# Patient Record
Sex: FEMALE | Race: WHITE | NOT HISPANIC OR LATINO | Employment: OTHER | URBAN - METROPOLITAN AREA
[De-identification: names, ages, dates, MRNs, and addresses within clinical notes are randomized per-mention and may not be internally consistent; named-entity substitution may affect disease eponyms.]

---

## 2018-01-30 PROBLEM — I49.9 CARDIAC ARRHYTHMIA: Status: ACTIVE | Noted: 2018-01-30

## 2018-11-15 ENCOUNTER — TELEPHONE (OUTPATIENT)
Dept: RHEUMATOLOGY | Facility: CLINIC | Age: 78
End: 2018-11-15

## 2018-11-15 NOTE — TELEPHONE ENCOUNTER
----- Message from Bautista Aguiar sent at 11/15/2018 12:28 PM CST -----  Contact: pt  Type:  Sooner Apoointment Request    Caller is requesting a sooner appointment.     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

## 2018-11-20 ENCOUNTER — TELEPHONE (OUTPATIENT)
Dept: RHEUMATOLOGY | Facility: CLINIC | Age: 78
End: 2018-11-20

## 2018-11-20 NOTE — TELEPHONE ENCOUNTER
----- Message from RT Lea sent at 11/20/2018  9:19 AM CST -----  Contact: pt    pt , requesting an appt to be worked in soon as possible due to inflammation / pain issues, thanks.

## 2018-11-27 ENCOUNTER — TELEPHONE (OUTPATIENT)
Dept: RHEUMATOLOGY | Facility: CLINIC | Age: 78
End: 2018-11-27

## 2018-11-28 NOTE — TELEPHONE ENCOUNTER
----- Message from Scarlet Cruz sent at 11/27/2018  3:09 PM CST -----  Type: Needs appointment     Who Called:  Patient  Best Call Back Number: [phone]  Additional Information: Patient left previous message/patient is being referred to see Dr. Davison/patient is willing to see Dr. Eden Snyder if can be seen sooner for joint stiffness and swelling in hands/please call patient back to advise.

## 2018-12-04 ENCOUNTER — LAB VISIT (OUTPATIENT)
Dept: LAB | Facility: HOSPITAL | Age: 78
End: 2018-12-04
Attending: INTERNAL MEDICINE
Payer: MEDICARE

## 2018-12-04 ENCOUNTER — INITIAL CONSULT (OUTPATIENT)
Dept: RHEUMATOLOGY | Facility: CLINIC | Age: 78
End: 2018-12-04
Payer: MEDICARE

## 2018-12-04 VITALS
WEIGHT: 166.44 LBS | SYSTOLIC BLOOD PRESSURE: 140 MMHG | HEIGHT: 62 IN | HEART RATE: 68 BPM | BODY MASS INDEX: 30.63 KG/M2 | DIASTOLIC BLOOD PRESSURE: 80 MMHG

## 2018-12-04 DIAGNOSIS — R76.8 ANA POSITIVE: Primary | ICD-10-CM

## 2018-12-04 DIAGNOSIS — M15.9 PRIMARY OSTEOARTHRITIS INVOLVING MULTIPLE JOINTS: ICD-10-CM

## 2018-12-04 DIAGNOSIS — R76.8 ANA POSITIVE: ICD-10-CM

## 2018-12-04 LAB
C3 SERPL-MCNC: 111 MG/DL
C4 SERPL-MCNC: 20 MG/DL
THYROGLOB AB SERPL IA-ACNC: 4.5 IU/ML
THYROPEROXIDASE IGG SERPL-ACNC: <6 IU/ML

## 2018-12-04 PROCEDURE — 86160 COMPLEMENT ANTIGEN: CPT | Mod: 59

## 2018-12-04 PROCEDURE — 86160 COMPLEMENT ANTIGEN: CPT

## 2018-12-04 PROCEDURE — 86235 NUCLEAR ANTIGEN ANTIBODY: CPT | Mod: 59

## 2018-12-04 PROCEDURE — 86235 NUCLEAR ANTIGEN ANTIBODY: CPT

## 2018-12-04 PROCEDURE — 86225 DNA ANTIBODY NATIVE: CPT

## 2018-12-04 PROCEDURE — 86038 ANTINUCLEAR ANTIBODIES: CPT

## 2018-12-04 PROCEDURE — 99999 PR PBB SHADOW E&M-EST. PATIENT-LVL III: CPT | Mod: PBBFAC,,, | Performed by: INTERNAL MEDICINE

## 2018-12-04 PROCEDURE — 86800 THYROGLOBULIN ANTIBODY: CPT

## 2018-12-04 PROCEDURE — 36415 COLL VENOUS BLD VENIPUNCTURE: CPT | Mod: PO

## 2018-12-04 PROCEDURE — 86376 MICROSOMAL ANTIBODY EACH: CPT

## 2018-12-04 PROCEDURE — 99205 OFFICE O/P NEW HI 60 MIN: CPT | Mod: S$GLB,,, | Performed by: INTERNAL MEDICINE

## 2018-12-04 PROCEDURE — 1101F PT FALLS ASSESS-DOCD LE1/YR: CPT | Mod: CPTII,S$GLB,, | Performed by: INTERNAL MEDICINE

## 2018-12-04 RX ORDER — DICLOFENAC SODIUM 10 MG/G
2 GEL TOPICAL 3 TIMES DAILY PRN
Qty: 100 G | Refills: 6 | Status: SHIPPED | OUTPATIENT
Start: 2018-12-04 | End: 2020-02-10

## 2018-12-04 NOTE — PROGRESS NOTES
Subjective:          Chief Complaint: Aracelis Weber is a 78 y.o. female who had concerns including Disease Management.    HPI:    Patient is a 78-year-old female referred by primary care physician for arthritis.  She had a workup that involved a negative rheumatoid factor, age appropriate sedimentation rate,C reactive protein JANET was positive 1:160 in speckled pattern  She is having pain in her hand with stiffness and right 2nd PIP joint now unable to flex. 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   Hx of bilateral TKA 3 and 5 years ago and those are doing well.   She notes intermittent edema. Some hammer toe deformity bilaterally making shoes problematic but not painful otherwise.   Long hx of GERD-severe.   Cannot tolerate NSAIDs at all w/o gastritis.   Can use Hydrocodone PRN   Patient denies weight loss, rashes, dry eye, dry mouth, nasal or palatal ulcerations,  lymphadenopathy, Raynaud's, hx of DVT/miscarriages, psoriasis or family hx of psoriasis, rashes, serositis, anemia or other constitutional symptoms.  Asking about naltrexone    Component      Latest Ref Rng & Units 11/10/2018   JANET IgG by IFA      <1:80 Detected (H)   JANET Interpretive Comment       See Note   JANET TITER       1:160 (A)   JANET PATTERN       Speckled (A)   Sed Rate      0 - 29 mm/Hr 39 (H)   CRP      0.00 - 0.90 mg/dL <0.50   JANET Screen      None Detected Detected (A)   Rheumatoid Factor      0.0 - 15.0 IU/mL <8.6       REVIEW OF SYSTEMS:    Review of Systems   Constitutional: Negative for fever, malaise/fatigue and weight loss.   HENT: Negative for sore throat.    Eyes: Negative for double vision, photophobia and redness.   Respiratory: Negative for cough, shortness of breath and wheezing.    Cardiovascular: Negative for chest pain, palpitations and orthopnea.   Gastrointestinal: Negative for abdominal pain, constipation and diarrhea.   Genitourinary: Negative for dysuria, hematuria and urgency.    Musculoskeletal: Positive for joint pain. Negative for back pain and myalgias.   Skin: Negative for rash.   Neurological: Negative for dizziness, tingling, focal weakness and headaches.   Endo/Heme/Allergies: Does not bruise/bleed easily.   Psychiatric/Behavioral: Negative for depression, hallucinations and suicidal ideas.               Objective:            History reviewed. No pertinent past medical history.  Family History   Problem Relation Age of Onset    Heart disease Mother     Arthritis Mother     Cancer Sister     Arthritis Sister     Diabetes Sister     Hypertension Sister      Social History     Tobacco Use    Smoking status: Former Smoker     Start date: 2/3/1955     Last attempt to quit: 1/3/1960     Years since quittin.9    Smokeless tobacco: Never Used   Substance Use Topics    Alcohol use: Not on file    Drug use: Not on file         Current Outpatient Medications on File Prior to Visit   Medication Sig Dispense Refill    aspirin (ECOTRIN) 81 MG EC tablet Take 81 mg by mouth once daily.      C/SOURCHERRY/CELERY/GRAPE SEED (TART CHERRY ORAL) Take by mouth.      CALCIUM CARBONATE (CALCIUM 600 ORAL) Take 1,200 mg by mouth once daily.       cholecalciferol, vitamin D3, (VITAMIN D3) 5,000 unit Tab Take 5,000 Units by mouth once daily.      cyclobenzaprine (FLEXERIL) 5 MG tablet Take 5 mg by mouth 3 (three) times daily as needed for Muscle spasms.      doxycycline (MONODOX) 100 MG capsule Take 1 capsule (100 mg total) by mouth 2 (two) times daily. 20 capsule 0    hydrocodone-acetaminophen 7.5-325mg (NORCO) 7.5-325 mg per tablet Take 1 tablet by mouth every 6 (six) hours as needed for Pain. 30 tablet 0    LACTOBACILLUS ACIDOPHILUS (PROBIOTIC ORAL) Take 1 capsule by mouth 2 (two) times daily.      metoprolol succinate (TOPROL-XL) 100 MG 24 hr tablet TAKE 1 TABLET EVERY DAY 90 tablet 3    multivitamin (ONE DAILY MULTIVITAMIN) per tablet Take 1 tablet by mouth once daily.       omeprazole (PRILOSEC) 40 MG capsule Take 1 capsule (40 mg total) by mouth once daily. 30 capsule 11    UNABLE TO FIND Sulfur powder QD      VIT C/VIT E ACET/LUTEIN/MIN (OCUVITE LUTEIN ORAL) Take by mouth.      VIT C/ZN/K.GINSG/MELODY/HRB62 (IMMUNE SUPPORT COMPLEX ORAL) Take 2 capsules by mouth once daily.       No current facility-administered medications on file prior to visit.        There were no vitals filed for this visit.    Physical Exam:    Physical Exam   Constitutional: She is oriented to person, place, and time. She appears well-developed and well-nourished.   HENT:   Head: Normocephalic and atraumatic.   Mouth/Throat: Oropharynx is clear and moist.   Eyes: EOM are normal. Pupils are equal, round, and reactive to light.   Neck: Normal range of motion.   Cardiovascular: Normal rate, regular rhythm and normal heart sounds.   Pulmonary/Chest: Effort normal and breath sounds normal.   Musculoskeletal:        Right shoulder: She exhibits normal range of motion, no tenderness and no swelling.        Left shoulder: She exhibits normal range of motion, no tenderness and no swelling.        Right elbow: She exhibits normal range of motion and no swelling. No tenderness found.        Left elbow: She exhibits normal range of motion and no swelling. No tenderness found.        Right wrist: She exhibits normal range of motion, no tenderness and no swelling.        Left wrist: She exhibits normal range of motion, no tenderness and no swelling.        Right knee: She exhibits normal range of motion and no swelling. No tenderness found.        Left knee: She exhibits normal range of motion and no swelling. No tenderness found.        Right hand: She exhibits decreased range of motion and tenderness. She exhibits no swelling.        Left hand: She exhibits decreased range of motion and tenderness. She exhibits no swelling.        Right foot: There is normal range of motion, no tenderness and no swelling.        Left  foot: There is normal range of motion, no tenderness and no swelling.   Bony hypertrophy at the PIP and DIP joints. +squaring at the CMC joint. No active synovitis on 28 joint exam.    Neurological: She is alert and oriented to person, place, and time.   Skin: Skin is warm and dry.   Psychiatric: She has a normal mood and affect. Her behavior is normal.             Assessment:       Encounter Diagnoses   Name Primary?    JANET positive Yes    Primary osteoarthritis involving multiple joints           Plan:        JANET positive  -     JANET; Future; Expected date: 12/04/2018  -     Anti-DNA antibody, double-stranded; Future; Expected date: 12/04/2018  -     Anti Sm/RNP Antibody; Future; Expected date: 12/04/2018  -     C3 complement; Future; Expected date: 12/04/2018  -     C4 complement; Future; Expected date: 12/04/2018  -     Anti-Smith antibody; Future; Expected date: 12/04/2018  -     Anti-thyroglobulin antibody; Future; Expected date: 12/04/2018  -     Thyroid peroxidase antibody; Future; Expected date: 12/04/2018  -     Sjogrens syndrome-B extractable nuclear antibody; Future; Expected date: 12/04/2018  -     Sjogrens syndrome-A extractable nuclear antibody; Future; Expected date: 12/04/2018    Primary osteoarthritis involving multiple joints    Other orders  -     diclofenac sodium (VOLTAREN) 1 % Gel; Apply 2 g topically 3 (three) times daily as needed.  Dispense: 100 g; Refill: 6    Trial with LDN 1.5mg daily to start and if tolerated we can increase to 3mg daily  Voltaren Gel  R/o +JANET but low suspicion for SLE.     No Follow-up on file.      f/u 4 months  Thank you for allowing me to participate in the care of this very pleasant patient.    60min consultation with greater than 50% spent in counseling, chart review and coordination of care. All questions answered.

## 2018-12-04 NOTE — LETTER
December 19, 2018      [address]  [facility] [id number]           [facility]  [address] [facility] [address] [place] [address]  Phone: [phone]  Fax: [phone]          Patient: Aracelis Weber   MR Number: [id number]   YOB: 1940   Date of Visit: 12/4/2018       Dear Dr. Ryan Reddy:    Thank you for referring Aracelis Weber to me for evaluation. Attached you will find relevant portions of my assessment and plan of care.    If you have questions, please do not hesitate to call me. I look forward to following Aracelis Weber along with you.    Sincerely,    Eden Snyder, DO    Enclosure  CC:  No Recipients    If you would like to receive this communication electronically, please contact [email or web address] or [phone] to request more information on [employer] access.    For providers and/or their staff who would like to refer a patient to [facility], please contact us through our one-stop-shop provider referral line, [facility], at [phone].    If you feel you have received this communication in error or would no longer like to receive these types of communications, please e-mail [email or web address]

## 2018-12-05 LAB
ANA SER QL IF: NORMAL
ANTI SM ANTIBODY: 4.91 EU
ANTI SM/RNP ANTIBODY: 5 EU
ANTI-SM INTERPRETATION: NEGATIVE
ANTI-SM/RNP INTERPRETATION: NEGATIVE
ANTI-SSA ANTIBODY: 4.05 EU
ANTI-SSA INTERPRETATION: NEGATIVE
ANTI-SSB ANTIBODY: 1.03 EU
ANTI-SSB INTERPRETATION: NEGATIVE
DSDNA AB SER-ACNC: NORMAL [IU]/ML

## 2018-12-14 ENCOUNTER — TELEPHONE (OUTPATIENT)
Dept: RHEUMATOLOGY | Facility: CLINIC | Age: 78
End: 2018-12-14

## 2018-12-14 NOTE — TELEPHONE ENCOUNTER
----- Message from Keily Lindsey sent at 12/14/2018  9:46 AM CST -----  Contact: Patient  Type:  Test Results    Who Called:  Patient  Name of Test (Lab/Mammo/Etc): labs  Date of Test:  12/4  Ordering Provider:    Where the test was performed:  [facility]  Best Call Back Number:  [phone] (home)    Additional Information:  Patient requesting a call back to discuss, has questions regarding labs. Thank you!     Spoke to the patient. Explained once the doctor reviews lab results, she will get a call back. CG

## 2018-12-14 NOTE — TELEPHONE ENCOUNTER
Repeat JANET negative nothing from a rheumatologic perspective all the other testing is negative for lupus or other connective tissue diseases.  She does have a positive anti thyroglobulin antibody which can be seen in autoimmune thyroid disease- is it is of no concern other than to follow up with her primary care for routine thyroid levels.    Kaylin CORBIN

## 2018-12-14 NOTE — TELEPHONE ENCOUNTER
Patient given lab results per Dr. Snyder. Patient will follow up with PCP concerning thyroid labs. BERKLEY

## 2018-12-27 PROBLEM — M19.90 ARTHRITIS: Status: ACTIVE | Noted: 2018-12-27

## 2019-04-24 ENCOUNTER — OFFICE VISIT (OUTPATIENT)
Dept: URGENT CARE | Facility: CLINIC | Age: 79
End: 2019-04-24
Payer: MEDICARE

## 2019-04-24 VITALS
HEIGHT: 62 IN | RESPIRATION RATE: 15 BRPM | OXYGEN SATURATION: 99 % | WEIGHT: 163 LBS | BODY MASS INDEX: 30 KG/M2 | TEMPERATURE: 98 F | SYSTOLIC BLOOD PRESSURE: 131 MMHG | HEART RATE: 53 BPM | DIASTOLIC BLOOD PRESSURE: 62 MMHG
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
## 2019-04-24 DIAGNOSIS — S80.01XA CONTUSION OF RIGHT KNEE, INITIAL ENCOUNTER: ICD-10-CM

## 2019-04-24 DIAGNOSIS — S20.212A CONTUSION, CHEST WALL, LEFT, INITIAL ENCOUNTER: ICD-10-CM

## 2019-04-24 PROCEDURE — 73562 X-RAY EXAM OF KNEE 3: CPT | Mod: RT,S$GLB,, | Performed by: RADIOLOGY

## 2019-04-24 PROCEDURE — 1101F PT FALLS ASSESS-DOCD LE1/YR: CPT | Mod: CPTII,S$GLB,, | Performed by: FAMILY MEDICINE

## 2019-04-24 PROCEDURE — 73562 XR KNEE 3 VIEW RIGHT: ICD-10-PCS | Mod: RT,S$GLB,, | Performed by: RADIOLOGY

## 2019-04-24 PROCEDURE — 99214 OFFICE O/P EST MOD 30 MIN: CPT | Mod: S$GLB,,, | Performed by: FAMILY MEDICINE

## 2019-04-24 PROCEDURE — 71100 XR RIBS 2 VIEW LEFT: ICD-10-PCS | Mod: LT,S$GLB,, | Performed by: RADIOLOGY

## 2019-04-24 PROCEDURE — 99214 PR OFFICE/OUTPT VISIT, EST, LEVL IV, 30-39 MIN: ICD-10-PCS | Mod: S$GLB,,, | Performed by: FAMILY MEDICINE

## 2019-04-24 PROCEDURE — 1101F PR PT FALLS ASSESS DOC 0-1 FALLS W/OUT INJ PAST YR: ICD-10-PCS | Mod: CPTII,S$GLB,, | Performed by: FAMILY MEDICINE

## 2019-04-24 PROCEDURE — 71100 X-RAY EXAM RIBS UNI 2 VIEWS: CPT | Mod: LT,S$GLB,, | Performed by: RADIOLOGY

## 2019-04-24 NOTE — PROGRESS NOTES
"Subjective:       Patient ID: Aracelis Weber is a 78 y.o. female.    Vitals:  height is 5' 2" (1.575 m) and weight is 73.9 kg (163 lb). Her temperature is 97.8 °F (36.6 °C). Her blood pressure is 131/62 and her pulse is 53 (abnormal). Her respiration is 15 and oxygen saturation is 99%.     Chief Complaint: Fall (monday 3pm); Knee Pain (right); and Rib Injury (left rib )    Pt fell Monday afternoon in her home while carrying stuff. Fell forward and hurt right knee and left side ribs. Hurts to take take deep breath. But denies SOB. Has not taken otc meds for pain. Has rested last day or so and has improved but still sore.     Fall   The accident occurred 2 days ago. The fall occurred while walking. She fell from a height of 3 to 5 ft. She landed on hard floor. There was no blood loss. The point of impact was the right knee. The pain is present in the left shoulder. Pertinent negatives include no abdominal pain, hematuria, loss of consciousness, nausea or vomiting.   Knee Pain       Patient landed directly on her right kneecap and then rolled and fell onto her left side jamming her elbow into her left anterior lateral rib cage.  She believe she bumped her head slightly because or glasses flu off but she did not lose consciousness and has no loss of memory or headaches at this time.  She has been able to ambulate more effectively every day since the injury and her knee pain is decreasing she continues to have pain in the left anterior infraclavicular region and the left anterior lateral mid chest around the 9th through 12th ribs.  Denies hemoptysis denies difficulty swallowing or eating.  No vomiting.  Some discomfort with deep inspiration.    Constitution: Negative for fatigue.   HENT: Negative for facial swelling and facial trauma.    Neck: Negative for neck stiffness.   Cardiovascular: Negative for chest trauma.   Eyes: Negative for eye trauma, double vision and blurred vision.   Gastrointestinal: Negative for " abdominal trauma, abdominal pain, nausea, vomiting, constipation, diarrhea and rectal bleeding.   Genitourinary: Negative for hematuria, missed menses, genital trauma and pelvic pain.   Musculoskeletal: Positive for pain. Negative for trauma, joint swelling and abnormal ROM of joint.   Skin: Positive for bruising. Negative for color change, wound, abrasion and laceration.   Neurological: Negative for dizziness, history of vertigo, light-headedness, coordination disturbances, altered mental status and loss of consciousness.   Hematologic/Lymphatic: Negative for history of bleeding disorder.   Psychiatric/Behavioral: Negative for altered mental status.       Objective:      Physical Exam   Constitutional: She is oriented to person, place, and time. She appears well-developed and well-nourished. She is cooperative.  Non-toxic appearance. She does not appear ill. No distress.   HENT:   Head: Normocephalic and atraumatic.   Right Ear: Hearing, tympanic membrane, external ear and ear canal normal.   Left Ear: Hearing, tympanic membrane, external ear and ear canal normal.   Nose: Nose normal. No mucosal edema, rhinorrhea or nasal deformity. No epistaxis. Right sinus exhibits no maxillary sinus tenderness and no frontal sinus tenderness. Left sinus exhibits no maxillary sinus tenderness and no frontal sinus tenderness.   Mouth/Throat: Uvula is midline, oropharynx is clear and moist and mucous membranes are normal. No trismus in the jaw. Normal dentition. No uvula swelling. No posterior oropharyngeal erythema.   Eyes: Conjunctivae and lids are normal. Right eye exhibits no discharge. Left eye exhibits no discharge. No scleral icterus.   Sclera clear bilat   Neck: Trachea normal, normal range of motion, full passive range of motion without pain and phonation normal. Neck supple.   Cardiovascular: Normal rate, regular rhythm, normal heart sounds, intact distal pulses and normal pulses.   Pulmonary/Chest: Effort normal and  breath sounds normal. No respiratory distress. She exhibits tenderness.   Tenderness along the left lower costochondral joints.  No subcutaneous emphysema or crepitus.  Tenderness also in the left infraclavicular area along the ribs but again no crepitus or subcu emphysema.   Abdominal: Soft. Normal appearance and bowel sounds are normal. She exhibits no distension, no pulsatile midline mass and no mass. There is no tenderness.   Musculoskeletal: Normal range of motion. She exhibits no edema or deformity.   Semi circular ecchymosis involving the inferior and medial aspects of the right patella well-healed TKR incision sites.  No gross ligament instability distal neurovascular intact.   Neurological: She is alert and oriented to person, place, and time. She exhibits normal muscle tone. Coordination normal.   Skin: Skin is warm, dry and intact. She is not diaphoretic. No pallor.   Psychiatric: She has a normal mood and affect. Her speech is normal and behavior is normal. Judgment and thought content normal. Cognition and memory are normal.   Nursing note and vitals reviewed.      Assessment:       1. Fall, initial encounter    2. Contusion, chest wall, left, initial encounter    3. Contusion of right knee, initial encounter        Plan:         Fall, initial encounter  -     X-Ray Ribs 2 View Left; Future; Expected date: 04/24/2019  -     XR KNEE 3 VIEW RIGHT; Future; Expected date: 04/24/2019    Contusion, chest wall, left, initial encounter    Contusion of right knee, initial encounter     X-rays of the chest on left rib cage show no rib fractures no evidence of hemopneumothorax and no evidence of left shoulder fracture.  Right knee x-ray shows hardware present from TKR but no evidence of acute fracture or hardware shift.  Patient continue OTC medicines for pain continue to increase her activities as symptoms allow and follow if she is not able to return to full activity within the next 1-2 weeks.

## 2019-04-24 NOTE — PATIENT INSTRUCTIONS
Chest Contusion    A contusion is a bruise to the skin, muscle, or ribs. It may cause pain, tenderness, and swelling. It may turn the skin purple until it heals. Contusions take a few days to a few weeks to heal.  Home care  Follow these guidelines when caring for yourself at home:  · Rest. Dont do any heavy lifting or strenuous activity. Dont do any activity that causes pain.  · Put an ice pack on the injured area. Do this for 20 minutes every 1 to 2 hours the first day. You can make an ice pack by wrapping a plastic bag of ice cubes in a thin towel. Continue to use the ice pack 3 to 4 times a day for the next 2 days. Then use the ice pack as needed to ease pain and swelling.  · After 1 to 2 days you may put a warm compress on the area. Do this for 10 minutes several times a day. A warm compress is a clean cloth thats damp with warm water.  · Hold a pillow to the affected area when you cough. This will help ease pain.  · You may use acetaminophen or ibuprofen to control pain, unless another pain medicine was prescribed. If you have chronic liver or kidney disease, talk with your health care provider before using these medicines. Also talk with your provider if youve had a stomach ulcer or GI bleeding.  Follow-up care  Follow up with your health care provider during the next week, or as advised.  When to seek medical advice  Call your health care provider right away if any of these occur:  · Shortness of breath, difficulty breathing, or breathing fast  · Chest pain gets worse when you breathe  · Severe pain that comes on suddenly or lasts more than an hour  · Dizziness, weakness, or fainting  · New abdominal pain or abdominal pain that gets worse  ·  Fever of 101ºF (38.3ºC) or higher, or as directed by your health care provider  Date Last Reviewed: 2/15/2015  © 5596-6331 [employer]. [address] [place] [address], [place] [address]. All rights reserved. This information is not intended as a substitute  for professional medical care. Always follow your healthcare professional's instructions.

## 2019-04-27 ENCOUNTER — TELEPHONE (OUTPATIENT)
Dept: URGENT CARE | Facility: CLINIC | Age: 79
End: 2019-04-27

## 2019-05-07 ENCOUNTER — OFFICE VISIT (OUTPATIENT)
Dept: RHEUMATOLOGY | Facility: CLINIC | Age: 79
End: 2019-05-07
Payer: MEDICARE

## 2019-05-07 VITALS
SYSTOLIC BLOOD PRESSURE: 122 MMHG | HEIGHT: 62 IN | BODY MASS INDEX: 28.44 KG/M2 | WEIGHT: 154.56 LBS | DIASTOLIC BLOOD PRESSURE: 58 MMHG | HEART RATE: 55 BPM

## 2019-05-07 DIAGNOSIS — M19.90 OSTEOARTHRITIS, UNSPECIFIED OSTEOARTHRITIS TYPE, UNSPECIFIED SITE: Primary | ICD-10-CM

## 2019-05-07 DIAGNOSIS — R76.8 ANA POSITIVE: ICD-10-CM

## 2019-05-07 DIAGNOSIS — R07.81 RIB PAIN ON LEFT SIDE: ICD-10-CM

## 2019-05-07 PROCEDURE — 1101F PT FALLS ASSESS-DOCD LE1/YR: CPT | Mod: CPTII,S$GLB,, | Performed by: INTERNAL MEDICINE

## 2019-05-07 PROCEDURE — 99999 PR PBB SHADOW E&M-EST. PATIENT-LVL III: ICD-10-PCS | Mod: PBBFAC,,, | Performed by: INTERNAL MEDICINE

## 2019-05-07 PROCEDURE — 99999 PR PBB SHADOW E&M-EST. PATIENT-LVL III: CPT | Mod: PBBFAC,,, | Performed by: INTERNAL MEDICINE

## 2019-05-07 PROCEDURE — 99214 PR OFFICE/OUTPT VISIT, EST, LEVL IV, 30-39 MIN: ICD-10-PCS | Mod: S$GLB,,, | Performed by: INTERNAL MEDICINE

## 2019-05-07 PROCEDURE — 99214 OFFICE O/P EST MOD 30 MIN: CPT | Mod: S$GLB,,, | Performed by: INTERNAL MEDICINE

## 2019-05-07 PROCEDURE — 1101F PR PT FALLS ASSESS DOC 0-1 FALLS W/OUT INJ PAST YR: ICD-10-PCS | Mod: CPTII,S$GLB,, | Performed by: INTERNAL MEDICINE

## 2019-05-07 NOTE — PROGRESS NOTES
Subjective:          Chief Complaint: Aracelis Weber is a 78 y.o. female who had concerns including Positive JANET (4 months) and Osteoarthritis.    HPI:    Patient is a 78-year-old female referred by primary care physician for arthritis.  She had a workup that involved a negative rheumatoid factor, age appropriate sedimentation rate,C reactive protein JANET was positive 1:160 in speckled pattern  Note      Repeat JANET negative nothing from a rheumatologic perspective all the other testing is negative for lupus or other connective tissue diseases.  She does have a positive anti thyroglobulin antibody which can be seen in autoimmune thyroid disease- is it is of no concern other than to follow up with her primary care for routine thyroid levels.                 She is having pain in her hand with stiffness and right 2nd PIP joint now unable to flex. 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   Hx of bilateral TKA 3 and 5 years ago and those are doing well.   She notes intermittent edema. Some hammer toe deformity bilaterally making shoes problematic but not painful otherwise.   Long hx of GERD-severe.   Cannot tolerate NSAIDs at all w/o gastritis.   Can use Hydrocodone PRN   Patient denies weight loss, rashes, dry eye, dry mouth, nasal or palatal ulcerations,  lymphadenopathy, Raynaud's, hx of DVT/miscarriages, psoriasis or family hx of psoriasis, rashes, serositis, anemia or other constitutional symptoms.  Asking about naltrexone    Component      Latest Ref Rng & Units 11/10/2018   JANET IgG by IFA      <1:80 Detected (H)   JANET Interpretive Comment       See Note   JANET TITER       1:160 (A)   JANET PATTERN       Speckled (A)   Sed Rate      0 - 29 mm/Hr 39 (H)   CRP      0.00 - 0.90 mg/dL <0.50   JANET Screen      None Detected Detected (A)   Rheumatoid Factor      0.0 - 15.0 IU/mL <8.6       REVIEW OF SYSTEMS:    Review of Systems   Constitutional: Negative for fever, malaise/fatigue and weight  loss.   HENT: Negative for sore throat.    Eyes: Negative for double vision, photophobia and redness.   Respiratory: Negative for cough, shortness of breath and wheezing.    Cardiovascular: Negative for chest pain, palpitations and orthopnea.   Gastrointestinal: Negative for abdominal pain, constipation and diarrhea.   Genitourinary: Negative for dysuria, hematuria and urgency.   Musculoskeletal: Positive for joint pain. Negative for back pain and myalgias.   Skin: Negative for rash.   Neurological: Negative for dizziness, tingling, focal weakness and headaches.   Endo/Heme/Allergies: Does not bruise/bleed easily.   Psychiatric/Behavioral: Negative for depression, hallucinations and suicidal ideas.               Objective:            Past Medical History:   Diagnosis Date    Depression      Family History   Problem Relation Age of Onset    Heart disease Mother     Arthritis Mother     Cancer Sister     Arthritis Sister     Diabetes Sister     Hypertension Sister      Social History     Tobacco Use    Smoking status: Former Smoker     Start date: 2/3/1955     Last attempt to quit: 1/3/1960     Years since quittin.3    Smokeless tobacco: Never Used   Substance Use Topics    Alcohol use: Never     Alcohol/week: 0.0 oz     Frequency: Never    Drug use: Never         Current Outpatient Medications on File Prior to Visit   Medication Sig Dispense Refill    aspirin (ECOTRIN) 81 MG EC tablet Take 81 mg by mouth once daily.      C/SOURCHERRY/CELERY/GRAPE SEED (TART CHERRY ORAL) Take by mouth.      CALCIUM CARBONATE (CALCIUM 600 ORAL) Take 1,200 mg by mouth once daily.       cholecalciferol, vitamin D3, (VITAMIN D3) 5,000 unit Tab Take 5,000 Units by mouth once daily.      cyclobenzaprine (FLEXERIL) 5 MG tablet Take 5 mg by mouth 3 (three) times daily as needed for Muscle spasms.      diclofenac sodium (VOLTAREN) 1 % Gel Apply 2 g topically 3 (three) times daily as needed. 100 g 6     hydrocodone-acetaminophen 7.5-325mg (NORCO) 7.5-325 mg per tablet Take 1 tablet by mouth every 6 (six) hours as needed for Pain. 30 tablet 0    LACTOBACILLUS ACIDOPHILUS (PROBIOTIC ORAL) Take 1 capsule by mouth 2 (two) times daily.      metoprolol succinate (TOPROL-XL) 100 MG 24 hr tablet TAKE 1 TABLET EVERY DAY 90 tablet 3    multivitamin (ONE DAILY MULTIVITAMIN) per tablet Take 1 tablet by mouth once daily.      naltrexone HCl (NALTREXONE ORAL) Take 2.5 mg by mouth once daily.      omeprazole (PRILOSEC) 40 MG capsule Take 1 capsule (40 mg total) by mouth once daily. 30 capsule 11    UNABLE TO FIND Sulfur powder QD      VIT C/VIT E ACET/LUTEIN/MIN (OCUVITE LUTEIN ORAL) Take by mouth.      VIT C/ZN/K.GINSG/MELODY/HRB62 (IMMUNE SUPPORT COMPLEX ORAL) Take 2 capsules by mouth once daily.       No current facility-administered medications on file prior to visit.        Vitals:    05/07/19 1016   BP: (!) 122/58   Pulse: (!) 55       Physical Exam:    Physical Exam   Constitutional: She is oriented to person, place, and time. She appears well-developed and well-nourished.   HENT:   Head: Normocephalic and atraumatic.   Mouth/Throat: Oropharynx is clear and moist.   Eyes: Pupils are equal, round, and reactive to light. EOM are normal.   Neck: Normal range of motion.   Cardiovascular: Normal rate, regular rhythm and normal heart sounds.   Pulmonary/Chest: Effort normal and breath sounds normal.   Musculoskeletal:        Right shoulder: She exhibits normal range of motion, no tenderness and no swelling.        Left shoulder: She exhibits normal range of motion, no tenderness and no swelling.        Right elbow: She exhibits normal range of motion and no swelling. No tenderness found.        Left elbow: She exhibits normal range of motion and no swelling. No tenderness found.        Right wrist: She exhibits normal range of motion, no tenderness and no swelling.        Left wrist: She exhibits normal range of motion, no  tenderness and no swelling.        Right knee: She exhibits normal range of motion and no swelling. No tenderness found.        Left knee: She exhibits normal range of motion and no swelling. No tenderness found.        Right hand: She exhibits decreased range of motion and tenderness. She exhibits no swelling.        Left hand: She exhibits decreased range of motion and tenderness. She exhibits no swelling.        Right foot: There is normal range of motion, no tenderness and no swelling.        Left foot: There is normal range of motion, no tenderness and no swelling.   Bony hypertrophy at the PIP and DIP joints. +squaring at the CMC joint. No active synovitis on 28 joint exam.    Neurological: She is alert and oriented to person, place, and time.   Skin: Skin is warm and dry.   Psychiatric: She has a normal mood and affect. Her behavior is normal.             Assessment:       No diagnosis found.       Plan:        There are no diagnoses linked to this encounter.  Voltaren Gel  R/o +JANET -no evidnece of   Naltrexone helping will increase to 3mg daily. Noted significant relief with pain.     Serratus injury recent fall -lidocaine OTC patch give 4-6 weeks if not better call office.     Follow up in about 6 months (around 11/7/2019).      f/u 4-6 months  Thank you for allowing me to participate in the care of this very pleasant patient.    30min consultation with greater than 50% spent in counseling, chart review and coordination of care. All questions answered.

## 2019-06-13 NOTE — TELEPHONE ENCOUNTER
----- Message from Liz Ordaz sent at 6/13/2019 12:39 PM CDT -----  Contact: [phone]  Patient contact# [phone]  Patient requesting this prescription Naltrexone 3mg to be re-sent to [facility].  Fax#[phone] [facility] fax#  Patient stated [facility] will cover the cost of the medication.

## 2019-11-07 ENCOUNTER — TELEPHONE (OUTPATIENT)
Dept: RHEUMATOLOGY | Facility: CLINIC | Age: 79
End: 2019-11-07

## 2019-11-07 NOTE — TELEPHONE ENCOUNTER
----- Message from Polly Blount sent at 11/7/2019  8:26 AM CST -----  Type:  Patient Returning Call    Who Called: patient  Who Left Message for Patient:  Anali  Does the patient know what this is regarding?:  eugenie  Best Call Back Number:  [phone]  Additional Information:  Thank you!    Patient confirms 12 pm 11-22-19 as a makeup for 11-7-19. CG

## 2019-11-22 ENCOUNTER — OFFICE VISIT (OUTPATIENT)
Dept: RHEUMATOLOGY | Facility: CLINIC | Age: 79
End: 2019-11-22
Payer: MEDICARE

## 2019-11-22 VITALS
BODY MASS INDEX: 24.66 KG/M2 | DIASTOLIC BLOOD PRESSURE: 61 MMHG | HEIGHT: 62 IN | SYSTOLIC BLOOD PRESSURE: 114 MMHG | WEIGHT: 134 LBS | HEART RATE: 54 BPM

## 2019-11-22 DIAGNOSIS — R76.8 ANA POSITIVE: ICD-10-CM

## 2019-11-22 DIAGNOSIS — M15.9 PRIMARY OSTEOARTHRITIS INVOLVING MULTIPLE JOINTS: Primary | ICD-10-CM

## 2019-11-22 PROCEDURE — 1159F MED LIST DOCD IN RCRD: CPT | Mod: S$GLB,,, | Performed by: INTERNAL MEDICINE

## 2019-11-22 PROCEDURE — 99999 PR PBB SHADOW E&M-EST. PATIENT-LVL III: ICD-10-PCS | Mod: PBBFAC,,, | Performed by: INTERNAL MEDICINE

## 2019-11-22 PROCEDURE — 1159F PR MEDICATION LIST DOCUMENTED IN MEDICAL RECORD: ICD-10-PCS | Mod: S$GLB,,, | Performed by: INTERNAL MEDICINE

## 2019-11-22 PROCEDURE — 1101F PT FALLS ASSESS-DOCD LE1/YR: CPT | Mod: CPTII,S$GLB,, | Performed by: INTERNAL MEDICINE

## 2019-11-22 PROCEDURE — 99999 PR PBB SHADOW E&M-EST. PATIENT-LVL III: CPT | Mod: PBBFAC,,, | Performed by: INTERNAL MEDICINE

## 2019-11-22 PROCEDURE — 1101F PR PT FALLS ASSESS DOC 0-1 FALLS W/OUT INJ PAST YR: ICD-10-PCS | Mod: CPTII,S$GLB,, | Performed by: INTERNAL MEDICINE

## 2019-11-22 PROCEDURE — 99214 PR OFFICE/OUTPT VISIT, EST, LEVL IV, 30-39 MIN: ICD-10-PCS | Mod: S$GLB,,, | Performed by: INTERNAL MEDICINE

## 2019-11-22 PROCEDURE — 1126F PR PAIN SEVERITY QUANTIFIED, NO PAIN PRESENT: ICD-10-PCS | Mod: S$GLB,,, | Performed by: INTERNAL MEDICINE

## 2019-11-22 PROCEDURE — 99214 OFFICE O/P EST MOD 30 MIN: CPT | Mod: S$GLB,,, | Performed by: INTERNAL MEDICINE

## 2019-11-22 PROCEDURE — 1126F AMNT PAIN NOTED NONE PRSNT: CPT | Mod: S$GLB,,, | Performed by: INTERNAL MEDICINE

## 2019-11-22 NOTE — PROGRESS NOTES
Subjective:          Chief Complaint: Aracelis Weber is a 79 y.o. female who had concerns including Disease Management and Osteoarthritis.    HPI:    Patient is a 78-year-old female referred by primary care physician for arthritis.  She had a workup that involved a negative rheumatoid factor, age appropriate sedimentation rate,C reactive protein JANET was positive 1:160 in speckled pattern  Note      Repeat JANET negative nothing from a rheumatologic perspective all the other testing is negative for lupus or other connective tissue diseases.  She does have a positive anti thyroglobulin antibody which can be seen in autoimmune thyroid disease- is it is of no concern other than to follow up with her primary care for routine thyroid levels.                 She is having pain in her hand with stiffness and right 2nd PIP joint now unable to flex. 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   Hx of bilateral TKA 3 and 5 years ago and those are doing well.   She notes intermittent edema. Some hammer toe deformity bilaterally making shoes problematic but not painful otherwise.   Long hx of GERD-severe.   Cannot tolerate NSAIDs at all w/o gastritis.   Can use Hydrocodone PRN   Added Naltrexone at 3mg and doing very well  Lost 30# with [facility].   Patient denies weight loss, rashes, dry eye, dry mouth, nasal or palatal ulcerations,  lymphadenopathy, Raynaud's, hx of DVT/miscarriages, psoriasis or family hx of psoriasis, rashes, serositis, anemia or other constitutional symptoms.  Asking about naltrexone    Component      Latest Ref Rng & Units 11/10/2018   JANET IgG by IFA      <1:80 Detected (H)   JANET Interpretive Comment       See Note   JANET TITER       1:160 (A)   JANET PATTERN       Speckled (A)   Sed Rate      0 - 29 mm/Hr 39 (H)   CRP      0.00 - 0.90 mg/dL <0.50   JANET Screen      None Detected Detected (A)   Rheumatoid Factor      0.0 - 15.0 IU/mL <8.6       REVIEW OF SYSTEMS:    Review of Systems    Constitutional: Negative for fever, malaise/fatigue and weight loss.   HENT: Negative for sore throat.    Eyes: Negative for double vision, photophobia and redness.   Respiratory: Negative for cough, shortness of breath and wheezing.    Cardiovascular: Negative for chest pain, palpitations and orthopnea.   Gastrointestinal: Negative for abdominal pain, constipation and diarrhea.   Genitourinary: Negative for dysuria, hematuria and urgency.   Musculoskeletal: Positive for joint pain. Negative for back pain and myalgias.   Skin: Negative for rash.   Neurological: Negative for dizziness, tingling, focal weakness and headaches.   Endo/Heme/Allergies: Does not bruise/bleed easily.   Psychiatric/Behavioral: Negative for depression, hallucinations and suicidal ideas.               Objective:            Past Medical History:   Diagnosis Date    Depression      Family History   Problem Relation Age of Onset    Heart disease Mother     Arthritis Mother     Cancer Sister     Arthritis Sister     Diabetes Sister     Hypertension Sister      Social History     Tobacco Use    Smoking status: Former Smoker     Start date: 2/3/1955     Last attempt to quit: 1/3/1960     Years since quittin.9    Smokeless tobacco: Never Used   Substance Use Topics    Alcohol use: Never     Alcohol/week: 0.0 standard drinks     Frequency: Never    Drug use: Never         Current Outpatient Medications on File Prior to Visit   Medication Sig Dispense Refill    aspirin (ECOTRIN) 81 MG EC tablet Take 81 mg by mouth once daily.      C/SOURCHERRY/CELERY/GRAPE SEED (TART CHERRY ORAL) Take by mouth.      CALCIUM CARBONATE (CALCIUM 600 ORAL) Take 1,200 mg by mouth once daily.       cholecalciferol, vitamin D3, (VITAMIN D3) 5,000 unit Tab Take 5,000 Units by mouth once daily.      cyclobenzaprine (FLEXERIL) 5 MG tablet Take 5 mg by mouth 3 (three) times daily as needed for Muscle spasms.      diclofenac sodium (VOLTAREN) 1 % Gel Apply  2 g topically 3 (three) times daily as needed. 100 g 6    hydrocodone-acetaminophen 7.5-325mg (NORCO) 7.5-325 mg per tablet Take 1 tablet by mouth every 6 (six) hours as needed for Pain. 30 tablet 0    LACTOBACILLUS ACIDOPHILUS (PROBIOTIC ORAL) Take 1 capsule by mouth 2 (two) times daily.      metoprolol succinate (TOPROL-XL) 100 MG 24 hr tablet Take 1 tablet (100 mg total) by mouth once daily. 90 tablet 3    multivitamin (ONE DAILY MULTIVITAMIN) per tablet Take 1 tablet by mouth once daily.      naltrexone capsule Compounded to 3mg  Sig once daily (Patient taking differently: Take by mouth every evening. Compounded to 3mg  Sig once daily) 30 capsule 11    UNABLE TO FIND Sulfur powder QD      VIT C/VIT E ACET/LUTEIN/MIN (OCUVITE LUTEIN ORAL) Take by mouth.      VIT C/ZN/K.GINSG/MELODY/HRB62 (IMMUNE SUPPORT COMPLEX ORAL) Take 2 capsules by mouth once daily.      omeprazole (PRILOSEC) 40 MG capsule Take 1 capsule (40 mg total) by mouth once daily. 30 capsule 11     No current facility-administered medications on file prior to visit.        Vitals:    11/22/19 1211   BP: 114/61   Pulse: (!) 54       Physical Exam:    Physical Exam   Constitutional: She is oriented to person, place, and time. She appears well-developed and well-nourished.   HENT:   Head: Normocephalic and atraumatic.   Mouth/Throat: Oropharynx is clear and moist.   Eyes: Pupils are equal, round, and reactive to light. EOM are normal.   Neck: Normal range of motion.   Cardiovascular: Normal rate, regular rhythm and normal heart sounds.   Pulmonary/Chest: Effort normal and breath sounds normal.   Musculoskeletal:        Right shoulder: She exhibits normal range of motion, no tenderness and no swelling.        Left shoulder: She exhibits normal range of motion, no tenderness and no swelling.        Right elbow: She exhibits normal range of motion and no swelling. No tenderness found.        Left elbow: She exhibits normal range of motion and no  swelling. No tenderness found.        Right wrist: She exhibits normal range of motion, no tenderness and no swelling.        Left wrist: She exhibits normal range of motion, no tenderness and no swelling.        Right knee: She exhibits normal range of motion and no swelling. No tenderness found.        Left knee: She exhibits normal range of motion and no swelling. No tenderness found.        Right hand: She exhibits decreased range of motion and tenderness. She exhibits no swelling.        Left hand: She exhibits decreased range of motion and tenderness. She exhibits no swelling.        Right foot: There is normal range of motion, no tenderness and no swelling.        Left foot: There is normal range of motion, no tenderness and no swelling.   Bony hypertrophy at the PIP and DIP joints. +squaring at the CMC joint. No active synovitis on 28 joint exam.    Neurological: She is alert and oriented to person, place, and time.   Skin: Skin is warm and dry.   Psychiatric: She has a normal mood and affect. Her behavior is normal.             Assessment:       Encounter Diagnoses   Name Primary?    Primary osteoarthritis involving multiple joints Yes    JANET positive           Plan:        Primary osteoarthritis involving multiple joints    JANET positive    Other orders  -     naltrexone capsule; Compounded to 3mg taken once nightly  Dispense: 30 capsule; Refill: 11      Voltaren Gel  R/o +JANET -no evidnece of   Naltrexone helping will increase to 3mg daily. Noted significant relief with pain.     Serratus injury recent fall -lidocaine OTC patch give 4-6 weeks if not better call office.     Follow up in about 6 months (around 5/22/2020).      f/u 4-6 months  Thank you for allowing me to participate in the care of this very pleasant patient.    30min consultation with greater than 50% spent in counseling, chart review and coordination of care. All questions answered.

## 2020-02-10 RX ORDER — DICLOFENAC SODIUM 10 MG/G
GEL TOPICAL 3 TIMES DAILY
Qty: 100 G | Refills: 6 | Status: SHIPPED | OUTPATIENT
Start: 2020-02-10 | End: 2021-02-05

## 2020-02-10 NOTE — TELEPHONE ENCOUNTER
----- Message from Lit Metz sent at 2/10/2020  4:19 PM CST -----  Contact: ONIEL ROSARIO [[id number]]  Type:  Patient Returning Call    Who Called: ONIEL ROSARIO [[id number]]    Who Left Message for Patient:     Does the patient know what this is regarding?: no    Would the patient rather a call back or a response via [facility]? call    Best Call Back Number: [phone]    Additional Information:     Patient got a call from our clinic and was checking if it was rheumatology. It wasn't and patient says she is doing well. Will keep May appointment.

## 2020-03-13 ENCOUNTER — TELEPHONE (OUTPATIENT)
Dept: RHEUMATOLOGY | Facility: CLINIC | Age: 80
End: 2020-03-13

## 2020-03-13 NOTE — TELEPHONE ENCOUNTER
She is not on any pain medication from me.   Ok to stop the naltrexone.   But hydrocodone is from PCP so just clarify with her     Dr. CORBIN

## 2020-03-13 NOTE — TELEPHONE ENCOUNTER
"Informed pt it is okay to stop naltrexone, pt states "PCP prescribed hydrocodone for pain from surgery"  "

## 2020-03-19 PROBLEM — E78.5 DYSLIPIDEMIA: Status: ACTIVE | Noted: 2020-03-19

## 2020-03-24 ENCOUNTER — TELEPHONE (OUTPATIENT)
Dept: RHEUMATOLOGY | Facility: CLINIC | Age: 80
End: 2020-03-24

## 2020-03-24 NOTE — TELEPHONE ENCOUNTER
Pt curious if she was considered immunosuppressed and if we were testing for COIVD-19 here at the clinic. 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

## 2020-03-24 NOTE — TELEPHONE ENCOUNTER
----- Message from Princess OBIE sEparza sent at 3/24/2020  3:20 PM CDT -----  Contact: pt  Type: Needs Medical Advice    Who Called:  Patient  Best Call Back Number:   Additional Information: requesting a call from the Nurse, patient has some questions she needs to ask.

## 2020-04-23 NOTE — TELEPHONE ENCOUNTER
----- Message from Priti Song sent at 4/23/2020  4:20 PM CDT -----  Contact: Patient  Hello,    Patient is in need of a refill for Naltrexone medication. She would like to have it sent to [facility] in [place]. Please send and notify patient once complete.        Thanks,    Priti ext. [phone]

## 2020-04-24 ENCOUNTER — DOCUMENTATION ONLY (OUTPATIENT)
Dept: RHEUMATOLOGY | Facility: CLINIC | Age: 80
End: 2020-04-24

## 2020-04-24 NOTE — PROGRESS NOTES
[facility]. Pt notified.

## 2020-05-26 ENCOUNTER — OFFICE VISIT (OUTPATIENT)
Dept: RHEUMATOLOGY | Facility: CLINIC | Age: 80
End: 2020-05-26
Payer: MEDICARE

## 2020-05-26 DIAGNOSIS — M19.90 OSTEOARTHRITIS, UNSPECIFIED OSTEOARTHRITIS TYPE, UNSPECIFIED SITE: Primary | ICD-10-CM

## 2020-05-26 PROCEDURE — 99443 PR PHYSICIAN TELEPHONE EVALUATION 21-30 MIN: ICD-10-PCS | Mod: 95,,, | Performed by: INTERNAL MEDICINE

## 2020-05-26 PROCEDURE — 99443 PR PHYSICIAN TELEPHONE EVALUATION 21-30 MIN: CPT | Mod: 95,,, | Performed by: INTERNAL MEDICINE

## 2020-05-26 NOTE — PROGRESS NOTES
Established Patient - Audio Only Telehealth Visit     Patient is a 78-year-old female referred by primary care physician for arthritis.  She had a workup that involved a negative rheumatoid factor, age appropriate sedimentation rate,C reactive protein JANET was positive 1:160 in speckled pattern  Note      Repeat JANET negative nothing from a rheumatologic perspective all the other testing is negative for lupus or other connective tissue diseases.  She does have a positive anti thyroglobulin antibody which can be seen in autoimmune thyroid disease- is it is of no concern other than to follow up with her primary care for routine thyroid levels.                     She is having pain in her hand with stiffness and right 2nd PIP joint now unable to flex. 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   Hx of bilateral TKA 3 and 5 years ago and those are doing well.   She notes intermittent edema. Some hammer toe deformity bilaterally making shoes problematic but not painful otherwise.   Long hx of GERD-severe.   Cannot tolerate NSAIDs at all w/o gastritis.   Can use Hydrocodone PRN   Added Naltrexone at 3mg and doing very well  Lost 30# with [facility].   Patient denies weight loss, rashes, dry eye, dry mouth, nasal or palatal ulcerations,  lymphadenopathy, Raynaud's, hx of DVT/miscarriages, psoriasis or family hx of psoriasis, rashes, serositis, anemia or other constitutional symptoms.  Asking about naltrexone  Primary osteoarthritis involving multiple joints     JANET positive     Other orders  -     naltrexone capsule;         Voltaren Gel  R/o +JANET -no evidnece of   Naltrexone helping will increase to 4.5 mg daily. Noted significant relief with pain.           The patient location is: Home [place]  The chief complaint leading to consultation is: medication management and f/u   Visit type: Virtual visit with synchronous audio   Total time spent with patient: 25min  Each patient to whom he or she provides medical  services by telemedicine is:  (1) informed of the relationship between the physician and patient and the respective role of any other health care provider with respect to management of the patient; and (2) notified that he or she may decline to receive medical services by telemedicine and may withdraw from such care at any time.    Notes:   As above.   This service was not originating from a related E/M service provided within the previous 7 days nor will  to an E/M service or procedure within the next 24 hours or my soonest available appointment.  Prevailing standard of care was able to be met in this audio-only visit.

## 2020-06-01 ENCOUNTER — TELEPHONE (OUTPATIENT)
Dept: RHEUMATOLOGY | Facility: CLINIC | Age: 80
End: 2020-06-01

## 2020-06-01 NOTE — TELEPHONE ENCOUNTER
----- Message from Aimee Johnson sent at 6/1/2020 12:22 PM CDT -----  Contact: [facility] -Joanne  Type: Needs Medical Advice    Who Called:  joanne  Best Call Back Number: [phone]  Additional Information: Requesting a call back regarding needing the strength of medication naltrexone capsule  Please Advise ---Thank you    Spoke to Joanne. Last chart notes states increase to 4.5 mg . Pharmacy will fill. CG

## 2020-06-30 ENCOUNTER — TELEPHONE (OUTPATIENT)
Dept: RHEUMATOLOGY | Facility: CLINIC | Age: 80
End: 2020-06-30

## 2020-06-30 NOTE — TELEPHONE ENCOUNTER
----- Message from Roselyn Montgomery sent at 6/30/2020  9:14 AM CDT -----  Type: Needs Medical Advice  Who Called:  patient  Symptoms (please be specific):  patient states that she is having a lot of pain  Best Call Back Number: [phone]  Additional Information: requesting a call back to speak to the nurse.

## 2020-06-30 NOTE — TELEPHONE ENCOUNTER
Patient is recovering from sinus surgery with Dr. Bautista Quinn ans has held Naltrexone prior and still off. Second sinus surgery is tomorrow. Patient has [place] 7.5/325 for post op pain. Patient states she has not been taking Norco but 4,000 mg Tylenol daily. Patient advised limit  tylenol intake to 2,000 mg a day if possible.     Patient states  all her joints have been hurting like a toothache and Voltaren gel and Tylenol not helping. Patient is asking when she can safely restart Naltrexone. ENT has done lots of labs recently and would like you to review in Epic please. CG

## 2020-06-30 NOTE — TELEPHONE ENCOUNTER
Patient's sed rate is elevated. No date on first sinus surgery but I assume prior to 6/25/20 which is lab date. This is likely elevated from surgery but will have to check with [place].     JANET is + in same titer as it was with Dr. Franco in 2018, then negative with me 1 month later- it fluctuates and does not signify flare of disease.     I think restart naltrexone after sutures are removed or wounds are starting to heal. There is some minor bleeding risk but no immune system suppression.     Dr. Snyder

## 2020-07-02 ENCOUNTER — TELEPHONE (OUTPATIENT)
Dept: RHEUMATOLOGY | Facility: CLINIC | Age: 80
End: 2020-07-02

## 2020-07-02 NOTE — TELEPHONE ENCOUNTER
Patient with no formally diagnosed autoimmune disorder to date so will need to see more notes. JANET is weakly positive.   I will need to review prior to appt.    Office notes  Surgical notes  Any pathology from surgery  Any imaging done by ENT>     I will need a copy of neg COVID as well (should have had this done prior to surgery)  Dr. Snyder

## 2020-07-02 NOTE — TELEPHONE ENCOUNTER
Patient called. She is one day post op from second sinus surgery. Dr. Bautista Quinn is recommending she see you soon for an in person physical. Right now booked for 6 month follow up in November. She states that  Dr. Quinn is willing to discuss her case over the phone. She states that her hearing has been affected and she has lost her sense of taste.Please advise. CG

## 2020-07-06 ENCOUNTER — TELEPHONE (OUTPATIENT)
Dept: RHEUMATOLOGY | Facility: CLINIC | Age: 80
End: 2020-07-06

## 2020-07-06 NOTE — TELEPHONE ENCOUNTER
Returned patient call regarding rescheduling appointment. Patient stated had surgery but still having trouble hearing. Patient stated surgeon wants appointment pushed up. Informed will send a message to Mrs Briones regarding a sooner appointment.

## 2020-07-06 NOTE — TELEPHONE ENCOUNTER
----- Message from Nish Gutierrez sent at 7/6/2020 10:08 AM CDT -----  Regarding: pt  Type:  Patient Returning Call    Who Called:  pt  Who Left Message for Patient:  nafisa  Does the patient know what this is regarding?:  eugenie appt with dr jamilah Rowland Call Back Number:  [phone]   Additional Information:

## 2020-07-07 NOTE — TELEPHONE ENCOUNTER
Ok to stop naltrexone but I suspect it is the hydrocodone that is causing constipation not naltrexone.     She is new to taking hydrocodone with ENT for sinus surgery, not from me.     She has taken naltrexone for over 1 year with no ASE to date.   Please see my notes from 6/30 and f/u 7/2/20 there is a request to see patient sooner per ENT, need ENT notes.     I am sure  was working on fitting patient in sooner, but not in office. So covering staff ok to find a sooner slot but need all the records as requested from my note 6/30/20.     DR. Snyder

## 2020-07-07 NOTE — TELEPHONE ENCOUNTER
Patient contacted office and informed had a allergic reaction to naltrexone. Has not had a bowel movement in 9 days. Stated she only took one dose of naltrexone. Did not go into detail of reaction. Stated called pharmacy regarding reaction and was told to stop naltrexone that it does not mix well with pain medication. Nurse informed a message will be sent to Dr Snyder for advisement. Patient verbalized understanding.

## 2020-07-08 ENCOUNTER — TELEPHONE (OUTPATIENT)
Dept: RHEUMATOLOGY | Facility: CLINIC | Age: 80
End: 2020-07-08

## 2020-07-08 NOTE — TELEPHONE ENCOUNTER
Spoke to pt's , advises she is having issues with her arthritis. Pt is unable to hear nurse to discuss issues,  states that since sinus surgery 6-8 wks ago pt has unable to hear.  reports having a lot of pain, but when takes pain medication has constipation and had naltrexone after stopping pain medication and started with reaction. States Dr. Turpin ENT states her immune system is down and the joint pain is intense, difficulty walking and sleeping. They would like pt for to be seen for further evaluation. Advised we would discuss further with Dr. CORBIN and reach back out to them with further advisement or an appointment. No further questions at this time.

## 2020-07-08 NOTE — TELEPHONE ENCOUNTER
Spoke to pt and she states that she was put on the norco 7.5-325mg 10 days ago. Pt states that the medication has made her constipated so she stopped it. Pt states she spoke to Maureen and Maureen gave her Dr. Snyder's message so she started the naltrexone. Pt states it caused a reaction but did not go into detail. Pt sates she called the pharmacy and they told her it was caused by the interaction from the pain medication. Pt states she has stopped the naltrexone but has to start back taking the pain medication because she is in extreme pain. Pt advised to take a stool softener while on the pain meds. Pt verbalized understanding. I spoke to the ENT office and they are going to fax over notes. Will place on your desk once received. Pt is requesting refill from you but advised she would need to contact ordering provider. Pt was adamant that I sent it to you..

## 2020-07-08 NOTE — TELEPHONE ENCOUNTER
----- Message from Liz Ordaz sent at 7/7/2020  3:32 PM CDT -----  Contact: ONIEL ROSARIO [[id number]]  Patient is requesting a call back from the nurse stated she's having adverse of medication and also inquiring about sooner appt.    Please call the patient upon request at phone number [phone].

## 2020-07-08 NOTE — TELEPHONE ENCOUNTER
----- Message from Bree Loera, Patient Care Assistant sent at 7/8/2020  4:29 PM CDT -----  Name of Who is Calling: ONIEL ROSARIO [[id number]]    What is the request in detail: Requesting a call back. Please contact to further discuss and advise      Can the clinic reply by [facility]: No    What Number to Call Back if not in [facility]:   [phone]

## 2020-07-09 ENCOUNTER — TELEPHONE (OUTPATIENT)
Dept: RHEUMATOLOGY | Facility: CLINIC | Age: 80
End: 2020-07-09

## 2020-07-09 RX ORDER — PREDNISONE 5 MG/1
10 TABLET ORAL DAILY
Qty: 60 TABLET | Refills: 1 | Status: SHIPPED | OUTPATIENT
Start: 2020-07-09 | End: 2020-09-07

## 2020-07-09 NOTE — TELEPHONE ENCOUNTER
Dr. Snyder,  Dr. Quinn would like you to give him a call regarding this pt.  Please call him back on his personal cell phone at [phone].  Thank you.    Court Perera MA  7/9/2020

## 2020-07-09 NOTE — TELEPHONE ENCOUNTER
Thanks spoke with Dr. Quinn- I want patient to start Prednisone 10mg daily today and through the weekend. We should see her on Monday at 12 (thanks) and can assess what is going on with regard to sinuses.     Pred

## 2020-07-09 NOTE — TELEPHONE ENCOUNTER
Spoke with pt's  and relayed Dr. Snyder's message regarding prescription.  He understands and will go  prescription today.    Court Perera MA  7/9/2020

## 2020-07-09 NOTE — TELEPHONE ENCOUNTER
Spoke with pt's  and gave him Dr. Snyder's message regarding appt on 7/13/20.   understands and will bring pt in at that time.    Court Perera MA  7/9/2020

## 2020-07-09 NOTE — TELEPHONE ENCOUNTER
Make sure we have records by end of clinic Friday 7/10/20. I have received a and returned a call with ENT.   Book her for 12pm Monday 7/13/20

## 2020-07-13 ENCOUNTER — OFFICE VISIT (OUTPATIENT)
Dept: RHEUMATOLOGY | Facility: CLINIC | Age: 80
End: 2020-07-13
Payer: MEDICARE

## 2020-07-13 VITALS
DIASTOLIC BLOOD PRESSURE: 65 MMHG | WEIGHT: 130 LBS | HEIGHT: 62 IN | BODY MASS INDEX: 23.92 KG/M2 | SYSTOLIC BLOOD PRESSURE: 131 MMHG | HEART RATE: 84 BPM

## 2020-07-13 DIAGNOSIS — M31.6 GIANT CELL ARTERITIS: ICD-10-CM

## 2020-07-13 DIAGNOSIS — M35.3 POLYMYALGIA RHEUMATICA: Primary | ICD-10-CM

## 2020-07-13 DIAGNOSIS — M79.661 PAIN IN RIGHT LOWER LEG: ICD-10-CM

## 2020-07-13 DIAGNOSIS — M54.9 UPPER BACK PAIN: ICD-10-CM

## 2020-07-13 DIAGNOSIS — M54.50 MIDLINE LOW BACK PAIN WITHOUT SCIATICA, UNSPECIFIED CHRONICITY: ICD-10-CM

## 2020-07-13 DIAGNOSIS — H91.90 HEARING LOSS, UNSPECIFIED HEARING LOSS TYPE, UNSPECIFIED LATERALITY: ICD-10-CM

## 2020-07-13 PROCEDURE — 99215 OFFICE O/P EST HI 40 MIN: CPT | Mod: S$GLB,,, | Performed by: INTERNAL MEDICINE

## 2020-07-13 PROCEDURE — 99999 PR PBB SHADOW E&M-EST. PATIENT-LVL III: CPT | Mod: PBBFAC,,, | Performed by: INTERNAL MEDICINE

## 2020-07-13 PROCEDURE — 1101F PT FALLS ASSESS-DOCD LE1/YR: CPT | Mod: CPTII,S$GLB,, | Performed by: INTERNAL MEDICINE

## 2020-07-13 PROCEDURE — 1159F MED LIST DOCD IN RCRD: CPT | Mod: S$GLB,,, | Performed by: INTERNAL MEDICINE

## 2020-07-13 PROCEDURE — 1125F AMNT PAIN NOTED PAIN PRSNT: CPT | Mod: S$GLB,,, | Performed by: INTERNAL MEDICINE

## 2020-07-13 PROCEDURE — 1125F PR PAIN SEVERITY QUANTIFIED, PAIN PRESENT: ICD-10-PCS | Mod: S$GLB,,, | Performed by: INTERNAL MEDICINE

## 2020-07-13 PROCEDURE — 99999 PR PBB SHADOW E&M-EST. PATIENT-LVL III: ICD-10-PCS | Mod: PBBFAC,,, | Performed by: INTERNAL MEDICINE

## 2020-07-13 PROCEDURE — 1101F PR PT FALLS ASSESS DOC 0-1 FALLS W/OUT INJ PAST YR: ICD-10-PCS | Mod: CPTII,S$GLB,, | Performed by: INTERNAL MEDICINE

## 2020-07-13 PROCEDURE — 99215 PR OFFICE/OUTPT VISIT, EST, LEVL V, 40-54 MIN: ICD-10-PCS | Mod: S$GLB,,, | Performed by: INTERNAL MEDICINE

## 2020-07-13 PROCEDURE — 1159F PR MEDICATION LIST DOCUMENTED IN MEDICAL RECORD: ICD-10-PCS | Mod: S$GLB,,, | Performed by: INTERNAL MEDICINE

## 2020-07-13 RX ORDER — PREDNISONE 10 MG/1
20 TABLET ORAL DAILY
Qty: 60 TABLET | Refills: 3 | Status: SHIPPED | OUTPATIENT
Start: 2020-07-13 | End: 2020-08-27 | Stop reason: DRUGHIGH

## 2020-07-13 RX ORDER — OMEPRAZOLE 40 MG/1
CAPSULE, DELAYED RELEASE ORAL
Qty: 90 CAPSULE | Refills: 0 | Status: SHIPPED | OUTPATIENT
Start: 2020-07-13 | End: 2020-09-23 | Stop reason: SDUPTHER

## 2020-07-13 RX ORDER — HYDROCODONE BITARTRATE AND ACETAMINOPHEN 7.5; 325 MG/1; MG/1
1 TABLET ORAL EVERY 6 HOURS PRN
Qty: 120 TABLET | Refills: 0 | Status: SHIPPED | OUTPATIENT
Start: 2020-07-13 | End: 2020-09-23 | Stop reason: SDUPTHER

## 2020-07-13 RX ORDER — SUCRALFATE 1 G/1
1 TABLET ORAL 3 TIMES DAILY
Qty: 90 TABLET | Refills: 2 | Status: SHIPPED | OUTPATIENT
Start: 2020-07-13 | End: 2020-07-14

## 2020-07-13 ASSESSMENT — ROUTINE ASSESSMENT OF PATIENT INDEX DATA (RAPID3)
FATIGUE SCORE: 2.2
PAIN SCORE: 8
TOTAL RAPID3 SCORE: 7.17
MDHAQ FUNCTION SCORE: 1.5
PSYCHOLOGICAL DISTRESS SCORE: 0
PATIENT GLOBAL ASSESSMENT SCORE: 8.5

## 2020-07-13 NOTE — PATIENT INSTRUCTIONS
This could be reaction to dissolving mesh from sinus surgery, another thought is this is giant cell arteritis with polymyalgia rheumatica.       Increase Prednisone to 20mg daily    Call office with update in 24 hours of any joint pain/ear pain.     Start omeprazole (prilosec)  40mg by mouth daily.       Carafate 1 tab as needed for any heartburn not controlled with above prilosec.     Labs today and in 1 week. For inflammatory marker.

## 2020-07-13 NOTE — LETTER
July 13, 2020        Bautista Quinn MD  [address] [facility] [address]             [facility]  [address] [place] [address]  Phone: [phone]  Fax: [phone]   Patient: Aracelis Weber   MR Number: [id number]   YOB: 1940   Date of Visit: 7/13/2020       Dear Dr. Quinn:    Thank you for referring Aracelis Weber to me for evaluation. Attached you will find relevant portions of my assessment and plan of care.    If you have questions, please do not hesitate to call me. I look forward to following Aracelis Weber along with you.    Sincerely,      Eden Snyder, DO            CC  No Recipients    Enclosure

## 2020-07-13 NOTE — PROGRESS NOTES
Subjective:          Chief Complaint: Aracelis Weber is a 79 y.o. female who had concerns including Osteoarthritis.    HPI:  Patient with a recent chronic sinusitis that ultimately warranted a septoplasty, endoscopic sinus surgery and turbinate reduction 5/2020   Four weeks postop t increasingly worse she was noting pain in her throat and ears lasting several hours complaint of pdizziness she had symptoms of ear pain and decreased hearing in the left ear.  She underwent a debridement at this visit she continue with compound nasal irrigations.  Follow-up June 16 she had had tympanostomy tubes placed for pressure within the ear and decreased hearing was complaining of headaches and sinus pressure, mucoid drainage    Patient did have labs done she does have a low IgM was concern for slowed healing.  She did was noted to have nasal drainage an ear fluid with symptoms of congestion continued with nasal irrigation,  ear drops  Multiple cultures have returned negative he is.    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    Patient did have repeat procedure on with biopsies taken 07/03/2020 showing right maxillary sinus chronically inflamed fibrotic polypoid portions benign nasal mucosa left maxillary sinus similar polypoid fragments ulcerated markedly inflamed respiratory mucosa focal foreign body giant cell response noted suggested that this might have been from her Gelfoam packing as a possible cause of this reaction    I have spoken with Dr. Quinn prior to patient's visit today and was a concern that she could have triggered some inflammatory response  with the Gelfoam packing    .  Patient states she no longer has a headache is not noticing much ear drainage continues with profound loss of hearing on the left and rather significant on the right both sensorineural and some conductive changes.    Restarted on prednisone as of [id number] mg patient has not noticed any change with her hearing in this time she did notice slight improvement with her joint aches and pains but is still relying on hydrocodone for the bulk of this.    She denies a persistent headache she does have some tenderness about the preauricular region particularly on the right more than the left.  She has denied jaw claudication changes in her voice or any amaurosis fugax.  She has no pre-existing history of joint aches and pains hip or otherwise.          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    Prior hx;  Patient is a 78-year-old female referred by primary care physician for arthritis.  She had a workup that involved a negative rheumatoid factor, age appropriate sedimentation rate,C reactive protein JANET was positive 1:160 in speckled pattern  Note      Repeat JANET negative nothing from a rheumatologic perspective all the other testing is negative for lupus or other connective tissue diseases.  She does have a positive anti thyroglobulin antibody which can be seen in autoimmune thyroid disease- is it is of no concern other than to follow up with her primary care for routine thyroid levels.                 She is having pain in her hand with stiffness and right 2nd PIP joint now unable to flex. 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   Hx of bilateral TKA 3 and 5 years ago and those are doing well.   She notes intermittent edema. Some hammer toe deformity bilaterally making shoes problematic but not painful otherwise.   Long hx of GERD-severe.   Cannot tolerate NSAIDs at all w/o gastritis.   Can use  Hydrocodone PRN   Added Naltrexone at 3mg and doing very well  Lost 30# with WW.   Patient denies weight loss, rashes, dry eye, dry mouth, nasal or palatal ulcerations,  lymphadenopathy, Raynaud's, hx of DVT/miscarriages, psoriasis or family hx of psoriasis, rashes, serositis, anemia or other constitutional symptoms.  Asking about naltrexone    Component      Latest Ref Rng & Units 11/10/2018   JANET IgG by IFA      <1:80 Detected (H)   JANET Interpretive Comment       See Note   JANET TITER       1:160 (A)   JANET PATTERN       Speckled (A)   Sed Rate      0 - 29 mm/Hr 39 (H)   CRP      0.00 - 0.90 mg/dL <0.50   JANET Screen      None Detected Detected (A)   Rheumatoid Factor      0.0 - 15.0 IU/mL <8.6       REVIEW OF SYSTEMS:    Review of Systems   Constitutional: Negative for fever, malaise/fatigue and weight loss.   HENT: Negative for sore throat.    Eyes: Negative for double vision, photophobia and redness.   Respiratory: Negative for cough, shortness of breath and wheezing.    Cardiovascular: Negative for chest pain, palpitations and orthopnea.   Gastrointestinal: Negative for abdominal pain, constipation and diarrhea.   Genitourinary: Negative for dysuria, hematuria and urgency.   Musculoskeletal: Positive for joint pain. Negative for back pain and myalgias.   Skin: Negative for rash.   Neurological: Negative for dizziness, tingling, focal weakness and headaches.   Endo/Heme/Allergies: Does not bruise/bleed easily.   Psychiatric/Behavioral: Negative for depression, hallucinations and suicidal ideas.               Objective:            Past Medical History:   Diagnosis Date    Depression      Family History   Problem Relation Age of Onset    Heart disease Mother     Arthritis Mother     Cancer Sister     Arthritis Sister     Diabetes Sister     Hypertension Sister      Social History     Tobacco Use    Smoking status: Former Smoker     Start date: 2/3/1955     Quit date: 1/3/1960     Years since quittin.5     Smokeless tobacco: Never Used   Substance Use Topics    Alcohol use: Never     Alcohol/week: 0.0 standard drinks     Frequency: Never    Drug use: Never         Current Outpatient Medications on File Prior to Visit   Medication Sig Dispense Refill    aspirin (ECOTRIN) 81 MG EC tablet Take 81 mg by mouth once daily.      C/SOURCHERRY/CELERY/GRAPE SEED (TART CHERRY ORAL) Take by mouth.      CALCIUM CARBONATE (CALCIUM 600 ORAL) Take 1,200 mg by mouth once daily.       cholecalciferol, vitamin D3, (VITAMIN D3) 5,000 unit Tab Take 5,000 Units by mouth once daily.      diclofenac sodium (VOLTAREN) 1 % Gel Apply topically 3 (three) times daily. Apply to affect joints up to 3 times daily 100 g 6    FLUZONE HIGH-DOSE 2019-20, PF, 180 mcg/0.5 mL Syrg ADM 0.5ML IM UTD  0    LACTOBACILLUS ACIDOPHILUS (PROBIOTIC ORAL) Take 1 capsule by mouth 2 (two) times daily.      metoprolol succinate (TOPROL-XL) 100 MG 24 hr tablet Take 1 tablet (100 mg total) by mouth once daily. 90 tablet 3    multivitamin (ONE DAILY MULTIVITAMIN) per tablet Take 1 tablet by mouth once daily.      naltrexone capsule Compound. 30 capsule 11    predniSONE (DELTASONE) 5 MG tablet Take 2 tablets (10 mg total) by mouth once daily. 60 tablet 1    UNABLE TO FIND Sulfur powder QD      VIT C/VIT E ACET/LUTEIN/MIN (OCUVITE LUTEIN ORAL) Take by mouth.      VIT C/ZN/K.GINSG/MELODY/HRB62 (IMMUNE SUPPORT COMPLEX ORAL) Take 2 capsules by mouth once daily.      [DISCONTINUED] hydrocodone-acetaminophen 7.5-325mg (NORCO) 7.5-325 mg per tablet Take 1 tablet by mouth every 6 (six) hours as needed for Pain. 30 tablet 0    [DISCONTINUED] omeprazole (PRILOSEC) 40 MG capsule TAKE 1 CAPSULE(40 MG) BY MOUTH EVERY DAY 90 capsule 0    cyclobenzaprine (FLEXERIL) 5 MG tablet Take 5 mg by mouth 3 (three) times daily as needed for Muscle spasms.       No current facility-administered medications on file prior to visit.        Vitals:    07/13/20 1209   BP: 131/65    Pulse: 84       Physical Exam:    Physical Exam   Constitutional: She is oriented to person, place, and time. She appears well-developed and well-nourished.   HENT:   Head: Normocephalic and atraumatic.   Mouth/Throat: Oropharynx is clear and moist.   Eyes: Pupils are equal, round, and reactive to light. EOM are normal.   Neck: Normal range of motion.   Cardiovascular: Normal rate, regular rhythm and normal heart sounds.   Pulmonary/Chest: Effort normal and breath sounds normal.   Musculoskeletal:        Right shoulder: She exhibits normal range of motion, no tenderness and no swelling.        Left shoulder: She exhibits normal range of motion, no tenderness and no swelling.        Right elbow: She exhibits normal range of motion and no swelling. No tenderness found.        Left elbow: She exhibits normal range of motion and no swelling. No tenderness found.        Right wrist: She exhibits normal range of motion, no tenderness and no swelling.        Left wrist: She exhibits normal range of motion, no tenderness and no swelling.        Right knee: She exhibits normal range of motion and no swelling. No tenderness found.        Left knee: She exhibits normal range of motion and no swelling. No tenderness found.        Right hand: She exhibits decreased range of motion and tenderness. She exhibits no swelling.        Left hand: She exhibits decreased range of motion and tenderness. She exhibits no swelling.        Right foot: There is normal range of motion, no tenderness and no swelling.        Left foot: There is normal range of motion, no tenderness and no swelling.   Bony hypertrophy at the PIP and DIP joints. +squaring at the CMC joint. No active synovitis on 28 joint exam.    Neurological: She is alert and oriented to person, place, and time.   Skin: Skin is warm and dry.   Psychiatric: She has a normal mood and affect. Her behavior is normal.             Assessment:       Encounter Diagnoses   Name Primary?     Polymyalgia rheumatica Yes    Giant cell arteritis     Upper back pain     Midline low back pain without sciatica, unspecified chronicity     Pain in right lower leg     Hearing loss, unspecified hearing loss type, unspecified laterality           Plan:        Polymyalgia rheumatica  -     Sedimentation rate; Standing  -     C-Reactive Protein; Standing; Expected date: 07/13/2020  -     Anti-Histone Antibody; Future; Expected date: 07/13/2020  -     Anti-Smith antibody; Future; Expected date: 07/13/2020  -     Anti-DNA antibody, double-stranded; Future; Expected date: 07/13/2020  -     Anti Sm/RNP Antibody; Future; Expected date: 07/13/2020  -     C3 complement; Future; Expected date: 07/13/2020  -     C4 complement; Future; Expected date: 07/13/2020  -     Complement, total; Future; Expected date: 07/13/2020  -     predniSONE (DELTASONE) 10 MG tablet; Take 2 tablets (20 mg total) by mouth once daily.  Dispense: 60 tablet; Refill: 3    Giant cell arteritis  -     Sedimentation rate; Standing  -     C-Reactive Protein; Standing; Expected date: 07/13/2020  -     Anti-Histone Antibody; Future; Expected date: 07/13/2020  -     Anti-Smith antibody; Future; Expected date: 07/13/2020  -     Anti-DNA antibody, double-stranded; Future; Expected date: 07/13/2020  -     Anti Sm/RNP Antibody; Future; Expected date: 07/13/2020  -     C3 complement; Future; Expected date: 07/13/2020  -     C4 complement; Future; Expected date: 07/13/2020  -     Complement, total; Future; Expected date: 07/13/2020  -     predniSONE (DELTASONE) 10 MG tablet; Take 2 tablets (20 mg total) by mouth once daily.  Dispense: 60 tablet; Refill: 3    Upper back pain  -     HYDROcodone-acetaminophen (NORCO) 7.5-325 mg per tablet; Take 1 tablet by mouth every 6 (six) hours as needed for Pain.  Dispense: 120 tablet; Refill: 0    Midline low back pain without sciatica, unspecified chronicity  -     HYDROcodone-acetaminophen (NORCO) 7.5-325 mg per  tablet; Take 1 tablet by mouth every 6 (six) hours as needed for Pain.  Dispense: 120 tablet; Refill: 0    Pain in right lower leg  -     HYDROcodone-acetaminophen (NORCO) 7.5-325 mg per tablet; Take 1 tablet by mouth every 6 (six) hours as needed for Pain.  Dispense: 120 tablet; Refill: 0    Hearing loss, unspecified hearing loss type, unspecified laterality  -     predniSONE (DELTASONE) 10 MG tablet; Take 2 tablets (20 mg total) by mouth once daily.  Dispense: 60 tablet; Refill: 3    Other orders  -     sucralfate (CARAFATE) 1 gram tablet; Take 1 tablet (1 g total) by mouth 3 (three) times daily. Dissolve tablet in 30cc water then drink. May use as need for heartburn.  Dispense: 90 tablet; Refill: 2  -     omeprazole (PRILOSEC) 40 MG capsule; TAKE 1 CAPSULE(40 MG) BY MOUTH EVERY DAY  Dispense: 90 capsule; Refill: 0       Patient is a 79-year-old female she has had a recent robust inflammatory reaction within the sinus cavity as well as hearing deficit following a sinus surgery.  She has been repeated cultured postoperatively completed antibiotics and irrigations negative for any fungal infections or bacterial infections.  She continues to have widespread musculoskeletal pain elevated sedimentation rate and a biopsy from July 2020 showing a possible reaction or inflammatory response to the gel packing.     -differential would include inflammatory reaction following sinus surgery versus concomitant temporal arteritis and polymyalgia rheumatica following a sinus surgery.  Repeat some labs today  . -patient did have seem to have some musculoskeletal relief with prednisone at 10 mg but 0 improvement with any hearing we will increase this to 20 mg daily she will call the office in 24 hr see if there has been any change.   -    This could be reaction to dissolving mesh from sinus surgery, another thought is this is giant cell arteritis with polymyalgia rheumatica.       Increase Prednisone to 20mg daily    Call office  with update in 24 hours of any joint pain/ear pain.     Start omeprazole (prilosec)  40mg by mouth daily.   Carafate 1 tab as needed for any heartburn not controlled with above prilosec.     Labs today and in 1 week. For inflammatory marker.     No follow-ups on file.      f/u 4-6 months  Thank you for allowing me to participate in the care of this very pleasant patient.    45min consultation with greater than 50% spent in counseling, chart review and coordination of care. All questions answered.

## 2020-07-14 ENCOUNTER — TELEPHONE (OUTPATIENT)
Dept: RHEUMATOLOGY | Facility: CLINIC | Age: 80
End: 2020-07-14

## 2020-07-14 RX ORDER — SUCRALFATE 1 G/1
1 TABLET ORAL 3 TIMES DAILY PRN
Qty: 120 TABLET | Refills: 0 | Status: SHIPPED | OUTPATIENT
Start: 2020-07-14 | End: 2020-07-14 | Stop reason: SDUPTHER

## 2020-07-14 RX ORDER — SUCRALFATE 1 G/1
1 TABLET ORAL 3 TIMES DAILY PRN
Qty: 120 TABLET | Refills: 0 | Status: SHIPPED | OUTPATIENT
Start: 2020-07-14 | End: 2020-07-20

## 2020-07-15 ENCOUNTER — TELEPHONE (OUTPATIENT)
Dept: RHEUMATOLOGY | Facility: CLINIC | Age: 80
End: 2020-07-15

## 2020-07-15 NOTE — TELEPHONE ENCOUNTER
----- Message from Aimee Johnson sent at 7/15/2020  2:11 PM CDT -----  Contact: pt  Type: Needs Medical Advice    Who Called:  Pt  Best Call Back Number: [phone]  Additional Information: Requesting a call back regarding pt stated that her progress report  the predniSONE (DELTASONE) 20 MG tablet has been helping her . It seams to be making the pain medication more effective,pt is having a side effect from it . Pt stated her feet is swelling from it . Pt is trying to control swelling by putting her feet up on pillows everyday.omeprazole (PRILOSEC) 40 MG capsule  1x every morning and sucralfate (CARAFATE) 1 gram tablet 1 x since she seen doctor  she stated that she is taking both and her stomach is not being affected by predzodone . Pt stated constipation is a little better from pain medication .  Please Advise ---Thank you

## 2020-07-16 NOTE — TELEPHONE ENCOUNTER
Called patient left a message. To keep prednisone at 20mg daily. She should see Dr. Quinn (ENT) tomorrow and I do want to plan for temporal artery biopsy.     Dr. Snyder

## 2020-07-17 ENCOUNTER — TELEPHONE (OUTPATIENT)
Dept: RHEUMATOLOGY | Facility: CLINIC | Age: 80
End: 2020-07-17

## 2020-07-17 DIAGNOSIS — M35.3 POLYMYALGIA RHEUMATICA: ICD-10-CM

## 2020-07-17 DIAGNOSIS — M31.6 GIANT CELL ARTERITIS: ICD-10-CM

## 2020-07-17 DIAGNOSIS — R60.9 EDEMA, UNSPECIFIED TYPE: Primary | ICD-10-CM

## 2020-07-17 RX ORDER — FUROSEMIDE 20 MG/1
20 TABLET ORAL DAILY PRN
Qty: 30 TABLET | Refills: 11 | Status: SHIPPED | OUTPATIENT
Start: 2020-07-17 | End: 2020-08-26

## 2020-07-17 NOTE — TELEPHONE ENCOUNTER
----- Message from Anali Del Rio LPN sent at 7/16/2020  4:11 PM CDT -----  Contact: self  Detailed update from patient-CG  ----- Message -----  From: Scarlet Chambers  Sent: 7/16/2020   3:43 PM CDT  To: [facility] Staff    Type: Needs Medical Advice  Who Called:  patient     Best Call Back Number: [phone]  Additional Information: called to give report on how she is doing,   states   1. basically the same as yesterday,   2. Prednisone seams to be making pain medication more effective, took 1 less pill of hydrocodone       yesterday today of 3 for yesterday,  will be taking 3 today.   3. Prilosec, is protecting her stomach, taken early morning.   4.  Karofate she did not have to use yesterday or today.   5. Feet are swollen,to the point of stinging,  trying to keep them elevated as often as she can.  6. Constipation is much better.

## 2020-07-17 NOTE — TELEPHONE ENCOUNTER
We will try Lasix 20 mg for edema with KCL supplement.     Take only if very swollen so may only need every other day. DR> M    Add BMP to labs for next week.

## 2020-07-20 ENCOUNTER — LAB VISIT (OUTPATIENT)
Dept: LAB | Facility: HOSPITAL | Age: 80
End: 2020-07-20
Attending: INTERNAL MEDICINE
Payer: MEDICARE

## 2020-07-20 DIAGNOSIS — M31.6 GIANT CELL ARTERITIS: ICD-10-CM

## 2020-07-20 DIAGNOSIS — M35.3 POLYMYALGIA RHEUMATICA: ICD-10-CM

## 2020-07-20 DIAGNOSIS — R60.9 EDEMA, UNSPECIFIED TYPE: ICD-10-CM

## 2020-07-20 LAB
ANION GAP SERPL CALC-SCNC: 7 MMOL/L (ref 8–16)
BUN SERPL-MCNC: 11 MG/DL (ref 8–23)
CALCIUM SERPL-MCNC: 9.7 MG/DL (ref 8.7–10.5)
CHLORIDE SERPL-SCNC: 92 MMOL/L (ref 95–110)
CO2 SERPL-SCNC: 29 MMOL/L (ref 23–29)
CREAT SERPL-MCNC: 0.8 MG/DL (ref 0.5–1.4)
CRP SERPL-MCNC: 57.5 MG/L (ref 0–8.2)
ERYTHROCYTE [SEDIMENTATION RATE] IN BLOOD BY WESTERGREN METHOD: 54 MM/HR (ref 0–20)
EST. GFR  (AFRICAN AMERICAN): >60 ML/MIN/1.73 M^2
EST. GFR  (NON AFRICAN AMERICAN): >60 ML/MIN/1.73 M^2
GLUCOSE SERPL-MCNC: 109 MG/DL (ref 70–110)
POTASSIUM SERPL-SCNC: 5.2 MMOL/L (ref 3.5–5.1)
SODIUM SERPL-SCNC: 128 MMOL/L (ref 136–145)

## 2020-07-20 PROCEDURE — 36415 COLL VENOUS BLD VENIPUNCTURE: CPT | Mod: PO

## 2020-07-20 PROCEDURE — 80048 BASIC METABOLIC PNL TOTAL CA: CPT

## 2020-07-20 PROCEDURE — 86140 C-REACTIVE PROTEIN: CPT

## 2020-07-20 PROCEDURE — 85651 RBC SED RATE NONAUTOMATED: CPT | Mod: PO

## 2020-07-21 ENCOUNTER — TELEPHONE (OUTPATIENT)
Dept: RHEUMATOLOGY | Facility: CLINIC | Age: 80
End: 2020-07-21

## 2020-07-21 DIAGNOSIS — M31.6 GIANT CELL ARTERITIS: Primary | ICD-10-CM

## 2020-07-21 DIAGNOSIS — E87.1 HYPONATREMIA: ICD-10-CM

## 2020-07-21 RX ORDER — POTASSIUM CHLORIDE 750 MG/1
10 TABLET, EXTENDED RELEASE ORAL DAILY
Qty: 30 TABLET | Refills: 3 | Status: SHIPPED | OUTPATIENT
Start: 2020-07-21 | End: 2020-08-20

## 2020-07-21 NOTE — TELEPHONE ENCOUNTER
Phoned patient:   Doing a bit better.   Fri-Sunday- she is down to hydrocodone only BID.   Prednisone making things greatly better regarding pain.   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   Right ear is slightly better- but patient   No HA.   Slight pain with chewing prolonged period. Mild intermittent.   Stomach is ok using prilosec   Using lasix. And this is helping with edema.   She is noting some red spots on arms.       Starting Prednisone 30mg today (7/21/20)   Adding KCL 10MeQ  QDas using lasix daily   Labs reflect one Lasix 20mg  taken.     STAFF:  Set up BMP to be done prior to appt next week please and notify patient.         Dr. Snyder

## 2020-07-22 ENCOUNTER — TELEPHONE (OUTPATIENT)
Dept: RHEUMATOLOGY | Facility: CLINIC | Age: 80
End: 2020-07-22

## 2020-07-22 NOTE — TELEPHONE ENCOUNTER
----- Message from Gema Bernal sent at 7/22/2020  9:56 AM CDT -----  Contact: @Aracelis  Type: Needs Medical Advice  Who Called:  Patient   Pharmacy name and phone #:    [id number] - [place], [place] - [address] AT [facility] [address]   [address]  [facility] [address]  Phone: [phone] Fax: [phone]  Best Call Back Number:   Additional Information: Per patient was told she needed to call and follow up with this office regarding a medication she started taking, states it is urgent-please advise-thank you    Took extra 10 mg  prednisone as directed yesterday and within an hour  feet and ankles blew up like a water balloon. Painful, stinging sensation in feet  which made her cry when she walked.  Took lasix and propped legs. Went to urinate often yesterday. Had a headache also.    Patient  asking if she needs to take  that extra  10 mg prednisone again? Afraid to have side effects again. No real  help with hearing. Red spots on the arm have doubled.    Swelling down in legs this morning. She took her normal 20 mg prednisone this morning. Please advise. CG

## 2020-07-24 ENCOUNTER — TELEPHONE (OUTPATIENT)
Dept: RHEUMATOLOGY | Facility: CLINIC | Age: 80
End: 2020-07-24

## 2020-07-24 NOTE — TELEPHONE ENCOUNTER
----- Message from Roselyn Montgomery sent at 7/24/2020  3:08 PM CDT -----  Type: Needs Medical Advice  Who Called:  patient  Best Call Back Number: [phone]  Additional Information: pt states that she tried called yesterday but if was after 5pm. States that she was calling to give a report on medication. Please give call back to discuss. thanks

## 2020-07-27 ENCOUNTER — TELEPHONE (OUTPATIENT)
Dept: RHEUMATOLOGY | Facility: CLINIC | Age: 80
End: 2020-07-27

## 2020-07-27 NOTE — TELEPHONE ENCOUNTER
----- Message from Summer Clark sent at 7/27/2020  3:41 PM CDT -----  Pt is returning a missed called from the office please reach out to pt at [phone]    Patient agrees to discuss all updates with the doctor this Wednesday. BERKLEY

## 2020-07-27 NOTE — TELEPHONE ENCOUNTER
----- Message from Lester Goodson sent at 7/27/2020  2:55 PM CDT -----  Type: Needs Medical Advice  Who Called:  Patient    Best Call Back Number: [phone]  Additional Information: Patient states that she would like a callback from Joanne regarding her daily report on her medications.    Patient states that she tried to reach the office on 07/24 but clinic was already closed.    Reminding patient of visit this Wednesday if she wants to update the doctor then. Call back number provided if needed. Labs are final now and will be sent to [facility] today. CG

## 2020-07-29 ENCOUNTER — LAB VISIT (OUTPATIENT)
Dept: LAB | Facility: HOSPITAL | Age: 80
End: 2020-07-29
Attending: INTERNAL MEDICINE
Payer: MEDICARE

## 2020-07-29 ENCOUNTER — OFFICE VISIT (OUTPATIENT)
Dept: RHEUMATOLOGY | Facility: CLINIC | Age: 80
End: 2020-07-29
Payer: MEDICARE

## 2020-07-29 VITALS
SYSTOLIC BLOOD PRESSURE: 105 MMHG | DIASTOLIC BLOOD PRESSURE: 56 MMHG | HEIGHT: 62 IN | HEART RATE: 74 BPM | WEIGHT: 128.44 LBS | BODY MASS INDEX: 23.64 KG/M2

## 2020-07-29 DIAGNOSIS — M35.3 POLYMYALGIA RHEUMATICA: Primary | ICD-10-CM

## 2020-07-29 DIAGNOSIS — M31.6 GIANT CELL ARTERITIS: ICD-10-CM

## 2020-07-29 DIAGNOSIS — M35.3 POLYMYALGIA RHEUMATICA: ICD-10-CM

## 2020-07-29 DIAGNOSIS — E87.1 HYPONATREMIA: ICD-10-CM

## 2020-07-29 LAB
ANION GAP SERPL CALC-SCNC: 9 MMOL/L (ref 8–16)
BUN SERPL-MCNC: 13 MG/DL (ref 8–23)
CALCIUM SERPL-MCNC: 9.5 MG/DL (ref 8.7–10.5)
CHLORIDE SERPL-SCNC: 92 MMOL/L (ref 95–110)
CO2 SERPL-SCNC: 25 MMOL/L (ref 23–29)
CREAT SERPL-MCNC: 0.8 MG/DL (ref 0.5–1.4)
EST. GFR  (AFRICAN AMERICAN): >60 ML/MIN/1.73 M^2
EST. GFR  (NON AFRICAN AMERICAN): >60 ML/MIN/1.73 M^2
GLUCOSE SERPL-MCNC: 109 MG/DL (ref 70–110)
POTASSIUM SERPL-SCNC: 5.4 MMOL/L (ref 3.5–5.1)
SODIUM SERPL-SCNC: 126 MMOL/L (ref 136–145)

## 2020-07-29 PROCEDURE — 1125F PR PAIN SEVERITY QUANTIFIED, PAIN PRESENT: ICD-10-PCS | Mod: S$GLB,,, | Performed by: INTERNAL MEDICINE

## 2020-07-29 PROCEDURE — 1159F MED LIST DOCD IN RCRD: CPT | Mod: S$GLB,,, | Performed by: INTERNAL MEDICINE

## 2020-07-29 PROCEDURE — 36415 COLL VENOUS BLD VENIPUNCTURE: CPT | Mod: PO

## 2020-07-29 PROCEDURE — 1159F PR MEDICATION LIST DOCUMENTED IN MEDICAL RECORD: ICD-10-PCS | Mod: S$GLB,,, | Performed by: INTERNAL MEDICINE

## 2020-07-29 PROCEDURE — 1101F PT FALLS ASSESS-DOCD LE1/YR: CPT | Mod: CPTII,S$GLB,, | Performed by: INTERNAL MEDICINE

## 2020-07-29 PROCEDURE — 99215 OFFICE O/P EST HI 40 MIN: CPT | Mod: S$GLB,,, | Performed by: INTERNAL MEDICINE

## 2020-07-29 PROCEDURE — 99215 PR OFFICE/OUTPT VISIT, EST, LEVL V, 40-54 MIN: ICD-10-PCS | Mod: S$GLB,,, | Performed by: INTERNAL MEDICINE

## 2020-07-29 PROCEDURE — 86140 C-REACTIVE PROTEIN: CPT

## 2020-07-29 PROCEDURE — 99999 PR PBB SHADOW E&M-EST. PATIENT-LVL V: ICD-10-PCS | Mod: PBBFAC,,, | Performed by: INTERNAL MEDICINE

## 2020-07-29 PROCEDURE — 1101F PR PT FALLS ASSESS DOC 0-1 FALLS W/OUT INJ PAST YR: ICD-10-PCS | Mod: CPTII,S$GLB,, | Performed by: INTERNAL MEDICINE

## 2020-07-29 PROCEDURE — 80048 BASIC METABOLIC PNL TOTAL CA: CPT

## 2020-07-29 PROCEDURE — 1125F AMNT PAIN NOTED PAIN PRSNT: CPT | Mod: S$GLB,,, | Performed by: INTERNAL MEDICINE

## 2020-07-29 PROCEDURE — 99999 PR PBB SHADOW E&M-EST. PATIENT-LVL V: CPT | Mod: PBBFAC,,, | Performed by: INTERNAL MEDICINE

## 2020-07-29 RX ORDER — SENNOSIDES 8.6 MG/1
1 TABLET ORAL DAILY
COMMUNITY
End: 2020-08-27

## 2020-07-29 RX ORDER — DOCUSATE SODIUM 100 MG/1
300 CAPSULE, LIQUID FILLED ORAL DAILY
COMMUNITY
End: 2022-03-07

## 2020-07-29 RX ORDER — AMOXICILLIN 500 MG
CAPSULE ORAL DAILY
COMMUNITY
End: 2021-06-28

## 2020-07-29 ASSESSMENT — ROUTINE ASSESSMENT OF PATIENT INDEX DATA (RAPID3)
PSYCHOLOGICAL DISTRESS SCORE: 0
PAIN SCORE: 5
FATIGUE SCORE: 1.1
TOTAL RAPID3 SCORE: 4.72
MDHAQ FUNCTION SCORE: 0.8
PATIENT GLOBAL ASSESSMENT SCORE: 6.5

## 2020-07-29 NOTE — PROGRESS NOTES
Subjective:          Chief Complaint: Aracelis Weber is a 79 y.o. female who had concerns including Disease Management.    HPI:    Interval events:   Pred 20mg with mild edema, pred 30 mg with severe edema.   Lasix daily with pronounced edema.     Hands not painful, knees not painful. Shoulder/cervical and down the arms, markedly improved. She still has some pain him hips and groin with walking and some weakness to the legs with edema. Some pain with fixing own hair.   She takes in AM regarding hips ache, some shoulder/arms, and again at night because hips/legs.   The clue is that the LDN new dose she had some ASE, previously tolerated 3mg daily fine.     Patient with a recent chronic sinusitis that ultimately warranted a septoplasty, endoscopic sinus surgery and turbinate reduction 5/2020   Four weeks postop t increasingly worse she was noting pain in her throat and ears lasting several hours complaint of pdizziness she had symptoms of ear pain and decreased hearing in the left ear.  She underwent a debridement at this visit she continue with compound nasal irrigations.  Follow-up June 16 she had had tympanostomy tubes placed for pressure within the ear and decreased hearing was complaining of headaches and sinus pressure, mucoid drainage    Patient did have labs done she does have a low IgM was concern for slowed healing.  She did was noted to have nasal drainage an ear fluid with symptoms of congestion continued with nasal irrigation,  ear drops  Multiple cultures have returned negative he is.    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    Patient did have repeat procedure on with biopsies taken 07/03/2020 showing right maxillary sinus chronically inflamed fibrotic polypoid portions benign nasal mucosa left maxillary sinus similar polypoid fragments ulcerated markedly inflamed respiratory mucosa focal foreign body giant cell response noted suggested that this might have been from her Gelfoam packing as a possible cause of this reaction    I have spoken with Dr. Quinn prior to patient's visit today and was a concern that she could have triggered some inflammatory response with the Gelfoam packing    .  Patient states she no longer has a headache is not noticing much ear drainage continues with profound loss of hearing on the left and rather significant on the right both sensorineural and some conductive changes.    Restarted on prednisone as of [id number] mg patient has not noticed any change with her hearing in this time she did notice slight improvement with her joint aches and pains but is still relying on hydrocodone for the bulk of this.    She denies a persistent headache she does have some tenderness about the preauricular region particularly on the right more than the left.  She has denied jaw claudication changes in her voice or any amaurosis fugax.  She has no pre-existing history of joint aches and pains hip or otherwise.          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    Prior hx;  Patient is a 78-year-old female referred by primary care physician for arthritis.  She had a workup that involved a negative rheumatoid factor, age appropriate sedimentation rate,C reactive protein JANET was positive 1:160 in speckled pattern  Note      Repeat JANET negative nothing from a rheumatologic perspective all the other testing is negative for lupus or other connective tissue diseases.  She does have a positive anti thyroglobulin antibody which can be seen in autoimmune  thyroid disease- is it is of no concern other than to follow up with her primary care for routine thyroid levels.                 She is having pain in her hand with stiffness and right 2nd PIP joint now unable to flex. 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   Hx of bilateral TKA 3 and 5 years ago and those are doing well.   She notes intermittent edema. Some hammer toe deformity bilaterally making shoes problematic but not painful otherwise.   Long hx of GERD-severe.   Cannot tolerate NSAIDs at all w/o gastritis.   Can use Hydrocodone PRN   Added Naltrexone at 3mg and doing very well  Lost 30# with WW.   Patient denies weight loss, rashes, dry eye, dry mouth, nasal or palatal ulcerations,  lymphadenopathy, Raynaud's, hx of DVT/miscarriages, psoriasis or family hx of psoriasis, rashes, serositis, anemia or other constitutional symptoms.  Asking about naltrexone    Component      Latest Ref Rng & Units 11/10/2018   JANET IgG by IFA      <1:80 Detected (H)   JANET Interpretive Comment       See Note   JANET TITER       1:160 (A)   JANET PATTERN       Speckled (A)   Sed Rate      0 - 29 mm/Hr 39 (H)   CRP      0.00 - 0.90 mg/dL <0.50   JANET Screen      None Detected Detected (A)   Rheumatoid Factor      0.0 - 15.0 IU/mL <8.6       REVIEW OF SYSTEMS:    Review of Systems   Constitutional: Negative for fever, malaise/fatigue and weight loss.   HENT: Negative for sore throat.    Eyes: Negative for double vision, photophobia and redness.   Respiratory: Negative for cough, shortness of breath and wheezing.    Cardiovascular: Negative for chest pain, palpitations and orthopnea.   Gastrointestinal: Negative for abdominal pain, constipation and diarrhea.   Genitourinary: Negative for dysuria, hematuria and urgency.   Musculoskeletal: Positive for joint pain. Negative for back pain and myalgias.   Skin: Negative for rash.   Neurological: Negative for dizziness, tingling, focal weakness and headaches.    Endo/Heme/Allergies: Does not bruise/bleed easily.   Psychiatric/Behavioral: Negative for depression, hallucinations and suicidal ideas.               Objective:            Past Medical History:   Diagnosis Date    Depression      Family History   Problem Relation Age of Onset    Heart disease Mother     Arthritis Mother     Cancer Sister     Arthritis Sister     Diabetes Sister     Hypertension Sister      Social History     Tobacco Use    Smoking status: Former Smoker     Start date: 2/3/1955     Quit date: 1/3/1960     Years since quittin.6    Smokeless tobacco: Never Used   Substance Use Topics    Alcohol use: Never     Alcohol/week: 0.0 standard drinks     Frequency: Never    Drug use: Never         Current Outpatient Medications on File Prior to Visit   Medication Sig Dispense Refill    aspirin (ECOTRIN) 81 MG EC tablet Take 81 mg by mouth once daily.      docusate sodium (COLACE) 250 MG capsule Take 250 mg by mouth once daily.      furosemide (LASIX) 20 MG tablet Take 1 tablet (20 mg total) by mouth daily as needed (EDEMA.). 30 tablet 11    HYDROcodone-acetaminophen (NORCO) 7.5-325 mg per tablet Take 1 tablet by mouth every 6 (six) hours as needed for Pain. 120 tablet 0    LACTOBACILLUS ACIDOPHILUS (PROBIOTIC ORAL) Take 1 capsule by mouth 2 (two) times daily.      metoprolol succinate (TOPROL-XL) 100 MG 24 hr tablet Take 1 tablet (100 mg total) by mouth once daily. 90 tablet 3    multivitamin (ONE DAILY MULTIVITAMIN) per tablet Take 1 tablet by mouth once daily.      omega-3 fatty acids/fish oil (FISH OIL-OMEGA-3 FATTY ACIDS) 300-1,000 mg capsule Take by mouth once daily.      omeprazole (PRILOSEC) 40 MG capsule TAKE 1 CAPSULE(40 MG) BY MOUTH EVERY DAY 90 capsule 0    potassium chloride SA (K-DUR,KLOR-CON) 10 MEQ tablet Take 1 tablet (10 mEq total) by mouth once daily. for 30 doses 30 tablet 3    predniSONE (DELTASONE) 10 MG tablet Take 2 tablets (20 mg total) by mouth once daily.  60 tablet 3    senna (SENOKOT) 8.6 mg tablet Take 1 tablet by mouth once daily.      C/SOURCHERRY/CELERY/GRAPE SEED (TART CHERRY ORAL) Take by mouth.      CALCIUM CARBONATE (CALCIUM 600 ORAL) Take 1,200 mg by mouth once daily.       cholecalciferol, vitamin D3, (VITAMIN D3) 5,000 unit Tab Take 5,000 Units by mouth once daily.      cyclobenzaprine (FLEXERIL) 5 MG tablet Take 5 mg by mouth 3 (three) times daily as needed for Muscle spasms.      diclofenac sodium (VOLTAREN) 1 % Gel Apply topically 3 (three) times daily. Apply to affect joints up to 3 times daily 100 g 6    FLUZONE HIGH-DOSE 2019-20, PF, 180 mcg/0.5 mL Syrg ADM 0.5ML IM UTD  0    naltrexone capsule Compound. (Patient not taking: Reported on 7/29/2020) 30 capsule 11    predniSONE (DELTASONE) 5 MG tablet Take 2 tablets (10 mg total) by mouth once daily. 60 tablet 1    sucralfate (CARAFATE) 1 gram tablet TAKE 1 TABLET(1 GRAM) BY MOUTH THREE TIMES DAILY AS NEEDED (Patient not taking: Reported on 7/29/2020) 270 tablet 3    UNABLE TO FIND Sulfur powder QD      VIT C/VIT E ACET/LUTEIN/MIN (OCUVITE LUTEIN ORAL) Take by mouth.      VIT C/ZN/K.GINSG/MELODY/HRB62 (IMMUNE SUPPORT COMPLEX ORAL) Take 2 capsules by mouth once daily.       No current facility-administered medications on file prior to visit.        Vitals:    07/29/20 1149   BP: (!) 105/56   Pulse: 74       Physical Exam:    Physical Exam   Constitutional: She is oriented to person, place, and time. She appears well-developed and well-nourished.   HENT:   Head: Normocephalic and atraumatic.   Mouth/Throat: Oropharynx is clear and moist.   Eyes: Pupils are equal, round, and reactive to light. EOM are normal.   Neck: Normal range of motion.   Cardiovascular: Normal rate, regular rhythm and normal heart sounds.   Pulmonary/Chest: Effort normal and breath sounds normal.   Musculoskeletal:        Right shoulder: She exhibits normal range of motion, no tenderness and no swelling.        Left  shoulder: She exhibits normal range of motion, no tenderness and no swelling.        Right elbow: She exhibits normal range of motion and no swelling. No tenderness found.        Left elbow: She exhibits normal range of motion and no swelling. No tenderness found.        Right wrist: She exhibits normal range of motion, no tenderness and no swelling.        Left wrist: She exhibits normal range of motion, no tenderness and no swelling.        Right knee: She exhibits normal range of motion and no swelling. No tenderness found.        Left knee: She exhibits normal range of motion and no swelling. No tenderness found.        Right hand: She exhibits decreased range of motion and tenderness. She exhibits no swelling.        Left hand: She exhibits decreased range of motion and tenderness. She exhibits no swelling.        Right foot: There is normal range of motion, no tenderness and no swelling.        Left foot: There is normal range of motion, no tenderness and no swelling.   Bony hypertrophy at the PIP and DIP joints. +squaring at the CMC joint. No active synovitis on 28 joint exam.    Neurological: She is alert and oriented to person, place, and time.   Skin: Skin is warm and dry.   Psychiatric: She has a normal mood and affect. Her behavior is normal.             Assessment:       Encounter Diagnosis   Name Primary?    Polymyalgia rheumatica Yes          Plan:        Polymyalgia rheumatica  -     Sedimentation rate; Future; Expected date: 07/29/2020  -     C-Reactive Protein; Future; Expected date: 07/29/2020       Patient is a 79-year-old female she has had a recent robust inflammatory reaction within the sinus cavity as well as hearing deficit following a sinus surgery.  She has been repeated cultured postoperatively completed antibiotics and irrigations negative for any fungal infections or bacterial infections.  She continues to have widespread musculoskeletal pain elevated sedimentation rate and a biopsy  from July 2020 showing a possible reaction or inflammatory response to the gel packing.     -differential would include inflammatory reaction following sinus surgery versus concomitant temporal arteritis and polymyalgia rheumatica following a sinus surgery.  Repeat some labs today  . -patient did have seem to have some musculoskeletal relief with prednisone at 10 mg but 0 improvement with any hearing we will increase this to 20 mg daily she will call the office in 24 hr see if there has been any change.   -        ? PMR less concern for TA given bx with reaction seems confined to sinuses, hearing not neuro vasc. No HA, vision changes. Working diagnosis is adverse reaction to packing following surgery.   Awaiting CRP from today    Prednisone to 20mg daily    With improvement in inflammatory markers.    Having edema using lasix 20mg    Left hearing not concerning for neurovascular etiology pending hearing aids with DR. Pérez.    Overall feeling better, still using Hydrocodone twice daily for pains. Previous was 3-4 per 24 hr.        Start omeprazole (prilosec)  40mg by mouth daily.   Carafate 1 tab as needed for any heartburn not controlled with above prilosec.     Labs today and in 1 week. For inflammatory marker.     No follow-ups on file.      f/u 4-6 months  Thank you for allowing me to participate in the care of this very pleasant patient.    45min consultation with greater than 50% spent in counseling, chart review and coordination of care. All questions answered.

## 2020-07-29 NOTE — PATIENT INSTRUCTIONS
1. Prednisone 15mg (mostly to help edema, but may have to find alternate to steroid)     2. Awaiting labs      3. I want to reconsider restart Low dose Naltrexone back at 3mg    4. I want to try one half tab of the Hydrocodone twice daily and see if pain still manageable.       I think we are dealing with only polymyalgia rheumatica and perhaps not temporal arteritis. As the hearing is mechanical not neurologic/vascular in nature.

## 2020-07-30 LAB — CRP SERPL-MCNC: 56.1 MG/L (ref 0–8.2)

## 2020-08-06 ENCOUNTER — TELEPHONE (OUTPATIENT)
Dept: RHEUMATOLOGY | Facility: CLINIC | Age: 80
End: 2020-08-06

## 2020-08-06 NOTE — TELEPHONE ENCOUNTER
Dr. Snyder,  Please see note below.  Pt states having numbness in feet and is wondering if there is a blood test you can order to determine the cause of this.  Please review and advise.  Thanks.    Court Perera MA  8/6/2020

## 2020-08-06 NOTE — TELEPHONE ENCOUNTER
----- Message from Belinda Ochoa sent at 8/6/2020 11:44 AM CDT -----  Regarding: advice  Contact: Patient/[phone] (home)  Type: Needs Medical Advice  Who Called:  Patient/[phone] (home)   Additional Information: Asking if office can add to her blood tests any test that could show what is causing her feet to be numb. Both feet are numb and stays numb. It is very painful and wakes up at night with cramping in her feet due to the numbness. Is there any test that could show the cause of this? Please call to advise.

## 2020-08-07 ENCOUNTER — PATIENT MESSAGE (OUTPATIENT)
Dept: RHEUMATOLOGY | Facility: CLINIC | Age: 80
End: 2020-08-07

## 2020-08-07 NOTE — TELEPHONE ENCOUNTER
Spoke to pt about what dr askew stated. Pt verbalized understanding. Stated they still are numb when they go down but will talk to you when she sees dr askew Wednesday.

## 2020-08-08 ENCOUNTER — LAB VISIT (OUTPATIENT)
Dept: LAB | Facility: HOSPITAL | Age: 80
End: 2020-08-08
Attending: INTERNAL MEDICINE
Payer: MEDICARE

## 2020-08-08 DIAGNOSIS — M35.3 POLYMYALGIA RHEUMATICA: ICD-10-CM

## 2020-08-08 LAB
CRP SERPL-MCNC: 75.4 MG/L (ref 0–8.2)
ERYTHROCYTE [SEDIMENTATION RATE] IN BLOOD BY WESTERGREN METHOD: 57 MM/HR (ref 0–36)

## 2020-08-08 PROCEDURE — 86140 C-REACTIVE PROTEIN: CPT

## 2020-08-08 PROCEDURE — 85652 RBC SED RATE AUTOMATED: CPT

## 2020-08-08 PROCEDURE — 36415 COLL VENOUS BLD VENIPUNCTURE: CPT | Mod: PO

## 2020-08-11 ENCOUNTER — PATIENT MESSAGE (OUTPATIENT)
Dept: RHEUMATOLOGY | Facility: CLINIC | Age: 80
End: 2020-08-11

## 2020-08-11 NOTE — TELEPHONE ENCOUNTER
Rising CRP and ESR  Poor tolerance with oral prednisone  Will need to consider DMARD    Dr. Snyder

## 2020-08-12 ENCOUNTER — HOSPITAL ENCOUNTER (OUTPATIENT)
Dept: RADIOLOGY | Facility: HOSPITAL | Age: 80
Discharge: HOME | End: 2020-08-12
Attending: INTERNAL MEDICINE
Payer: MEDICARE

## 2020-08-12 ENCOUNTER — OFFICE VISIT (OUTPATIENT)
Dept: RHEUMATOLOGY | Facility: CLINIC | Age: 80
End: 2020-08-12
Payer: MEDICARE

## 2020-08-12 VITALS
HEART RATE: 80 BPM | SYSTOLIC BLOOD PRESSURE: 98 MMHG | WEIGHT: 129.63 LBS | HEIGHT: 62 IN | BODY MASS INDEX: 23.85 KG/M2 | DIASTOLIC BLOOD PRESSURE: 48 MMHG

## 2020-08-12 DIAGNOSIS — Z79.899 HIGH RISK MEDICATIONS (NOT ANTICOAGULANTS) LONG-TERM USE: ICD-10-CM

## 2020-08-12 DIAGNOSIS — R70.0 ELEVATED SED RATE: ICD-10-CM

## 2020-08-12 DIAGNOSIS — R60.9 EDEMA, UNSPECIFIED TYPE: ICD-10-CM

## 2020-08-12 DIAGNOSIS — R53.83 MALAISE AND FATIGUE: ICD-10-CM

## 2020-08-12 DIAGNOSIS — M35.3 POLYMYALGIA RHEUMATICA: Primary | ICD-10-CM

## 2020-08-12 DIAGNOSIS — R53.81 MALAISE AND FATIGUE: ICD-10-CM

## 2020-08-12 PROCEDURE — 1101F PT FALLS ASSESS-DOCD LE1/YR: CPT | Mod: CPTII,S$GLB,, | Performed by: INTERNAL MEDICINE

## 2020-08-12 PROCEDURE — 71046 XR CHEST PA AND LATERAL: ICD-10-PCS | Mod: 26,,, | Performed by: RADIOLOGY

## 2020-08-12 PROCEDURE — 71046 X-RAY EXAM CHEST 2 VIEWS: CPT | Mod: TC,FY,PO

## 2020-08-12 PROCEDURE — 1159F PR MEDICATION LIST DOCUMENTED IN MEDICAL RECORD: ICD-10-PCS | Mod: S$GLB,,, | Performed by: INTERNAL MEDICINE

## 2020-08-12 PROCEDURE — 1159F MED LIST DOCD IN RCRD: CPT | Mod: S$GLB,,, | Performed by: INTERNAL MEDICINE

## 2020-08-12 PROCEDURE — 99214 PR OFFICE/OUTPT VISIT, EST, LEVL IV, 30-39 MIN: ICD-10-PCS | Mod: S$GLB,,, | Performed by: INTERNAL MEDICINE

## 2020-08-12 PROCEDURE — 1101F PR PT FALLS ASSESS DOC 0-1 FALLS W/OUT INJ PAST YR: ICD-10-PCS | Mod: CPTII,S$GLB,, | Performed by: INTERNAL MEDICINE

## 2020-08-12 PROCEDURE — 1125F AMNT PAIN NOTED PAIN PRSNT: CPT | Mod: S$GLB,,, | Performed by: INTERNAL MEDICINE

## 2020-08-12 PROCEDURE — 99214 OFFICE O/P EST MOD 30 MIN: CPT | Mod: S$GLB,,, | Performed by: INTERNAL MEDICINE

## 2020-08-12 PROCEDURE — 71046 X-RAY EXAM CHEST 2 VIEWS: CPT | Mod: 26,,, | Performed by: RADIOLOGY

## 2020-08-12 PROCEDURE — 99999 PR PBB SHADOW E&M-EST. PATIENT-LVL V: CPT | Mod: PBBFAC,,, | Performed by: INTERNAL MEDICINE

## 2020-08-12 PROCEDURE — 1125F PR PAIN SEVERITY QUANTIFIED, PAIN PRESENT: ICD-10-PCS | Mod: S$GLB,,, | Performed by: INTERNAL MEDICINE

## 2020-08-12 PROCEDURE — 99999 PR PBB SHADOW E&M-EST. PATIENT-LVL V: ICD-10-PCS | Mod: PBBFAC,,, | Performed by: INTERNAL MEDICINE

## 2020-08-12 RX ORDER — FOLIC ACID 1 MG/1
1 TABLET ORAL DAILY
Qty: 100 TABLET | Refills: 3 | Status: SHIPPED | OUTPATIENT
Start: 2020-08-12 | End: 2020-11-04 | Stop reason: SDUPTHER

## 2020-08-12 RX ORDER — IBUPROFEN 100 MG/5ML
1000 SUSPENSION, ORAL (FINAL DOSE FORM) ORAL DAILY
COMMUNITY
End: 2021-02-05

## 2020-08-12 RX ORDER — METHOTREXATE 2.5 MG/1
10 TABLET ORAL
Qty: 16 TABLET | Refills: 2 | Status: SHIPPED | OUTPATIENT
Start: 2020-08-12 | End: 2020-09-24 | Stop reason: SDUPTHER

## 2020-08-12 ASSESSMENT — ROUTINE ASSESSMENT OF PATIENT INDEX DATA (RAPID3)
MDHAQ FUNCTION SCORE: 0.9
TOTAL RAPID3 SCORE: 6.33
PATIENT GLOBAL ASSESSMENT SCORE: 6.5
PAIN SCORE: 9.5
PSYCHOLOGICAL DISTRESS SCORE: 1.1

## 2020-08-12 NOTE — PROGRESS NOTES
Subjective:          Chief Complaint: Aracelis Weber is a 79 y.o. female who had no chief complaint listed for this encounter.    HPI:    Interval events:   Pred 20mg with mild edema, pred 30 mg with severe edema.   Current Pred 15mg daily    Lasix 20 mg daily with improved edema but having some pins and needles in feet/legs.   Having tight, burning stinging at the toes and feet. Heaviness. Prickly.   Noting sodium is falling, K 5.2-5.4 still using           7/2020:  Hands not painful, knees not painful. Shoulder/cervical and down the arms, markedly improved. She still has some pain him hips and groin with walking and some weakness to the legs with edema. Some pain with fixing own hair.   She takes in AM regarding hips ache, some shoulder/arms, and again at night because hips/legs.   The clue is that the LDN new dose she had some ASE, previously tolerated 3mg daily fine.     Historical review of present Illness.   Patient with a recent chronic sinusitis that ultimately warranted a septoplasty, endoscopic sinus surgery and turbinate reduction 5/2020   Four weeks postop t increasingly worse she was noting pain in her throat and ears lasting several hours complaint of pdizziness she had symptoms of ear pain and decreased hearing in the left ear.  She underwent a debridement at this visit she continue with compound nasal irrigations.  Follow-up June 16 she had had tympanostomy tubes placed for pressure within the ear and decreased hearing was complaining of headaches and sinus pressure, mucoid drainage    Patient did have labs done she does have a low IgM was concern for slowed healing.  She did was noted to have nasal drainage an ear fluid with symptoms of congestion continued with nasal irrigation,  ear drops  Multiple cultures have returned negative he is.    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    Patient did have repeat procedure on with biopsies taken 07/03/2020 showing right maxillary sinus chronically inflamed fibrotic polypoid portions benign nasal mucosa left maxillary sinus similar polypoid fragments ulcerated markedly inflamed respiratory mucosa focal foreign body giant cell response noted suggested that this might have been from her Gelfoam packing as a possible cause of this reaction    I have spoken with Dr. Quinn prior to patient's visit today and was a concern that she could have triggered some inflammatory response with the Gelfoam packing    .  Patient states she no longer has a headache is not noticing much ear drainage continues with profound loss of hearing on the left and rather significant on the right both sensorineural and some conductive changes.    Restarted on prednisone as of 7/20/20  10 mg patient has not noticed any change with her hearing in this time she did notice slight improvement with her joint aches and pains but is still relying on hydrocodone for the bulk of this.    She denies a persistent headache she does have some tenderness about the preauricular region particularly on the right more than the left.  She has denied jaw claudication changes in her voice or any amaurosis fugax.  She has no pre-existing history of joint aches and pains hip or otherwise.  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  She had a workup that involved a negative rheumatoid factor, age appropriate sedimentation rate,C reactive protein JANET was positive 1:160 in speckled pattern        Previous: Hx:   She is having pain in her hand with stiffness and right 2nd PIP joint now unable to  flex. 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   Hx of bilateral TKA 3 and 5 years ago and those are doing well.   She notes intermittent edema. Some hammer toe deformity bilaterally making shoes problematic but not painful otherwise.   Long hx of GERD-severe.   Cannot tolerate NSAIDs at all w/o gastritis.   Can use Hydrocodone PRN   Added Naltrexone at 3mg and doing very well  Lost 30# with WW.   Patient denies weight loss, rashes, dry eye, dry mouth, nasal or palatal ulcerations,  lymphadenopathy, Raynaud's, hx of DVT/miscarriages, psoriasis or family hx of psoriasis, rashes, serositis, anemia or other constitutional symptoms.  Asking about naltrexone  Component      Latest Ref Rng & Units 8/8/2020 7/29/2020 7/20/2020 7/13/2020   Sodium      136 - 145 mmol/L  126 (L) 128 (L)    Potassium      3.5 - 5.1 mmol/L  5.4 (H) 5.2 (H)    Chloride      95 - 110 mmol/L  92 (L) 92 (L)    CO2      23 - 29 mmol/L  25 29    Glucose      70 - 110 mg/dL  109 109    BUN, Bld      8 - 23 mg/dL  13 11    Creatinine      0.5 - 1.4 mg/dL  0.8 0.8    Calcium      8.7 - 10.5 mg/dL  9.5 9.7    Anion Gap      8 - 16 mmol/L  9 7 (L)    eGFR if African American      >60 mL/min/1.73 m:2  >60.0 >60.0    eGFR if non African American      >60 mL/min/1.73 m:2  >60.0 >60.0    Anti Sm Antibody      0.00 - 0.99 Ratio    0.04   Anti-Sm Interpretation      Negative    Negative   Anti Sm/RNP Antibody      0.00 - 0.99 Ratio    0.09   Anti-Sm/RNP Interpretation      Negative    Negative   JANET Screen      None Detected       Rheumatoid Factor      0.0 - 15.0 IU/mL       Sed Rate      0 - 36 mm/Hr 57 (H)  54 (H) 75 (H)   CRP      0.0 - 8.2 mg/L 75.4 (H) 56.1 (H) 57.5 (H) 99.4 (H)   Anti-Histone Antibody      0.0 - 0.9 Units    0.4   ds DNA Ab      Negative 1:10    Negative 1:10     Component      Latest Ref Rng & Units 6/25/2020 11/10/2018   Sodium      136 - 145 mmol/L     Potassium      3.5 - 5.1 mmol/L     Chloride      95 -  110 mmol/L     CO2      23 - 29 mmol/L     Glucose      70 - 110 mg/dL     BUN, Bld      8 - 23 mg/dL     Creatinine      0.5 - 1.4 mg/dL     Calcium      8.7 - 10.5 mg/dL     Anion Gap      8 - 16 mmol/L     eGFR if African American      >60 mL/min/1.73 m:2     eGFR if non African American      >60 mL/min/1.73 m:2     Anti Sm Antibody      0.00 - 0.99 Ratio     Anti-Sm Interpretation      Negative     Anti Sm/RNP Antibody      0.00 - 0.99 Ratio     Anti-Sm/RNP Interpretation      Negative     JANET Screen      None Detected Detected (A) Detected (A)   Rheumatoid Factor      0.0 - 15.0 IU/mL 11.6 <8.6   Sed Rate      0 - 36 mm/Hr     CRP      0.0 - 8.2 mg/L     Anti-Histone Antibody      0.0 - 0.9 Units     ds DNA Ab      Negative 1:10         REVIEW OF SYSTEMS:    Review of Systems   Constitutional: Negative for fever, malaise/fatigue and weight loss.   HENT: Negative for sore throat.    Eyes: Negative for double vision, photophobia and redness.   Respiratory: Negative for cough, shortness of breath and wheezing.    Cardiovascular: Negative for chest pain, palpitations and orthopnea.   Gastrointestinal: Negative for abdominal pain, constipation and diarrhea.   Genitourinary: Negative for dysuria, hematuria and urgency.   Musculoskeletal: Positive for joint pain. Negative for back pain and myalgias.   Skin: Negative for rash.   Neurological: Negative for dizziness, tingling, focal weakness and headaches.   Endo/Heme/Allergies: Does not bruise/bleed easily.   Psychiatric/Behavioral: Negative for depression, hallucinations and suicidal ideas.               Objective:            Past Medical History:   Diagnosis Date    Depression      Family History   Problem Relation Age of Onset    Heart disease Mother     Arthritis Mother     Cancer Sister     Arthritis Sister     Diabetes Sister     Hypertension Sister      Social History     Tobacco Use    Smoking status: Former Smoker     Start date: 2/3/1955     Quit  date: 1/3/1960     Years since quittin.6    Smokeless tobacco: Never Used   Substance Use Topics    Alcohol use: Never     Alcohol/week: 0.0 standard drinks     Frequency: Never    Drug use: Never         Current Outpatient Medications on File Prior to Visit   Medication Sig Dispense Refill    ascorbic acid, vitamin C, (VITAMIN C) 1000 MG tablet Take 1,000 mg by mouth once daily.      aspirin (ECOTRIN) 81 MG EC tablet Take 81 mg by mouth once daily.      diclofenac sodium (VOLTAREN) 1 % Gel Apply topically 3 (three) times daily. Apply to affect joints up to 3 times daily 100 g 6    docusate sodium (COLACE) 250 MG capsule Take 250 mg by mouth once daily.      furosemide (LASIX) 20 MG tablet Take 1 tablet (20 mg total) by mouth daily as needed (EDEMA.). (Patient taking differently: Take 10 mg by mouth daily as needed (EDEMA.). ) 30 tablet 11    HYDROcodone-acetaminophen (NORCO) 7.5-325 mg per tablet Take 1 tablet by mouth every 6 (six) hours as needed for Pain. 120 tablet 0    LACTOBACILLUS ACIDOPHILUS (PROBIOTIC ORAL) Take 1 capsule by mouth 2 (two) times daily.      metoprolol succinate (TOPROL-XL) 100 MG 24 hr tablet Take 1 tablet (100 mg total) by mouth once daily. 90 tablet 3    multivitamin (ONE DAILY MULTIVITAMIN) per tablet Take 1 tablet by mouth once daily.      omeprazole (PRILOSEC) 40 MG capsule TAKE 1 CAPSULE(40 MG) BY MOUTH EVERY DAY 90 capsule 0    potassium chloride SA (K-DUR,KLOR-CON) 10 MEQ tablet Take 1 tablet (10 mEq total) by mouth once daily. for 30 doses 30 tablet 3    predniSONE (DELTASONE) 10 MG tablet Take 2 tablets (20 mg total) by mouth once daily. (Patient taking differently: Take 5 mg by mouth once daily. ) 60 tablet 3    predniSONE (DELTASONE) 5 MG tablet Take 2 tablets (10 mg total) by mouth once daily. 60 tablet 1    VIT C/VIT E ACET/LUTEIN/MIN (OCUVITE LUTEIN ORAL) Take by mouth.      C/SOURCHERRY/CELERY/GRAPE SEED (TART CHERRY ORAL) Take by mouth.      CALCIUM  CARBONATE (CALCIUM 600 ORAL) Take 1,200 mg by mouth once daily.       cholecalciferol, vitamin D3, (VITAMIN D3) 5,000 unit Tab Take 5,000 Units by mouth once daily.      cyclobenzaprine (FLEXERIL) 5 MG tablet Take 5 mg by mouth 3 (three) times daily as needed for Muscle spasms.      FLUZONE HIGH-DOSE 2019-20, PF, 180 mcg/0.5 mL Syrg ADM 0.5ML IM UTD  0    naltrexone capsule Compound. (Patient not taking: Reported on 7/29/2020) 30 capsule 11    omega-3 fatty acids/fish oil (FISH OIL-OMEGA-3 FATTY ACIDS) 300-1,000 mg capsule Take by mouth once daily.      senna (SENOKOT) 8.6 mg tablet Take 1 tablet by mouth once daily.      sucralfate (CARAFATE) 1 gram tablet TAKE 1 TABLET(1 GRAM) BY MOUTH THREE TIMES DAILY AS NEEDED (Patient not taking: Reported on 7/29/2020) 270 tablet 3    UNABLE TO FIND Sulfur powder QD      VIT C/ZN/K.GINSG/MELODY/HRB62 (IMMUNE SUPPORT COMPLEX ORAL) Take 2 capsules by mouth once daily.       No current facility-administered medications on file prior to visit.        Vitals:    08/12/20 1212   BP: (!) 98/48   Pulse: 80       Physical Exam:    Physical Exam   Constitutional: She is oriented to person, place, and time. She appears well-developed and well-nourished.   HENT:   Head: Normocephalic and atraumatic.   Mouth/Throat: Oropharynx is clear and moist.   Eyes: Pupils are equal, round, and reactive to light. EOM are normal.   Neck: Normal range of motion.   Cardiovascular: Normal rate, regular rhythm and normal heart sounds.   Pulmonary/Chest: Effort normal and breath sounds normal.   Musculoskeletal:        Right shoulder: She exhibits normal range of motion, no tenderness and no swelling.        Left shoulder: She exhibits normal range of motion, no tenderness and no swelling.        Right elbow: She exhibits normal range of motion and no swelling. No tenderness found.        Left elbow: She exhibits normal range of motion and no swelling. No tenderness found.        Right wrist: She  exhibits normal range of motion, no tenderness and no swelling.        Left wrist: She exhibits normal range of motion, no tenderness and no swelling.        Right knee: She exhibits normal range of motion and no swelling. No tenderness found.        Left knee: She exhibits normal range of motion and no swelling. No tenderness found.        Right hand: She exhibits decreased range of motion and tenderness. She exhibits no swelling.        Left hand: She exhibits decreased range of motion and tenderness. She exhibits no swelling.        Right foot: There is normal range of motion, no tenderness and no swelling.        Left foot: There is normal range of motion, no tenderness and no swelling.   Bony hypertrophy at the PIP and DIP joints. +squaring at the CMC joint. No active synovitis on 28 joint exam.    Neurological: She is alert and oriented to person, place, and time.   Skin: Skin is warm and dry.   Psychiatric: She has a normal mood and affect. Her behavior is normal.             Assessment:       Encounter Diagnoses   Name Primary?    Polymyalgia rheumatica Yes    Malaise and fatigue     Elevated sed rate     Edema, unspecified type     High risk medications (not anticoagulants) long-term use           Plan:        Polymyalgia rheumatica  -     Protein electrophoresis, serum; Future; Expected date: 08/12/2020  -     methotrexate 2.5 MG Tab; Take 4 tablets (10 mg total) by mouth every 7 days.  Dispense: 16 tablet; Refill: 2  -     folic acid (FOLVITE) 1 MG tablet; Take 1 tablet (1 mg total) by mouth once daily.  Dispense: 100 tablet; Refill: 3  -     CBC auto differential; Future; Expected date: 08/12/2020  -     Sedimentation rate; Standing; Expected date: 08/12/2020  -     C-Reactive Protein; Standing; Expected date: 08/12/2020  -     CBC auto differential; Standing; Expected date: 08/12/2020  -     Comprehensive metabolic panel; Future; Expected date: 08/12/2020    Malaise and fatigue  -     Hepatitis B  Core Antibody, Total; Future; Expected date: 08/12/2020  -     Hepatitis B Surface Ab, Qualitative; Future; Expected date: 08/12/2020  -     Hepatitis B Surface Antigen; Future; Expected date: 08/12/2020  -     Hepatitis C Antibody; Future; Expected date: 08/12/2020    Elevated sed rate  -     Protein electrophoresis, serum; Future; Expected date: 08/12/2020    Edema, unspecified type  -     X-Ray Chest PA And Lateral; Future; Expected date: 08/12/2020  -     Brain Natriuretic Peptide; Future; Expected date: 08/12/2020    High risk medications (not anticoagulants) long-term use  -     Sedimentation rate; Standing; Expected date: 08/12/2020  -     C-Reactive Protein; Standing; Expected date: 08/12/2020  -     CBC auto differential; Standing; Expected date: 08/12/2020  -     Comprehensive metabolic panel; Future; Expected date: 08/12/2020       Patient is a 79-year-old female she has had a recent robust inflammatory reaction within the sinus cavity as well as hearing deficit following a sinus surgery.  She has been repeated cultured postoperatively completed antibiotics and irrigations negative for any fungal infections or bacterial infections.  She continues to have widespread musculoskeletal pain elevated sedimentation rate and a biopsy from July 2020 showing a possible reaction or inflammatory response to the gel packing.     -differential would include inflammatory reaction following sinus surgery versus concomitant temporal arteritis and polymyalgia rheumatica following a sinus surgery.  Repeat some labs today  . -patient did have seem to have some musculoskeletal relief with prednisone at 10 mg but 0 improvement with any hearing we will increase this to 20 mg daily she will call the office in 24 hr see if there has been any change.   -        ? PMR less concern for TA given bx with reaction seems confined to sinuses, hearing not neuro vasc. No HA, vision changes. Working diagnosis is adverse reaction to packing  following surgery.   Awaiting CRP from today    Prednisone to 10mg daily    With improvement in inflammatory markers.    Having edema using lasix 20mg -w/ hyponatremia.          Lasix 10mg daily (1/2 tab)  Stop the potassium pill  Decrease water, ok for gatorade  Prednisone 15mg for next week then Drop to 10mg daily  Check inflammatory markers labs in 2 weeks    And full labs in 4 weeks.       No follow-ups on file.      f/u 4-6 months  Thank you for allowing me to participate in the care of this very pleasant patient.    30 min consultation with greater than 50% spent in counseling, chart review and coordination of care. All questions answered.

## 2020-08-12 NOTE — PATIENT INSTRUCTIONS
Lasix 10mg daily (1/2 tab)  Stop the potassium pill  Decrease water, ok for gatorade  Prednisone 15mg for next week then Drop to 10mg daily  Check inflammatory markers labs in 2 weeks    And full labs in 4 weeks.

## 2020-08-24 ENCOUNTER — TELEPHONE (OUTPATIENT)
Dept: RHEUMATOLOGY | Facility: CLINIC | Age: 80
End: 2020-08-24

## 2020-08-24 NOTE — TELEPHONE ENCOUNTER
----- Message from Jeffrey Guardado sent at 8/24/2020 10:58 AM CDT -----  Contact: patient  Type: Needs Medical Advice  Who Called:  patient  Symptoms (please be specific):    How long has patient had these symptoms:    Pharmacy name and phone #:    Best Call Back Number: [phone]  Additional Information: please tell  that the neuropathy in her feet has become more intense and never stop hurting need to know what to do? Call back    LVM that this message will be sent to the doctor. Call back number is provided . CG

## 2020-08-25 ENCOUNTER — LAB VISIT (OUTPATIENT)
Dept: LAB | Facility: HOSPITAL | Age: 80
End: 2020-08-25
Attending: INTERNAL MEDICINE
Payer: MEDICARE

## 2020-08-25 DIAGNOSIS — Z79.899 HIGH RISK MEDICATIONS (NOT ANTICOAGULANTS) LONG-TERM USE: ICD-10-CM

## 2020-08-25 DIAGNOSIS — M35.3 POLYMYALGIA RHEUMATICA: ICD-10-CM

## 2020-08-25 LAB
CRP SERPL-MCNC: 64.9 MG/L (ref 0–8.2)
ERYTHROCYTE [SEDIMENTATION RATE] IN BLOOD BY WESTERGREN METHOD: 80 MM/HR (ref 0–20)

## 2020-08-25 PROCEDURE — 85651 RBC SED RATE NONAUTOMATED: CPT | Mod: PO

## 2020-08-25 PROCEDURE — 36415 COLL VENOUS BLD VENIPUNCTURE: CPT | Mod: PO

## 2020-08-25 PROCEDURE — 86140 C-REACTIVE PROTEIN: CPT

## 2020-08-25 NOTE — TELEPHONE ENCOUNTER
----- Message from Polly Jallohza sent at 8/25/2020 11:35 AM CDT -----  Type:  Patient Returning Call    Who Called:  Aracelis  Who Left Message for Patient:  Joanne  Does the patient know what this is regarding?:  advice  Best Call Back Number:  [phone]  Additional Information:  thank you!    Patient called to give more details about her feet. They are worse than they were at last visit. She states her feet feel like they are in a vise. She can't wear shoes. She says this is the first time she has had neuropathy problems. She does not want pain killers. Please advise. CG

## 2020-08-26 ENCOUNTER — TELEPHONE (OUTPATIENT)
Dept: RHEUMATOLOGY | Facility: CLINIC | Age: 80
End: 2020-08-26

## 2020-08-26 NOTE — TELEPHONE ENCOUNTER
----- Message from Jaclyn Jaya sent at 8/26/2020 11:28 AM CDT -----  Regarding: Info  Contact: Patient  Type: Needs Medical Advice    Who Called:  Patient    Best Call Back Number: [phone]    Additional Information:   Patient calling to let Joanne know that the Dr she told her about for Nueropathy is Dr. Fermin Peña in [place]. (friend referred her)  Requesting call back to discuss if there is anything more she needs to do?    Made notation in specialty comments regarding Dr. Peña. Spoke to the patient earlier regarding Dr. Snyder's recommendations. CG

## 2020-08-26 NOTE — TELEPHONE ENCOUNTER
Spoke to patient with results and advice from Dr. Snyder. Will call PCP for urgent care slot to be seen asap. Patient aware to continue prednisone 10 mg and MTX. CG

## 2020-08-27 ENCOUNTER — TELEPHONE (OUTPATIENT)
Dept: RHEUMATOLOGY | Facility: CLINIC | Age: 80
End: 2020-08-27

## 2020-08-27 NOTE — TELEPHONE ENCOUNTER
----- Message from Brigette Yo sent at 8/27/2020 10:57 AM CDT -----  Regarding: REFILL  Contact: Patient  Type: RX Refill Request    Who Called: Patient     Have you contacted your pharmacy:    Refill or New Rx: NEW    RX Name and Strength: Magic mouth wash     How is the patient currently taking it? (ex. 1XDay):    Is this a 30 day or 90 day RX:    Preferred Pharmacy with phone number:  [id number] - [facility] [address] [place] [address] & [place] [address]  [id number] [place] [id number]  Phone: [phone] Fax: [phone]        Local or Mail Order: Local     Ordering Provider: Dr. Snyder     Would the patient rather a call back or a response via [facility]? Call back     Best Call Back Number: [phone]    Patient says Barry wanted  you to fill for the patient.

## 2020-09-09 ENCOUNTER — TELEPHONE (OUTPATIENT)
Dept: RHEUMATOLOGY | Facility: CLINIC | Age: 80
End: 2020-09-09

## 2020-09-09 NOTE — TELEPHONE ENCOUNTER
"Call patient find out if she is having oral ulcers or white tongue with burning?   I never got a clear story just that :"her pcp asked me to write for mouthwash"     If white and burning she needs a different med  If ulcers she needs to increase folic acid to 2mg daily as this is a side effect of methotrexate, perhaps.       Dr. Snyder   "

## 2020-09-09 NOTE — TELEPHONE ENCOUNTER
----- Message from Anali Del Rio LPN sent at 8/27/2020  6:20 PM CDT -----  Contact: ONIEL ROSARIO [[id number]]  Please send magic mouthwash for patient. Do not know how to enter. CG  ----- Message -----  From: Liz Ordaz  Sent: 8/27/2020  11:46 AM CDT  To: Cl[facility] CORBIN Staff    Patient is requesting a call back from the nurse stated she went to see pcp, and he advised for Dr. Snyder to call in magic mouth wash.    Please call the patient upon request at phone number [phone].    Patient will be using:  [id number] [id number] [place] [address] AT [facility] [address]  & [place] [address]  [id number] [place] [id number]  Phone: [phone] Fax: [phone]

## 2020-09-10 NOTE — TELEPHONE ENCOUNTER
----- Message from Belinda Ochoa sent at 9/10/2020  3:25 PM CDT -----  Regarding: advice  Contact: Patient/[phone] (home)  Type: Needs Medical Advice  Who Called:  Patient/[phone] (home)       Additional Information: Has questions concerning some of her medications. She needs refills on the naltrexone capsule and the methotrexate 2.5 MG Tab but she does not know if she needs to go up on the dosage before she gets the refills. Please call to advise.     [facility][id number] [facility] [id number] [place] [address] AT [facility] [address] [place] [address]  [id number] [place] [id number]  Phone: [phone] Fax: [phone]    Also, she wants to give office an update on her health condition.

## 2020-09-10 NOTE — TELEPHONE ENCOUNTER
Spoke to the patient and checked on status of her tongue and mouth ulcers. All symptoms went away after 5 days of magic mouthwash.     Patient asking if she should refill her MTX at 4 a week as she has been. Patient asking if Naltrexone should be 3 mg or 4.5 mg. She is out of the 3 mg from [facility]. Please advise.    Recent cardiology visit ruled out CHF. Patient continues with painful swelling in legs. Negative echocardiogram and now off Lasix per Dr. Villavicencio.     Patient instructed to let us know if mouth ulcers and white burning tongue return. Next visit 9-23-20. CG

## 2020-09-10 NOTE — TELEPHONE ENCOUNTER
Keep MTX at 4 tabs weekly  Folic acid 1 mg daily.   Prednisone 10mg daily per last note.   Naltrexone 3mg daily this will be hand written at that dose (epic cannot print 3mg) and need to fax to [facility]. - if using hydrocodone do not use naltrexone (she had an interaction with them)     No need for more magic mouthwash if no more ulcers.     Labs are set for 9/11/20 already.     Dr. Snyder

## 2020-09-11 ENCOUNTER — LAB VISIT (OUTPATIENT)
Dept: LAB | Facility: HOSPITAL | Age: 80
End: 2020-09-11
Attending: INTERNAL MEDICINE
Payer: MEDICARE

## 2020-09-11 DIAGNOSIS — Z79.899 HIGH RISK MEDICATIONS (NOT ANTICOAGULANTS) LONG-TERM USE: ICD-10-CM

## 2020-09-11 DIAGNOSIS — M35.3 POLYMYALGIA RHEUMATICA: ICD-10-CM

## 2020-09-11 LAB
ALBUMIN SERPL BCP-MCNC: 3.2 G/DL (ref 3.5–5.2)
ALP SERPL-CCNC: 82 U/L (ref 55–135)
ALT SERPL W/O P-5'-P-CCNC: 11 U/L (ref 10–44)
ANION GAP SERPL CALC-SCNC: 9 MMOL/L (ref 8–16)
AST SERPL-CCNC: 16 U/L (ref 10–40)
BASOPHILS # BLD AUTO: 0.05 K/UL (ref 0–0.2)
BASOPHILS NFR BLD: 0.6 % (ref 0–1.9)
BILIRUB SERPL-MCNC: 0.2 MG/DL (ref 0.1–1)
BUN SERPL-MCNC: 21 MG/DL (ref 8–23)
CALCIUM SERPL-MCNC: 9.1 MG/DL (ref 8.7–10.5)
CHLORIDE SERPL-SCNC: 101 MMOL/L (ref 95–110)
CO2 SERPL-SCNC: 27 MMOL/L (ref 23–29)
CREAT SERPL-MCNC: 0.8 MG/DL (ref 0.5–1.4)
CRP SERPL-MCNC: 24.7 MG/L (ref 0–8.2)
DIFFERENTIAL METHOD: ABNORMAL
EOSINOPHIL # BLD AUTO: 0.3 K/UL (ref 0–0.5)
EOSINOPHIL NFR BLD: 2.9 % (ref 0–8)
ERYTHROCYTE [DISTWIDTH] IN BLOOD BY AUTOMATED COUNT: 16.8 % (ref 11.5–14.5)
ERYTHROCYTE [SEDIMENTATION RATE] IN BLOOD BY WESTERGREN METHOD: 46 MM/HR (ref 0–20)
EST. GFR  (AFRICAN AMERICAN): >60 ML/MIN/1.73 M^2
EST. GFR  (NON AFRICAN AMERICAN): >60 ML/MIN/1.73 M^2
GLUCOSE SERPL-MCNC: 102 MG/DL (ref 70–110)
HCT VFR BLD AUTO: 28.5 % (ref 37–48.5)
HGB BLD-MCNC: 8.3 G/DL (ref 12–16)
IMM GRANULOCYTES # BLD AUTO: 0.07 K/UL (ref 0–0.04)
IMM GRANULOCYTES NFR BLD AUTO: 0.8 % (ref 0–0.5)
LYMPHOCYTES # BLD AUTO: 0.8 K/UL (ref 1–4.8)
LYMPHOCYTES NFR BLD: 9.5 % (ref 18–48)
MCH RBC QN AUTO: 26.4 PG (ref 27–31)
MCHC RBC AUTO-ENTMCNC: 29.1 G/DL (ref 32–36)
MCV RBC AUTO: 91 FL (ref 82–98)
MONOCYTES # BLD AUTO: 1 K/UL (ref 0.3–1)
MONOCYTES NFR BLD: 11.1 % (ref 4–15)
NEUTROPHILS # BLD AUTO: 6.5 K/UL (ref 1.8–7.7)
NEUTROPHILS NFR BLD: 75.1 % (ref 38–73)
NRBC BLD-RTO: 0 /100 WBC
PLATELET # BLD AUTO: 512 K/UL (ref 150–350)
PMV BLD AUTO: 8.8 FL (ref 9.2–12.9)
POTASSIUM SERPL-SCNC: 4.1 MMOL/L (ref 3.5–5.1)
PROT SERPL-MCNC: 6.4 G/DL (ref 6–8.4)
RBC # BLD AUTO: 3.14 M/UL (ref 4–5.4)
SODIUM SERPL-SCNC: 137 MMOL/L (ref 136–145)
WBC # BLD AUTO: 8.7 K/UL (ref 3.9–12.7)

## 2020-09-11 PROCEDURE — 85025 COMPLETE CBC W/AUTO DIFF WBC: CPT

## 2020-09-11 PROCEDURE — 86140 C-REACTIVE PROTEIN: CPT

## 2020-09-11 PROCEDURE — 80053 COMPREHEN METABOLIC PANEL: CPT

## 2020-09-11 PROCEDURE — 36415 COLL VENOUS BLD VENIPUNCTURE: CPT | Mod: PO

## 2020-09-11 PROCEDURE — 85651 RBC SED RATE NONAUTOMATED: CPT | Mod: PO

## 2020-09-11 NOTE — TELEPHONE ENCOUNTER
Spoke with pt who understood all of the medication changes. Got the Naltrexone faxed to [facility] on 9/11/20 at 11 am. Pt states she has been taking both naltrexone and norco at the same time and hasn't had any problems. Pt also states that her feet have been hurting her so badly that she is walking with a cane and has been taking 1-2 tabs of norco daily. I advised the pt I would let  know, pt verbalized understanding.

## 2020-09-17 ENCOUNTER — TELEPHONE (OUTPATIENT)
Dept: RHEUMATOLOGY | Facility: CLINIC | Age: 80
End: 2020-09-17

## 2020-09-17 NOTE — TELEPHONE ENCOUNTER
Patient given gabapentin 900 mg per day from PCP for neuropathy. Patient reminded it takes more than a week to notice a difference. Patient asking if podiatrist or neurologist is needed. Patient is advised to ask her PCP as this is not a rheumatology problem. Patient wanting Dr. Snyder's opinion as well. Please advise. CG

## 2020-09-23 ENCOUNTER — LAB VISIT (OUTPATIENT)
Dept: LAB | Facility: HOSPITAL | Age: 80
End: 2020-09-23
Attending: INTERNAL MEDICINE
Payer: MEDICARE

## 2020-09-23 ENCOUNTER — OFFICE VISIT (OUTPATIENT)
Dept: RHEUMATOLOGY | Facility: CLINIC | Age: 80
End: 2020-09-23
Payer: MEDICARE

## 2020-09-23 VITALS
BODY MASS INDEX: 24.38 KG/M2 | DIASTOLIC BLOOD PRESSURE: 55 MMHG | SYSTOLIC BLOOD PRESSURE: 97 MMHG | WEIGHT: 132.5 LBS | HEART RATE: 71 BPM | HEIGHT: 62 IN

## 2020-09-23 DIAGNOSIS — M54.50 MIDLINE LOW BACK PAIN WITHOUT SCIATICA, UNSPECIFIED CHRONICITY: ICD-10-CM

## 2020-09-23 DIAGNOSIS — D64.9 ANEMIA, UNSPECIFIED TYPE: Primary | ICD-10-CM

## 2020-09-23 DIAGNOSIS — M79.661 PAIN IN RIGHT LOWER LEG: ICD-10-CM

## 2020-09-23 DIAGNOSIS — Z79.899 HIGH RISK MEDICATIONS (NOT ANTICOAGULANTS) LONG-TERM USE: ICD-10-CM

## 2020-09-23 DIAGNOSIS — M35.3 POLYMYALGIA RHEUMATICA: ICD-10-CM

## 2020-09-23 DIAGNOSIS — D64.9 ANEMIA, UNSPECIFIED TYPE: ICD-10-CM

## 2020-09-23 DIAGNOSIS — M54.9 UPPER BACK PAIN: ICD-10-CM

## 2020-09-23 LAB
BASOPHILS # BLD AUTO: 0.03 K/UL (ref 0–0.2)
BASOPHILS NFR BLD: 0.4 % (ref 0–1.9)
DIFFERENTIAL METHOD: ABNORMAL
EOSINOPHIL # BLD AUTO: 0.1 K/UL (ref 0–0.5)
EOSINOPHIL NFR BLD: 1.3 % (ref 0–8)
ERYTHROCYTE [DISTWIDTH] IN BLOOD BY AUTOMATED COUNT: 16.6 % (ref 11.5–14.5)
HCT VFR BLD AUTO: 28.8 % (ref 37–48.5)
HGB BLD-MCNC: 9.1 G/DL (ref 12–16)
IMM GRANULOCYTES # BLD AUTO: 0.09 K/UL (ref 0–0.04)
IMM GRANULOCYTES NFR BLD AUTO: 1.1 % (ref 0–0.5)
LYMPHOCYTES # BLD AUTO: 0.7 K/UL (ref 1–4.8)
LYMPHOCYTES NFR BLD: 8 % (ref 18–48)
MCH RBC QN AUTO: 27 PG (ref 27–31)
MCHC RBC AUTO-ENTMCNC: 31.6 G/DL (ref 32–36)
MCV RBC AUTO: 86 FL (ref 82–98)
MONOCYTES # BLD AUTO: 0.5 K/UL (ref 0.3–1)
MONOCYTES NFR BLD: 6 % (ref 4–15)
NEUTROPHILS # BLD AUTO: 6.9 K/UL (ref 1.8–7.7)
NEUTROPHILS NFR BLD: 83.2 % (ref 38–73)
NRBC BLD-RTO: 0 /100 WBC
PLATELET # BLD AUTO: 453 K/UL (ref 150–350)
PMV BLD AUTO: 8.2 FL (ref 9.2–12.9)
RBC # BLD AUTO: 3.37 M/UL (ref 4–5.4)
WBC # BLD AUTO: 8.33 K/UL (ref 3.9–12.7)

## 2020-09-23 PROCEDURE — 1125F PR PAIN SEVERITY QUANTIFIED, PAIN PRESENT: ICD-10-PCS | Mod: S$GLB,,, | Performed by: INTERNAL MEDICINE

## 2020-09-23 PROCEDURE — 1101F PT FALLS ASSESS-DOCD LE1/YR: CPT | Mod: CPTII,S$GLB,, | Performed by: INTERNAL MEDICINE

## 2020-09-23 PROCEDURE — 99999 PR PBB SHADOW E&M-EST. PATIENT-LVL V: CPT | Mod: PBBFAC,,, | Performed by: INTERNAL MEDICINE

## 2020-09-23 PROCEDURE — 1125F AMNT PAIN NOTED PAIN PRSNT: CPT | Mod: S$GLB,,, | Performed by: INTERNAL MEDICINE

## 2020-09-23 PROCEDURE — 1101F PR PT FALLS ASSESS DOC 0-1 FALLS W/OUT INJ PAST YR: ICD-10-PCS | Mod: CPTII,S$GLB,, | Performed by: INTERNAL MEDICINE

## 2020-09-23 PROCEDURE — 36415 COLL VENOUS BLD VENIPUNCTURE: CPT | Mod: PO

## 2020-09-23 PROCEDURE — 99214 OFFICE O/P EST MOD 30 MIN: CPT | Mod: S$GLB,,, | Performed by: INTERNAL MEDICINE

## 2020-09-23 PROCEDURE — 1159F MED LIST DOCD IN RCRD: CPT | Mod: S$GLB,,, | Performed by: INTERNAL MEDICINE

## 2020-09-23 PROCEDURE — 99214 PR OFFICE/OUTPT VISIT, EST, LEVL IV, 30-39 MIN: ICD-10-PCS | Mod: S$GLB,,, | Performed by: INTERNAL MEDICINE

## 2020-09-23 PROCEDURE — 1159F PR MEDICATION LIST DOCUMENTED IN MEDICAL RECORD: ICD-10-PCS | Mod: S$GLB,,, | Performed by: INTERNAL MEDICINE

## 2020-09-23 PROCEDURE — 85025 COMPLETE CBC W/AUTO DIFF WBC: CPT | Mod: PO

## 2020-09-23 PROCEDURE — 99999 PR PBB SHADOW E&M-EST. PATIENT-LVL V: ICD-10-PCS | Mod: PBBFAC,,, | Performed by: INTERNAL MEDICINE

## 2020-09-23 RX ORDER — OMEPRAZOLE 40 MG/1
CAPSULE, DELAYED RELEASE ORAL
Qty: 90 CAPSULE | Refills: 0 | Status: SHIPPED | OUTPATIENT
Start: 2020-09-23 | End: 2020-12-16 | Stop reason: SDUPTHER

## 2020-09-23 RX ORDER — HYDROCODONE BITARTRATE AND ACETAMINOPHEN 7.5; 325 MG/1; MG/1
1 TABLET ORAL EVERY 12 HOURS PRN
Qty: 60 TABLET | Refills: 0 | Status: SHIPPED | OUTPATIENT
Start: 2020-09-23 | End: 2020-11-04 | Stop reason: SDUPTHER

## 2020-09-23 NOTE — PROGRESS NOTES
Subjective:          Chief Complaint: Aracelis Weber is a 79 y.o. female who had concerns including Disease Management.    HPI:    Interval events:   Patient with PMR   MTX at 4 tabs weekly new anemia. Thrombocytosis  Pred at 10mg   Complicated with peripheral edema rather severe , started MTX seeing trending CRP/ESR but persistent leg pain.   Seen with PCP for neuropathy s/sx not seeing much benefit with gabpentin  Seen with Cardiology- no concern for cardiac ECHO ok. dc'd lasix for hyponatremia.     8/2020  Pred 20mg with mild edema, pred 30 mg with severe edema.   Current Pred 15mg daily  Rising ESR again.   Lasix 20 mg daily with improved edema but having some pins and needles in feet/legs.   Having tight, burning stinging at the toes and feet. Heaviness. Prickly.   Noting sodium is falling, K 5.2-5.4 still using           7/2020:  Hands not painful, knees not painful. Shoulder/cervical and down the arms, markedly improved. She still has some pain him hips and groin with walking and some weakness to the legs with edema. Some pain with fixing own hair.   She takes in AM regarding hips ache, some shoulder/arms, and again at night because hips/legs.   The clue is that the LDN new dose she had some ASE, previously tolerated 3mg daily fine.     Historical review of present Illness.   Patient with a recent chronic sinusitis that ultimately warranted a septoplasty, endoscopic sinus surgery and turbinate reduction 5/2020   Four weeks postop t increasingly worse she was noting pain in her throat and ears lasting several hours complaint of pdizziness she had symptoms of ear pain and decreased hearing in the left ear.  She underwent a debridement at this visit she continue with compound nasal irrigations.  Follow-up June 16 she had had tympanostomy tubes placed for pressure within the ear and decreased hearing was complaining of headaches and sinus pressure, mucoid drainage    Patient did have labs  done she does have a low IgM was concern for slowed healing.  She did was noted to have nasal drainage an ear fluid with symptoms of congestion continued with nasal irrigation,  ear drops  Multiple cultures have returned negative he is.    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    Patient did have repeat procedure on with biopsies taken 07/03/2020 showing right maxillary sinus chronically inflamed fibrotic polypoid portions benign nasal mucosa left maxillary sinus similar polypoid fragments ulcerated markedly inflamed respiratory mucosa focal foreign body giant cell response noted suggested that this might have been from her Gelfoam packing as a possible cause of this reaction    I have spoken with Dr. Quinn prior to patient's visit today and was a concern that she could have triggered some inflammatory response with the Gelfoam packing    .  Patient states she no longer has a headache is not noticing much ear drainage continues with profound loss of hearing on the left and rather significant on the right both sensorineural and some conductive changes.    Restarted on prednisone as of 7/20/20  10 mg patient has not noticed any change with her hearing in this time she did notice slight improvement with her joint aches and pains but is still relying on hydrocodone for the bulk of this.    She denies a persistent headache she does have some tenderness about the preauricular region particularly on the right more than the left.  She has denied jaw claudication changes in her voice or any amaurosis fugax.  She has no pre-existing history of joint aches and  pains hip or otherwise.  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  She had a workup that involved a negative rheumatoid factor, age appropriate sedimentation rate,C reactive protein JANET was positive 1:160 in speckled pattern        Previous: Hx:   She is having pain in her hand with stiffness and right 2nd PIP joint now unable to flex. 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   Hx of bilateral TKA 3 and 5 years ago and those are doing well.   She notes intermittent edema. Some hammer toe deformity bilaterally making shoes problematic but not painful otherwise.   Long hx of GERD-severe.   Cannot tolerate NSAIDs at all w/o gastritis.   Can use Hydrocodone PRN   Added Naltrexone at 3mg and doing very well  Lost 30# with WW.   Patient denies weight loss, rashes, dry eye, dry mouth, nasal or palatal ulcerations,  lymphadenopathy, Raynaud's, hx of DVT/miscarriages, psoriasis or family hx of psoriasis, rashes, serositis, anemia or other constitutional symptoms.  Asking about naltrexone  Component      Latest Ref Rng & Units 8/8/2020 7/29/2020 7/20/2020 7/13/2020   Sodium      136 - 145 mmol/L  126 (L) 128 (L)    Potassium      3.5 - 5.1 mmol/L  5.4 (H) 5.2 (H)    Chloride      95 - 110 mmol/L  92 (L) 92 (L)    CO2      23 - 29 mmol/L  25 29    Glucose      70 - 110 mg/dL  109 109    BUN, Bld      8 - 23 mg/dL  13 11    Creatinine      0.5 - 1.4 mg/dL  0.8 0.8    Calcium      8.7 - 10.5 mg/dL  9.5 9.7    Anion Gap      8 - 16 mmol/L  9 7 (L)    eGFR if African American      >60 mL/min/1.73 m:2  >60.0 >60.0    eGFR if non African American      >60 mL/min/1.73 m:2  >60.0 >60.0    Anti Sm Antibody      0.00 - 0.99 Ratio    0.04   Anti-Sm Interpretation      Negative    Negative   Anti Sm/RNP Antibody      0.00 - 0.99 Ratio    0.09   Anti-Sm/RNP Interpretation      Negative    Negative   JANET Screen      None Detected       Rheumatoid  Factor      0.0 - 15.0 IU/mL       Sed Rate      0 - 36 mm/Hr 57 (H)  54 (H) 75 (H)   CRP      0.0 - 8.2 mg/L 75.4 (H) 56.1 (H) 57.5 (H) 99.4 (H)   Anti-Histone Antibody      0.0 - 0.9 Units    0.4   ds DNA Ab      Negative 1:10    Negative 1:10     Component      Latest Ref Rng & Units 6/25/2020 11/10/2018   Sodium      136 - 145 mmol/L     Potassium      3.5 - 5.1 mmol/L     Chloride      95 - 110 mmol/L     CO2      23 - 29 mmol/L     Glucose      70 - 110 mg/dL     BUN, Bld      8 - 23 mg/dL     Creatinine      0.5 - 1.4 mg/dL     Calcium      8.7 - 10.5 mg/dL     Anion Gap      8 - 16 mmol/L     eGFR if African American      >60 mL/min/1.73 m:2     eGFR if non African American      >60 mL/min/1.73 m:2     Anti Sm Antibody      0.00 - 0.99 Ratio     Anti-Sm Interpretation      Negative     Anti Sm/RNP Antibody      0.00 - 0.99 Ratio     Anti-Sm/RNP Interpretation      Negative     JANET Screen      None Detected Detected (A) Detected (A)   Rheumatoid Factor      0.0 - 15.0 IU/mL 11.6 <8.6   Sed Rate      0 - 36 mm/Hr     CRP      0.0 - 8.2 mg/L     Anti-Histone Antibody      0.0 - 0.9 Units     ds DNA Ab      Negative 1:10         REVIEW OF SYSTEMS:    Review of Systems   Constitutional: Negative for fever, malaise/fatigue and weight loss.   HENT: Negative for sore throat.    Eyes: Negative for double vision, photophobia and redness.   Respiratory: Negative for cough, shortness of breath and wheezing.    Cardiovascular: Negative for chest pain, palpitations and orthopnea.   Gastrointestinal: Negative for abdominal pain, constipation and diarrhea.   Genitourinary: Negative for dysuria, hematuria and urgency.   Musculoskeletal: Positive for joint pain. Negative for back pain and myalgias.   Skin: Negative for rash.   Neurological: Negative for dizziness, tingling, focal weakness and headaches.   Endo/Heme/Allergies: Does not bruise/bleed easily.   Psychiatric/Behavioral: Negative for depression, hallucinations and  suicidal ideas.               Objective:            Past Medical History:   Diagnosis Date    Depression      Family History   Problem Relation Age of Onset    Heart disease Mother     Arthritis Mother     Cancer Sister     Arthritis Sister     Diabetes Sister     Hypertension Sister      Social History     Tobacco Use    Smoking status: Former Smoker     Start date: 2/3/1955     Quit date: 1/3/1960     Years since quittin.7    Smokeless tobacco: Never Used   Substance Use Topics    Alcohol use: Never     Alcohol/week: 0.0 standard drinks     Frequency: Never    Drug use: Never         Current Outpatient Medications on File Prior to Visit   Medication Sig Dispense Refill    ascorbic acid, vitamin C, (VITAMIN C) 1000 MG tablet Take 1,000 mg by mouth once daily.      aspirin (ECOTRIN) 81 MG EC tablet Take 81 mg by mouth once daily.      C/SOURCHERRY/CELERY/GRAPE SEED (TART CHERRY ORAL) Take by mouth.      CALCIUM CARBONATE (CALCIUM 600 ORAL) Take 1,200 mg by mouth once daily.       cholecalciferol, vitamin D3, (VITAMIN D3) 5,000 unit Tab Take 5,000 Units by mouth once daily.      diclofenac sodium (VOLTAREN) 1 % Gel Apply topically 3 (three) times daily. Apply to affect joints up to 3 times daily 100 g 6    docusate sodium (COLACE) 250 MG capsule Take 250 mg by mouth once daily.      folic acid (FOLVITE) 1 MG tablet Take 1 tablet (1 mg total) by mouth once daily. 100 tablet 3    gabapentin (NEURONTIN) 300 MG capsule Take 1 capsule (300 mg total) by mouth 3 (three) times daily. 90 capsule 11    HYDROcodone-acetaminophen (NORCO) 7.5-325 mg per tablet Take 1 tablet by mouth every 6 (six) hours as needed for Pain. 120 tablet 0    LACTOBACILLUS ACIDOPHILUS (PROBIOTIC ORAL) Take 1 capsule by mouth 2 (two) times daily.      metoprolol succinate (TOPROL-XL) 100 MG 24 hr tablet Take 1 tablet (100 mg total) by mouth once daily. 90 tablet 3    multivitamin (ONE DAILY MULTIVITAMIN) per tablet Take 1  tablet by mouth once daily.      omega-3 fatty acids/fish oil (FISH OIL-OMEGA-3 FATTY ACIDS) 300-1,000 mg capsule Take by mouth once daily.      omeprazole (PRILOSEC) 40 MG capsule TAKE 1 CAPSULE(40 MG) BY MOUTH EVERY DAY 90 capsule 0    sucralfate (CARAFATE) 1 gram tablet TAKE 1 TABLET(1 GRAM) BY MOUTH THREE TIMES DAILY AS NEEDED 270 tablet 3    UNABLE TO FIND Sulfur powder QD      VIT C/VIT E ACET/LUTEIN/MIN (OCUVITE LUTEIN ORAL) Take by mouth.      VIT C/ZN/K.GINSG/MELODY/HRB62 (IMMUNE SUPPORT COMPLEX ORAL) Take 2 capsules by mouth once daily.      methotrexate 2.5 MG Tab Take 4 tablets (10 mg total) by mouth every 7 days. 16 tablet 2    [DISCONTINUED] naltrexone capsule Take 3 mg by mouth once daily.       No current facility-administered medications on file prior to visit.        Vitals:    09/23/20 1210   BP: (!) 97/55   Pulse: 71       Physical Exam:    Physical Exam   Constitutional: She is oriented to person, place, and time. She appears well-developed and well-nourished.   HENT:   Head: Normocephalic and atraumatic.   Mouth/Throat: Oropharynx is clear and moist.   Eyes: Pupils are equal, round, and reactive to light. EOM are normal.   Neck: Normal range of motion.   Cardiovascular: Normal rate, regular rhythm and normal heart sounds.   Pulmonary/Chest: Effort normal and breath sounds normal.   Musculoskeletal:        Right shoulder: She exhibits normal range of motion, no tenderness and no swelling.        Left shoulder: She exhibits normal range of motion, no tenderness and no swelling.        Right elbow: She exhibits normal range of motion and no swelling. No tenderness found.        Left elbow: She exhibits normal range of motion and no swelling. No tenderness found.        Right wrist: She exhibits normal range of motion, no tenderness and no swelling.        Left wrist: She exhibits normal range of motion, no tenderness and no swelling.        Right knee: She exhibits normal range of motion and  no swelling. No tenderness found.        Left knee: She exhibits normal range of motion and no swelling. No tenderness found.        Right hand: She exhibits decreased range of motion and tenderness. She exhibits no swelling.        Left hand: She exhibits decreased range of motion and tenderness. She exhibits no swelling.        Right foot: There is normal range of motion, no tenderness and no swelling.        Left foot: There is normal range of motion, no tenderness and no swelling.   Bony hypertrophy at the PIP and DIP joints. +squaring at the CMC joint. No active synovitis on 28 joint exam.    Neurological: She is alert and oriented to person, place, and time.   Skin: Skin is warm and dry.   Psychiatric: She has a normal mood and affect. Her behavior is normal.             Assessment:       No diagnosis found.       Plan:        There are no diagnoses linked to this encounter.   Patient is a 79-year-old female she has had a recent robust inflammatory reaction within the sinus cavity as well as hearing deficit following a sinus surgery.  She has been repeated cultured postoperatively completed antibiotics and irrigations negative for any fungal infections or bacterial infections.  She continues to have widespread musculoskeletal pain elevated sedimentation rate and a biopsy from July 2020 showing a possible reaction or inflammatory response to the gel packing      ? PMR less concern for TA given bx with reaction seems confined to sinuses, hearing not neuro vasc. No HA, vision changes. Working diagnosis is adverse reaction to packing following surgery.   CRP      10mg daily  Check inflammatory markers labs in 2 weeks     And full labs in 4 weeks.       Left hearing not concerning for neurovascular etiology pending hearing aids with DR. Pérez.    Overall feeling better, still using Hydrocodone twice daily for pains. Previous was 3-4 per 24 hr.        Start omeprazole (prilosec)  40mg by mouth daily.   Carafate 1  tab as needed for any heartburn not controlled with above prilosec.     Labs today and in 1 week. For inflammatory marker.     No follow-ups on file.      f/u 4-6 months  Thank you for allowing me to participate in the care of this very pleasant patient.    30 min consultation with greater than 50% spent in counseling, chart review and coordination of care. All questions answered.

## 2020-09-23 NOTE — PATIENT INSTRUCTIONS
Suspect some of foot pain is seronegative RA new onset.   In 10-15 % patient will begin as PMR and convert over time.     Foot has a neuropathic and arthritic component.   Methotrexate not therapeutic, cannot increase Pred.   ? If tarsal tunnel at play, perhaps Dr. Campbell can inject.     Try to increase dietary iron and will check anemia again today and then decide to increase Methotrexate.     Keep Prednisone at 10mg daily for now.     Labs again in late October.

## 2020-09-24 ENCOUNTER — PATIENT MESSAGE (OUTPATIENT)
Dept: RHEUMATOLOGY | Facility: CLINIC | Age: 80
End: 2020-09-24

## 2020-09-24 ENCOUNTER — TELEPHONE (OUTPATIENT)
Dept: RHEUMATOLOGY | Facility: CLINIC | Age: 80
End: 2020-09-24

## 2020-09-24 DIAGNOSIS — M35.3 POLYMYALGIA RHEUMATICA: ICD-10-CM

## 2020-09-24 DIAGNOSIS — D64.9 ANEMIA, UNSPECIFIED TYPE: Primary | ICD-10-CM

## 2020-09-24 RX ORDER — METHOTREXATE 2.5 MG/1
15 TABLET ORAL
Qty: 24 TABLET | Refills: 3 | Status: SHIPPED | OUTPATIENT
Start: 2020-09-24 | End: 2020-12-16 | Stop reason: SDUPTHER

## 2020-09-24 NOTE — TELEPHONE ENCOUNTER
Please call patient. I have reviewed her CBC and the anemia did improve a bit.   I am going to assume this is inflammatory and will improve with time but want to check a few more labs sometime next week if she is able.       Retic, EPO level and some iron studies.     I do think we are safe to increase the Methotrexate to 6 tablets weekly and this should help improve the hand and foot pain.       Thanks. Dr. CORBIN

## 2020-11-03 ENCOUNTER — TELEPHONE (OUTPATIENT)
Dept: RHEUMATOLOGY | Facility: CLINIC | Age: 80
End: 2020-11-03

## 2020-11-03 ENCOUNTER — LAB VISIT (OUTPATIENT)
Dept: LAB | Facility: HOSPITAL | Age: 80
End: 2020-11-03
Attending: INTERNAL MEDICINE
Payer: MEDICARE

## 2020-11-03 DIAGNOSIS — Z79.899 HIGH RISK MEDICATIONS (NOT ANTICOAGULANTS) LONG-TERM USE: ICD-10-CM

## 2020-11-03 DIAGNOSIS — D64.9 ANEMIA, UNSPECIFIED TYPE: ICD-10-CM

## 2020-11-03 LAB
ALBUMIN SERPL BCP-MCNC: 3.8 G/DL (ref 3.5–5.2)
ALP SERPL-CCNC: 69 U/L (ref 55–135)
ALT SERPL W/O P-5'-P-CCNC: 10 U/L (ref 10–44)
ANION GAP SERPL CALC-SCNC: 11 MMOL/L (ref 8–16)
AST SERPL-CCNC: 18 U/L (ref 10–40)
BASOPHILS # BLD AUTO: 0.02 K/UL (ref 0–0.2)
BASOPHILS NFR BLD: 0.2 % (ref 0–1.9)
BILIRUB SERPL-MCNC: 0.2 MG/DL (ref 0.1–1)
BUN SERPL-MCNC: 17 MG/DL (ref 8–23)
CALCIUM SERPL-MCNC: 9.5 MG/DL (ref 8.7–10.5)
CHLORIDE SERPL-SCNC: 100 MMOL/L (ref 95–110)
CO2 SERPL-SCNC: 26 MMOL/L (ref 23–29)
CREAT SERPL-MCNC: 0.9 MG/DL (ref 0.5–1.4)
CRP SERPL-MCNC: 2.9 MG/L (ref 0–8.2)
DIFFERENTIAL METHOD: ABNORMAL
EOSINOPHIL # BLD AUTO: 0 K/UL (ref 0–0.5)
EOSINOPHIL NFR BLD: 0.3 % (ref 0–8)
ERYTHROCYTE [DISTWIDTH] IN BLOOD BY AUTOMATED COUNT: 16.9 % (ref 11.5–14.5)
ERYTHROCYTE [SEDIMENTATION RATE] IN BLOOD BY WESTERGREN METHOD: 36 MM/HR (ref 0–20)
EST. GFR  (AFRICAN AMERICAN): >60 ML/MIN/1.73 M^2
EST. GFR  (NON AFRICAN AMERICAN): >60 ML/MIN/1.73 M^2
GLUCOSE SERPL-MCNC: 113 MG/DL (ref 70–110)
HCT VFR BLD AUTO: 32.2 % (ref 37–48.5)
HGB BLD-MCNC: 9.6 G/DL (ref 12–16)
IMM GRANULOCYTES # BLD AUTO: 0.05 K/UL (ref 0–0.04)
IMM GRANULOCYTES NFR BLD AUTO: 0.5 % (ref 0–0.5)
LYMPHOCYTES # BLD AUTO: 0.5 K/UL (ref 1–4.8)
LYMPHOCYTES NFR BLD: 4.8 % (ref 18–48)
MCH RBC QN AUTO: 26.4 PG (ref 27–31)
MCHC RBC AUTO-ENTMCNC: 29.8 G/DL (ref 32–36)
MCV RBC AUTO: 89 FL (ref 82–98)
MONOCYTES # BLD AUTO: 0.3 K/UL (ref 0.3–1)
MONOCYTES NFR BLD: 2.5 % (ref 4–15)
NEUTROPHILS # BLD AUTO: 9.4 K/UL (ref 1.8–7.7)
NEUTROPHILS NFR BLD: 91.7 % (ref 38–73)
NRBC BLD-RTO: 0 /100 WBC
PLATELET # BLD AUTO: 360 K/UL (ref 150–350)
PMV BLD AUTO: 9.6 FL (ref 9.2–12.9)
POTASSIUM SERPL-SCNC: 4.3 MMOL/L (ref 3.5–5.1)
PROT SERPL-MCNC: 7 G/DL (ref 6–8.4)
RBC # BLD AUTO: 3.64 M/UL (ref 4–5.4)
SODIUM SERPL-SCNC: 137 MMOL/L (ref 136–145)
WBC # BLD AUTO: 10.24 K/UL (ref 3.9–12.7)

## 2020-11-03 PROCEDURE — 36415 COLL VENOUS BLD VENIPUNCTURE: CPT | Mod: PO

## 2020-11-03 PROCEDURE — 85025 COMPLETE CBC W/AUTO DIFF WBC: CPT

## 2020-11-03 PROCEDURE — 85651 RBC SED RATE NONAUTOMATED: CPT | Mod: PO

## 2020-11-03 PROCEDURE — 80053 COMPREHEN METABOLIC PANEL: CPT

## 2020-11-03 PROCEDURE — 86140 C-REACTIVE PROTEIN: CPT

## 2020-11-03 NOTE — TELEPHONE ENCOUNTER
----- Message from Johanna Lopes sent at 11/2/2020  3:41 PM CST -----  Regarding: call back  Contact: [phone]  Type:  Patient Call Back    Who Called: Pt  What this is regarding?: scheduling  Would the patient rather a call back or a response via [facility]? Call back  Best Call Back Number:[phone]  Additional Information:     Advised to call back directly if there are further questions, or if these symptoms fail to improve as anticipated or worsen.      LVM to call or email back if needed. On schedule noon tomorrow. Did receive fax from Dr. Quinn/ENT concerning her last visit. CG

## 2020-11-04 ENCOUNTER — OFFICE VISIT (OUTPATIENT)
Dept: RHEUMATOLOGY | Facility: CLINIC | Age: 80
End: 2020-11-04
Payer: MEDICARE

## 2020-11-04 ENCOUNTER — TELEPHONE (OUTPATIENT)
Dept: RHEUMATOLOGY | Facility: CLINIC | Age: 80
End: 2020-11-04

## 2020-11-04 VITALS
SYSTOLIC BLOOD PRESSURE: 122 MMHG | WEIGHT: 139.75 LBS | HEART RATE: 65 BPM | BODY MASS INDEX: 25.72 KG/M2 | DIASTOLIC BLOOD PRESSURE: 57 MMHG | HEIGHT: 62 IN

## 2020-11-04 DIAGNOSIS — Z79.899 HIGH RISK MEDICATIONS (NOT ANTICOAGULANTS) LONG-TERM USE: ICD-10-CM

## 2020-11-04 DIAGNOSIS — M54.9 UPPER BACK PAIN: ICD-10-CM

## 2020-11-04 DIAGNOSIS — M79.661 PAIN IN RIGHT LOWER LEG: ICD-10-CM

## 2020-11-04 DIAGNOSIS — G62.9 NEUROPATHY: ICD-10-CM

## 2020-11-04 DIAGNOSIS — M35.3 POLYMYALGIA RHEUMATICA: Primary | ICD-10-CM

## 2020-11-04 DIAGNOSIS — M54.50 MIDLINE LOW BACK PAIN WITHOUT SCIATICA, UNSPECIFIED CHRONICITY: ICD-10-CM

## 2020-11-04 PROCEDURE — 99214 PR OFFICE/OUTPT VISIT, EST, LEVL IV, 30-39 MIN: ICD-10-PCS | Mod: S$GLB,,, | Performed by: INTERNAL MEDICINE

## 2020-11-04 PROCEDURE — 1159F PR MEDICATION LIST DOCUMENTED IN MEDICAL RECORD: ICD-10-PCS | Mod: S$GLB,,, | Performed by: INTERNAL MEDICINE

## 2020-11-04 PROCEDURE — 99999 PR PBB SHADOW E&M-EST. PATIENT-LVL IV: ICD-10-PCS | Mod: PBBFAC,,, | Performed by: INTERNAL MEDICINE

## 2020-11-04 PROCEDURE — 99214 OFFICE O/P EST MOD 30 MIN: CPT | Mod: S$GLB,,, | Performed by: INTERNAL MEDICINE

## 2020-11-04 PROCEDURE — 1101F PT FALLS ASSESS-DOCD LE1/YR: CPT | Mod: CPTII,S$GLB,, | Performed by: INTERNAL MEDICINE

## 2020-11-04 PROCEDURE — 99999 PR PBB SHADOW E&M-EST. PATIENT-LVL IV: CPT | Mod: PBBFAC,,, | Performed by: INTERNAL MEDICINE

## 2020-11-04 PROCEDURE — 1159F MED LIST DOCD IN RCRD: CPT | Mod: S$GLB,,, | Performed by: INTERNAL MEDICINE

## 2020-11-04 PROCEDURE — 1101F PR PT FALLS ASSESS DOC 0-1 FALLS W/OUT INJ PAST YR: ICD-10-PCS | Mod: CPTII,S$GLB,, | Performed by: INTERNAL MEDICINE

## 2020-11-04 RX ORDER — FOLIC ACID 1 MG/1
2 TABLET ORAL DAILY
Qty: 100 TABLET | Refills: 3 | Status: SHIPPED | OUTPATIENT
Start: 2020-11-04 | End: 2021-06-28

## 2020-11-04 RX ORDER — HYDROCODONE BITARTRATE AND ACETAMINOPHEN 7.5; 325 MG/1; MG/1
1 TABLET ORAL EVERY 12 HOURS PRN
Qty: 60 TABLET | Refills: 0 | Status: SHIPPED | OUTPATIENT
Start: 2020-11-04 | End: 2020-12-16 | Stop reason: SDUPTHER

## 2020-11-04 RX ORDER — GABAPENTIN 400 MG/1
400 CAPSULE ORAL 3 TIMES DAILY
Qty: 90 CAPSULE | Refills: 6 | Status: SHIPPED | OUTPATIENT
Start: 2020-11-04 | End: 2021-06-08

## 2020-11-04 ASSESSMENT — ROUTINE ASSESSMENT OF PATIENT INDEX DATA (RAPID3)
TOTAL RAPID3 SCORE: 6.44
PAIN SCORE: 8
MDHAQ FUNCTION SCORE: 1
PATIENT GLOBAL ASSESSMENT SCORE: 8
PSYCHOLOGICAL DISTRESS SCORE: 1.1

## 2020-11-04 NOTE — PATIENT INSTRUCTIONS
Decrease Prednisone to 7.5mg daily  Repeat labs again in 8 weeks  Keep Methotrexate at 6 tabs weekly.   Folic acid increase to 2mg daily

## 2020-11-04 NOTE — TELEPHONE ENCOUNTER
----- Message from Desi Catherine sent at 11/4/2020  1:48 PM CST -----  Contact: self  Patient just left your office and todays visit printout she thinks there are three things that she doesn't think is hers.  Please call back at [phone] (home) to advise.  Thanks    [facility] if there is anything not hers to tear up and throw away.

## 2020-11-04 NOTE — PROGRESS NOTES
Subjective:          Chief Complaint: Aracelis Weber is a 79 y.o. female who had concerns including PMR.    HPI:  Interval events:   Edema is markedly better this visit.   Now with pain, numbness, burning, hypersensitivity through the skin, tightness from ankle through toes. Feels like she is standing on straw wearing a shoes that is too small.   Pred 10mg daily  MTX 6 tabs weekly  Gabapentin up to 300mg TID.   Now with Dr. Campbell doing PT for Plantar fasciitis. She is taking supplement and compound cream for joint and neuropathy. No response to tarsal tunnel injection per Dr. Campbell.   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 WBC ok.       Interval events:   Patient with PMR   MTX at 4 tabs weekly new anemia. Thrombocytosis  Pred at 10mg   Complicated with peripheral edema rather severe , started MTX seeing trending CRP/ESR but persistent leg pain.   Seen with PCP for neuropathy s/sx not seeing much benefit with gabpentin  Seen with Cardiology- no concern for cardiac ECHO ok. dc'd lasix for hyponatremia.     8/2020  Pred 20mg with mild edema, pred 30 mg with severe edema.   Current Pred 15mg daily  Rising ESR again.   Lasix 20 mg daily with improved edema but having some pins and needles in feet/legs.   Having tight, burning stinging at the toes and feet. Heaviness. Prickly.   Noting sodium is falling, K 5.2-5.4 still using           7/2020:  Hands not painful, knees not painful. Shoulder/cervical and down the arms, markedly improved. She still has some pain him hips and groin with walking and some weakness to the legs with edema. Some pain with fixing own hair.   She takes in AM regarding hips ache, some shoulder/arms, and again at night because hips/legs.   The clue is that the LDN new dose she had some ASE, previously tolerated 3mg daily fine.     Historical review of present Illness.    Patient with a recent chronic sinusitis that ultimately warranted a septoplasty, endoscopic sinus surgery and turbinate reduction 5/2020   Four weeks postop t increasingly worse she was noting pain in her throat and ears lasting several hours complaint of pdizziness she had symptoms of ear pain and decreased hearing in the left ear.  She underwent a debridement at this visit she continue with compound nasal irrigations.  Follow-up June 16 she had had tympanostomy tubes placed for pressure within the ear and decreased hearing was complaining of headaches and sinus pressure, mucoid drainage    Patient did have labs done she does have a low IgM was concern for slowed healing.  She did was noted to have nasal drainage an ear fluid with symptoms of congestion continued with nasal irrigation,  ear drops  Multiple cultures have returned negative he is.    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    Patient did have repeat procedure on with biopsies taken 07/03/2020 showing right maxillary sinus chronically inflamed fibrotic polypoid portions benign nasal mucosa left maxillary sinus similar polypoid fragments ulcerated markedly inflamed respiratory mucosa focal foreign body giant cell response noted suggested that this might have been from her Gelfoam packing as a possible cause of this reaction    I have spoken with Dr. Quinn prior to patient's visit today and was a concern that she could have triggered some inflammatory response with the Gelfoam packing    .  Patient states she no longer has a headache is not noticing much ear drainage continues with  profound loss of hearing on the left and rather significant on the right both sensorineural and some conductive changes.    Restarted on prednisone as of 7/20/20  10 mg patient has not noticed any change with her hearing in this time she did notice slight improvement with her joint aches and pains but is still relying on hydrocodone for the bulk of this.    She denies a persistent headache she does have some tenderness about the preauricular region particularly on the right more than the left.  She has denied jaw claudication changes in her voice or any amaurosis fugax.  She has no pre-existing history of joint aches and pains hip or otherwise.  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  She had a workup that involved a negative rheumatoid factor, age appropriate sedimentation rate,C reactive protein JANET was positive 1:160 in speckled pattern        Previous: Hx:   She is having pain in her hand with stiffness and right 2nd PIP joint now unable to flex. 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   Hx of bilateral TKA 3 and 5 years ago and those are doing well.   She notes intermittent edema. Some hammer toe deformity bilaterally making shoes problematic but not painful otherwise.   Long hx of GERD-severe.   Cannot tolerate NSAIDs at all w/o gastritis.   Can use Hydrocodone PRN   Added Naltrexone at 3mg and doing very well  Lost 30# with WW.   Patient denies weight loss, rashes, dry eye, dry mouth, nasal or palatal ulcerations,  lymphadenopathy, Raynaud's, hx of DVT/miscarriages, psoriasis or family hx of psoriasis, rashes, serositis, anemia or other constitutional symptoms.  Asking about naltrexone  Component      Latest Ref Rng & Units 8/8/2020 7/29/2020 7/20/2020 7/13/2020   Sodium      136 - 145 mmol/L  126 (L) 128 (L)    Potassium      3.5 - 5.1 mmol/L  5.4 (H) 5.2 (H)    Chloride      95 - 110 mmol/L  92 (L) 92 (L)    CO2       23 - 29 mmol/L  25 29    Glucose      70 - 110 mg/dL  109 109    BUN, Bld      8 - 23 mg/dL  13 11    Creatinine      0.5 - 1.4 mg/dL  0.8 0.8    Calcium      8.7 - 10.5 mg/dL  9.5 9.7    Anion Gap      8 - 16 mmol/L  9 7 (L)    eGFR if African American      >60 mL/min/1.73 m:2  >60.0 >60.0    eGFR if non African American      >60 mL/min/1.73 m:2  >60.0 >60.0    Anti Sm Antibody      0.00 - 0.99 Ratio    0.04   Anti-Sm Interpretation      Negative    Negative   Anti Sm/RNP Antibody      0.00 - 0.99 Ratio    0.09   Anti-Sm/RNP Interpretation      Negative    Negative   JANET Screen      None Detected       Rheumatoid Factor      0.0 - 15.0 IU/mL       Sed Rate      0 - 36 mm/Hr 57 (H)  54 (H) 75 (H)   CRP      0.0 - 8.2 mg/L 75.4 (H) 56.1 (H) 57.5 (H) 99.4 (H)   Anti-Histone Antibody      0.0 - 0.9 Units    0.4   ds DNA Ab      Negative 1:10    Negative 1:10     Component      Latest Ref Rng & Units 6/25/2020 11/10/2018   Sodium      136 - 145 mmol/L     Potassium      3.5 - 5.1 mmol/L     Chloride      95 - 110 mmol/L     CO2      23 - 29 mmol/L     Glucose      70 - 110 mg/dL     BUN, Bld      8 - 23 mg/dL     Creatinine      0.5 - 1.4 mg/dL     Calcium      8.7 - 10.5 mg/dL     Anion Gap      8 - 16 mmol/L     eGFR if African American      >60 mL/min/1.73 m:2     eGFR if non African American      >60 mL/min/1.73 m:2     Anti Sm Antibody      0.00 - 0.99 Ratio     Anti-Sm Interpretation      Negative     Anti Sm/RNP Antibody      0.00 - 0.99 Ratio     Anti-Sm/RNP Interpretation      Negative     JANET Screen      None Detected Detected (A) Detected (A)   Rheumatoid Factor      0.0 - 15.0 IU/mL 11.6 <8.6   Sed Rate      0 - 36 mm/Hr     CRP      0.0 - 8.2 mg/L     Anti-Histone Antibody      0.0 - 0.9 Units     ds DNA Ab      Negative 1:10         REVIEW OF SYSTEMS:    Review of Systems   Constitutional: Negative for fever, malaise/fatigue and weight loss.   HENT: Negative for sore throat.    Eyes: Negative for double  vision, photophobia and redness.   Respiratory: Negative for cough, shortness of breath and wheezing.    Cardiovascular: Negative for chest pain, palpitations and orthopnea.   Gastrointestinal: Negative for abdominal pain, constipation and diarrhea.   Genitourinary: Negative for dysuria, hematuria and urgency.   Musculoskeletal: Positive for joint pain. Negative for back pain and myalgias.   Skin: Negative for rash.   Neurological: Negative for dizziness, tingling, focal weakness and headaches.   Endo/Heme/Allergies: Does not bruise/bleed easily.   Psychiatric/Behavioral: Negative for depression, hallucinations and suicidal ideas.               Objective:            Past Medical History:   Diagnosis Date    Depression      Family History   Problem Relation Age of Onset    Heart disease Mother     Arthritis Mother     Cancer Sister     Arthritis Sister     Diabetes Sister     Hypertension Sister      Social History     Tobacco Use    Smoking status: Former Smoker     Start date: 2/3/1955     Quit date: 1/3/1960     Years since quittin.8    Smokeless tobacco: Never Used   Substance Use Topics    Alcohol use: Never     Alcohol/week: 0.0 standard drinks     Frequency: Never    Drug use: Never         Current Outpatient Medications on File Prior to Visit   Medication Sig Dispense Refill    ascorbic acid, vitamin C, (VITAMIN C) 1000 MG tablet Take 1,000 mg by mouth once daily.      aspirin (ECOTRIN) 81 MG EC tablet Take 81 mg by mouth once daily.      C/SOURCHERRY/CELERY/GRAPE SEED (TART CHERRY ORAL) Take by mouth.      CALCIUM CARBONATE (CALCIUM 600 ORAL) Take 1,200 mg by mouth once daily.       cholecalciferol, vitamin D3, (VITAMIN D3) 5,000 unit Tab Take 5,000 Units by mouth once daily.      diclofenac sodium (VOLTAREN) 1 % Gel Apply topically 3 (three) times daily. Apply to affect joints up to 3 times daily 100 g 6    docusate sodium (COLACE) 250 MG capsule Take 250 mg by mouth once daily.       folic acid (FOLVITE) 1 MG tablet Take 1 tablet (1 mg total) by mouth once daily. 100 tablet 3    gabapentin (NEURONTIN) 300 MG capsule Take 1 capsule (300 mg total) by mouth 3 (three) times daily. 90 capsule 11    HYDROcodone-acetaminophen (NORCO) 7.5-325 mg per tablet Take 1 tablet by mouth every 12 (twelve) hours as needed for Pain. 60 tablet 0    LACTOBACILLUS ACIDOPHILUS (PROBIOTIC ORAL) Take 1 capsule by mouth 2 (two) times daily.      metoprolol succinate (TOPROL-XL) 100 MG 24 hr tablet TAKE 1 TABLET EVERY DAY 90 tablet 3    multivitamin (ONE DAILY MULTIVITAMIN) per tablet Take 1 tablet by mouth once daily.      omega-3 fatty acids/fish oil (FISH OIL-OMEGA-3 FATTY ACIDS) 300-1,000 mg capsule Take by mouth once daily.      omeprazole (PRILOSEC) 40 MG capsule TAKE 1 CAPSULE(40 MG) BY MOUTH EVERY DAY 90 capsule 0    sucralfate (CARAFATE) 1 gram tablet TAKE 1 TABLET(1 GRAM) BY MOUTH THREE TIMES DAILY AS NEEDED 270 tablet 3    UNABLE TO FIND Sulfur powder QD      VIT C/VIT E ACET/LUTEIN/MIN (OCUVITE LUTEIN ORAL) Take by mouth.      VIT C/ZN/K.GINSG/MELODY/HRB62 (IMMUNE SUPPORT COMPLEX ORAL) Take 2 capsules by mouth once daily.      [DISCONTINUED] naltrexone capsule Take 3 mg by mouth once daily.      methotrexate 2.5 MG Tab Take 6 tablets (15 mg total) by mouth every 7 days. 24 tablet 3     No current facility-administered medications on file prior to visit.        Vitals:    11/04/20 1202   BP: (!) 122/57   Pulse: 65       Physical Exam:    Physical Exam   Constitutional: She is oriented to person, place, and time. She appears well-developed and well-nourished.   HENT:   Head: Normocephalic and atraumatic.   Mouth/Throat: Oropharynx is clear and moist.   Eyes: Pupils are equal, round, and reactive to light. EOM are normal.   Neck: Normal range of motion.   Cardiovascular: Normal rate, regular rhythm and normal heart sounds.   Pulmonary/Chest: Effort normal and breath sounds normal.   Musculoskeletal:         Right shoulder: She exhibits normal range of motion, no tenderness and no swelling.        Left shoulder: She exhibits normal range of motion, no tenderness and no swelling.        Right elbow: She exhibits normal range of motion and no swelling. No tenderness found.        Left elbow: She exhibits normal range of motion and no swelling. No tenderness found.        Right wrist: She exhibits normal range of motion, no tenderness and no swelling.        Left wrist: She exhibits normal range of motion, no tenderness and no swelling.        Right knee: She exhibits normal range of motion and no swelling. No tenderness found.        Left knee: She exhibits normal range of motion and no swelling. No tenderness found.        Right hand: She exhibits decreased range of motion and tenderness. She exhibits no swelling.        Left hand: She exhibits decreased range of motion and tenderness. She exhibits no swelling.        Right foot: There is normal range of motion, no tenderness and no swelling.        Left foot: There is normal range of motion, no tenderness and no swelling.   Bony hypertrophy at the PIP and DIP joints. +squaring at the CMC joint. No active synovitis on 28 joint exam.    Neurological: She is alert and oriented to person, place, and time.   Skin: Skin is warm and dry.   Psychiatric: She has a normal mood and affect. Her behavior is normal.             Assessment:       Encounter Diagnoses   Name Primary?    Polymyalgia rheumatica Yes    High risk medications (not anticoagulants) long-term use     Upper back pain     Midline low back pain without sciatica, unspecified chronicity     Pain in right lower leg     Neuropathy           Plan:        Polymyalgia rheumatica  -     CBC Auto Differential; Standing; Expected date: 11/04/2020  -     Comprehensive Metabolic Panel; Standing; Expected date: 11/04/2020  -     Sedimentation rate; Standing; Expected date: 11/04/2020  -     C-Reactive Protein;  Standing; Expected date: 11/04/2020  -     folic acid (FOLVITE) 1 MG tablet; Take 2 tablets (2 mg total) by mouth once daily.  Dispense: 100 tablet; Refill: 3  -     HYDROcodone-acetaminophen (NORCO) 7.5-325 mg per tablet; Take 1 tablet by mouth every 12 (twelve) hours as needed for Pain.  Dispense: 60 tablet; Refill: 0    High risk medications (not anticoagulants) long-term use  -     CBC Auto Differential; Standing; Expected date: 11/04/2020  -     Comprehensive Metabolic Panel; Standing; Expected date: 11/04/2020  -     Sedimentation rate; Standing; Expected date: 11/04/2020  -     C-Reactive Protein; Standing; Expected date: 11/04/2020  -     folic acid (FOLVITE) 1 MG tablet; Take 2 tablets (2 mg total) by mouth once daily.  Dispense: 100 tablet; Refill: 3    Upper back pain  -     HYDROcodone-acetaminophen (NORCO) 7.5-325 mg per tablet; Take 1 tablet by mouth every 12 (twelve) hours as needed for Pain.  Dispense: 60 tablet; Refill: 0    Midline low back pain without sciatica, unspecified chronicity  -     HYDROcodone-acetaminophen (NORCO) 7.5-325 mg per tablet; Take 1 tablet by mouth every 12 (twelve) hours as needed for Pain.  Dispense: 60 tablet; Refill: 0    Pain in right lower leg  -     HYDROcodone-acetaminophen (NORCO) 7.5-325 mg per tablet; Take 1 tablet by mouth every 12 (twelve) hours as needed for Pain.  Dispense: 60 tablet; Refill: 0    Neuropathy  -     gabapentin (NEURONTIN) 400 MG capsule; Take 1 capsule (400 mg total) by mouth 3 (three) times daily.  Dispense: 90 capsule; Refill: 6       Patient is a 79-year-old female she has had a recent robust inflammatory reaction within the sinus cavity as well as hearing deficit following a sinus surgery.  She has been repeated cultured postoperatively completed antibiotics and irrigations negative for any fungal infections or bacterial infections.  She continues to have widespread musculoskeletal pain elevated sedimentation rate and a biopsy from July 2020  showing a possible reaction or inflammatory response to the gel packing  PMR/RA late onset   Doing well with MTx at 6 tabs      Decrease Prednisone to 7.5mg daily  Repeat labs again in 8 weeks  Keep Methotrexate at 6 tabs weekly.   Folic acid increase to 2mg daily    Start omeprazole (prilosec)  40mg by mouth daily.     Labs today and in 1 week. For inflammatory marker.     No follow-ups on file.      f/u 3 months  Thank you for allowing me to participate in the care of this very pleasant patient.    30 min consultation with greater than 50% spent in counseling, chart review and coordination of care. All questions answered.

## 2020-12-15 ENCOUNTER — TELEPHONE (OUTPATIENT)
Dept: RHEUMATOLOGY | Facility: CLINIC | Age: 80
End: 2020-12-15

## 2020-12-15 NOTE — TELEPHONE ENCOUNTER
----- Message from Gee Pyle sent at 12/15/2020 10:28 AM CST -----  Regarding: pt advice  Contact: pt  Type:  Patient Returning Call    Who Called:  pt  Does the patient know what this is regarding?:  pt has appt tomorrow and wants to know if she needs to do blood work  Best Call Back Number:    Additional Information:  please follow up    Patient assured no labs needed prior ;future labs already booked. BERKLEY

## 2020-12-16 ENCOUNTER — HOSPITAL ENCOUNTER (OUTPATIENT)
Dept: RADIOLOGY | Facility: HOSPITAL | Age: 80
Discharge: HOME | End: 2020-12-16
Attending: INTERNAL MEDICINE
Payer: MEDICARE

## 2020-12-16 ENCOUNTER — OFFICE VISIT (OUTPATIENT)
Dept: RHEUMATOLOGY | Facility: CLINIC | Age: 80
End: 2020-12-16
Payer: MEDICARE

## 2020-12-16 VITALS
SYSTOLIC BLOOD PRESSURE: 138 MMHG | HEART RATE: 67 BPM | HEIGHT: 62 IN | WEIGHT: 139.75 LBS | DIASTOLIC BLOOD PRESSURE: 74 MMHG | BODY MASS INDEX: 25.72 KG/M2

## 2020-12-16 DIAGNOSIS — G62.89 OTHER POLYNEUROPATHY: ICD-10-CM

## 2020-12-16 DIAGNOSIS — M35.3 POLYMYALGIA RHEUMATICA: ICD-10-CM

## 2020-12-16 DIAGNOSIS — M54.9 UPPER BACK PAIN: ICD-10-CM

## 2020-12-16 DIAGNOSIS — M72.2 PLANTAR FASCIITIS, BILATERAL: ICD-10-CM

## 2020-12-16 DIAGNOSIS — M54.50 MIDLINE LOW BACK PAIN WITHOUT SCIATICA, UNSPECIFIED CHRONICITY: ICD-10-CM

## 2020-12-16 DIAGNOSIS — Z79.899 HIGH RISK MEDICATIONS (NOT ANTICOAGULANTS) LONG-TERM USE: Primary | ICD-10-CM

## 2020-12-16 DIAGNOSIS — M79.661 PAIN IN RIGHT LOWER LEG: ICD-10-CM

## 2020-12-16 PROCEDURE — 1125F AMNT PAIN NOTED PAIN PRSNT: CPT | Mod: S$GLB,,, | Performed by: INTERNAL MEDICINE

## 2020-12-16 PROCEDURE — 1159F PR MEDICATION LIST DOCUMENTED IN MEDICAL RECORD: ICD-10-PCS | Mod: S$GLB,,, | Performed by: INTERNAL MEDICINE

## 2020-12-16 PROCEDURE — 1159F MED LIST DOCD IN RCRD: CPT | Mod: S$GLB,,, | Performed by: INTERNAL MEDICINE

## 2020-12-16 PROCEDURE — 73630 XR FOOT COMPLETE 3 VIEW BILATERAL: ICD-10-PCS | Mod: 26,50,, | Performed by: RADIOLOGY

## 2020-12-16 PROCEDURE — 1125F PR PAIN SEVERITY QUANTIFIED, PAIN PRESENT: ICD-10-PCS | Mod: S$GLB,,, | Performed by: INTERNAL MEDICINE

## 2020-12-16 PROCEDURE — 73630 X-RAY EXAM OF FOOT: CPT | Mod: 26,50,, | Performed by: RADIOLOGY

## 2020-12-16 PROCEDURE — 99214 PR OFFICE/OUTPT VISIT, EST, LEVL IV, 30-39 MIN: ICD-10-PCS | Mod: S$GLB,,, | Performed by: INTERNAL MEDICINE

## 2020-12-16 PROCEDURE — 73630 X-RAY EXAM OF FOOT: CPT | Mod: TC,50,FY,PO

## 2020-12-16 PROCEDURE — 99999 PR PBB SHADOW E&M-EST. PATIENT-LVL V: CPT | Mod: PBBFAC,,, | Performed by: INTERNAL MEDICINE

## 2020-12-16 PROCEDURE — 99999 PR PBB SHADOW E&M-EST. PATIENT-LVL V: ICD-10-PCS | Mod: PBBFAC,,, | Performed by: INTERNAL MEDICINE

## 2020-12-16 PROCEDURE — 99214 OFFICE O/P EST MOD 30 MIN: CPT | Mod: S$GLB,,, | Performed by: INTERNAL MEDICINE

## 2020-12-16 RX ORDER — PREDNISONE 5 MG/1
7.5 TABLET ORAL DAILY
Qty: 60 TABLET | Refills: 2 | Status: SHIPPED | OUTPATIENT
Start: 2020-12-16 | End: 2021-03-29 | Stop reason: SDUPTHER

## 2020-12-16 RX ORDER — TIZANIDINE 2 MG/1
2-4 TABLET ORAL NIGHTLY PRN
Qty: 60 TABLET | Refills: 3 | Status: SHIPPED | OUTPATIENT
Start: 2020-12-16 | End: 2021-12-16

## 2020-12-16 RX ORDER — HYDROCODONE BITARTRATE AND ACETAMINOPHEN 7.5; 325 MG/1; MG/1
1 TABLET ORAL EVERY 12 HOURS PRN
Qty: 60 TABLET | Refills: 0 | Status: SHIPPED | OUTPATIENT
Start: 2020-12-16 | End: 2021-02-05 | Stop reason: SDUPTHER

## 2020-12-16 RX ORDER — METHOTREXATE 2.5 MG/1
15 TABLET ORAL
Qty: 24 TABLET | Refills: 3 | Status: SHIPPED | OUTPATIENT
Start: 2020-12-16 | End: 2021-02-05

## 2020-12-16 RX ORDER — OMEPRAZOLE 40 MG/1
CAPSULE, DELAYED RELEASE ORAL
Qty: 90 CAPSULE | Refills: 0 | Status: SHIPPED | OUTPATIENT
Start: 2020-12-16 | End: 2021-02-05 | Stop reason: SDUPTHER

## 2020-12-16 NOTE — PROGRESS NOTES
"Patient having recent increase in shoulder region but tracks cervical paraspinal and upper trapezius. No pain with movement of the shoulder.   Hands note increase in swelling. Had ring cut off recently. Stiffness of the hands in AM, some improvement with movement but stiffness lasting longer in day.   Still sinus congestion with dried blood, mucous. No new sinus irrigation or surgery. She is doing home saline irrigation. Taking only flonase no antihistamine.     No night sweats, no fevers.   Edema much better with compression  Anemia: no SOB, CP, dizziness.     Pain in sole of foot persistent and intensely painful worse upon initial step and aches after that. PT has not been helpful. She was given arch support but exacerbated her pain. She did have dorsal midfoot inections but none in the plantar fascia.   Persistent tingling, burning and numb with "straw" , squeezing feeling from high ankle through entire foot.   EBN3 and topical compound with compound cream.   No NCS/EMG to date.     MTX at 6 tabs weekly noting hair loss worsening.   Pred 7.5mg   Gabapentin 400mg TID sedating but help.   Hydrocodone one in AM and rare at night. - takes for hands and feet.       Does not tolerate PO iron. GI upset.   "

## 2020-12-16 NOTE — PROGRESS NOTES
"     Subjective:          Chief Complaint: Aracelis Weber is a 80 y.o. female who had concerns including Disease Management.    HPI:  Interval events: Patient having recent increase in shoulder region but points to cervical paraspinal and upper trapezius. No pain with movement of the shoulder.   Hands note increase in swelling. Had ring cut off recently. Stiffness of the hands in AM, some improvement with movement but stiffness lasting longer in day.   Still sinus congestion with dried blood, mucous. No new sinus irrigation or surgery. She is doing home saline irrigation. Taking only flonase no antihistamine.     No night sweats, no fevers.   Edema much better with compression  Anemia: no SOB, CP, dizziness.     Pain in sole of foot persistent and intensely painful worse upon initial step and aches after that. PT has not been helpful. She was given arch support but exacerbated her pain. She did have dorsal midfoot inections but none in the plantar fascia.   Persistent tingling, burning and numb with "straw" , squeezing feeling from high ankle through entire foot.   EBN3 and topical compound with compound cream.   No NCS/EMG to date.     MTX at 6 tabs weekly noting hair loss worsening.   Pred 7.5mg   Gabapentin 400mg TID sedating but help.   Hydrocodone one in AM and rare at night. - takes for hands and feet.       Does not tolerate PO iron. GI upset.       11/4/20  Edema is markedly better this visit.   Now with pain, numbness, burning, hypersensitivity through the skin, tightness from ankle through toes. Feels like she is standing on straw wearing a shoes that is too small.   Pred 10mg daily  MTX 6 tabs weekly  Gabapentin up to 300mg TID.   Now with Dr. Campbell doing PT for Plantar fasciitis. She is taking supplement and compound cream for joint and neuropathy. No response to tarsal tunnel injection per Dr. Campbell.   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 WBC ok.       9/23/20  Patient with PMR   MTX at 4 tabs weekly new anemia. Thrombocytosis  Pred at 10mg   Complicated with peripheral edema rather severe , started MTX seeing trending CRP/ESR but persistent leg pain.   Seen with PCP for neuropathy s/sx not seeing much benefit with gabpentin  Seen with Cardiology- no concern for cardiac ECHO ok. dc'd lasix for hyponatremia.     8/2020  Pred 20mg with mild edema, pred 30 mg with severe edema.   Current Pred 15mg daily  Rising ESR again.   Lasix 20 mg daily with improved edema but having some pins and needles in feet/legs.   Having tight, burning stinging at the toes and feet. Heaviness. Prickly.   Noting sodium is falling, K 5.2-5.4 still using           7/2020:  Hands not painful, knees not painful. Shoulder/cervical and down the arms, markedly improved. She still has some pain him hips and groin with walking and some weakness to the legs with edema. Some pain with fixing own hair.   She takes in AM regarding hips ache, some shoulder/arms, and again at night because hips/legs.   The clue is that the LDN new dose she had some ASE, previously tolerated 3mg daily fine.     Historical review of present Illness.   Patient with a recent chronic sinusitis that ultimately warranted a septoplasty, endoscopic sinus surgery and turbinate reduction 5/2020   Four weeks postop t increasingly worse she was noting pain in her throat and ears lasting several hours complaint of pdizziness she had symptoms of ear pain and decreased hearing in the left ear.  She underwent a debridement at this visit she continue with compound nasal irrigations.  Follow-up June 16 she had had tympanostomy tubes placed for pressure within the ear and decreased hearing was complaining of headaches and sinus pressure, mucoid drainage    Patient did have labs done she does have a low IgM was concern for slowed healing.  She did was noted to have nasal  drainage an ear fluid with symptoms of congestion continued with nasal irrigation,  ear drops  Multiple cultures have returned negative he is.    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    Patient did have repeat procedure on with biopsies taken 07/03/2020 showing right maxillary sinus chronically inflamed fibrotic polypoid portions benign nasal mucosa left maxillary sinus similar polypoid fragments ulcerated markedly inflamed respiratory mucosa focal foreign body giant cell response noted suggested that this might have been from her Gelfoam packing as a possible cause of this reaction    I have spoken with Dr. Quinn prior to patient's visit today and was a concern that she could have triggered some inflammatory response with the Gelfoam packing    .  Patient states she no longer has a headache is not noticing much ear drainage continues with profound loss of hearing on the left and rather significant on the right both sensorineural and some conductive changes.    Restarted on prednisone as of 7/20/20  10 mg patient has not noticed any change with her hearing in this time she did notice slight improvement with her joint aches and pains but is still relying on hydrocodone for the bulk of this.    She denies a persistent headache she does have some tenderness about the preauricular region particularly on the right more than the left.  She has denied jaw claudication changes in her voice or any amaurosis fugax.  She has no pre-existing history of joint aches and pains hip or otherwise.  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  She had a workup that involved a negative rheumatoid factor, age appropriate sedimentation rate,C reactive protein JANET was positive 1:160 in speckled pattern        Previous: Hx:   She is having pain in her hand with stiffness and right 2nd PIP joint now unable to flex. 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   Hx of bilateral TKA 3 and 5 years ago and those are doing well.   She notes intermittent edema. Some hammer toe deformity bilaterally making shoes problematic but not painful otherwise.   Long hx of GERD-severe.   Cannot tolerate NSAIDs at all w/o gastritis.   Can use Hydrocodone PRN   Added Naltrexone at 3mg and doing very well  Lost 30# with WW.   Patient denies weight loss, rashes, dry eye, dry mouth, nasal or palatal ulcerations,  lymphadenopathy, Raynaud's, hx of DVT/miscarriages, psoriasis or family hx of psoriasis, rashes, serositis, anemia or other constitutional symptoms.  Asking about naltrexone  Component      Latest Ref Rng & Units 8/8/2020 7/29/2020 7/20/2020 7/13/2020   Sodium      136 - 145 mmol/L  126 (L) 128 (L)    Potassium      3.5 - 5.1 mmol/L  5.4 (H) 5.2 (H)    Chloride      95 - 110 mmol/L  92 (L) 92 (L)    CO2      23 - 29 mmol/L  25 29    Glucose      70 - 110 mg/dL  109 109    BUN, Bld      8 - 23 mg/dL  13 11    Creatinine      0.5 - 1.4 mg/dL  0.8 0.8    Calcium      8.7 - 10.5 mg/dL  9.5 9.7    Anion Gap      8 - 16 mmol/L  9 7 (L)    eGFR if African American      >60 mL/min/1.73 m:2  >60.0 >60.0    eGFR if non African American      >60 mL/min/1.73 m:2  >60.0 >60.0    Anti Sm Antibody      0.00 - 0.99 Ratio    0.04   Anti-Sm Interpretation      Negative    Negative   Anti Sm/RNP Antibody      0.00 - 0.99 Ratio    0.09   Anti-Sm/RNP Interpretation      Negative    Negative   JANET Screen      None Detected       Rheumatoid Factor      0.0 - 15.0 IU/mL       Sed Rate      0 - 36 mm/Hr 57 (H)  54 (H) 75 (H)   CRP      0.0 -  8.2 mg/L 75.4 (H) 56.1 (H) 57.5 (H) 99.4 (H)   Anti-Histone Antibody      0.0 - 0.9 Units    0.4   ds DNA Ab      Negative 1:10    Negative 1:10     Component      Latest Ref Rng & Units 6/25/2020 11/10/2018   Sodium      136 - 145 mmol/L     Potassium      3.5 - 5.1 mmol/L     Chloride      95 - 110 mmol/L     CO2      23 - 29 mmol/L     Glucose      70 - 110 mg/dL     BUN, Bld      8 - 23 mg/dL     Creatinine      0.5 - 1.4 mg/dL     Calcium      8.7 - 10.5 mg/dL     Anion Gap      8 - 16 mmol/L     eGFR if African American      >60 mL/min/1.73 m:2     eGFR if non African American      >60 mL/min/1.73 m:2     Anti Sm Antibody      0.00 - 0.99 Ratio     Anti-Sm Interpretation      Negative     Anti Sm/RNP Antibody      0.00 - 0.99 Ratio     Anti-Sm/RNP Interpretation      Negative     JANET Screen      None Detected Detected (A) Detected (A)   Rheumatoid Factor      0.0 - 15.0 IU/mL 11.6 <8.6   Sed Rate      0 - 36 mm/Hr     CRP      0.0 - 8.2 mg/L     Anti-Histone Antibody      0.0 - 0.9 Units     ds DNA Ab      Negative 1:10         REVIEW OF SYSTEMS:    Review of Systems   Constitutional: Negative for fever, malaise/fatigue and weight loss.   HENT: Negative for sore throat.    Eyes: Negative for double vision, photophobia and redness.   Respiratory: Negative for cough, shortness of breath and wheezing.    Cardiovascular: Negative for chest pain, palpitations and orthopnea.   Gastrointestinal: Negative for abdominal pain, constipation and diarrhea.   Genitourinary: Negative for dysuria, hematuria and urgency.   Musculoskeletal: Positive for joint pain. Negative for back pain and myalgias.   Skin: Negative for rash.   Neurological: Negative for dizziness, tingling, focal weakness and headaches.   Endo/Heme/Allergies: Does not bruise/bleed easily.   Psychiatric/Behavioral: Negative for depression, hallucinations and suicidal ideas.               Objective:            Past Medical History:   Diagnosis Date     Depression      Family History   Problem Relation Age of Onset    Heart disease Mother     Arthritis Mother     Cancer Sister     Arthritis Sister     Diabetes Sister     Hypertension Sister      Social History     Tobacco Use    Smoking status: Former Smoker     Start date: 2/3/1955     Quit date: 1/3/1960     Years since quittin.9    Smokeless tobacco: Never Used   Substance Use Topics    Alcohol use: Never     Alcohol/week: 0.0 standard drinks     Frequency: Never    Drug use: Never         Current Outpatient Medications on File Prior to Visit   Medication Sig Dispense Refill    ascorbic acid, vitamin C, (VITAMIN C) 1000 MG tablet Take 1,000 mg by mouth once daily.      aspirin (ECOTRIN) 81 MG EC tablet Take 81 mg by mouth once daily.      C/SOURCHERRY/CELERY/GRAPE SEED (TART CHERRY ORAL) Take by mouth.      CALCIUM CARBONATE (CALCIUM 600 ORAL) Take 1,200 mg by mouth once daily.       cholecalciferol, vitamin D3, (VITAMIN D3) 5,000 unit Tab Take 5,000 Units by mouth once daily.      diclofenac sodium (VOLTAREN) 1 % Gel Apply topically 3 (three) times daily. Apply to affect joints up to 3 times daily 100 g 6    docusate sodium (COLACE) 250 MG capsule Take 250 mg by mouth once daily.      folic acid (FOLVITE) 1 MG tablet Take 2 tablets (2 mg total) by mouth once daily. 100 tablet 3    gabapentin (NEURONTIN) 400 MG capsule Take 1 capsule (400 mg total) by mouth 3 (three) times daily. 90 capsule 6    HYDROcodone-acetaminophen (NORCO) 7.5-325 mg per tablet Take 1 tablet by mouth every 12 (twelve) hours as needed for Pain. 60 tablet 0    LACTOBACILLUS ACIDOPHILUS (PROBIOTIC ORAL) Take 1 capsule by mouth 2 (two) times daily.      metoprolol succinate (TOPROL-XL) 100 MG 24 hr tablet TAKE 1 TABLET EVERY DAY 90 tablet 3    multivitamin (ONE DAILY MULTIVITAMIN) per tablet Take 1 tablet by mouth once daily.      omega-3 fatty acids/fish oil (FISH OIL-OMEGA-3 FATTY ACIDS) 300-1,000 mg capsule Take  by mouth once daily.      omeprazole (PRILOSEC) 40 MG capsule TAKE 1 CAPSULE(40 MG) BY MOUTH EVERY DAY 90 capsule 0    sucralfate (CARAFATE) 1 gram tablet TAKE 1 TABLET(1 GRAM) BY MOUTH THREE TIMES DAILY AS NEEDED 270 tablet 3    UNABLE TO FIND Sulfur powder QD      VIT C/VIT E ACET/LUTEIN/MIN (OCUVITE LUTEIN ORAL) Take by mouth.      VIT C/ZN/K.GINSG/MELODY/HRB62 (IMMUNE SUPPORT COMPLEX ORAL) Take 2 capsules by mouth once daily.      methotrexate 2.5 MG Tab Take 6 tablets (15 mg total) by mouth every 7 days. 24 tablet 3    [DISCONTINUED] naltrexone capsule Take 3 mg by mouth once daily.       No current facility-administered medications on file prior to visit.        Vitals:    12/16/20 1030   BP: 138/74   Pulse: 67       Physical Exam:    Physical Exam   Constitutional: She is oriented to person, place, and time. She appears well-developed and well-nourished.   HENT:   Head: Normocephalic and atraumatic.   Mouth/Throat: Oropharynx is clear and moist.   Eyes: Pupils are equal, round, and reactive to light. EOM are normal.   Neck: Normal range of motion.   Cardiovascular: Normal rate, regular rhythm and normal heart sounds.   Pulmonary/Chest: Effort normal and breath sounds normal.   Musculoskeletal:        Right shoulder: She exhibits normal range of motion, no tenderness and no swelling.        Left shoulder: She exhibits normal range of motion, no tenderness and no swelling.        Right elbow: She exhibits normal range of motion and no swelling. No tenderness found.        Left elbow: She exhibits normal range of motion and no swelling. No tenderness found.        Right wrist: She exhibits normal range of motion, no tenderness and no swelling.        Left wrist: She exhibits normal range of motion, no tenderness and no swelling.        Right knee: She exhibits normal range of motion and no swelling. No tenderness found.        Left knee: She exhibits normal range of motion and no swelling. No tenderness  found.        Right hand: She exhibits decreased range of motion and tenderness. She exhibits no swelling.        Left hand: She exhibits decreased range of motion and tenderness. She exhibits no swelling.        Right foot: There is normal range of motion, no tenderness and no swelling.        Left foot: There is normal range of motion, no tenderness and no swelling.   Bony hypertrophy at the PIP and DIP joints. +squaring at the CMC joint. No active synovitis on 28 joint exam.    Neurological: She is alert and oriented to person, place, and time.   Skin: Skin is warm and dry.   Psychiatric: She has a normal mood and affect. Her behavior is normal.             Assessment:       Encounter Diagnoses   Name Primary?    High risk medications (not anticoagulants) long-term use Yes    Other polyneuropathy     Plantar fasciitis, bilateral     Upper back pain     Midline low back pain without sciatica, unspecified chronicity     Pain in right lower leg     Polymyalgia rheumatica           Plan:        High risk medications (not anticoagulants) long-term use  -     CBC Auto Differential; Standing; Expected date: 12/16/2020  -     Comprehensive Metabolic Panel; Standing; Expected date: 12/16/2020  -     Sedimentation rate; Standing; Expected date: 12/16/2020  -     C-Reactive Protein; Standing; Expected date: 12/16/2020    Other polyneuropathy  -     EMG W/ ULTRASOUND AND NERVE CONDUCTION TEST 2 Extremities; Future  -     Ambulatory referral/consult to Podiatry; Future; Expected date: 12/23/2020    Plantar fasciitis, bilateral  -     X-Ray Foot Complete Bilateral; Future; Expected date: 12/16/2020  -     Ambulatory referral/consult to Podiatry; Future; Expected date: 12/23/2020    Upper back pain  -     HYDROcodone-acetaminophen (NORCO) 7.5-325 mg per tablet; Take 1 tablet by mouth every 12 (twelve) hours as needed for Pain.  Dispense: 60 tablet; Refill: 0    Midline low back pain without sciatica, unspecified  chronicity  -     HYDROcodone-acetaminophen (NORCO) 7.5-325 mg per tablet; Take 1 tablet by mouth every 12 (twelve) hours as needed for Pain.  Dispense: 60 tablet; Refill: 0    Pain in right lower leg  -     HYDROcodone-acetaminophen (NORCO) 7.5-325 mg per tablet; Take 1 tablet by mouth every 12 (twelve) hours as needed for Pain.  Dispense: 60 tablet; Refill: 0    Polymyalgia rheumatica  -     HYDROcodone-acetaminophen (NORCO) 7.5-325 mg per tablet; Take 1 tablet by mouth every 12 (twelve) hours as needed for Pain.  Dispense: 60 tablet; Refill: 0  -     methotrexate 2.5 MG Tab; Take 6 tablets (15 mg total) by mouth every 7 days.  Dispense: 24 tablet; Refill: 3    Other orders  -     omeprazole (PRILOSEC) 40 MG capsule; TAKE 1 CAPSULE(40 MG) BY MOUTH EVERY DAY  Dispense: 90 capsule; Refill: 0  -     tiZANidine (ZANAFLEX) 2 MG tablet; Take 1-2 tablets (2-4 mg total) by mouth nightly as needed.  Dispense: 60 tablet; Refill: 3  -     predniSONE (DELTASONE) 5 MG tablet; Take 1.5 tablets (7.5 mg total) by mouth once daily.  Dispense: 60 tablet; Refill: 2       Patient is a 80-year-old female she has had a recent robust inflammatory reaction within the sinus cavity as well as hearing deficit following a sinus surgery.  She has been repeated cultured postoperatively completed antibiotics and irrigations negative for any fungal infections or bacterial infections.  She continues to have widespread musculoskeletal pain elevated sedimentation rate and a biopsy from July 2020 showing a possible reaction or inflammatory response to the gel packing    PMR/RA late onset      Keep Prednisone to 7.5mg daily  Repeat labs again in 8 weeks  Keep Methotrexate at 6 tabs weekly. - unable to titrate dose due to hair loss at current dose.   Consider adding Plaquenil vs SSZ but want to evaluate the neuropathy and foot pain independently for non inflammatory causes of pain.   Folic acid i 2mg daily    Continue: omeprazole (prilosec)  40mg by  mouth daily.     Referral to [facility] podiatry for plantar fasciitis     Neuropathy: continue gabapentin.    -EMG/NCS      No follow-ups on file.      f/u 3 months  Thank you for allowing me to participate in the care of this very pleasant patient.    30 min consultation with greater than 50% spent in counseling, chart review and coordination of care. All questions answered.

## 2021-01-06 ENCOUNTER — OFFICE VISIT (OUTPATIENT)
Dept: PHYSICAL MEDICINE AND REHAB | Facility: CLINIC | Age: 81
End: 2021-01-06
Payer: MEDICARE

## 2021-01-06 DIAGNOSIS — G62.9 PERIPHERAL POLYNEUROPATHY: ICD-10-CM

## 2021-01-06 PROCEDURE — 99499 UNLISTED E&M SERVICE: CPT | Mod: GC,S$GLB,, | Performed by: PHYSICAL MEDICINE & REHABILITATION

## 2021-01-06 PROCEDURE — 99999 PR PBB SHADOW E&M-EST. PATIENT-LVL I: ICD-10-PCS | Mod: PBBFAC,,, | Performed by: PHYSICAL MEDICINE & REHABILITATION

## 2021-01-06 PROCEDURE — 95886 MUSC TEST DONE W/N TEST COMP: CPT | Mod: S$GLB,,, | Performed by: PHYSICAL MEDICINE & REHABILITATION

## 2021-01-06 PROCEDURE — 99999 PR PBB SHADOW E&M-EST. PATIENT-LVL I: CPT | Mod: PBBFAC,,, | Performed by: PHYSICAL MEDICINE & REHABILITATION

## 2021-01-06 PROCEDURE — 95910 NRV CNDJ TEST 7-8 STUDIES: CPT | Mod: S$GLB,,, | Performed by: PHYSICAL MEDICINE & REHABILITATION

## 2021-01-06 PROCEDURE — 95910 PR NERVE CONDUCTION STUDY; 7-8 STUDIES: ICD-10-PCS | Mod: S$GLB,,, | Performed by: PHYSICAL MEDICINE & REHABILITATION

## 2021-01-06 PROCEDURE — 99499 NO LOS: ICD-10-PCS | Mod: GC,S$GLB,, | Performed by: PHYSICAL MEDICINE & REHABILITATION

## 2021-01-06 PROCEDURE — 95886 PR EMG COMPLETE, W/ NERVE CONDUCTION STUDIES, 5+ MUSCLES: ICD-10-PCS | Mod: S$GLB,,, | Performed by: PHYSICAL MEDICINE & REHABILITATION

## 2021-01-08 ENCOUNTER — OFFICE VISIT (OUTPATIENT)
Dept: PODIATRY | Facility: CLINIC | Age: 81
End: 2021-01-08
Payer: MEDICARE

## 2021-01-08 ENCOUNTER — TELEPHONE (OUTPATIENT)
Dept: PODIATRY | Facility: CLINIC | Age: 81
End: 2021-01-08

## 2021-01-08 VITALS — HEIGHT: 62 IN | WEIGHT: 139.75 LBS | BODY MASS INDEX: 25.72 KG/M2

## 2021-01-08 DIAGNOSIS — G57.01 COMMON PERONEAL NEUROPATHY, RIGHT: ICD-10-CM

## 2021-01-08 DIAGNOSIS — G57.02 COMMON PERONEAL NEUROPATHY OF LEFT LOWER EXTREMITY: ICD-10-CM

## 2021-01-08 DIAGNOSIS — G57.53 TARSAL TUNNEL SYNDROME OF BOTH LOWER EXTREMITIES: Primary | ICD-10-CM

## 2021-01-08 DIAGNOSIS — M72.2 PLANTAR FASCIITIS OF RIGHT FOOT: ICD-10-CM

## 2021-01-08 DIAGNOSIS — M72.2 PLANTAR FASCIITIS OF LEFT FOOT: ICD-10-CM

## 2021-01-08 DIAGNOSIS — G57.31 DEEP PERONEAL NEUROPATHY, RIGHT: ICD-10-CM

## 2021-01-08 DIAGNOSIS — G57.32 DEEP PERONEAL NEUROPATHY OF LEFT LOWER EXTREMITY: ICD-10-CM

## 2021-01-08 PROCEDURE — 3288F PR FALLS RISK ASSESSMENT DOCUMENTED: ICD-10-PCS | Mod: CPTII,S$GLB,, | Performed by: PODIATRIST

## 2021-01-08 PROCEDURE — 99203 OFFICE O/P NEW LOW 30 MIN: CPT | Mod: S$GLB,,, | Performed by: PODIATRIST

## 2021-01-08 PROCEDURE — 1159F MED LIST DOCD IN RCRD: CPT | Mod: S$GLB,,, | Performed by: PODIATRIST

## 2021-01-08 PROCEDURE — 99203 PR OFFICE/OUTPT VISIT, NEW, LEVL III, 30-44 MIN: ICD-10-PCS | Mod: S$GLB,,, | Performed by: PODIATRIST

## 2021-01-08 PROCEDURE — 99999 PR PBB SHADOW E&M-EST. PATIENT-LVL IV: CPT | Mod: PBBFAC,,, | Performed by: PODIATRIST

## 2021-01-08 PROCEDURE — 1159F PR MEDICATION LIST DOCUMENTED IN MEDICAL RECORD: ICD-10-PCS | Mod: S$GLB,,, | Performed by: PODIATRIST

## 2021-01-08 PROCEDURE — 1125F AMNT PAIN NOTED PAIN PRSNT: CPT | Mod: S$GLB,,, | Performed by: PODIATRIST

## 2021-01-08 PROCEDURE — 1101F PT FALLS ASSESS-DOCD LE1/YR: CPT | Mod: CPTII,S$GLB,, | Performed by: PODIATRIST

## 2021-01-08 PROCEDURE — 99999 PR PBB SHADOW E&M-EST. PATIENT-LVL IV: ICD-10-PCS | Mod: PBBFAC,,, | Performed by: PODIATRIST

## 2021-01-08 PROCEDURE — 1101F PR PT FALLS ASSESS DOC 0-1 FALLS W/OUT INJ PAST YR: ICD-10-PCS | Mod: CPTII,S$GLB,, | Performed by: PODIATRIST

## 2021-01-08 PROCEDURE — 3288F FALL RISK ASSESSMENT DOCD: CPT | Mod: CPTII,S$GLB,, | Performed by: PODIATRIST

## 2021-01-08 PROCEDURE — 1125F PR PAIN SEVERITY QUANTIFIED, PAIN PRESENT: ICD-10-PCS | Mod: S$GLB,,, | Performed by: PODIATRIST

## 2021-01-14 ENCOUNTER — CLINICAL SUPPORT (OUTPATIENT)
Dept: URGENT CARE | Facility: CLINIC | Age: 81
End: 2021-01-14
Payer: MEDICARE

## 2021-01-14 VITALS — TEMPERATURE: 98 F | OXYGEN SATURATION: 97 % | HEART RATE: 67 BPM

## 2021-01-14 DIAGNOSIS — Z11.52 ENCOUNTER FOR SCREENING LABORATORY TESTING FOR COVID-19 VIRUS: ICD-10-CM

## 2021-01-14 DIAGNOSIS — R09.81 SINUS CONGESTION: Primary | ICD-10-CM

## 2021-01-14 LAB
CTP QC/QA: YES
SARS-COV-2 RDRP RESP QL NAA+PROBE: NEGATIVE

## 2021-01-14 PROCEDURE — U0002 COVID-19 LAB TEST NON-CDC: HCPCS | Mod: QW,S$GLB,, | Performed by: PHYSICIAN ASSISTANT

## 2021-01-14 PROCEDURE — U0002: ICD-10-PCS | Mod: QW,S$GLB,, | Performed by: PHYSICIAN ASSISTANT

## 2021-01-22 ENCOUNTER — PATIENT MESSAGE (OUTPATIENT)
Dept: ADMINISTRATIVE | Facility: OTHER | Age: 81
End: 2021-01-22

## 2021-02-01 ENCOUNTER — TELEPHONE (OUTPATIENT)
Dept: RHEUMATOLOGY | Facility: CLINIC | Age: 81
End: 2021-02-01

## 2021-02-03 ENCOUNTER — TELEPHONE (OUTPATIENT)
Dept: PODIATRY | Facility: CLINIC | Age: 81
End: 2021-02-03

## 2021-02-05 ENCOUNTER — OFFICE VISIT (OUTPATIENT)
Dept: RHEUMATOLOGY | Facility: CLINIC | Age: 81
End: 2021-02-05
Payer: MEDICARE

## 2021-02-05 VITALS
DIASTOLIC BLOOD PRESSURE: 61 MMHG | WEIGHT: 139.75 LBS | HEART RATE: 70 BPM | HEIGHT: 62 IN | BODY MASS INDEX: 25.72 KG/M2 | SYSTOLIC BLOOD PRESSURE: 115 MMHG

## 2021-02-05 DIAGNOSIS — M79.661 PAIN IN RIGHT LOWER LEG: ICD-10-CM

## 2021-02-05 DIAGNOSIS — M54.9 UPPER BACK PAIN: ICD-10-CM

## 2021-02-05 DIAGNOSIS — M35.3 POLYMYALGIA RHEUMATICA: Primary | ICD-10-CM

## 2021-02-05 DIAGNOSIS — J32.0 CHRONIC MAXILLARY SINUSITIS: ICD-10-CM

## 2021-02-05 DIAGNOSIS — D50.9 IRON DEFICIENCY ANEMIA, UNSPECIFIED IRON DEFICIENCY ANEMIA TYPE: ICD-10-CM

## 2021-02-05 DIAGNOSIS — M54.50 MIDLINE LOW BACK PAIN WITHOUT SCIATICA, UNSPECIFIED CHRONICITY: ICD-10-CM

## 2021-02-05 DIAGNOSIS — M06.09 RHEUMATOID ARTHRITIS OF MULTIPLE SITES WITH NEGATIVE RHEUMATOID FACTOR: ICD-10-CM

## 2021-02-05 PROCEDURE — 99214 OFFICE O/P EST MOD 30 MIN: CPT | Mod: S$GLB,,, | Performed by: INTERNAL MEDICINE

## 2021-02-05 PROCEDURE — 1125F PR PAIN SEVERITY QUANTIFIED, PAIN PRESENT: ICD-10-PCS | Mod: S$GLB,,, | Performed by: INTERNAL MEDICINE

## 2021-02-05 PROCEDURE — 99999 PR PBB SHADOW E&M-EST. PATIENT-LVL IV: ICD-10-PCS | Mod: PBBFAC,,, | Performed by: INTERNAL MEDICINE

## 2021-02-05 PROCEDURE — 1159F MED LIST DOCD IN RCRD: CPT | Mod: S$GLB,,, | Performed by: INTERNAL MEDICINE

## 2021-02-05 PROCEDURE — 3288F FALL RISK ASSESSMENT DOCD: CPT | Mod: CPTII,S$GLB,, | Performed by: INTERNAL MEDICINE

## 2021-02-05 PROCEDURE — 99999 PR PBB SHADOW E&M-EST. PATIENT-LVL IV: CPT | Mod: PBBFAC,,, | Performed by: INTERNAL MEDICINE

## 2021-02-05 PROCEDURE — 1101F PT FALLS ASSESS-DOCD LE1/YR: CPT | Mod: CPTII,S$GLB,, | Performed by: INTERNAL MEDICINE

## 2021-02-05 PROCEDURE — 99214 PR OFFICE/OUTPT VISIT, EST, LEVL IV, 30-39 MIN: ICD-10-PCS | Mod: S$GLB,,, | Performed by: INTERNAL MEDICINE

## 2021-02-05 PROCEDURE — 1159F PR MEDICATION LIST DOCUMENTED IN MEDICAL RECORD: ICD-10-PCS | Mod: S$GLB,,, | Performed by: INTERNAL MEDICINE

## 2021-02-05 PROCEDURE — 1101F PR PT FALLS ASSESS DOC 0-1 FALLS W/OUT INJ PAST YR: ICD-10-PCS | Mod: CPTII,S$GLB,, | Performed by: INTERNAL MEDICINE

## 2021-02-05 PROCEDURE — 3288F PR FALLS RISK ASSESSMENT DOCUMENTED: ICD-10-PCS | Mod: CPTII,S$GLB,, | Performed by: INTERNAL MEDICINE

## 2021-02-05 PROCEDURE — 1125F AMNT PAIN NOTED PAIN PRSNT: CPT | Mod: S$GLB,,, | Performed by: INTERNAL MEDICINE

## 2021-02-05 RX ORDER — HYDROXYCHLOROQUINE SULFATE 200 MG/1
200 TABLET, FILM COATED ORAL 2 TIMES DAILY
Qty: 60 TABLET | Refills: 6 | Status: SHIPPED | OUTPATIENT
Start: 2021-02-05 | End: 2021-06-09

## 2021-02-05 RX ORDER — HYDROCODONE BITARTRATE AND ACETAMINOPHEN 7.5; 325 MG/1; MG/1
1 TABLET ORAL EVERY 12 HOURS PRN
Qty: 60 TABLET | Refills: 0 | Status: SHIPPED | OUTPATIENT
Start: 2021-02-05 | End: 2021-07-20 | Stop reason: SDUPTHER

## 2021-02-05 RX ORDER — OMEPRAZOLE 40 MG/1
CAPSULE, DELAYED RELEASE ORAL
Qty: 90 CAPSULE | Refills: 0 | Status: SHIPPED | OUTPATIENT
Start: 2021-02-05 | End: 2021-03-29 | Stop reason: SDUPTHER

## 2021-02-05 ASSESSMENT — ROUTINE ASSESSMENT OF PATIENT INDEX DATA (RAPID3)
PATIENT GLOBAL ASSESSMENT SCORE: 6
PSYCHOLOGICAL DISTRESS SCORE: 0
MDHAQ FUNCTION SCORE: 0.5
TOTAL RAPID3 SCORE: 4.89
FATIGUE SCORE: 1.1
PAIN SCORE: 7

## 2021-03-08 RX ORDER — DICLOFENAC SODIUM 10 MG/G
2 GEL TOPICAL 3 TIMES DAILY
Qty: 100 G | Refills: 6 | Status: SHIPPED | OUTPATIENT
Start: 2021-03-08 | End: 2022-03-23

## 2021-03-11 ENCOUNTER — TELEPHONE (OUTPATIENT)
Dept: RHEUMATOLOGY | Facility: CLINIC | Age: 81
End: 2021-03-11

## 2021-03-11 ENCOUNTER — PATIENT MESSAGE (OUTPATIENT)
Dept: RHEUMATOLOGY | Facility: CLINIC | Age: 81
End: 2021-03-11

## 2021-03-16 ENCOUNTER — TELEPHONE (OUTPATIENT)
Dept: SURGERY | Facility: CLINIC | Age: 81
End: 2021-03-16

## 2021-03-29 ENCOUNTER — OFFICE VISIT (OUTPATIENT)
Dept: RHEUMATOLOGY | Facility: CLINIC | Age: 81
End: 2021-03-29
Payer: MEDICARE

## 2021-03-29 VITALS
WEIGHT: 144.81 LBS | HEIGHT: 62 IN | BODY MASS INDEX: 26.65 KG/M2 | SYSTOLIC BLOOD PRESSURE: 132 MMHG | HEART RATE: 74 BPM | DIASTOLIC BLOOD PRESSURE: 73 MMHG

## 2021-03-29 DIAGNOSIS — D50.9 IRON DEFICIENCY ANEMIA, UNSPECIFIED IRON DEFICIENCY ANEMIA TYPE: ICD-10-CM

## 2021-03-29 DIAGNOSIS — M06.09 RHEUMATOID ARTHRITIS OF MULTIPLE SITES WITH NEGATIVE RHEUMATOID FACTOR: Primary | ICD-10-CM

## 2021-03-29 DIAGNOSIS — M35.3 POLYMYALGIA RHEUMATICA: ICD-10-CM

## 2021-03-29 PROCEDURE — 99214 OFFICE O/P EST MOD 30 MIN: CPT | Mod: S$GLB,,, | Performed by: INTERNAL MEDICINE

## 2021-03-29 PROCEDURE — 3288F FALL RISK ASSESSMENT DOCD: CPT | Mod: CPTII,S$GLB,, | Performed by: INTERNAL MEDICINE

## 2021-03-29 PROCEDURE — 1101F PR PT FALLS ASSESS DOC 0-1 FALLS W/OUT INJ PAST YR: ICD-10-PCS | Mod: CPTII,S$GLB,, | Performed by: INTERNAL MEDICINE

## 2021-03-29 PROCEDURE — 1159F PR MEDICATION LIST DOCUMENTED IN MEDICAL RECORD: ICD-10-PCS | Mod: S$GLB,,, | Performed by: INTERNAL MEDICINE

## 2021-03-29 PROCEDURE — 1101F PT FALLS ASSESS-DOCD LE1/YR: CPT | Mod: CPTII,S$GLB,, | Performed by: INTERNAL MEDICINE

## 2021-03-29 PROCEDURE — 1159F MED LIST DOCD IN RCRD: CPT | Mod: S$GLB,,, | Performed by: INTERNAL MEDICINE

## 2021-03-29 PROCEDURE — 99214 PR OFFICE/OUTPT VISIT, EST, LEVL IV, 30-39 MIN: ICD-10-PCS | Mod: S$GLB,,, | Performed by: INTERNAL MEDICINE

## 2021-03-29 PROCEDURE — 99999 PR PBB SHADOW E&M-EST. PATIENT-LVL IV: ICD-10-PCS | Mod: PBBFAC,,, | Performed by: INTERNAL MEDICINE

## 2021-03-29 PROCEDURE — 99999 PR PBB SHADOW E&M-EST. PATIENT-LVL IV: CPT | Mod: PBBFAC,,, | Performed by: INTERNAL MEDICINE

## 2021-03-29 PROCEDURE — 1126F AMNT PAIN NOTED NONE PRSNT: CPT | Mod: S$GLB,,, | Performed by: INTERNAL MEDICINE

## 2021-03-29 PROCEDURE — 1126F PR PAIN SEVERITY QUANTIFIED, NO PAIN PRESENT: ICD-10-PCS | Mod: S$GLB,,, | Performed by: INTERNAL MEDICINE

## 2021-03-29 PROCEDURE — 3288F PR FALLS RISK ASSESSMENT DOCUMENTED: ICD-10-PCS | Mod: CPTII,S$GLB,, | Performed by: INTERNAL MEDICINE

## 2021-03-29 RX ORDER — OMEPRAZOLE 40 MG/1
CAPSULE, DELAYED RELEASE ORAL
Qty: 90 CAPSULE | Refills: 0 | Status: SHIPPED | OUTPATIENT
Start: 2021-03-29 | End: 2021-07-06

## 2021-03-29 RX ORDER — PREDNISONE 5 MG/1
TABLET ORAL
Qty: 60 TABLET | Refills: 6 | Status: SHIPPED | OUTPATIENT
Start: 2021-03-29 | End: 2021-06-21 | Stop reason: SDUPTHER

## 2021-03-29 ASSESSMENT — ROUTINE ASSESSMENT OF PATIENT INDEX DATA (RAPID3)
PATIENT GLOBAL ASSESSMENT SCORE: 5
PSYCHOLOGICAL DISTRESS SCORE: 0
MDHAQ FUNCTION SCORE: 0.9
FATIGUE SCORE: 1.1
TOTAL RAPID3 SCORE: 3.67
PAIN SCORE: 3

## 2021-04-08 ENCOUNTER — TELEPHONE (OUTPATIENT)
Dept: RHEUMATOLOGY | Facility: CLINIC | Age: 81
End: 2021-04-08

## 2021-04-19 ENCOUNTER — TELEPHONE (OUTPATIENT)
Dept: RHEUMATOLOGY | Facility: CLINIC | Age: 81
End: 2021-04-19

## 2021-04-22 ENCOUNTER — TELEPHONE (OUTPATIENT)
Dept: RHEUMATOLOGY | Facility: CLINIC | Age: 81
End: 2021-04-22

## 2021-04-22 DIAGNOSIS — J32.0 CHRONIC MAXILLARY SINUSITIS: ICD-10-CM

## 2021-04-22 DIAGNOSIS — M35.3 POLYMYALGIA RHEUMATICA: ICD-10-CM

## 2021-04-22 DIAGNOSIS — M31.6 GIANT CELL ARTERITIS: Primary | ICD-10-CM

## 2021-04-26 ENCOUNTER — TELEPHONE (OUTPATIENT)
Dept: RHEUMATOLOGY | Facility: CLINIC | Age: 81
End: 2021-04-26

## 2021-04-27 ENCOUNTER — LAB VISIT (OUTPATIENT)
Dept: LAB | Facility: HOSPITAL | Age: 81
End: 2021-04-27
Attending: INTERNAL MEDICINE
Payer: MEDICARE

## 2021-04-27 DIAGNOSIS — R82.81 PYURIA: Primary | ICD-10-CM

## 2021-04-27 DIAGNOSIS — M35.3 POLYMYALGIA RHEUMATICA: ICD-10-CM

## 2021-04-27 DIAGNOSIS — M31.6 GIANT CELL ARTERITIS: ICD-10-CM

## 2021-04-27 DIAGNOSIS — J32.0 CHRONIC MAXILLARY SINUSITIS: ICD-10-CM

## 2021-04-27 LAB
CRP SERPL-MCNC: 54.4 MG/L (ref 0–8.2)
ERYTHROCYTE [SEDIMENTATION RATE] IN BLOOD BY WESTERGREN METHOD: 100 MM/HR (ref 0–20)

## 2021-04-27 PROCEDURE — 85651 RBC SED RATE NONAUTOMATED: CPT | Mod: PO | Performed by: INTERNAL MEDICINE

## 2021-04-27 PROCEDURE — 86039 ANTINUCLEAR ANTIBODIES (ANA): CPT | Performed by: INTERNAL MEDICINE

## 2021-04-27 PROCEDURE — 86140 C-REACTIVE PROTEIN: CPT | Performed by: INTERNAL MEDICINE

## 2021-04-27 PROCEDURE — 86038 ANTINUCLEAR ANTIBODIES: CPT | Performed by: INTERNAL MEDICINE

## 2021-04-27 PROCEDURE — 86235 NUCLEAR ANTIGEN ANTIBODY: CPT | Mod: 59 | Performed by: INTERNAL MEDICINE

## 2021-04-27 PROCEDURE — 83516 IMMUNOASSAY NONANTIBODY: CPT | Performed by: INTERNAL MEDICINE

## 2021-04-27 PROCEDURE — 83516 IMMUNOASSAY NONANTIBODY: CPT | Mod: 59 | Performed by: INTERNAL MEDICINE

## 2021-04-27 PROCEDURE — 86255 FLUORESCENT ANTIBODY SCREEN: CPT | Mod: 91 | Performed by: INTERNAL MEDICINE

## 2021-04-27 PROCEDURE — 82784 ASSAY IGA/IGD/IGG/IGM EACH: CPT | Mod: 59 | Performed by: INTERNAL MEDICINE

## 2021-04-28 ENCOUNTER — TELEPHONE (OUTPATIENT)
Dept: RHEUMATOLOGY | Facility: CLINIC | Age: 81
End: 2021-04-28

## 2021-04-28 DIAGNOSIS — I77.82 ANCA-ASSOCIATED VASCULITIS: Primary | ICD-10-CM

## 2021-04-28 LAB
ANA PATTERN 1: NORMAL
ANA SER QL IF: POSITIVE
ANA TITR SER IF: NORMAL {TITER}
IGA SERPL-MCNC: 234 MG/DL (ref 40–350)
IGG SERPL-MCNC: 1211 MG/DL (ref 650–1600)
IGM SERPL-MCNC: 50 MG/DL (ref 50–300)

## 2021-04-29 LAB
ANTI SM ANTIBODY: 0.08 RATIO (ref 0–0.99)
ANTI SM/RNP ANTIBODY: 0.09 RATIO (ref 0–0.99)
ANTI-SM INTERPRETATION: NEGATIVE
ANTI-SM/RNP INTERPRETATION: NEGATIVE
ANTI-SSA ANTIBODY: 0.06 RATIO (ref 0–0.99)
ANTI-SSA INTERPRETATION: NEGATIVE
ANTI-SSB ANTIBODY: 0.09 RATIO (ref 0–0.99)
ANTI-SSB INTERPRETATION: NEGATIVE
DSDNA AB SER-ACNC: NORMAL [IU]/ML
PROTEINASE3 IGG SER-ACNC: >8 U

## 2021-04-30 ENCOUNTER — TELEPHONE (OUTPATIENT)
Dept: RHEUMATOLOGY | Facility: CLINIC | Age: 81
End: 2021-04-30

## 2021-04-30 LAB — MYELOPEROXIDASE AB SER-ACNC: 2 UNITS

## 2021-04-30 RX ORDER — AZATHIOPRINE 50 MG/1
50 TABLET ORAL 2 TIMES DAILY
Qty: 60 TABLET | Refills: 3 | Status: SHIPPED | OUTPATIENT
Start: 2021-04-30 | End: 2021-06-09 | Stop reason: SDUPTHER

## 2021-05-03 ENCOUNTER — TELEPHONE (OUTPATIENT)
Dept: RHEUMATOLOGY | Facility: CLINIC | Age: 81
End: 2021-05-03

## 2021-05-03 LAB
ANCA AB TITR SER IF: ABNORMAL TITER
P-ANCA TITR SER IF: ABNORMAL TITER

## 2021-05-05 ENCOUNTER — TELEPHONE (OUTPATIENT)
Dept: RHEUMATOLOGY | Facility: CLINIC | Age: 81
End: 2021-05-05

## 2021-05-05 ENCOUNTER — OFFICE VISIT (OUTPATIENT)
Dept: RHEUMATOLOGY | Facility: CLINIC | Age: 81
End: 2021-05-05
Payer: MEDICARE

## 2021-05-05 VITALS
BODY MASS INDEX: 26.45 KG/M2 | HEART RATE: 81 BPM | HEIGHT: 62 IN | SYSTOLIC BLOOD PRESSURE: 101 MMHG | DIASTOLIC BLOOD PRESSURE: 58 MMHG | WEIGHT: 143.75 LBS

## 2021-05-05 DIAGNOSIS — I77.82 ANCA-ASSOCIATED VASCULITIS: ICD-10-CM

## 2021-05-05 DIAGNOSIS — R76.12 POSITIVE QUANTIFERON-TB GOLD TEST: Primary | ICD-10-CM

## 2021-05-05 PROCEDURE — 1159F PR MEDICATION LIST DOCUMENTED IN MEDICAL RECORD: ICD-10-PCS | Mod: S$GLB,,, | Performed by: INTERNAL MEDICINE

## 2021-05-05 PROCEDURE — 99214 PR OFFICE/OUTPT VISIT, EST, LEVL IV, 30-39 MIN: ICD-10-PCS | Mod: S$GLB,,, | Performed by: INTERNAL MEDICINE

## 2021-05-05 PROCEDURE — 99999 PR PBB SHADOW E&M-EST. PATIENT-LVL V: CPT | Mod: PBBFAC,,, | Performed by: INTERNAL MEDICINE

## 2021-05-05 PROCEDURE — 1125F PR PAIN SEVERITY QUANTIFIED, PAIN PRESENT: ICD-10-PCS | Mod: S$GLB,,, | Performed by: INTERNAL MEDICINE

## 2021-05-05 PROCEDURE — 1159F MED LIST DOCD IN RCRD: CPT | Mod: S$GLB,,, | Performed by: INTERNAL MEDICINE

## 2021-05-05 PROCEDURE — 1125F AMNT PAIN NOTED PAIN PRSNT: CPT | Mod: S$GLB,,, | Performed by: INTERNAL MEDICINE

## 2021-05-05 PROCEDURE — 99214 OFFICE O/P EST MOD 30 MIN: CPT | Mod: S$GLB,,, | Performed by: INTERNAL MEDICINE

## 2021-05-05 PROCEDURE — 99999 PR PBB SHADOW E&M-EST. PATIENT-LVL V: ICD-10-PCS | Mod: PBBFAC,,, | Performed by: INTERNAL MEDICINE

## 2021-05-06 ENCOUNTER — PATIENT MESSAGE (OUTPATIENT)
Dept: RESEARCH | Facility: HOSPITAL | Age: 81
End: 2021-05-06

## 2021-05-18 ENCOUNTER — TELEPHONE (OUTPATIENT)
Dept: RHEUMATOLOGY | Facility: CLINIC | Age: 81
End: 2021-05-18

## 2021-05-21 ENCOUNTER — TELEPHONE (OUTPATIENT)
Dept: RHEUMATOLOGY | Facility: CLINIC | Age: 81
End: 2021-05-21

## 2021-05-25 ENCOUNTER — TELEPHONE (OUTPATIENT)
Dept: RHEUMATOLOGY | Facility: CLINIC | Age: 81
End: 2021-05-25

## 2021-06-02 ENCOUNTER — HOSPITAL ENCOUNTER (OUTPATIENT)
Dept: RADIOLOGY | Facility: HOSPITAL | Age: 81
Discharge: HOME | End: 2021-06-02
Attending: INTERNAL MEDICINE
Payer: MEDICARE

## 2021-06-02 ENCOUNTER — OFFICE VISIT (OUTPATIENT)
Dept: FAMILY MEDICINE | Facility: CLINIC | Age: 81
End: 2021-06-02
Payer: MEDICARE

## 2021-06-02 VITALS
WEIGHT: 143 LBS | HEART RATE: 88 BPM | DIASTOLIC BLOOD PRESSURE: 84 MMHG | HEIGHT: 62 IN | SYSTOLIC BLOOD PRESSURE: 96 MMHG | BODY MASS INDEX: 26.31 KG/M2 | OXYGEN SATURATION: 96 %

## 2021-06-02 DIAGNOSIS — R05.9 COUGH: ICD-10-CM

## 2021-06-02 DIAGNOSIS — N39.0 URINARY TRACT INFECTION WITHOUT HEMATURIA, SITE UNSPECIFIED: ICD-10-CM

## 2021-06-02 DIAGNOSIS — E78.5 DYSLIPIDEMIA: ICD-10-CM

## 2021-06-02 DIAGNOSIS — I77.6 VASCULITIS: ICD-10-CM

## 2021-06-02 DIAGNOSIS — J18.9 PNEUMONIA, UNSPECIFIED ORGANISM: ICD-10-CM

## 2021-06-02 DIAGNOSIS — F32.A DEPRESSION, UNSPECIFIED DEPRESSION TYPE: Primary | ICD-10-CM

## 2021-06-02 PROCEDURE — 81000 URINALYSIS NONAUTO W/SCOPE: CPT | Mod: PO | Performed by: INTERNAL MEDICINE

## 2021-06-02 PROCEDURE — 99999 PR PBB SHADOW E&M-EST. PATIENT-LVL IV: ICD-10-PCS | Mod: PBBFAC,,, | Performed by: INTERNAL MEDICINE

## 2021-06-02 PROCEDURE — 71046 X-RAY EXAM CHEST 2 VIEWS: CPT | Mod: TC,FY,PO

## 2021-06-02 PROCEDURE — 99999 PR PBB SHADOW E&M-EST. PATIENT-LVL IV: CPT | Mod: PBBFAC,,, | Performed by: INTERNAL MEDICINE

## 2021-06-02 PROCEDURE — 1159F PR MEDICATION LIST DOCUMENTED IN MEDICAL RECORD: ICD-10-PCS | Mod: S$GLB,,, | Performed by: INTERNAL MEDICINE

## 2021-06-02 PROCEDURE — 1125F PR PAIN SEVERITY QUANTIFIED, PAIN PRESENT: ICD-10-PCS | Mod: S$GLB,,, | Performed by: INTERNAL MEDICINE

## 2021-06-02 PROCEDURE — 1159F MED LIST DOCD IN RCRD: CPT | Mod: S$GLB,,, | Performed by: INTERNAL MEDICINE

## 2021-06-02 PROCEDURE — 1125F AMNT PAIN NOTED PAIN PRSNT: CPT | Mod: S$GLB,,, | Performed by: INTERNAL MEDICINE

## 2021-06-02 PROCEDURE — 71046 XR CHEST PA AND LATERAL: ICD-10-PCS | Mod: 26,,, | Performed by: RADIOLOGY

## 2021-06-02 PROCEDURE — 71046 X-RAY EXAM CHEST 2 VIEWS: CPT | Mod: 26,,, | Performed by: RADIOLOGY

## 2021-06-02 PROCEDURE — 99214 PR OFFICE/OUTPT VISIT, EST, LEVL IV, 30-39 MIN: ICD-10-PCS | Mod: S$GLB,,, | Performed by: INTERNAL MEDICINE

## 2021-06-02 PROCEDURE — 99214 OFFICE O/P EST MOD 30 MIN: CPT | Mod: S$GLB,,, | Performed by: INTERNAL MEDICINE

## 2021-06-03 ENCOUNTER — TELEPHONE (OUTPATIENT)
Dept: FAMILY MEDICINE | Facility: CLINIC | Age: 81
End: 2021-06-03

## 2021-06-03 LAB
BACTERIA #/AREA URNS HPF: NORMAL /HPF
BILIRUB UR QL STRIP: NEGATIVE
CLARITY UR: CLEAR
COLOR UR: YELLOW
GLUCOSE UR QL STRIP: NEGATIVE
HGB UR QL STRIP: ABNORMAL
KETONES UR QL STRIP: NEGATIVE
LEUKOCYTE ESTERASE UR QL STRIP: ABNORMAL
MICROSCOPIC COMMENT: NORMAL
NITRITE UR QL STRIP: NEGATIVE
PH UR STRIP: 6 [PH] (ref 5–8)
PROT UR QL STRIP: NEGATIVE
RBC #/AREA URNS HPF: 0 /HPF (ref 0–4)
SP GR UR STRIP: 1.01 (ref 1–1.03)
URN SPEC COLLECT METH UR: ABNORMAL
WBC #/AREA URNS HPF: 1 /HPF (ref 0–5)

## 2021-06-07 ENCOUNTER — TELEPHONE (OUTPATIENT)
Dept: FAMILY MEDICINE | Facility: CLINIC | Age: 81
End: 2021-06-07

## 2021-06-08 ENCOUNTER — PATIENT MESSAGE (OUTPATIENT)
Dept: RHEUMATOLOGY | Facility: CLINIC | Age: 81
End: 2021-06-08

## 2021-06-09 ENCOUNTER — OFFICE VISIT (OUTPATIENT)
Dept: RHEUMATOLOGY | Facility: CLINIC | Age: 81
End: 2021-06-09
Payer: MEDICARE

## 2021-06-09 VITALS
DIASTOLIC BLOOD PRESSURE: 51 MMHG | HEIGHT: 62 IN | WEIGHT: 143.44 LBS | SYSTOLIC BLOOD PRESSURE: 100 MMHG | BODY MASS INDEX: 26.39 KG/M2 | HEART RATE: 82 BPM

## 2021-06-09 DIAGNOSIS — I77.82 ANCA-ASSOCIATED VASCULITIS: Primary | ICD-10-CM

## 2021-06-09 DIAGNOSIS — M54.9 UPPER BACK PAIN: ICD-10-CM

## 2021-06-09 DIAGNOSIS — R91.8 PULMONARY INFILTRATES: ICD-10-CM

## 2021-06-09 DIAGNOSIS — M54.50 MIDLINE LOW BACK PAIN WITHOUT SCIATICA, UNSPECIFIED CHRONICITY: ICD-10-CM

## 2021-06-09 DIAGNOSIS — M79.661 PAIN IN RIGHT LOWER LEG: ICD-10-CM

## 2021-06-09 DIAGNOSIS — M35.3 POLYMYALGIA RHEUMATICA: ICD-10-CM

## 2021-06-09 PROCEDURE — 1159F MED LIST DOCD IN RCRD: CPT | Mod: S$GLB,,, | Performed by: INTERNAL MEDICINE

## 2021-06-09 PROCEDURE — 1125F PR PAIN SEVERITY QUANTIFIED, PAIN PRESENT: ICD-10-PCS | Mod: S$GLB,,, | Performed by: INTERNAL MEDICINE

## 2021-06-09 PROCEDURE — 99999 PR PBB SHADOW E&M-EST. PATIENT-LVL V: ICD-10-PCS | Mod: PBBFAC,,, | Performed by: INTERNAL MEDICINE

## 2021-06-09 PROCEDURE — 1125F AMNT PAIN NOTED PAIN PRSNT: CPT | Mod: S$GLB,,, | Performed by: INTERNAL MEDICINE

## 2021-06-09 PROCEDURE — 1159F PR MEDICATION LIST DOCUMENTED IN MEDICAL RECORD: ICD-10-PCS | Mod: S$GLB,,, | Performed by: INTERNAL MEDICINE

## 2021-06-09 PROCEDURE — 99999 PR PBB SHADOW E&M-EST. PATIENT-LVL V: CPT | Mod: PBBFAC,,, | Performed by: INTERNAL MEDICINE

## 2021-06-09 PROCEDURE — 99215 OFFICE O/P EST HI 40 MIN: CPT | Mod: S$GLB,,, | Performed by: INTERNAL MEDICINE

## 2021-06-09 PROCEDURE — 99215 PR OFFICE/OUTPT VISIT, EST, LEVL V, 40-54 MIN: ICD-10-PCS | Mod: S$GLB,,, | Performed by: INTERNAL MEDICINE

## 2021-06-09 RX ORDER — AZATHIOPRINE 50 MG/1
TABLET ORAL
Qty: 90 TABLET | Refills: 3 | Status: ON HOLD | OUTPATIENT
Start: 2021-06-09 | End: 2021-09-23 | Stop reason: HOSPADM

## 2021-06-10 ENCOUNTER — TELEPHONE (OUTPATIENT)
Dept: RHEUMATOLOGY | Facility: CLINIC | Age: 81
End: 2021-06-10

## 2021-06-10 ASSESSMENT — ROUTINE ASSESSMENT OF PATIENT INDEX DATA (RAPID3)
PSYCHOLOGICAL DISTRESS SCORE: 1.1
PATIENT GLOBAL ASSESSMENT SCORE: 6.5
TOTAL RAPID3 SCORE: 6.39
PAIN SCORE: 9
MDHAQ FUNCTION SCORE: 1.1

## 2021-06-11 ENCOUNTER — HOSPITAL ENCOUNTER (OUTPATIENT)
Dept: RADIOLOGY | Facility: HOSPITAL | Age: 81
Discharge: HOME | End: 2021-06-11
Attending: INTERNAL MEDICINE
Payer: MEDICARE

## 2021-06-11 DIAGNOSIS — J18.9 PNEUMONIA, UNSPECIFIED ORGANISM: ICD-10-CM

## 2021-06-11 PROCEDURE — 71250 CT CHEST WITHOUT CONTRAST: ICD-10-PCS | Mod: 26,,, | Performed by: RADIOLOGY

## 2021-06-11 PROCEDURE — 71250 CT THORAX DX C-: CPT | Mod: 26,,, | Performed by: RADIOLOGY

## 2021-06-11 PROCEDURE — 71250 CT THORAX DX C-: CPT | Mod: TC,PO

## 2021-06-14 ENCOUNTER — TELEPHONE (OUTPATIENT)
Dept: RHEUMATOLOGY | Facility: CLINIC | Age: 81
End: 2021-06-14

## 2021-06-14 DIAGNOSIS — D49.59 OVARIAN NEOPLASM: ICD-10-CM

## 2021-06-14 DIAGNOSIS — C34.2 MALIGNANT NEOPLASM OF MIDDLE LOBE, BRONCHUS OR LUNG: ICD-10-CM

## 2021-06-14 DIAGNOSIS — J18.9 ATYPICAL PNEUMONIA: ICD-10-CM

## 2021-06-14 DIAGNOSIS — D49.1 PULMONARY NEOPLASM: ICD-10-CM

## 2021-06-14 DIAGNOSIS — I77.82 ANCA-ASSOCIATED VASCULITIS: Primary | ICD-10-CM

## 2021-06-15 ENCOUNTER — TELEPHONE (OUTPATIENT)
Dept: RHEUMATOLOGY | Facility: CLINIC | Age: 81
End: 2021-06-15

## 2021-06-16 ENCOUNTER — DOCUMENTATION ONLY (OUTPATIENT)
Dept: RHEUMATOLOGY | Facility: CLINIC | Age: 81
End: 2021-06-16

## 2021-06-16 ENCOUNTER — TELEPHONE (OUTPATIENT)
Dept: RHEUMATOLOGY | Facility: CLINIC | Age: 81
End: 2021-06-16

## 2021-06-16 ENCOUNTER — HOSPITAL ENCOUNTER (OUTPATIENT)
Dept: RADIOLOGY | Facility: HOSPITAL | Age: 81
Discharge: HOME | End: 2021-06-16
Attending: INTERNAL MEDICINE
Payer: MEDICARE

## 2021-06-16 DIAGNOSIS — I77.82 ANCA-ASSOCIATED VASCULITIS: ICD-10-CM

## 2021-06-16 DIAGNOSIS — C34.2 MALIGNANT NEOPLASM OF MIDDLE LOBE, BRONCHUS OR LUNG: ICD-10-CM

## 2021-06-16 DIAGNOSIS — D49.1 PULMONARY NEOPLASM: ICD-10-CM

## 2021-06-16 DIAGNOSIS — J18.9 ATYPICAL PNEUMONIA: ICD-10-CM

## 2021-06-16 LAB — GLUCOSE SERPL-MCNC: 110 MG/DL (ref 70–110)

## 2021-06-16 PROCEDURE — 78815 PET IMAGE W/CT SKULL-THIGH: CPT | Mod: 26,PI,, | Performed by: RADIOLOGY

## 2021-06-16 PROCEDURE — 78815 PET IMAGE W/CT SKULL-THIGH: CPT | Mod: TC,PN,PI

## 2021-06-16 PROCEDURE — 78815 NM PET CT ROUTINE: ICD-10-PCS | Mod: 26,PI,, | Performed by: RADIOLOGY

## 2021-06-18 ENCOUNTER — LAB VISIT (OUTPATIENT)
Dept: LAB | Facility: HOSPITAL | Age: 81
End: 2021-06-18
Attending: INTERNAL MEDICINE
Payer: MEDICARE

## 2021-06-18 DIAGNOSIS — R91.8 LUNG MASS: Primary | ICD-10-CM

## 2021-06-18 DIAGNOSIS — R91.8 PULMONARY NODULES: ICD-10-CM

## 2021-06-18 PROCEDURE — 86403 PARTICLE AGGLUT ANTBDY SCRN: CPT | Performed by: INTERNAL MEDICINE

## 2021-06-18 PROCEDURE — 86480 TB TEST CELL IMMUN MEASURE: CPT | Performed by: INTERNAL MEDICINE

## 2021-06-18 PROCEDURE — 86606 ASPERGILLUS ANTIBODY: CPT | Performed by: INTERNAL MEDICINE

## 2021-06-22 LAB
GAMMA INTERFERON BACKGROUND BLD IA-ACNC: 0.03 IU/ML
M TB IFN-G CD4+ BCKGRND COR BLD-ACNC: 0 IU/ML
MITOGEN IGNF BCKGRD COR BLD-ACNC: 1.17 IU/ML
TB GOLD PLUS: NEGATIVE
TB2 - NIL: 0 IU/ML

## 2021-06-23 LAB
ASPERGILLUS AB SER QL ID: NORMAL
B DERMAT AB SER QL ID: NORMAL
C IMMITIS AB SER QL ID: NORMAL
H CAPSUL AB SER QL ID: NORMAL

## 2021-06-25 LAB — CRYPTOC AB TITR SER: NORMAL {TITER}

## 2021-06-28 PROBLEM — H35.3290 EXUDATIVE AGE-RELATED MACULAR DEGENERATION: Status: ACTIVE | Noted: 2021-06-28

## 2021-06-28 PROBLEM — M31.30 WEGENER'S DISEASE, PULMONARY: Status: ACTIVE | Noted: 2021-06-28

## 2021-07-08 ENCOUNTER — LAB VISIT (OUTPATIENT)
Dept: LAB | Facility: HOSPITAL | Age: 81
End: 2021-07-08
Attending: INTERNAL MEDICINE
Payer: MEDICARE

## 2021-07-08 DIAGNOSIS — I77.82 ANCA-ASSOCIATED VASCULITIS: ICD-10-CM

## 2021-07-08 LAB
ALBUMIN SERPL BCP-MCNC: 3.3 G/DL (ref 3.5–5.2)
ALP SERPL-CCNC: 67 U/L (ref 55–135)
ALT SERPL W/O P-5'-P-CCNC: 8 U/L (ref 10–44)
ANION GAP SERPL CALC-SCNC: 7 MMOL/L (ref 8–16)
AST SERPL-CCNC: 16 U/L (ref 10–40)
BASOPHILS # BLD AUTO: 0.03 K/UL (ref 0–0.2)
BASOPHILS NFR BLD: 0.5 % (ref 0–1.9)
BILIRUB SERPL-MCNC: 0.3 MG/DL (ref 0.1–1)
BUN SERPL-MCNC: 11 MG/DL (ref 8–23)
CALCIUM SERPL-MCNC: 9.8 MG/DL (ref 8.7–10.5)
CHLORIDE SERPL-SCNC: 102 MMOL/L (ref 95–110)
CO2 SERPL-SCNC: 27 MMOL/L (ref 23–29)
CREAT SERPL-MCNC: 1 MG/DL (ref 0.5–1.4)
CRP SERPL-MCNC: 14 MG/L (ref 0–8.2)
DIFFERENTIAL METHOD: ABNORMAL
EOSINOPHIL # BLD AUTO: 0.2 K/UL (ref 0–0.5)
EOSINOPHIL NFR BLD: 3.1 % (ref 0–8)
ERYTHROCYTE [DISTWIDTH] IN BLOOD BY AUTOMATED COUNT: 18.6 % (ref 11.5–14.5)
ERYTHROCYTE [SEDIMENTATION RATE] IN BLOOD BY WESTERGREN METHOD: 27 MM/HR (ref 0–20)
EST. GFR  (AFRICAN AMERICAN): >60 ML/MIN/1.73 M^2
EST. GFR  (NON AFRICAN AMERICAN): 54.1 ML/MIN/1.73 M^2
GLUCOSE SERPL-MCNC: 92 MG/DL (ref 70–110)
HCT VFR BLD AUTO: 27.1 % (ref 37–48.5)
HGB BLD-MCNC: 8.2 G/DL (ref 12–16)
IMM GRANULOCYTES # BLD AUTO: 0.03 K/UL (ref 0–0.04)
IMM GRANULOCYTES NFR BLD AUTO: 0.5 % (ref 0–0.5)
LYMPHOCYTES # BLD AUTO: 1 K/UL (ref 1–4.8)
LYMPHOCYTES NFR BLD: 15.5 % (ref 18–48)
MCH RBC QN AUTO: 26.4 PG (ref 27–31)
MCHC RBC AUTO-ENTMCNC: 30.3 G/DL (ref 32–36)
MCV RBC AUTO: 87 FL (ref 82–98)
MONOCYTES # BLD AUTO: 1 K/UL (ref 0.3–1)
MONOCYTES NFR BLD: 15.5 % (ref 4–15)
NEUTROPHILS # BLD AUTO: 4.1 K/UL (ref 1.8–7.7)
NEUTROPHILS NFR BLD: 64.9 % (ref 38–73)
NRBC BLD-RTO: 0 /100 WBC
PLATELET # BLD AUTO: 359 K/UL (ref 150–450)
PMV BLD AUTO: 9.6 FL (ref 9.2–12.9)
POTASSIUM SERPL-SCNC: 5 MMOL/L (ref 3.5–5.1)
PROT SERPL-MCNC: 6.3 G/DL (ref 6–8.4)
RBC # BLD AUTO: 3.11 M/UL (ref 4–5.4)
SODIUM SERPL-SCNC: 136 MMOL/L (ref 136–145)
WBC # BLD AUTO: 6.37 K/UL (ref 3.9–12.7)

## 2021-07-08 PROCEDURE — 36415 COLL VENOUS BLD VENIPUNCTURE: CPT | Mod: PO | Performed by: INTERNAL MEDICINE

## 2021-07-08 PROCEDURE — 80053 COMPREHEN METABOLIC PANEL: CPT | Performed by: INTERNAL MEDICINE

## 2021-07-08 PROCEDURE — 85025 COMPLETE CBC W/AUTO DIFF WBC: CPT | Performed by: INTERNAL MEDICINE

## 2021-07-08 PROCEDURE — 85651 RBC SED RATE NONAUTOMATED: CPT | Mod: PO | Performed by: INTERNAL MEDICINE

## 2021-07-08 PROCEDURE — 86140 C-REACTIVE PROTEIN: CPT | Performed by: INTERNAL MEDICINE

## 2021-07-12 ENCOUNTER — TELEPHONE (OUTPATIENT)
Dept: RHEUMATOLOGY | Facility: CLINIC | Age: 81
End: 2021-07-12

## 2021-07-20 ENCOUNTER — OFFICE VISIT (OUTPATIENT)
Dept: RHEUMATOLOGY | Facility: CLINIC | Age: 81
End: 2021-07-20
Payer: MEDICARE

## 2021-07-20 ENCOUNTER — TELEPHONE (OUTPATIENT)
Dept: RHEUMATOLOGY | Facility: CLINIC | Age: 81
End: 2021-07-20

## 2021-07-20 DIAGNOSIS — M54.50 MIDLINE LOW BACK PAIN WITHOUT SCIATICA, UNSPECIFIED CHRONICITY: ICD-10-CM

## 2021-07-20 DIAGNOSIS — M79.661 PAIN IN RIGHT LOWER LEG: ICD-10-CM

## 2021-07-20 DIAGNOSIS — M35.3 POLYMYALGIA RHEUMATICA: ICD-10-CM

## 2021-07-20 DIAGNOSIS — M54.9 UPPER BACK PAIN: ICD-10-CM

## 2021-07-20 PROCEDURE — 1159F PR MEDICATION LIST DOCUMENTED IN MEDICAL RECORD: ICD-10-PCS | Mod: CPTII,S$GLB,, | Performed by: INTERNAL MEDICINE

## 2021-07-20 PROCEDURE — 99999 PR PBB SHADOW E&M-EST. PATIENT-LVL I: ICD-10-PCS | Mod: PBBFAC,,, | Performed by: INTERNAL MEDICINE

## 2021-07-20 PROCEDURE — 99215 PR OFFICE/OUTPT VISIT, EST, LEVL V, 40-54 MIN: ICD-10-PCS | Mod: S$GLB,,, | Performed by: INTERNAL MEDICINE

## 2021-07-20 PROCEDURE — 99215 OFFICE O/P EST HI 40 MIN: CPT | Mod: S$GLB,,, | Performed by: INTERNAL MEDICINE

## 2021-07-20 PROCEDURE — 99417 PR PROLONGED SVC, OUTPT, W/WO DIRECT PT CONTACT,  EA ADDTL 15 MIN: ICD-10-PCS | Mod: S$GLB,,, | Performed by: INTERNAL MEDICINE

## 2021-07-20 PROCEDURE — 1160F PR REVIEW ALL MEDS BY PRESCRIBER/CLIN PHARMACIST DOCUMENTED: ICD-10-PCS | Mod: CPTII,S$GLB,, | Performed by: INTERNAL MEDICINE

## 2021-07-20 PROCEDURE — 99417 PROLNG OP E/M EACH 15 MIN: CPT | Mod: S$GLB,,, | Performed by: INTERNAL MEDICINE

## 2021-07-20 PROCEDURE — 1160F RVW MEDS BY RX/DR IN RCRD: CPT | Mod: CPTII,S$GLB,, | Performed by: INTERNAL MEDICINE

## 2021-07-20 PROCEDURE — 1159F MED LIST DOCD IN RCRD: CPT | Mod: CPTII,S$GLB,, | Performed by: INTERNAL MEDICINE

## 2021-07-20 PROCEDURE — 99999 PR PBB SHADOW E&M-EST. PATIENT-LVL I: CPT | Mod: PBBFAC,,, | Performed by: INTERNAL MEDICINE

## 2021-07-20 RX ORDER — OMEPRAZOLE 40 MG/1
40 CAPSULE, DELAYED RELEASE ORAL DAILY
Qty: 90 CAPSULE | Refills: 3 | Status: SHIPPED | OUTPATIENT
Start: 2021-07-20 | End: 2021-10-06

## 2021-07-20 RX ORDER — HYDROCODONE BITARTRATE AND ACETAMINOPHEN 7.5; 325 MG/1; MG/1
1 TABLET ORAL EVERY 12 HOURS PRN
Qty: 60 TABLET | Refills: 0 | Status: SHIPPED | OUTPATIENT
Start: 2021-07-20 | End: 2021-12-06 | Stop reason: SDUPTHER

## 2021-07-22 ENCOUNTER — TELEPHONE (OUTPATIENT)
Dept: RHEUMATOLOGY | Facility: CLINIC | Age: 81
End: 2021-07-22

## 2021-07-30 ENCOUNTER — TELEPHONE (OUTPATIENT)
Dept: RHEUMATOLOGY | Facility: CLINIC | Age: 81
End: 2021-07-30

## 2021-08-02 ENCOUNTER — TELEPHONE (OUTPATIENT)
Dept: RHEUMATOLOGY | Facility: CLINIC | Age: 81
End: 2021-08-02

## 2021-08-02 ENCOUNTER — PATIENT MESSAGE (OUTPATIENT)
Dept: RHEUMATOLOGY | Facility: CLINIC | Age: 81
End: 2021-08-02

## 2021-08-04 ENCOUNTER — TELEPHONE (OUTPATIENT)
Dept: RHEUMATOLOGY | Facility: CLINIC | Age: 81
End: 2021-08-04

## 2021-08-05 ENCOUNTER — TELEPHONE (OUTPATIENT)
Dept: RHEUMATOLOGY | Facility: CLINIC | Age: 81
End: 2021-08-05

## 2021-08-07 ENCOUNTER — LAB VISIT (OUTPATIENT)
Dept: FAMILY MEDICINE | Facility: CLINIC | Age: 81
End: 2021-08-07
Payer: MEDICARE

## 2021-08-07 DIAGNOSIS — R51.9 NONINTRACTABLE HEADACHE, UNSPECIFIED CHRONICITY PATTERN, UNSPECIFIED HEADACHE TYPE: ICD-10-CM

## 2021-08-07 DIAGNOSIS — R51.9 NONINTRACTABLE HEADACHE, UNSPECIFIED CHRONICITY PATTERN, UNSPECIFIED HEADACHE TYPE: Primary | ICD-10-CM

## 2021-08-07 PROCEDURE — U0003 INFECTIOUS AGENT DETECTION BY NUCLEIC ACID (DNA OR RNA); SEVERE ACUTE RESPIRATORY SYNDROME CORONAVIRUS 2 (SARS-COV-2) (CORONAVIRUS DISEASE [COVID-19]), AMPLIFIED PROBE TECHNIQUE, MAKING USE OF HIGH THROUGHPUT TECHNOLOGIES AS DESCRIBED BY CMS-2020-01-R: HCPCS | Performed by: FAMILY MEDICINE

## 2021-08-07 PROCEDURE — U0005 INFEC AGEN DETEC AMPLI PROBE: HCPCS | Performed by: FAMILY MEDICINE

## 2021-08-09 ENCOUNTER — PATIENT MESSAGE (OUTPATIENT)
Dept: PAIN MEDICINE | Facility: CLINIC | Age: 81
End: 2021-08-09

## 2021-08-09 LAB
SARS-COV-2 RNA RESP QL NAA+PROBE: NOT DETECTED
SARS-COV-2- CYCLE NUMBER: -1

## 2021-08-10 ENCOUNTER — TELEPHONE (OUTPATIENT)
Dept: FAMILY MEDICINE | Facility: CLINIC | Age: 81
End: 2021-08-10

## 2021-08-12 ENCOUNTER — IMMUNIZATION (OUTPATIENT)
Dept: FAMILY MEDICINE | Facility: CLINIC | Age: 81
End: 2021-08-12
Payer: MEDICARE

## 2021-08-12 DIAGNOSIS — Z23 NEED FOR VACCINATION: Primary | ICD-10-CM

## 2021-08-12 PROCEDURE — 0001A COVID-19, MRNA, LNP-S, PF, 30 MCG/0.3 ML DOSE VACCINE: ICD-10-PCS | Mod: CV19,,, | Performed by: INTERNAL MEDICINE

## 2021-08-12 PROCEDURE — 91300 COVID-19, MRNA, LNP-S, PF, 30 MCG/0.3 ML DOSE VACCINE: ICD-10-PCS | Mod: ,,, | Performed by: INTERNAL MEDICINE

## 2021-08-12 PROCEDURE — 0001A COVID-19, MRNA, LNP-S, PF, 30 MCG/0.3 ML DOSE VACCINE: CPT | Mod: CV19,,, | Performed by: INTERNAL MEDICINE

## 2021-08-12 PROCEDURE — 91300 COVID-19, MRNA, LNP-S, PF, 30 MCG/0.3 ML DOSE VACCINE: CPT | Mod: ,,, | Performed by: INTERNAL MEDICINE

## 2021-09-02 ENCOUNTER — TELEPHONE (OUTPATIENT)
Dept: RHEUMATOLOGY | Facility: CLINIC | Age: 81
End: 2021-09-02

## 2021-09-02 ENCOUNTER — TELEPHONE (OUTPATIENT)
Dept: FAMILY MEDICINE | Facility: CLINIC | Age: 81
End: 2021-09-02

## 2021-09-08 ENCOUNTER — IMMUNIZATION (OUTPATIENT)
Dept: FAMILY MEDICINE | Facility: CLINIC | Age: 81
End: 2021-09-08
Payer: MEDICARE

## 2021-09-08 DIAGNOSIS — Z23 NEED FOR VACCINATION: Primary | ICD-10-CM

## 2021-09-08 PROCEDURE — 0002A COVID-19, MRNA, LNP-S, PF, 30 MCG/0.3 ML DOSE VACCINE: CPT | Mod: CV19,,, | Performed by: FAMILY MEDICINE

## 2021-09-08 PROCEDURE — 91300 COVID-19, MRNA, LNP-S, PF, 30 MCG/0.3 ML DOSE VACCINE: CPT | Mod: ,,, | Performed by: FAMILY MEDICINE

## 2021-09-08 PROCEDURE — 91300 COVID-19, MRNA, LNP-S, PF, 30 MCG/0.3 ML DOSE VACCINE: ICD-10-PCS | Mod: ,,, | Performed by: FAMILY MEDICINE

## 2021-09-08 PROCEDURE — 0002A COVID-19, MRNA, LNP-S, PF, 30 MCG/0.3 ML DOSE VACCINE: ICD-10-PCS | Mod: CV19,,, | Performed by: FAMILY MEDICINE

## 2021-09-13 ENCOUNTER — OFFICE VISIT (OUTPATIENT)
Dept: RHEUMATOLOGY | Facility: CLINIC | Age: 81
End: 2021-09-13
Payer: MEDICARE

## 2021-09-13 VITALS
HEIGHT: 62 IN | WEIGHT: 145.5 LBS | HEART RATE: 93 BPM | DIASTOLIC BLOOD PRESSURE: 53 MMHG | SYSTOLIC BLOOD PRESSURE: 94 MMHG | BODY MASS INDEX: 26.78 KG/M2

## 2021-09-13 DIAGNOSIS — J98.4 PULMONARY LESION, RIGHT: ICD-10-CM

## 2021-09-13 DIAGNOSIS — M31.30 WEGENER'S DISEASE, PULMONARY: Primary | ICD-10-CM

## 2021-09-13 PROCEDURE — 1125F AMNT PAIN NOTED PAIN PRSNT: CPT | Mod: CPTII,S$GLB,, | Performed by: INTERNAL MEDICINE

## 2021-09-13 PROCEDURE — 3078F DIAST BP <80 MM HG: CPT | Mod: CPTII,S$GLB,, | Performed by: INTERNAL MEDICINE

## 2021-09-13 PROCEDURE — 99215 PR OFFICE/OUTPT VISIT, EST, LEVL V, 40-54 MIN: ICD-10-PCS | Mod: S$GLB,,, | Performed by: INTERNAL MEDICINE

## 2021-09-13 PROCEDURE — 1159F PR MEDICATION LIST DOCUMENTED IN MEDICAL RECORD: ICD-10-PCS | Mod: CPTII,S$GLB,, | Performed by: INTERNAL MEDICINE

## 2021-09-13 PROCEDURE — 3288F FALL RISK ASSESSMENT DOCD: CPT | Mod: CPTII,S$GLB,, | Performed by: INTERNAL MEDICINE

## 2021-09-13 PROCEDURE — 3074F PR MOST RECENT SYSTOLIC BLOOD PRESSURE < 130 MM HG: ICD-10-PCS | Mod: CPTII,S$GLB,, | Performed by: INTERNAL MEDICINE

## 2021-09-13 PROCEDURE — 1159F MED LIST DOCD IN RCRD: CPT | Mod: CPTII,S$GLB,, | Performed by: INTERNAL MEDICINE

## 2021-09-13 PROCEDURE — 99215 OFFICE O/P EST HI 40 MIN: CPT | Mod: S$GLB,,, | Performed by: INTERNAL MEDICINE

## 2021-09-13 PROCEDURE — 1125F PR PAIN SEVERITY QUANTIFIED, PAIN PRESENT: ICD-10-PCS | Mod: CPTII,S$GLB,, | Performed by: INTERNAL MEDICINE

## 2021-09-13 PROCEDURE — 1100F PTFALLS ASSESS-DOCD GE2>/YR: CPT | Mod: CPTII,S$GLB,, | Performed by: INTERNAL MEDICINE

## 2021-09-13 PROCEDURE — 1160F RVW MEDS BY RX/DR IN RCRD: CPT | Mod: CPTII,S$GLB,, | Performed by: INTERNAL MEDICINE

## 2021-09-13 PROCEDURE — 99999 PR PBB SHADOW E&M-EST. PATIENT-LVL IV: CPT | Mod: PBBFAC,,, | Performed by: INTERNAL MEDICINE

## 2021-09-13 PROCEDURE — 99999 PR PBB SHADOW E&M-EST. PATIENT-LVL IV: ICD-10-PCS | Mod: PBBFAC,,, | Performed by: INTERNAL MEDICINE

## 2021-09-13 PROCEDURE — 3078F PR MOST RECENT DIASTOLIC BLOOD PRESSURE < 80 MM HG: ICD-10-PCS | Mod: CPTII,S$GLB,, | Performed by: INTERNAL MEDICINE

## 2021-09-13 PROCEDURE — 1100F PR PT FALLS ASSESS DOC 2+ FALLS/FALL W/INJURY/YR: ICD-10-PCS | Mod: CPTII,S$GLB,, | Performed by: INTERNAL MEDICINE

## 2021-09-13 PROCEDURE — 3288F PR FALLS RISK ASSESSMENT DOCUMENTED: ICD-10-PCS | Mod: CPTII,S$GLB,, | Performed by: INTERNAL MEDICINE

## 2021-09-13 PROCEDURE — 1160F PR REVIEW ALL MEDS BY PRESCRIBER/CLIN PHARMACIST DOCUMENTED: ICD-10-PCS | Mod: CPTII,S$GLB,, | Performed by: INTERNAL MEDICINE

## 2021-09-13 PROCEDURE — 3074F SYST BP LT 130 MM HG: CPT | Mod: CPTII,S$GLB,, | Performed by: INTERNAL MEDICINE

## 2021-09-13 ASSESSMENT — ROUTINE ASSESSMENT OF PATIENT INDEX DATA (RAPID3)
PAIN SCORE: 4
PATIENT GLOBAL ASSESSMENT SCORE: 6
FATIGUE SCORE: 1.1
MDHAQ FUNCTION SCORE: 0.9
PSYCHOLOGICAL DISTRESS SCORE: 0
TOTAL RAPID3 SCORE: 4.33

## 2021-09-15 ENCOUNTER — TELEPHONE (OUTPATIENT)
Dept: FAMILY MEDICINE | Facility: CLINIC | Age: 81
End: 2021-09-15

## 2021-09-17 PROBLEM — S22.31XA CLOSED FRACTURE OF ONE RIB OF RIGHT SIDE: Status: ACTIVE | Noted: 2021-09-17

## 2021-09-17 PROBLEM — R91.8 MASS OF UPPER LOBE OF RIGHT LUNG: Status: ACTIVE | Noted: 2021-09-17

## 2021-09-17 PROBLEM — Z71.89 ACP (ADVANCE CARE PLANNING): Status: ACTIVE | Noted: 2021-09-17

## 2021-09-17 PROBLEM — R91.8 PULMONARY NODULES: Status: ACTIVE | Noted: 2021-09-17

## 2021-09-17 PROBLEM — I95.9 HYPOTENSION: Status: ACTIVE | Noted: 2021-09-17

## 2021-09-17 PROBLEM — E87.1 HYPONATREMIA: Status: ACTIVE | Noted: 2021-09-17

## 2021-09-17 PROBLEM — D50.9 MICROCYTIC ANEMIA: Status: ACTIVE | Noted: 2021-09-17

## 2021-09-20 PROBLEM — Z75.8 DISCHARGE PLANNING ISSUES: Status: ACTIVE | Noted: 2021-09-20

## 2021-09-20 PROBLEM — Z02.9 DISCHARGE PLANNING ISSUES: Status: ACTIVE | Noted: 2021-09-20

## 2021-10-04 ENCOUNTER — PATIENT MESSAGE (OUTPATIENT)
Dept: RHEUMATOLOGY | Facility: CLINIC | Age: 81
End: 2021-10-04

## 2021-10-05 PROCEDURE — G0180 PR HOME HEALTH MD CERTIFICATION: ICD-10-PCS | Mod: ,,, | Performed by: INTERNAL MEDICINE

## 2021-10-05 PROCEDURE — G0180 MD CERTIFICATION HHA PATIENT: HCPCS | Mod: ,,, | Performed by: INTERNAL MEDICINE

## 2021-10-11 ENCOUNTER — LAB VISIT (OUTPATIENT)
Dept: LAB | Facility: HOSPITAL | Age: 81
End: 2021-10-11
Attending: INTERNAL MEDICINE
Payer: MEDICARE

## 2021-10-11 DIAGNOSIS — I77.82 ANCA-ASSOCIATED VASCULITIS: ICD-10-CM

## 2021-10-11 LAB
ALBUMIN SERPL BCP-MCNC: 2.3 G/DL (ref 3.5–5.2)
ALP SERPL-CCNC: 74 U/L (ref 55–135)
ALT SERPL W/O P-5'-P-CCNC: 6 U/L (ref 10–44)
ANION GAP SERPL CALC-SCNC: 15 MMOL/L (ref 8–16)
AST SERPL-CCNC: 14 U/L (ref 10–40)
BASOPHILS # BLD AUTO: 0.01 K/UL (ref 0–0.2)
BASOPHILS # BLD AUTO: 0.01 K/UL (ref 0–0.2)
BASOPHILS NFR BLD: 0.1 % (ref 0–1.9)
BASOPHILS NFR BLD: 0.1 % (ref 0–1.9)
BILIRUB SERPL-MCNC: 0.2 MG/DL (ref 0.1–1)
BUN SERPL-MCNC: 17 MG/DL (ref 8–23)
CALCIUM SERPL-MCNC: 9.7 MG/DL (ref 8.7–10.5)
CHLORIDE SERPL-SCNC: 94 MMOL/L (ref 95–110)
CO2 SERPL-SCNC: 22 MMOL/L (ref 23–29)
CREAT SERPL-MCNC: 1.1 MG/DL (ref 0.5–1.4)
CRP SERPL-MCNC: 120.6 MG/L (ref 0–8.2)
DIFFERENTIAL METHOD: ABNORMAL
DIFFERENTIAL METHOD: ABNORMAL
EOSINOPHIL # BLD AUTO: 0.1 K/UL (ref 0–0.5)
EOSINOPHIL # BLD AUTO: 0.1 K/UL (ref 0–0.5)
EOSINOPHIL NFR BLD: 0.5 % (ref 0–8)
EOSINOPHIL NFR BLD: 0.5 % (ref 0–8)
ERYTHROCYTE [DISTWIDTH] IN BLOOD BY AUTOMATED COUNT: 21 % (ref 11.5–14.5)
ERYTHROCYTE [DISTWIDTH] IN BLOOD BY AUTOMATED COUNT: 21 % (ref 11.5–14.5)
ERYTHROCYTE [SEDIMENTATION RATE] IN BLOOD BY WESTERGREN METHOD: 78 MM/HR (ref 0–20)
EST. GFR  (AFRICAN AMERICAN): 55.6 ML/MIN/1.73 M^2
EST. GFR  (NON AFRICAN AMERICAN): 48.2 ML/MIN/1.73 M^2
GLUCOSE SERPL-MCNC: 101 MG/DL (ref 70–110)
HCT VFR BLD AUTO: 24.2 % (ref 37–48.5)
HCT VFR BLD AUTO: 24.2 % (ref 37–48.5)
HGB BLD-MCNC: 7.5 G/DL (ref 12–16)
HGB BLD-MCNC: 7.5 G/DL (ref 12–16)
IMM GRANULOCYTES # BLD AUTO: 0.19 K/UL (ref 0–0.04)
IMM GRANULOCYTES # BLD AUTO: 0.19 K/UL (ref 0–0.04)
IMM GRANULOCYTES NFR BLD AUTO: 1.5 % (ref 0–0.5)
IMM GRANULOCYTES NFR BLD AUTO: 1.5 % (ref 0–0.5)
LYMPHOCYTES # BLD AUTO: 0.6 K/UL (ref 1–4.8)
LYMPHOCYTES # BLD AUTO: 0.6 K/UL (ref 1–4.8)
LYMPHOCYTES NFR BLD: 4.5 % (ref 18–48)
LYMPHOCYTES NFR BLD: 4.5 % (ref 18–48)
MCH RBC QN AUTO: 26 PG (ref 27–31)
MCH RBC QN AUTO: 26 PG (ref 27–31)
MCHC RBC AUTO-ENTMCNC: 31 G/DL (ref 32–36)
MCHC RBC AUTO-ENTMCNC: 31 G/DL (ref 32–36)
MCV RBC AUTO: 84 FL (ref 82–98)
MCV RBC AUTO: 84 FL (ref 82–98)
MONOCYTES # BLD AUTO: 1.1 K/UL (ref 0.3–1)
MONOCYTES # BLD AUTO: 1.1 K/UL (ref 0.3–1)
MONOCYTES NFR BLD: 8.8 % (ref 4–15)
MONOCYTES NFR BLD: 8.8 % (ref 4–15)
NEUTROPHILS # BLD AUTO: 10.9 K/UL (ref 1.8–7.7)
NEUTROPHILS # BLD AUTO: 10.9 K/UL (ref 1.8–7.7)
NEUTROPHILS NFR BLD: 84.6 % (ref 38–73)
NEUTROPHILS NFR BLD: 84.6 % (ref 38–73)
NRBC BLD-RTO: 0 /100 WBC
NRBC BLD-RTO: 0 /100 WBC
PLATELET # BLD AUTO: 512 K/UL (ref 150–450)
PLATELET # BLD AUTO: 512 K/UL (ref 150–450)
PMV BLD AUTO: 9.3 FL (ref 9.2–12.9)
PMV BLD AUTO: 9.3 FL (ref 9.2–12.9)
POTASSIUM SERPL-SCNC: 4.5 MMOL/L (ref 3.5–5.1)
PROT SERPL-MCNC: 6.1 G/DL (ref 6–8.4)
RBC # BLD AUTO: 2.89 M/UL (ref 4–5.4)
RBC # BLD AUTO: 2.89 M/UL (ref 4–5.4)
SODIUM SERPL-SCNC: 131 MMOL/L (ref 136–145)
WBC # BLD AUTO: 12.85 K/UL (ref 3.9–12.7)
WBC # BLD AUTO: 12.85 K/UL (ref 3.9–12.7)

## 2021-10-11 PROCEDURE — 85651 RBC SED RATE NONAUTOMATED: CPT | Mod: PO | Performed by: INTERNAL MEDICINE

## 2021-10-11 PROCEDURE — 80053 COMPREHEN METABOLIC PANEL: CPT | Performed by: INTERNAL MEDICINE

## 2021-10-11 PROCEDURE — 86140 C-REACTIVE PROTEIN: CPT | Performed by: INTERNAL MEDICINE

## 2021-10-11 PROCEDURE — 85025 COMPLETE CBC W/AUTO DIFF WBC: CPT | Performed by: INTERNAL MEDICINE

## 2021-10-11 PROCEDURE — 36415 COLL VENOUS BLD VENIPUNCTURE: CPT | Mod: PO | Performed by: INTERNAL MEDICINE

## 2021-10-12 ENCOUNTER — DOCUMENT SCAN (OUTPATIENT)
Dept: HOME HEALTH SERVICES | Facility: HOSPITAL | Age: 81
End: 2021-10-12
Payer: MEDICARE

## 2021-10-12 ENCOUNTER — OFFICE VISIT (OUTPATIENT)
Dept: RHEUMATOLOGY | Facility: CLINIC | Age: 81
End: 2021-10-12
Payer: MEDICARE

## 2021-10-12 VITALS — BODY MASS INDEX: 26.01 KG/M2 | HEIGHT: 62 IN | WEIGHT: 141.31 LBS

## 2021-10-12 DIAGNOSIS — M31.31 WEGENER'S GRANULOMATOSIS WITH RENAL INVOLVEMENT: Primary | ICD-10-CM

## 2021-10-12 DIAGNOSIS — R41.82 ALTERED MENTAL STATUS, UNSPECIFIED ALTERED MENTAL STATUS TYPE: ICD-10-CM

## 2021-10-12 DIAGNOSIS — D64.9 SYMPTOMATIC ANEMIA: ICD-10-CM

## 2021-10-12 PROCEDURE — 1159F PR MEDICATION LIST DOCUMENTED IN MEDICAL RECORD: ICD-10-PCS | Mod: CPTII,S$GLB,, | Performed by: INTERNAL MEDICINE

## 2021-10-12 PROCEDURE — 1101F PR PT FALLS ASSESS DOC 0-1 FALLS W/OUT INJ PAST YR: ICD-10-PCS | Mod: CPTII,S$GLB,, | Performed by: INTERNAL MEDICINE

## 2021-10-12 PROCEDURE — 3288F FALL RISK ASSESSMENT DOCD: CPT | Mod: CPTII,S$GLB,, | Performed by: INTERNAL MEDICINE

## 2021-10-12 PROCEDURE — 3288F PR FALLS RISK ASSESSMENT DOCUMENTED: ICD-10-PCS | Mod: CPTII,S$GLB,, | Performed by: INTERNAL MEDICINE

## 2021-10-12 PROCEDURE — 1101F PT FALLS ASSESS-DOCD LE1/YR: CPT | Mod: CPTII,S$GLB,, | Performed by: INTERNAL MEDICINE

## 2021-10-12 PROCEDURE — 1125F PR PAIN SEVERITY QUANTIFIED, PAIN PRESENT: ICD-10-PCS | Mod: CPTII,S$GLB,, | Performed by: INTERNAL MEDICINE

## 2021-10-12 PROCEDURE — 99215 OFFICE O/P EST HI 40 MIN: CPT | Mod: S$GLB,,, | Performed by: INTERNAL MEDICINE

## 2021-10-12 PROCEDURE — 1111F PR DISCHARGE MEDS RECONCILED W/ CURRENT OUTPATIENT MED LIST: ICD-10-PCS | Mod: CPTII,S$GLB,, | Performed by: INTERNAL MEDICINE

## 2021-10-12 PROCEDURE — 1125F AMNT PAIN NOTED PAIN PRSNT: CPT | Mod: CPTII,S$GLB,, | Performed by: INTERNAL MEDICINE

## 2021-10-12 PROCEDURE — 99999 PR PBB SHADOW E&M-EST. PATIENT-LVL III: ICD-10-PCS | Mod: PBBFAC,,, | Performed by: INTERNAL MEDICINE

## 2021-10-12 PROCEDURE — 99999 PR PBB SHADOW E&M-EST. PATIENT-LVL III: CPT | Mod: PBBFAC,,, | Performed by: INTERNAL MEDICINE

## 2021-10-12 PROCEDURE — 1160F PR REVIEW ALL MEDS BY PRESCRIBER/CLIN PHARMACIST DOCUMENTED: ICD-10-PCS | Mod: CPTII,S$GLB,, | Performed by: INTERNAL MEDICINE

## 2021-10-12 PROCEDURE — 1111F DSCHRG MED/CURRENT MED MERGE: CPT | Mod: CPTII,S$GLB,, | Performed by: INTERNAL MEDICINE

## 2021-10-12 PROCEDURE — 1159F MED LIST DOCD IN RCRD: CPT | Mod: CPTII,S$GLB,, | Performed by: INTERNAL MEDICINE

## 2021-10-12 PROCEDURE — 99215 PR OFFICE/OUTPT VISIT, EST, LEVL V, 40-54 MIN: ICD-10-PCS | Mod: S$GLB,,, | Performed by: INTERNAL MEDICINE

## 2021-10-12 PROCEDURE — 1160F RVW MEDS BY RX/DR IN RCRD: CPT | Mod: CPTII,S$GLB,, | Performed by: INTERNAL MEDICINE

## 2021-10-12 RX ORDER — METHYLPREDNISOLONE SOD SUCC 125 MG
80 VIAL (EA) INJECTION
Status: CANCELLED | OUTPATIENT
Start: 2021-10-14

## 2021-10-12 RX ORDER — ACETAMINOPHEN 325 MG/1
650 TABLET ORAL
Status: CANCELLED | OUTPATIENT
Start: 2021-10-14

## 2021-10-12 RX ORDER — SODIUM CHLORIDE 0.9 % (FLUSH) 0.9 %
10 SYRINGE (ML) INJECTION
Status: CANCELLED | OUTPATIENT
Start: 2021-10-14

## 2021-10-12 RX ORDER — DIPHENHYDRAMINE HCL 25 MG
25 CAPSULE ORAL
Status: CANCELLED | OUTPATIENT
Start: 2021-10-14

## 2021-10-12 RX ORDER — HEPARIN 100 UNIT/ML
500 SYRINGE INTRAVENOUS
Status: CANCELLED | OUTPATIENT
Start: 2021-10-14

## 2021-10-12 ASSESSMENT — ROUTINE ASSESSMENT OF PATIENT INDEX DATA (RAPID3)
FATIGUE SCORE: 0
PATIENT GLOBAL ASSESSMENT SCORE: 3
TOTAL RAPID3 SCORE: 3.56
PAIN SCORE: 6
MDHAQ FUNCTION SCORE: 0.5
PSYCHOLOGICAL DISTRESS SCORE: 1.1

## 2021-10-13 ENCOUNTER — PATIENT MESSAGE (OUTPATIENT)
Dept: INFUSION THERAPY | Facility: HOSPITAL | Age: 81
End: 2021-10-13
Payer: MEDICARE

## 2021-10-13 ENCOUNTER — DOCUMENT SCAN (OUTPATIENT)
Dept: HOME HEALTH SERVICES | Facility: HOSPITAL | Age: 81
End: 2021-10-13
Payer: MEDICARE

## 2021-10-14 ENCOUNTER — TELEPHONE (OUTPATIENT)
Dept: RHEUMATOLOGY | Facility: CLINIC | Age: 81
End: 2021-10-14

## 2021-10-14 ENCOUNTER — TELEPHONE (OUTPATIENT)
Dept: FAMILY MEDICINE | Facility: CLINIC | Age: 81
End: 2021-10-14

## 2021-10-14 ENCOUNTER — CLINICAL SUPPORT (OUTPATIENT)
Dept: RHEUMATOLOGY | Facility: CLINIC | Age: 81
End: 2021-10-14
Payer: MEDICARE

## 2021-10-14 VITALS — DIASTOLIC BLOOD PRESSURE: 71 MMHG | TEMPERATURE: 98 F | SYSTOLIC BLOOD PRESSURE: 137 MMHG | HEART RATE: 94 BPM

## 2021-10-14 DIAGNOSIS — R82.81 PYURIA: ICD-10-CM

## 2021-10-14 DIAGNOSIS — M31.31 WEGENER'S GRANULOMATOSIS WITH RENAL INVOLVEMENT: Primary | ICD-10-CM

## 2021-10-14 PROCEDURE — 99999 PR PBB SHADOW E&M-EST. PATIENT-LVL II: CPT | Mod: PBBFAC,,,

## 2021-10-14 PROCEDURE — 96372 PR INJECTION,THERAP/PROPH/DIAG2ST, IM OR SUBCUT: ICD-10-PCS | Mod: S$GLB,,, | Performed by: INTERNAL MEDICINE

## 2021-10-14 PROCEDURE — 99999 PR PBB SHADOW E&M-EST. PATIENT-LVL II: ICD-10-PCS | Mod: PBBFAC,,,

## 2021-10-14 PROCEDURE — 96372 THER/PROPH/DIAG INJ SC/IM: CPT | Mod: S$GLB,,, | Performed by: INTERNAL MEDICINE

## 2021-10-14 RX ORDER — CEFDINIR 300 MG/1
300 CAPSULE ORAL 2 TIMES DAILY
Qty: 20 CAPSULE | Refills: 0 | Status: SHIPPED | OUTPATIENT
Start: 2021-10-14 | End: 2021-10-24

## 2021-10-14 RX ORDER — CEFTRIAXONE 1 G/1
1 INJECTION, POWDER, FOR SOLUTION INTRAMUSCULAR; INTRAVENOUS
Status: COMPLETED | OUTPATIENT
Start: 2021-10-14 | End: 2021-10-14

## 2021-10-14 RX ADMIN — CEFTRIAXONE 1 G: 1 INJECTION, POWDER, FOR SOLUTION INTRAMUSCULAR; INTRAVENOUS at 04:10

## 2021-10-15 ENCOUNTER — INFUSION (OUTPATIENT)
Dept: INFUSION THERAPY | Facility: HOSPITAL | Age: 81
End: 2021-10-15
Attending: INTERNAL MEDICINE
Payer: MEDICARE

## 2021-10-15 VITALS
WEIGHT: 141.13 LBS | SYSTOLIC BLOOD PRESSURE: 172 MMHG | HEIGHT: 62 IN | DIASTOLIC BLOOD PRESSURE: 71 MMHG | HEART RATE: 102 BPM | TEMPERATURE: 98 F | BODY MASS INDEX: 25.97 KG/M2 | RESPIRATION RATE: 18 BRPM

## 2021-10-15 DIAGNOSIS — M54.50 MIDLINE LOW BACK PAIN WITHOUT SCIATICA, UNSPECIFIED CHRONICITY: ICD-10-CM

## 2021-10-15 DIAGNOSIS — M31.31 WEGENER'S GRANULOMATOSIS WITH RENAL INVOLVEMENT: Primary | ICD-10-CM

## 2021-10-15 DIAGNOSIS — M31.30 GRANULOMATOSIS WITH POLYANGIITIS WITH PULMONARY INVOLVEMENT: Primary | ICD-10-CM

## 2021-10-15 DIAGNOSIS — D84.9 IMMUNOCOMPROMISED: ICD-10-CM

## 2021-10-15 DIAGNOSIS — M35.3 POLYMYALGIA RHEUMATICA: ICD-10-CM

## 2021-10-15 DIAGNOSIS — M31.31 WEGENER'S GRANULOMATOSIS WITH RENAL INVOLVEMENT: ICD-10-CM

## 2021-10-15 PROCEDURE — 25000003 PHARM REV CODE 250: Mod: PN | Performed by: INTERNAL MEDICINE

## 2021-10-15 PROCEDURE — 87340 HEPATITIS B SURFACE AG IA: CPT | Performed by: INTERNAL MEDICINE

## 2021-10-15 PROCEDURE — 63600175 PHARM REV CODE 636 W HCPCS: Mod: JG,PN | Performed by: INTERNAL MEDICINE

## 2021-10-15 PROCEDURE — 96413 CHEMO IV INFUSION 1 HR: CPT | Mod: PN

## 2021-10-15 PROCEDURE — 96375 TX/PRO/DX INJ NEW DRUG ADDON: CPT | Mod: PN

## 2021-10-15 PROCEDURE — 96415 CHEMO IV INFUSION ADDL HR: CPT | Mod: PN

## 2021-10-15 RX ORDER — DIPHENHYDRAMINE HCL 25 MG
25 CAPSULE ORAL
Status: COMPLETED | OUTPATIENT
Start: 2021-10-15 | End: 2021-10-15

## 2021-10-15 RX ORDER — SODIUM CHLORIDE 0.9 % (FLUSH) 0.9 %
10 SYRINGE (ML) INJECTION
Status: DISCONTINUED | OUTPATIENT
Start: 2021-10-15 | End: 2021-10-15 | Stop reason: HOSPADM

## 2021-10-15 RX ORDER — HEPARIN 100 UNIT/ML
500 SYRINGE INTRAVENOUS
Status: CANCELLED | OUTPATIENT
Start: 2021-10-16

## 2021-10-15 RX ORDER — SODIUM CHLORIDE 0.9 % (FLUSH) 0.9 %
10 SYRINGE (ML) INJECTION
Status: CANCELLED | OUTPATIENT
Start: 2021-10-16

## 2021-10-15 RX ORDER — METHYLPREDNISOLONE SOD SUCC 125 MG
80 VIAL (EA) INJECTION
Status: CANCELLED | OUTPATIENT
Start: 2021-10-16

## 2021-10-15 RX ORDER — ACETAMINOPHEN 325 MG/1
650 TABLET ORAL
Status: COMPLETED | OUTPATIENT
Start: 2021-10-15 | End: 2021-10-15

## 2021-10-15 RX ORDER — DIPHENHYDRAMINE HCL 25 MG
25 CAPSULE ORAL
Status: CANCELLED | OUTPATIENT
Start: 2021-10-16

## 2021-10-15 RX ORDER — ACETAMINOPHEN 325 MG/1
650 TABLET ORAL
Status: CANCELLED | OUTPATIENT
Start: 2021-10-16

## 2021-10-15 RX ORDER — METHYLPREDNISOLONE SOD SUCC 125 MG
80 VIAL (EA) INJECTION
Status: COMPLETED | OUTPATIENT
Start: 2021-10-15 | End: 2021-10-15

## 2021-10-15 RX ADMIN — DIPHENHYDRAMINE HYDROCHLORIDE 25 MG: 25 CAPSULE ORAL at 09:10

## 2021-10-15 RX ADMIN — RITUXIMAB 626 MG: 10 INJECTION, SOLUTION INTRAVENOUS at 09:10

## 2021-10-15 RX ADMIN — SODIUM CHLORIDE: 0.9 INJECTION, SOLUTION INTRAVENOUS at 09:10

## 2021-10-15 RX ADMIN — METHYLPREDNISOLONE SODIUM SUCCINATE 80 MG: 125 INJECTION, POWDER, FOR SOLUTION INTRAMUSCULAR; INTRAVENOUS at 09:10

## 2021-10-15 RX ADMIN — ACETAMINOPHEN 650 MG: 325 TABLET ORAL at 09:10

## 2021-10-18 ENCOUNTER — TELEPHONE (OUTPATIENT)
Dept: FAMILY MEDICINE | Facility: CLINIC | Age: 81
End: 2021-10-18

## 2021-10-18 LAB — HBV SURFACE AG SERPL QL IA: NEGATIVE

## 2021-10-21 ENCOUNTER — TELEPHONE (OUTPATIENT)
Dept: RHEUMATOLOGY | Facility: CLINIC | Age: 81
End: 2021-10-21

## 2021-10-22 ENCOUNTER — INFUSION (OUTPATIENT)
Dept: INFUSION THERAPY | Facility: HOSPITAL | Age: 81
End: 2021-10-22
Attending: INTERNAL MEDICINE
Payer: MEDICARE

## 2021-10-22 VITALS
WEIGHT: 147.69 LBS | HEIGHT: 62 IN | BODY MASS INDEX: 27.18 KG/M2 | RESPIRATION RATE: 18 BRPM | TEMPERATURE: 97 F | DIASTOLIC BLOOD PRESSURE: 58 MMHG | HEART RATE: 65 BPM | SYSTOLIC BLOOD PRESSURE: 126 MMHG

## 2021-10-22 DIAGNOSIS — M31.30 GRANULOMATOSIS WITH POLYANGIITIS WITH PULMONARY INVOLVEMENT: Primary | ICD-10-CM

## 2021-10-22 PROCEDURE — 25000003 PHARM REV CODE 250: Mod: PN | Performed by: INTERNAL MEDICINE

## 2021-10-22 PROCEDURE — 63600175 PHARM REV CODE 636 W HCPCS: Mod: PN | Performed by: INTERNAL MEDICINE

## 2021-10-22 PROCEDURE — 96375 TX/PRO/DX INJ NEW DRUG ADDON: CPT | Mod: PN

## 2021-10-22 PROCEDURE — 96413 CHEMO IV INFUSION 1 HR: CPT | Mod: PN

## 2021-10-22 PROCEDURE — 96415 CHEMO IV INFUSION ADDL HR: CPT | Mod: PN

## 2021-10-22 RX ORDER — METHYLPREDNISOLONE SOD SUCC 125 MG
80 VIAL (EA) INJECTION
Status: CANCELLED | OUTPATIENT
Start: 2021-10-23

## 2021-10-22 RX ORDER — DIPHENHYDRAMINE HCL 25 MG
25 CAPSULE ORAL
Status: COMPLETED | OUTPATIENT
Start: 2021-10-22 | End: 2021-10-22

## 2021-10-22 RX ORDER — METHYLPREDNISOLONE SOD SUCC 125 MG
80 VIAL (EA) INJECTION
Status: COMPLETED | OUTPATIENT
Start: 2021-10-22 | End: 2021-10-22

## 2021-10-22 RX ORDER — HEPARIN 100 UNIT/ML
500 SYRINGE INTRAVENOUS
Status: CANCELLED | OUTPATIENT
Start: 2021-10-23

## 2021-10-22 RX ORDER — SODIUM CHLORIDE 0.9 % (FLUSH) 0.9 %
10 SYRINGE (ML) INJECTION
Status: DISCONTINUED | OUTPATIENT
Start: 2021-10-22 | End: 2021-10-22 | Stop reason: HOSPADM

## 2021-10-22 RX ORDER — SODIUM CHLORIDE 0.9 % (FLUSH) 0.9 %
10 SYRINGE (ML) INJECTION
Status: CANCELLED | OUTPATIENT
Start: 2021-10-23

## 2021-10-22 RX ORDER — ACETAMINOPHEN 325 MG/1
650 TABLET ORAL
Status: COMPLETED | OUTPATIENT
Start: 2021-10-22 | End: 2021-10-22

## 2021-10-22 RX ORDER — ACETAMINOPHEN 325 MG/1
650 TABLET ORAL
Status: CANCELLED | OUTPATIENT
Start: 2021-10-23

## 2021-10-22 RX ORDER — DIPHENHYDRAMINE HCL 25 MG
25 CAPSULE ORAL
Status: CANCELLED | OUTPATIENT
Start: 2021-10-23

## 2021-10-22 RX ADMIN — DIPHENHYDRAMINE HYDROCHLORIDE 25 MG: 25 CAPSULE ORAL at 09:10

## 2021-10-22 RX ADMIN — SODIUM CHLORIDE: 0.9 INJECTION, SOLUTION INTRAVENOUS at 09:10

## 2021-10-22 RX ADMIN — RITUXIMAB 600 MG: 10 INJECTION, SOLUTION INTRAVENOUS at 09:10

## 2021-10-22 RX ADMIN — ACETAMINOPHEN 650 MG: 325 TABLET ORAL at 09:10

## 2021-10-22 RX ADMIN — METHYLPREDNISOLONE SODIUM SUCCINATE 80 MG: 125 INJECTION, POWDER, FOR SOLUTION INTRAMUSCULAR; INTRAVENOUS at 09:10

## 2021-10-26 ENCOUNTER — DOCUMENT SCAN (OUTPATIENT)
Dept: HOME HEALTH SERVICES | Facility: HOSPITAL | Age: 81
End: 2021-10-26
Payer: MEDICARE

## 2021-10-28 ENCOUNTER — TELEPHONE (OUTPATIENT)
Dept: RHEUMATOLOGY | Facility: CLINIC | Age: 81
End: 2021-10-28
Payer: MEDICARE

## 2021-10-28 ENCOUNTER — OFFICE VISIT (OUTPATIENT)
Dept: RHEUMATOLOGY | Facility: CLINIC | Age: 81
End: 2021-10-28
Payer: MEDICARE

## 2021-10-28 VITALS
WEIGHT: 147 LBS | BODY MASS INDEX: 27.05 KG/M2 | HEIGHT: 62 IN | HEART RATE: 82 BPM | SYSTOLIC BLOOD PRESSURE: 154 MMHG | DIASTOLIC BLOOD PRESSURE: 66 MMHG

## 2021-10-28 DIAGNOSIS — M31.31 WEGENER'S GRANULOMATOSIS WITH RENAL INVOLVEMENT: Primary | ICD-10-CM

## 2021-10-28 DIAGNOSIS — D84.9 IMMUNOCOMPROMISED: ICD-10-CM

## 2021-10-28 DIAGNOSIS — I77.82 ANCA-ASSOCIATED VASCULITIS: ICD-10-CM

## 2021-10-28 PROCEDURE — 1125F AMNT PAIN NOTED PAIN PRSNT: CPT | Mod: CPTII,S$GLB,, | Performed by: INTERNAL MEDICINE

## 2021-10-28 PROCEDURE — 99214 PR OFFICE/OUTPT VISIT, EST, LEVL IV, 30-39 MIN: ICD-10-PCS | Mod: S$GLB,,, | Performed by: INTERNAL MEDICINE

## 2021-10-28 PROCEDURE — 1125F PR PAIN SEVERITY QUANTIFIED, PAIN PRESENT: ICD-10-PCS | Mod: CPTII,S$GLB,, | Performed by: INTERNAL MEDICINE

## 2021-10-28 PROCEDURE — 99999 PR PBB SHADOW E&M-EST. PATIENT-LVL IV: ICD-10-PCS | Mod: PBBFAC,,, | Performed by: INTERNAL MEDICINE

## 2021-10-28 PROCEDURE — 99214 OFFICE O/P EST MOD 30 MIN: CPT | Mod: S$GLB,,, | Performed by: INTERNAL MEDICINE

## 2021-10-28 PROCEDURE — 3078F PR MOST RECENT DIASTOLIC BLOOD PRESSURE < 80 MM HG: ICD-10-PCS | Mod: CPTII,S$GLB,, | Performed by: INTERNAL MEDICINE

## 2021-10-28 PROCEDURE — 99999 PR PBB SHADOW E&M-EST. PATIENT-LVL IV: CPT | Mod: PBBFAC,,, | Performed by: INTERNAL MEDICINE

## 2021-10-28 PROCEDURE — 3077F PR MOST RECENT SYSTOLIC BLOOD PRESSURE >= 140 MM HG: ICD-10-PCS | Mod: CPTII,S$GLB,, | Performed by: INTERNAL MEDICINE

## 2021-10-28 PROCEDURE — 3078F DIAST BP <80 MM HG: CPT | Mod: CPTII,S$GLB,, | Performed by: INTERNAL MEDICINE

## 2021-10-28 PROCEDURE — 3077F SYST BP >= 140 MM HG: CPT | Mod: CPTII,S$GLB,, | Performed by: INTERNAL MEDICINE

## 2021-10-28 RX ORDER — FUROSEMIDE 20 MG/1
TABLET ORAL
Qty: 7 TABLET | Refills: 2 | Status: SHIPPED | OUTPATIENT
Start: 2021-10-28 | End: 2021-11-01

## 2021-10-29 ENCOUNTER — DOCUMENT SCAN (OUTPATIENT)
Dept: HOME HEALTH SERVICES | Facility: HOSPITAL | Age: 81
End: 2021-10-29
Payer: MEDICARE

## 2021-10-29 ENCOUNTER — INFUSION (OUTPATIENT)
Dept: INFUSION THERAPY | Facility: HOSPITAL | Age: 81
End: 2021-10-29
Attending: INTERNAL MEDICINE
Payer: MEDICARE

## 2021-10-29 VITALS
RESPIRATION RATE: 18 BRPM | TEMPERATURE: 97 F | WEIGHT: 149.69 LBS | DIASTOLIC BLOOD PRESSURE: 66 MMHG | SYSTOLIC BLOOD PRESSURE: 162 MMHG | HEART RATE: 55 BPM | BODY MASS INDEX: 27.55 KG/M2 | HEIGHT: 62 IN

## 2021-10-29 DIAGNOSIS — I77.82 ANCA-ASSOCIATED VASCULITIS: ICD-10-CM

## 2021-10-29 DIAGNOSIS — M31.31 WEGENER'S GRANULOMATOSIS WITH RENAL INVOLVEMENT: Primary | ICD-10-CM

## 2021-10-29 DIAGNOSIS — M31.30 GRANULOMATOSIS WITH POLYANGIITIS WITH PULMONARY INVOLVEMENT: ICD-10-CM

## 2021-10-29 DIAGNOSIS — D84.9 IMMUNOCOMPROMISED: ICD-10-CM

## 2021-10-29 LAB
ALBUMIN SERPL BCP-MCNC: 2.5 G/DL (ref 3.5–5.2)
ALP SERPL-CCNC: 55 U/L (ref 55–135)
ALT SERPL W/O P-5'-P-CCNC: 10 U/L (ref 10–44)
ANION GAP SERPL CALC-SCNC: 11 MMOL/L (ref 8–16)
AST SERPL-CCNC: 10 U/L (ref 10–40)
BASOPHILS # BLD AUTO: 0.02 K/UL (ref 0–0.2)
BASOPHILS NFR BLD: 0.2 % (ref 0–1.9)
BILIRUB SERPL-MCNC: 0.3 MG/DL (ref 0.1–1)
BUN SERPL-MCNC: 27 MG/DL (ref 8–23)
CALCIUM SERPL-MCNC: 9.3 MG/DL (ref 8.7–10.5)
CHLORIDE SERPL-SCNC: 102 MMOL/L (ref 95–110)
CO2 SERPL-SCNC: 25 MMOL/L (ref 23–29)
CREAT SERPL-MCNC: 1.2 MG/DL (ref 0.5–1.4)
DIFFERENTIAL METHOD: ABNORMAL
EOSINOPHIL # BLD AUTO: 0.1 K/UL (ref 0–0.5)
EOSINOPHIL NFR BLD: 1 % (ref 0–8)
ERYTHROCYTE [DISTWIDTH] IN BLOOD BY AUTOMATED COUNT: 21.7 % (ref 11.5–14.5)
EST. GFR  (AFRICAN AMERICAN): 50 ML/MIN/1.73 M^2
EST. GFR  (NON AFRICAN AMERICAN): 43 ML/MIN/1.73 M^2
GLUCOSE SERPL-MCNC: 87 MG/DL (ref 70–110)
HCT VFR BLD AUTO: 26.8 % (ref 37–48.5)
HGB BLD-MCNC: 8.4 G/DL (ref 12–16)
IMM GRANULOCYTES # BLD AUTO: 0.11 K/UL (ref 0–0.04)
IMM GRANULOCYTES NFR BLD AUTO: 1.1 % (ref 0–0.5)
LYMPHOCYTES # BLD AUTO: 0.8 K/UL (ref 1–4.8)
LYMPHOCYTES NFR BLD: 8.1 % (ref 18–48)
MCH RBC QN AUTO: 27.3 PG (ref 27–31)
MCHC RBC AUTO-ENTMCNC: 31.3 G/DL (ref 32–36)
MCV RBC AUTO: 87 FL (ref 82–98)
MONOCYTES # BLD AUTO: 1 K/UL (ref 0.3–1)
MONOCYTES NFR BLD: 10 % (ref 4–15)
NEUTROPHILS # BLD AUTO: 7.7 K/UL (ref 1.8–7.7)
NEUTROPHILS NFR BLD: 79.6 % (ref 38–73)
NRBC BLD-RTO: 0 /100 WBC
PLATELET # BLD AUTO: 358 K/UL (ref 150–450)
PMV BLD AUTO: 8.4 FL (ref 9.2–12.9)
POTASSIUM SERPL-SCNC: 4 MMOL/L (ref 3.5–5.1)
PROT SERPL-MCNC: 5.7 G/DL (ref 6–8.4)
RBC # BLD AUTO: 3.08 M/UL (ref 4–5.4)
SODIUM SERPL-SCNC: 138 MMOL/L (ref 136–145)
WBC # BLD AUTO: 9.73 K/UL (ref 3.9–12.7)

## 2021-10-29 PROCEDURE — 63600175 PHARM REV CODE 636 W HCPCS: Mod: PN | Performed by: INTERNAL MEDICINE

## 2021-10-29 PROCEDURE — 85025 COMPLETE CBC W/AUTO DIFF WBC: CPT | Mod: PN | Performed by: INTERNAL MEDICINE

## 2021-10-29 PROCEDURE — 96415 CHEMO IV INFUSION ADDL HR: CPT | Mod: PN

## 2021-10-29 PROCEDURE — 96375 TX/PRO/DX INJ NEW DRUG ADDON: CPT | Mod: PN

## 2021-10-29 PROCEDURE — 80053 COMPREHEN METABOLIC PANEL: CPT | Mod: PN | Performed by: INTERNAL MEDICINE

## 2021-10-29 PROCEDURE — 25000003 PHARM REV CODE 250: Mod: PN | Performed by: INTERNAL MEDICINE

## 2021-10-29 PROCEDURE — 96413 CHEMO IV INFUSION 1 HR: CPT | Mod: PN

## 2021-10-29 PROCEDURE — 86140 C-REACTIVE PROTEIN: CPT | Performed by: INTERNAL MEDICINE

## 2021-10-29 PROCEDURE — 85651 RBC SED RATE NONAUTOMATED: CPT | Mod: PN | Performed by: INTERNAL MEDICINE

## 2021-10-29 RX ORDER — METHYLPREDNISOLONE SOD SUCC 125 MG
80 VIAL (EA) INJECTION
Status: CANCELLED | OUTPATIENT
Start: 2021-11-05

## 2021-10-29 RX ORDER — SODIUM CHLORIDE 0.9 % (FLUSH) 0.9 %
10 SYRINGE (ML) INJECTION
Status: DISCONTINUED | OUTPATIENT
Start: 2021-10-29 | End: 2021-10-29 | Stop reason: HOSPADM

## 2021-10-29 RX ORDER — DIPHENHYDRAMINE HCL 25 MG
25 CAPSULE ORAL
Status: COMPLETED | OUTPATIENT
Start: 2021-10-29 | End: 2021-10-29

## 2021-10-29 RX ORDER — METHYLPREDNISOLONE SOD SUCC 125 MG
80 VIAL (EA) INJECTION
Status: COMPLETED | OUTPATIENT
Start: 2021-10-29 | End: 2021-10-29

## 2021-10-29 RX ORDER — ACETAMINOPHEN 325 MG/1
650 TABLET ORAL
Status: COMPLETED | OUTPATIENT
Start: 2021-10-29 | End: 2021-10-29

## 2021-10-29 RX ORDER — ACETAMINOPHEN 325 MG/1
650 TABLET ORAL
Status: CANCELLED | OUTPATIENT
Start: 2021-11-05

## 2021-10-29 RX ORDER — SODIUM CHLORIDE 0.9 % (FLUSH) 0.9 %
10 SYRINGE (ML) INJECTION
Status: CANCELLED | OUTPATIENT
Start: 2021-11-05

## 2021-10-29 RX ORDER — HEPARIN 100 UNIT/ML
500 SYRINGE INTRAVENOUS
Status: DISCONTINUED | OUTPATIENT
Start: 2021-10-29 | End: 2021-10-29 | Stop reason: HOSPADM

## 2021-10-29 RX ORDER — DIPHENHYDRAMINE HCL 25 MG
25 CAPSULE ORAL
Status: CANCELLED | OUTPATIENT
Start: 2021-11-05

## 2021-10-29 RX ORDER — HEPARIN 100 UNIT/ML
500 SYRINGE INTRAVENOUS
Status: CANCELLED | OUTPATIENT
Start: 2021-11-05

## 2021-10-29 RX ADMIN — ACETAMINOPHEN 650 MG: 325 TABLET ORAL at 09:10

## 2021-10-29 RX ADMIN — METHYLPREDNISOLONE SODIUM SUCCINATE 80 MG: 125 INJECTION, POWDER, FOR SOLUTION INTRAMUSCULAR; INTRAVENOUS at 09:10

## 2021-10-29 RX ADMIN — RITUXIMAB 600 MG: 10 INJECTION, SOLUTION INTRAVENOUS at 09:10

## 2021-10-29 RX ADMIN — DIPHENHYDRAMINE HYDROCHLORIDE 25 MG: 25 CAPSULE ORAL at 09:10

## 2021-10-29 RX ADMIN — SODIUM CHLORIDE: 0.9 INJECTION, SOLUTION INTRAVENOUS at 09:10

## 2021-10-30 LAB
CRP SERPL-MCNC: 52.9 MG/L (ref 0–8.2)
ERYTHROCYTE [SEDIMENTATION RATE] IN BLOOD BY WESTERGREN METHOD: 77 MM/HR (ref 0–36)

## 2021-11-01 PROBLEM — E46 PROTEIN MALNUTRITION: Status: ACTIVE | Noted: 2021-11-01

## 2021-11-01 PROBLEM — H91.90 HEARING LOSS: Status: ACTIVE | Noted: 2021-11-01

## 2021-11-02 ENCOUNTER — DOCUMENT SCAN (OUTPATIENT)
Dept: HOME HEALTH SERVICES | Facility: HOSPITAL | Age: 81
End: 2021-11-02
Payer: MEDICARE

## 2021-11-03 ENCOUNTER — TELEPHONE (OUTPATIENT)
Dept: NEPHROLOGY | Facility: CLINIC | Age: 81
End: 2021-11-03
Payer: MEDICARE

## 2021-11-04 ENCOUNTER — OFFICE VISIT (OUTPATIENT)
Dept: RHEUMATOLOGY | Facility: CLINIC | Age: 81
End: 2021-11-04
Payer: MEDICARE

## 2021-11-04 VITALS
SYSTOLIC BLOOD PRESSURE: 147 MMHG | DIASTOLIC BLOOD PRESSURE: 66 MMHG | HEIGHT: 62 IN | BODY MASS INDEX: 28.02 KG/M2 | HEART RATE: 69 BPM | WEIGHT: 152.25 LBS

## 2021-11-04 DIAGNOSIS — M31.31 WEGENER'S GRANULOMATOSIS WITH RENAL INVOLVEMENT: Primary | ICD-10-CM

## 2021-11-04 PROCEDURE — 99214 PR OFFICE/OUTPT VISIT, EST, LEVL IV, 30-39 MIN: ICD-10-PCS | Mod: S$GLB,,, | Performed by: INTERNAL MEDICINE

## 2021-11-04 PROCEDURE — 99999 PR PBB SHADOW E&M-EST. PATIENT-LVL IV: ICD-10-PCS | Mod: PBBFAC,,, | Performed by: INTERNAL MEDICINE

## 2021-11-04 PROCEDURE — 99999 PR PBB SHADOW E&M-EST. PATIENT-LVL IV: CPT | Mod: PBBFAC,,, | Performed by: INTERNAL MEDICINE

## 2021-11-04 PROCEDURE — 99214 OFFICE O/P EST MOD 30 MIN: CPT | Mod: S$GLB,,, | Performed by: INTERNAL MEDICINE

## 2021-11-04 RX ORDER — SULFAMETHOXAZOLE AND TRIMETHOPRIM 800; 160 MG/1; MG/1
1 TABLET ORAL
Qty: 8 TABLET | Refills: 4 | Status: SHIPPED | OUTPATIENT
Start: 2021-11-04 | End: 2021-12-06

## 2021-11-05 ENCOUNTER — INFUSION (OUTPATIENT)
Dept: INFUSION THERAPY | Facility: HOSPITAL | Age: 81
End: 2021-11-05
Attending: INTERNAL MEDICINE
Payer: MEDICARE

## 2021-11-05 ENCOUNTER — DOCUMENT SCAN (OUTPATIENT)
Dept: HOME HEALTH SERVICES | Facility: HOSPITAL | Age: 81
End: 2021-11-05
Payer: MEDICARE

## 2021-11-05 VITALS
BODY MASS INDEX: 28.76 KG/M2 | WEIGHT: 156.31 LBS | HEART RATE: 57 BPM | DIASTOLIC BLOOD PRESSURE: 60 MMHG | SYSTOLIC BLOOD PRESSURE: 142 MMHG | TEMPERATURE: 97 F | RESPIRATION RATE: 18 BRPM | HEIGHT: 62 IN

## 2021-11-05 DIAGNOSIS — M31.30 GRANULOMATOSIS WITH POLYANGIITIS WITH PULMONARY INVOLVEMENT: Primary | ICD-10-CM

## 2021-11-05 PROCEDURE — 25000003 PHARM REV CODE 250: Mod: PN | Performed by: INTERNAL MEDICINE

## 2021-11-05 PROCEDURE — 96375 TX/PRO/DX INJ NEW DRUG ADDON: CPT | Mod: PN

## 2021-11-05 PROCEDURE — 96413 CHEMO IV INFUSION 1 HR: CPT | Mod: PN

## 2021-11-05 PROCEDURE — 63600175 PHARM REV CODE 636 W HCPCS: Mod: PN | Performed by: INTERNAL MEDICINE

## 2021-11-05 PROCEDURE — 96415 CHEMO IV INFUSION ADDL HR: CPT | Mod: PN

## 2021-11-05 RX ORDER — METHYLPREDNISOLONE SOD SUCC 125 MG
80 VIAL (EA) INJECTION ONCE
Status: COMPLETED | OUTPATIENT
Start: 2021-11-05 | End: 2021-11-05

## 2021-11-05 RX ORDER — SODIUM CHLORIDE 0.9 % (FLUSH) 0.9 %
10 SYRINGE (ML) INJECTION
Status: CANCELLED | OUTPATIENT
Start: 2021-11-05

## 2021-11-05 RX ORDER — METHYLPREDNISOLONE SOD SUCC 125 MG
80 VIAL (EA) INJECTION ONCE
Status: CANCELLED
Start: 2021-11-05 | End: 2021-11-05

## 2021-11-05 RX ORDER — HEPARIN 100 UNIT/ML
500 SYRINGE INTRAVENOUS
Status: DISCONTINUED | OUTPATIENT
Start: 2021-11-05 | End: 2021-11-05 | Stop reason: HOSPADM

## 2021-11-05 RX ORDER — DIPHENHYDRAMINE HCL 25 MG
25 CAPSULE ORAL
Status: COMPLETED | OUTPATIENT
Start: 2021-11-05 | End: 2021-11-05

## 2021-11-05 RX ORDER — ACETAMINOPHEN 325 MG/1
650 TABLET ORAL
Status: COMPLETED | OUTPATIENT
Start: 2021-11-05 | End: 2021-11-05

## 2021-11-05 RX ORDER — ACETAMINOPHEN 325 MG/1
650 TABLET ORAL
Status: CANCELLED | OUTPATIENT
Start: 2021-11-05

## 2021-11-05 RX ORDER — SODIUM CHLORIDE 0.9 % (FLUSH) 0.9 %
10 SYRINGE (ML) INJECTION
Status: DISCONTINUED | OUTPATIENT
Start: 2021-11-05 | End: 2021-11-05 | Stop reason: HOSPADM

## 2021-11-05 RX ORDER — HEPARIN 100 UNIT/ML
500 SYRINGE INTRAVENOUS
Status: CANCELLED | OUTPATIENT
Start: 2021-11-05

## 2021-11-05 RX ORDER — DIPHENHYDRAMINE HCL 25 MG
25 CAPSULE ORAL
Status: CANCELLED | OUTPATIENT
Start: 2021-11-05

## 2021-11-05 RX ADMIN — METHYLPREDNISOLONE SODIUM SUCCINATE 80 MG: 125 INJECTION, POWDER, FOR SOLUTION INTRAMUSCULAR; INTRAVENOUS at 09:11

## 2021-11-05 RX ADMIN — RITUXIMAB 600 MG: 10 INJECTION, SOLUTION INTRAVENOUS at 09:11

## 2021-11-05 RX ADMIN — ACETAMINOPHEN 650 MG: 325 TABLET, FILM COATED ORAL at 09:11

## 2021-11-05 RX ADMIN — DIPHENHYDRAMINE HYDROCHLORIDE 25 MG: 25 CAPSULE ORAL at 09:11

## 2021-11-05 RX ADMIN — SODIUM CHLORIDE: 0.9 INJECTION, SOLUTION INTRAVENOUS at 09:11

## 2021-11-08 ENCOUNTER — EXTERNAL HOME HEALTH (OUTPATIENT)
Dept: HOME HEALTH SERVICES | Facility: HOSPITAL | Age: 81
End: 2021-11-08
Payer: MEDICARE

## 2021-11-11 ENCOUNTER — CLINICAL SUPPORT (OUTPATIENT)
Dept: URGENT CARE | Facility: CLINIC | Age: 81
End: 2021-11-11
Payer: MEDICARE

## 2021-11-11 ENCOUNTER — OFFICE VISIT (OUTPATIENT)
Dept: RHEUMATOLOGY | Facility: CLINIC | Age: 81
End: 2021-11-11
Payer: MEDICARE

## 2021-11-11 ENCOUNTER — TELEPHONE (OUTPATIENT)
Dept: RHEUMATOLOGY | Facility: CLINIC | Age: 81
End: 2021-11-11
Payer: MEDICARE

## 2021-11-11 VITALS
WEIGHT: 156.31 LBS | BODY MASS INDEX: 28.76 KG/M2 | SYSTOLIC BLOOD PRESSURE: 164 MMHG | DIASTOLIC BLOOD PRESSURE: 66 MMHG | HEIGHT: 62 IN | HEART RATE: 74 BPM

## 2021-11-11 DIAGNOSIS — R05.9 COUGH: ICD-10-CM

## 2021-11-11 DIAGNOSIS — Z20.822 ENCOUNTER FOR LABORATORY TESTING FOR COVID-19 VIRUS: ICD-10-CM

## 2021-11-11 PROCEDURE — 1101F PR PT FALLS ASSESS DOC 0-1 FALLS W/OUT INJ PAST YR: ICD-10-PCS | Mod: CPTII,S$GLB,, | Performed by: INTERNAL MEDICINE

## 2021-11-11 PROCEDURE — 3078F PR MOST RECENT DIASTOLIC BLOOD PRESSURE < 80 MM HG: ICD-10-PCS | Mod: CPTII,S$GLB,, | Performed by: INTERNAL MEDICINE

## 2021-11-11 PROCEDURE — 3288F FALL RISK ASSESSMENT DOCD: CPT | Mod: CPTII,S$GLB,, | Performed by: INTERNAL MEDICINE

## 2021-11-11 PROCEDURE — 3078F DIAST BP <80 MM HG: CPT | Mod: CPTII,S$GLB,, | Performed by: INTERNAL MEDICINE

## 2021-11-11 PROCEDURE — 1101F PT FALLS ASSESS-DOCD LE1/YR: CPT | Mod: CPTII,S$GLB,, | Performed by: INTERNAL MEDICINE

## 2021-11-11 PROCEDURE — 3077F PR MOST RECENT SYSTOLIC BLOOD PRESSURE >= 140 MM HG: ICD-10-PCS | Mod: CPTII,S$GLB,, | Performed by: INTERNAL MEDICINE

## 2021-11-11 PROCEDURE — 99211 PR OFFICE/OUTPT VISIT, EST, LEVL I: ICD-10-PCS | Mod: S$GLB,,, | Performed by: FAMILY MEDICINE

## 2021-11-11 PROCEDURE — 1125F PR PAIN SEVERITY QUANTIFIED, PAIN PRESENT: ICD-10-PCS | Mod: CPTII,S$GLB,, | Performed by: INTERNAL MEDICINE

## 2021-11-11 PROCEDURE — 99999 PR PBB SHADOW E&M-EST. PATIENT-LVL IV: ICD-10-PCS | Mod: PBBFAC,,, | Performed by: INTERNAL MEDICINE

## 2021-11-11 PROCEDURE — 99211 OFF/OP EST MAY X REQ PHY/QHP: CPT | Mod: S$GLB,,, | Performed by: FAMILY MEDICINE

## 2021-11-11 PROCEDURE — 1159F MED LIST DOCD IN RCRD: CPT | Mod: CPTII,S$GLB,, | Performed by: INTERNAL MEDICINE

## 2021-11-11 PROCEDURE — 1160F PR REVIEW ALL MEDS BY PRESCRIBER/CLIN PHARMACIST DOCUMENTED: ICD-10-PCS | Mod: CPTII,S$GLB,, | Performed by: INTERNAL MEDICINE

## 2021-11-11 PROCEDURE — 3077F SYST BP >= 140 MM HG: CPT | Mod: CPTII,S$GLB,, | Performed by: INTERNAL MEDICINE

## 2021-11-11 PROCEDURE — 99215 OFFICE O/P EST HI 40 MIN: CPT | Mod: S$GLB,,, | Performed by: INTERNAL MEDICINE

## 2021-11-11 PROCEDURE — 99215 PR OFFICE/OUTPT VISIT, EST, LEVL V, 40-54 MIN: ICD-10-PCS | Mod: S$GLB,,, | Performed by: INTERNAL MEDICINE

## 2021-11-11 PROCEDURE — 1159F PR MEDICATION LIST DOCUMENTED IN MEDICAL RECORD: ICD-10-PCS | Mod: CPTII,S$GLB,, | Performed by: INTERNAL MEDICINE

## 2021-11-11 PROCEDURE — U0003 INFECTIOUS AGENT DETECTION BY NUCLEIC ACID (DNA OR RNA); SEVERE ACUTE RESPIRATORY SYNDROME CORONAVIRUS 2 (SARS-COV-2) (CORONAVIRUS DISEASE [COVID-19]), AMPLIFIED PROBE TECHNIQUE, MAKING USE OF HIGH THROUGHPUT TECHNOLOGIES AS DESCRIBED BY CMS-2020-01-R: HCPCS | Performed by: INTERNAL MEDICINE

## 2021-11-11 PROCEDURE — 99999 PR PBB SHADOW E&M-EST. PATIENT-LVL IV: CPT | Mod: PBBFAC,,, | Performed by: INTERNAL MEDICINE

## 2021-11-11 PROCEDURE — 1160F RVW MEDS BY RX/DR IN RCRD: CPT | Mod: CPTII,S$GLB,, | Performed by: INTERNAL MEDICINE

## 2021-11-11 PROCEDURE — 1125F AMNT PAIN NOTED PAIN PRSNT: CPT | Mod: CPTII,S$GLB,, | Performed by: INTERNAL MEDICINE

## 2021-11-11 PROCEDURE — 3288F PR FALLS RISK ASSESSMENT DOCUMENTED: ICD-10-PCS | Mod: CPTII,S$GLB,, | Performed by: INTERNAL MEDICINE

## 2021-11-14 LAB
SARS-COV-2 RNA RESP QL NAA+PROBE: NOT DETECTED
SARS-COV-2- CYCLE NUMBER: NORMAL

## 2021-11-15 ENCOUNTER — TELEPHONE (OUTPATIENT)
Dept: RHEUMATOLOGY | Facility: CLINIC | Age: 81
End: 2021-11-15
Payer: MEDICARE

## 2021-11-16 ENCOUNTER — TELEPHONE (OUTPATIENT)
Dept: URGENT CARE | Facility: CLINIC | Age: 81
End: 2021-11-16
Payer: MEDICARE

## 2021-11-26 ENCOUNTER — LAB VISIT (OUTPATIENT)
Dept: LAB | Facility: HOSPITAL | Age: 81
End: 2021-11-26
Attending: INTERNAL MEDICINE
Payer: MEDICARE

## 2021-11-26 DIAGNOSIS — M31.31 WEGENER'S GRANULOMATOSIS WITH RENAL INVOLVEMENT: ICD-10-CM

## 2021-11-26 LAB
ALBUMIN SERPL BCP-MCNC: 3.1 G/DL (ref 3.5–5.2)
ALP SERPL-CCNC: 73 U/L (ref 55–135)
ALT SERPL W/O P-5'-P-CCNC: 12 U/L (ref 10–44)
ANION GAP SERPL CALC-SCNC: 13 MMOL/L (ref 8–16)
AST SERPL-CCNC: 17 U/L (ref 10–40)
BASOPHILS # BLD AUTO: 0.08 K/UL (ref 0–0.2)
BASOPHILS NFR BLD: 0.8 % (ref 0–1.9)
BILIRUB SERPL-MCNC: 0.2 MG/DL (ref 0.1–1)
BUN SERPL-MCNC: 28 MG/DL (ref 8–23)
CALCIUM SERPL-MCNC: 9.5 MG/DL (ref 8.7–10.5)
CHLORIDE SERPL-SCNC: 96 MMOL/L (ref 95–110)
CO2 SERPL-SCNC: 25 MMOL/L (ref 23–29)
CREAT SERPL-MCNC: 2 MG/DL (ref 0.5–1.4)
CRP SERPL-MCNC: 46.2 MG/L (ref 0–8.2)
DIFFERENTIAL METHOD: ABNORMAL
EOSINOPHIL # BLD AUTO: 0.2 K/UL (ref 0–0.5)
EOSINOPHIL NFR BLD: 1.6 % (ref 0–8)
ERYTHROCYTE [DISTWIDTH] IN BLOOD BY AUTOMATED COUNT: 18.8 % (ref 11.5–14.5)
ERYTHROCYTE [SEDIMENTATION RATE] IN BLOOD BY WESTERGREN METHOD: 6 MM/HR (ref 0–20)
EST. GFR  (AFRICAN AMERICAN): 26.8 ML/MIN/1.73 M^2
EST. GFR  (NON AFRICAN AMERICAN): 23.2 ML/MIN/1.73 M^2
GLUCOSE SERPL-MCNC: 105 MG/DL (ref 70–110)
HCT VFR BLD AUTO: 31.9 % (ref 37–48.5)
HGB BLD-MCNC: 9.5 G/DL (ref 12–16)
IMM GRANULOCYTES # BLD AUTO: 0.25 K/UL (ref 0–0.04)
IMM GRANULOCYTES NFR BLD AUTO: 2.5 % (ref 0–0.5)
LYMPHOCYTES # BLD AUTO: 0.7 K/UL (ref 1–4.8)
LYMPHOCYTES NFR BLD: 7.4 % (ref 18–48)
MCH RBC QN AUTO: 28.4 PG (ref 27–31)
MCHC RBC AUTO-ENTMCNC: 29.8 G/DL (ref 32–36)
MCV RBC AUTO: 96 FL (ref 82–98)
MONOCYTES # BLD AUTO: 1 K/UL (ref 0.3–1)
MONOCYTES NFR BLD: 10.4 % (ref 4–15)
NEUTROPHILS # BLD AUTO: 7.6 K/UL (ref 1.8–7.7)
NEUTROPHILS NFR BLD: 77.3 % (ref 38–73)
NRBC BLD-RTO: 0 /100 WBC
PLATELET # BLD AUTO: 410 K/UL (ref 150–450)
PMV BLD AUTO: 8.8 FL (ref 9.2–12.9)
POTASSIUM SERPL-SCNC: 4.4 MMOL/L (ref 3.5–5.1)
PROT SERPL-MCNC: 6.3 G/DL (ref 6–8.4)
RBC # BLD AUTO: 3.34 M/UL (ref 4–5.4)
SODIUM SERPL-SCNC: 134 MMOL/L (ref 136–145)
WBC # BLD AUTO: 9.84 K/UL (ref 3.9–12.7)

## 2021-11-26 PROCEDURE — 85651 RBC SED RATE NONAUTOMATED: CPT | Mod: PO | Performed by: INTERNAL MEDICINE

## 2021-11-26 PROCEDURE — 86140 C-REACTIVE PROTEIN: CPT | Performed by: INTERNAL MEDICINE

## 2021-11-26 PROCEDURE — 85025 COMPLETE CBC W/AUTO DIFF WBC: CPT | Performed by: INTERNAL MEDICINE

## 2021-11-26 PROCEDURE — 36415 COLL VENOUS BLD VENIPUNCTURE: CPT | Mod: PO | Performed by: INTERNAL MEDICINE

## 2021-11-26 PROCEDURE — 80053 COMPREHEN METABOLIC PANEL: CPT | Performed by: INTERNAL MEDICINE

## 2021-12-06 ENCOUNTER — TELEPHONE (OUTPATIENT)
Dept: RHEUMATOLOGY | Facility: CLINIC | Age: 81
End: 2021-12-06
Payer: MEDICARE

## 2021-12-06 DIAGNOSIS — M31.31 WEGENER'S GRANULOMATOSIS WITH RENAL INVOLVEMENT: Primary | ICD-10-CM

## 2021-12-06 PROBLEM — F19.20 STEROID DEPENDENCE: Status: ACTIVE | Noted: 2021-12-06

## 2021-12-06 PROBLEM — N18.4 CHRONIC KIDNEY DISEASE (CKD), STAGE IV (SEVERE): Status: ACTIVE | Noted: 2021-12-06

## 2021-12-09 ENCOUNTER — OFFICE VISIT (OUTPATIENT)
Dept: RHEUMATOLOGY | Facility: CLINIC | Age: 81
End: 2021-12-09
Payer: MEDICARE

## 2021-12-09 VITALS
HEART RATE: 71 BPM | HEIGHT: 62 IN | BODY MASS INDEX: 27.83 KG/M2 | SYSTOLIC BLOOD PRESSURE: 156 MMHG | WEIGHT: 151.25 LBS | DIASTOLIC BLOOD PRESSURE: 71 MMHG

## 2021-12-09 DIAGNOSIS — M54.30 SCIATIC LEG PAIN: ICD-10-CM

## 2021-12-09 DIAGNOSIS — N18.4 CHRONIC KIDNEY DISEASE (CKD), STAGE IV (SEVERE): ICD-10-CM

## 2021-12-09 DIAGNOSIS — M31.31 WEGENER'S GRANULOMATOSIS WITH RENAL INVOLVEMENT: Primary | ICD-10-CM

## 2021-12-09 DIAGNOSIS — M41.9 SCOLIOSIS, UNSPECIFIED SCOLIOSIS TYPE, UNSPECIFIED SPINAL REGION: ICD-10-CM

## 2021-12-09 PROCEDURE — 99999 PR PBB SHADOW E&M-EST. PATIENT-LVL IV: ICD-10-PCS | Mod: PBBFAC,,, | Performed by: INTERNAL MEDICINE

## 2021-12-09 PROCEDURE — 99215 OFFICE O/P EST HI 40 MIN: CPT | Mod: S$GLB,,, | Performed by: INTERNAL MEDICINE

## 2021-12-09 PROCEDURE — 99999 PR PBB SHADOW E&M-EST. PATIENT-LVL IV: CPT | Mod: PBBFAC,,, | Performed by: INTERNAL MEDICINE

## 2021-12-09 PROCEDURE — 99215 PR OFFICE/OUTPT VISIT, EST, LEVL V, 40-54 MIN: ICD-10-PCS | Mod: S$GLB,,, | Performed by: INTERNAL MEDICINE

## 2021-12-09 ASSESSMENT — ROUTINE ASSESSMENT OF PATIENT INDEX DATA (RAPID3)
PAIN SCORE: 5.5
TOTAL RAPID3 SCORE: 5.05
FATIGUE SCORE: 1.1
MDHAQ FUNCTION SCORE: 0.8
PATIENT GLOBAL ASSESSMENT SCORE: 7
PSYCHOLOGICAL DISTRESS SCORE: 1.1

## 2021-12-10 ENCOUNTER — LAB VISIT (OUTPATIENT)
Dept: LAB | Facility: HOSPITAL | Age: 81
End: 2021-12-10
Attending: INTERNAL MEDICINE
Payer: MEDICARE

## 2021-12-10 DIAGNOSIS — N18.4 CHRONIC KIDNEY DISEASE (CKD), STAGE IV (SEVERE): ICD-10-CM

## 2021-12-10 DIAGNOSIS — M31.31 WEGENER'S GRANULOMATOSIS WITH RENAL INVOLVEMENT: ICD-10-CM

## 2021-12-10 LAB
ALBUMIN SERPL BCP-MCNC: 3.1 G/DL (ref 3.5–5.2)
ANION GAP SERPL CALC-SCNC: 11 MMOL/L (ref 8–16)
BUN SERPL-MCNC: 25 MG/DL (ref 8–23)
CALCIUM SERPL-MCNC: 9.5 MG/DL (ref 8.7–10.5)
CHLORIDE SERPL-SCNC: 98 MMOL/L (ref 95–110)
CO2 SERPL-SCNC: 22 MMOL/L (ref 23–29)
CREAT SERPL-MCNC: 1.2 MG/DL (ref 0.5–1.4)
EST. GFR  (AFRICAN AMERICAN): 49.7 ML/MIN/1.73 M^2
EST. GFR  (NON AFRICAN AMERICAN): 43.1 ML/MIN/1.73 M^2
GLUCOSE SERPL-MCNC: 82 MG/DL (ref 70–110)
PHOSPHATE SERPL-MCNC: 3 MG/DL (ref 2.7–4.5)
POTASSIUM SERPL-SCNC: 4.3 MMOL/L (ref 3.5–5.1)
SODIUM SERPL-SCNC: 131 MMOL/L (ref 136–145)

## 2021-12-10 PROCEDURE — 36415 COLL VENOUS BLD VENIPUNCTURE: CPT | Mod: PO | Performed by: INTERNAL MEDICINE

## 2021-12-10 PROCEDURE — 80069 RENAL FUNCTION PANEL: CPT | Performed by: INTERNAL MEDICINE

## 2021-12-14 ENCOUNTER — OFFICE VISIT (OUTPATIENT)
Dept: NEPHROLOGY | Facility: CLINIC | Age: 81
End: 2021-12-14
Payer: MEDICARE

## 2021-12-14 VITALS
HEIGHT: 62 IN | DIASTOLIC BLOOD PRESSURE: 56 MMHG | BODY MASS INDEX: 27.66 KG/M2 | SYSTOLIC BLOOD PRESSURE: 138 MMHG | HEART RATE: 66 BPM | OXYGEN SATURATION: 98 %

## 2021-12-14 DIAGNOSIS — H91.90 HEARING LOSS, UNSPECIFIED HEARING LOSS TYPE, UNSPECIFIED LATERALITY: ICD-10-CM

## 2021-12-14 DIAGNOSIS — E88.09 HYPOALBUMINEMIA: ICD-10-CM

## 2021-12-14 DIAGNOSIS — N28.9 RENAL INSUFFICIENCY: ICD-10-CM

## 2021-12-14 DIAGNOSIS — M31.30 GRANULOMATOSIS WITH POLYANGIITIS WITH PULMONARY INVOLVEMENT: ICD-10-CM

## 2021-12-14 DIAGNOSIS — N06.9 ISOLATED PROTEINURIA WITH MORPHOLOGIC LESION: ICD-10-CM

## 2021-12-14 DIAGNOSIS — N18.9 CHRONIC KIDNEY DISEASE, UNSPECIFIED: Primary | ICD-10-CM

## 2021-12-14 DIAGNOSIS — I77.6 VASCULITIS: ICD-10-CM

## 2021-12-14 DIAGNOSIS — R60.9 EDEMA, UNSPECIFIED TYPE: ICD-10-CM

## 2021-12-14 DIAGNOSIS — E87.1 HYPONATREMIA: ICD-10-CM

## 2021-12-14 PROBLEM — R80.0 ISOLATED PROTEINURIA: Status: ACTIVE | Noted: 2021-12-14

## 2021-12-14 PROCEDURE — 99204 PR OFFICE/OUTPT VISIT, NEW, LEVL IV, 45-59 MIN: ICD-10-PCS | Mod: S$GLB,,, | Performed by: INTERNAL MEDICINE

## 2021-12-14 PROCEDURE — 99999 PR PBB SHADOW E&M-EST. PATIENT-LVL IV: CPT | Mod: PBBFAC,,, | Performed by: INTERNAL MEDICINE

## 2021-12-14 PROCEDURE — 99204 OFFICE O/P NEW MOD 45 MIN: CPT | Mod: S$GLB,,, | Performed by: INTERNAL MEDICINE

## 2021-12-14 PROCEDURE — 99999 PR PBB SHADOW E&M-EST. PATIENT-LVL IV: ICD-10-PCS | Mod: PBBFAC,,, | Performed by: INTERNAL MEDICINE

## 2021-12-14 RX ORDER — FUROSEMIDE 20 MG/1
20 TABLET ORAL 2 TIMES DAILY
Qty: 30 TABLET | Refills: 2 | Status: SHIPPED | OUTPATIENT
Start: 2021-12-14 | End: 2022-01-06 | Stop reason: SDUPTHER

## 2021-12-17 PROCEDURE — G0180 PR HOME HEALTH MD CERTIFICATION: ICD-10-PCS | Mod: ,,, | Performed by: INTERNAL MEDICINE

## 2021-12-17 PROCEDURE — G0180 MD CERTIFICATION HHA PATIENT: HCPCS | Mod: ,,, | Performed by: INTERNAL MEDICINE

## 2021-12-27 ENCOUNTER — TELEPHONE (OUTPATIENT)
Dept: RHEUMATOLOGY | Facility: CLINIC | Age: 81
End: 2021-12-27
Payer: MEDICARE

## 2022-01-06 ENCOUNTER — OFFICE VISIT (OUTPATIENT)
Dept: RHEUMATOLOGY | Facility: CLINIC | Age: 82
End: 2022-01-06
Payer: MEDICARE

## 2022-01-06 ENCOUNTER — OFFICE VISIT (OUTPATIENT)
Dept: NEPHROLOGY | Facility: CLINIC | Age: 82
End: 2022-01-06
Payer: MEDICARE

## 2022-01-06 VITALS
BODY MASS INDEX: 26.68 KG/M2 | HEART RATE: 76 BPM | DIASTOLIC BLOOD PRESSURE: 52 MMHG | HEIGHT: 62 IN | SYSTOLIC BLOOD PRESSURE: 130 MMHG | WEIGHT: 145 LBS | OXYGEN SATURATION: 98 %

## 2022-01-06 VITALS
SYSTOLIC BLOOD PRESSURE: 133 MMHG | DIASTOLIC BLOOD PRESSURE: 64 MMHG | HEIGHT: 62 IN | HEART RATE: 76 BPM | BODY MASS INDEX: 26.72 KG/M2 | WEIGHT: 145.19 LBS

## 2022-01-06 DIAGNOSIS — N18.30 STAGE 3 CHRONIC KIDNEY DISEASE, UNSPECIFIED WHETHER STAGE 3A OR 3B CKD: ICD-10-CM

## 2022-01-06 DIAGNOSIS — M81.0 POSTMENOPAUSAL OSTEOPOROSIS: ICD-10-CM

## 2022-01-06 DIAGNOSIS — M31.31 WEGENER'S GRANULOMATOSIS WITH RENAL INVOLVEMENT: Primary | ICD-10-CM

## 2022-01-06 DIAGNOSIS — M80.08XA FRACTURE OF VERTEBRA DUE TO OSTEOPOROSIS, INITIAL ENCOUNTER: ICD-10-CM

## 2022-01-06 DIAGNOSIS — M31.30 GRANULOMATOSIS WITH POLYANGIITIS WITH PULMONARY INVOLVEMENT: ICD-10-CM

## 2022-01-06 DIAGNOSIS — G62.9 NEUROPATHY: ICD-10-CM

## 2022-01-06 DIAGNOSIS — I77.6 VASCULITIS: Primary | ICD-10-CM

## 2022-01-06 DIAGNOSIS — I77.82 ANCA-ASSOCIATED VASCULITIS: ICD-10-CM

## 2022-01-06 DIAGNOSIS — R60.9 EDEMA, UNSPECIFIED TYPE: ICD-10-CM

## 2022-01-06 DIAGNOSIS — M31.30 GRANULOMATOSIS WITH POLYANGIITIS WITH MULTISYSTEM INVOLVEMENT: ICD-10-CM

## 2022-01-06 DIAGNOSIS — N18.4 CHRONIC KIDNEY DISEASE (CKD), STAGE IV (SEVERE): ICD-10-CM

## 2022-01-06 PROCEDURE — 99999 PR PBB SHADOW E&M-EST. PATIENT-LVL IV: CPT | Mod: PBBFAC,,, | Performed by: INTERNAL MEDICINE

## 2022-01-06 PROCEDURE — 3078F DIAST BP <80 MM HG: CPT | Mod: CPTII,S$GLB,, | Performed by: INTERNAL MEDICINE

## 2022-01-06 PROCEDURE — 3288F PR FALLS RISK ASSESSMENT DOCUMENTED: ICD-10-PCS | Mod: CPTII,S$GLB,, | Performed by: INTERNAL MEDICINE

## 2022-01-06 PROCEDURE — 99214 OFFICE O/P EST MOD 30 MIN: CPT | Mod: S$GLB,,, | Performed by: INTERNAL MEDICINE

## 2022-01-06 PROCEDURE — 1160F PR REVIEW ALL MEDS BY PRESCRIBER/CLIN PHARMACIST DOCUMENTED: ICD-10-PCS | Mod: CPTII,S$GLB,, | Performed by: INTERNAL MEDICINE

## 2022-01-06 PROCEDURE — 1101F PT FALLS ASSESS-DOCD LE1/YR: CPT | Mod: CPTII,S$GLB,, | Performed by: INTERNAL MEDICINE

## 2022-01-06 PROCEDURE — 1125F PR PAIN SEVERITY QUANTIFIED, PAIN PRESENT: ICD-10-PCS | Mod: CPTII,S$GLB,, | Performed by: INTERNAL MEDICINE

## 2022-01-06 PROCEDURE — 1160F RVW MEDS BY RX/DR IN RCRD: CPT | Mod: CPTII,S$GLB,, | Performed by: INTERNAL MEDICINE

## 2022-01-06 PROCEDURE — 1101F PR PT FALLS ASSESS DOC 0-1 FALLS W/OUT INJ PAST YR: ICD-10-PCS | Mod: CPTII,S$GLB,, | Performed by: INTERNAL MEDICINE

## 2022-01-06 PROCEDURE — 3075F SYST BP GE 130 - 139MM HG: CPT | Mod: CPTII,S$GLB,, | Performed by: INTERNAL MEDICINE

## 2022-01-06 PROCEDURE — 1159F PR MEDICATION LIST DOCUMENTED IN MEDICAL RECORD: ICD-10-PCS | Mod: CPTII,S$GLB,, | Performed by: INTERNAL MEDICINE

## 2022-01-06 PROCEDURE — 99417 PR PROLONGED SVC, OUTPT, W/WO DIRECT PT CONTACT,  EA ADDTL 15 MIN: ICD-10-PCS | Mod: S$GLB,,, | Performed by: INTERNAL MEDICINE

## 2022-01-06 PROCEDURE — 1100F PTFALLS ASSESS-DOCD GE2>/YR: CPT | Mod: CPTII,S$GLB,, | Performed by: INTERNAL MEDICINE

## 2022-01-06 PROCEDURE — 1100F PR PT FALLS ASSESS DOC 2+ FALLS/FALL W/INJURY/YR: ICD-10-PCS | Mod: CPTII,S$GLB,, | Performed by: INTERNAL MEDICINE

## 2022-01-06 PROCEDURE — 3288F FALL RISK ASSESSMENT DOCD: CPT | Mod: CPTII,S$GLB,, | Performed by: INTERNAL MEDICINE

## 2022-01-06 PROCEDURE — 3075F PR MOST RECENT SYSTOLIC BLOOD PRESS GE 130-139MM HG: ICD-10-PCS | Mod: CPTII,S$GLB,, | Performed by: INTERNAL MEDICINE

## 2022-01-06 PROCEDURE — 99999 PR PBB SHADOW E&M-EST. PATIENT-LVL V: ICD-10-PCS | Mod: PBBFAC,,, | Performed by: INTERNAL MEDICINE

## 2022-01-06 PROCEDURE — 99214 PR OFFICE/OUTPT VISIT, EST, LEVL IV, 30-39 MIN: ICD-10-PCS | Mod: S$GLB,,, | Performed by: INTERNAL MEDICINE

## 2022-01-06 PROCEDURE — 1125F AMNT PAIN NOTED PAIN PRSNT: CPT | Mod: CPTII,S$GLB,, | Performed by: INTERNAL MEDICINE

## 2022-01-06 PROCEDURE — 1159F MED LIST DOCD IN RCRD: CPT | Mod: CPTII,S$GLB,, | Performed by: INTERNAL MEDICINE

## 2022-01-06 PROCEDURE — 99215 PR OFFICE/OUTPT VISIT, EST, LEVL V, 40-54 MIN: ICD-10-PCS | Mod: S$GLB,,, | Performed by: INTERNAL MEDICINE

## 2022-01-06 PROCEDURE — 99417 PROLNG OP E/M EACH 15 MIN: CPT | Mod: S$GLB,,, | Performed by: INTERNAL MEDICINE

## 2022-01-06 PROCEDURE — 99999 PR PBB SHADOW E&M-EST. PATIENT-LVL V: CPT | Mod: PBBFAC,,, | Performed by: INTERNAL MEDICINE

## 2022-01-06 PROCEDURE — 99999 PR PBB SHADOW E&M-EST. PATIENT-LVL IV: ICD-10-PCS | Mod: PBBFAC,,, | Performed by: INTERNAL MEDICINE

## 2022-01-06 PROCEDURE — 3078F PR MOST RECENT DIASTOLIC BLOOD PRESSURE < 80 MM HG: ICD-10-PCS | Mod: CPTII,S$GLB,, | Performed by: INTERNAL MEDICINE

## 2022-01-06 PROCEDURE — 99215 OFFICE O/P EST HI 40 MIN: CPT | Mod: S$GLB,,, | Performed by: INTERNAL MEDICINE

## 2022-01-06 RX ORDER — FUROSEMIDE 20 MG/1
20 TABLET ORAL DAILY PRN
Qty: 30 TABLET | Refills: 2 | Status: SHIPPED | OUTPATIENT
Start: 2022-01-06 | End: 2022-03-07

## 2022-01-06 RX ORDER — GABAPENTIN 400 MG/1
CAPSULE ORAL
Qty: 90 CAPSULE | Refills: 6 | Status: SHIPPED | OUTPATIENT
Start: 2022-01-06 | End: 2022-03-23

## 2022-01-06 RX ORDER — ACETAMINOPHEN 500 MG
5000 TABLET ORAL
COMMUNITY
End: 2022-03-07

## 2022-01-06 ASSESSMENT — ROUTINE ASSESSMENT OF PATIENT INDEX DATA (RAPID3)
MDHAQ FUNCTION SCORE: 1
PSYCHOLOGICAL DISTRESS SCORE: 2.2
PAIN SCORE: 5.5
TOTAL RAPID3 SCORE: 4.61
FATIGUE SCORE: 1.1
PATIENT GLOBAL ASSESSMENT SCORE: 5

## 2022-01-06 NOTE — PROGRESS NOTES
Subjective:          Chief Complaint: Aracelis Weber is a 81 y.o. female who had concerns including Disease Management.    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 Recent COVID exposure but negative.   Seen with Nephro.   Given her successful response with Ritux. Agree to continue maintanance with Ritux Q 4-6 months    Needs to get #3 COVID vaccine timing with RTX.   Discussed Prolia  Working on Evusheld for PreP with COVID>       12/9/2021  Patient's recent CXR 12/21/21 with marked improvement when compared to 10/21/21  Prednisone 10mg daily  S/p RTX x 4 infusions completion date. 11/5/21  Patient with rise in creatinine to 2.0 on 11/26 had been requiring lasix for marked edema since 11/1/21. Stopped 2 weeks ago. Edema is present but managed. Drinking about 40 oz water and 3 cups of coffee daily    Patient completed C PT felt she was doing well home exercises about 2 weeks ago with acute pain in lumbar spine after exercising. Imaging lumbar few days ago without evidence of a fracture. She point to pain in the lumbosacral and radiating to buttock region. Ok to rise from seated. Pain more on left low back and buttock. No numbness no tingling but pain in the hamstring region. Comfortable sitting but not lying down, but she never sleeps in bed. Long hx of rather impressive scoliosis.       11/11/2021:   Inflammatory markers are markedly improved.   Cough dry still at night. Patient noting she has lost sense of smell. Can still taste.     Patient recently hospitatlized for Bronch with negative cultures to date.  There was a concern for possible atypical infection she has had enlarging right upper lobe cavitary lesion measures still has a right middle lobe lesion with some cavitation now as well.  I have discussed this case extensively with both Dr. Schwartz as well as Dr. Joseph upon discharge we felt comfortable that this is likely Wegener's  granulomatosis causing cavitary lesions.  Supporting sinus biopsy does note granuloma.    Patient was started on Imuran in March of 2021 but with the growth of her lesion we had rule out for any possible infection given her status of immunosuppression.  She is here today not in her usual state she appears fatigued pale she is not complaining of shortness of breath but does have a cough dry persistent.  She is noticing increased pedal edema.         9/13/21: patient with 2 falls 8 days apart. Spoke w/ pulm shortly following initial fall and we were scheduled for bronch when she sustained another fall.   Scheduled now for 9/17/21 bronch. RUL lesion.   Remains off Imuran  Prednisone 15mg daily.   Sinus congestion. Still productive     She is noting some productive cough at night no hemoptysis.   Patient denies SOB  Having more HA. -frontal and sinus, she is noting some drainage in right ear some mucous/blood.   No edema.   She notes fatigue, no fevers, no night sweats. No weight loss.   Skin easily bruising.     No personal hx of any malignancy.       7/20/2021:    Spoke with Pulm plan for bronch is able working on possible bx site vs BAL.   Need to r/o infectious/neoplastic and confirm for ANCA (GPA vasculitis)   Inflammatory markers markedly down .8  Now 14.   H/H improve from 7.4 to 8.2  Platelets normalized.   Renal stable over time    Patient did attend wedding with approx 300 people unmasked last weekend. Reviewed by recs for COVID precautions given    Current regimen:   pred 15mg (Na stable)  Holding Imuran    Interval events: PET with hypermetabolic lesions:   a. RUL cavitary/cystic lesion 1.9cm x 1.3cm  b. RUL spiculated 1.2cm x 1.4cm  c. RML 1.7cm x 2.0 cm  All with differential: infectious, inflammatory, neoplastic, grannulomatous (a) with favor of inflammation given the rapid change    Fungal immunodiffusion neg  Quant gold and T spot: neg  H/H improving     Sinus congestion, pressure, headache, x 3  "weeks very productive with blowing nose, back on navage. :  started Cindamycin with DR Quinn x 10 days.   Patient denies any shortness breath.   Very fatigues.   No more fevers. Slight dry cough, no blood/ no mucous.   No SOB.       5/12/21 completed nasal irrigation with biopsy not definitive for GPA, but + inflammation and granulomas. Temporal artery bx negative. Patient with PCP same day as labs 6/2/21  dx with UTI on keflex.     Was seen apparently in ED in [place] for 104F she had altered mental status, dx with UTI, dehydration, treated with. Completed all 7 days of keflex and within 48 hours with another fever 104 F. She continues with congestion but no worse. Patient denies cough, hemoptysis, bloody nasal discharge. She has had no fever x 10 days. Checks twice daily.   while travelling told "congestion in chest on xray". Inflammatory markers very high again.   Patient with recent concern for wegeners needs CT chest. CXR with new airspace opacities right chest- need to r/o GGO vs infection. CT has been ordered pending scheduling.   Broad ABX coverage recommended for now will cc Dr. Natarajan.     I am limited here with her  severe edema from higher dose steroids. Max tolerated is 20mg daily pred  Started Imuran 5/5/2021. Very low dose 50mg BID (0.65mg/kg) tolerating VERY well.   H/H still low.         2/2021  Sinususitis, cx were negative.  Using nasal irrigation with mucoid disharge.   Patient continues with significant nasal discharge and blood with scabs. We discussed Wegeners but pathology sinus no vasculitis, granuloma noted.   No HA, no blurred vision. Recent sinus infection with HA for 10 days. cx +, but also repeat labs demonstarting +PR3 and +ANCA       Patient with c/o right > left 3,4 finger numbness that was intermittent until approximately 2 weeks about having near constant numbness in hands, pins and needles and pain.   Patient not noting much improvement with change in position.     Off MTX 6  tabs " weekly.   Doing well with Hydroxychloroquine.   Declined COVID vaccine    Gabapentin up to 300mg TID.   Now with Dr. Campbell doing PT for Plantar fasciitis. She is taking supplement and compound cream for joint and neuropathy. No response to tarsal tunnel injection per Dr. Campbell.   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 WBC ok.       11/2020  Patient with PMR   MTX at 4 tabs weekly new anemia. Thrombocytosis  Pred at 10mg   Complicated with peripheral edema rather severe , started MTX seeing trending CRP/ESR but persistent leg pain.   Seen with PCP for neuropathy s/sx not seeing much benefit with gabpentin  Seen with Cardiology- no concern for cardiac ECHO ok. dc'd lasix for hyponatremia.     8/2020  Pred 20mg with mild edema, pred 30 mg with severe edema.   Current Pred 15mg daily  Rising ESR again.   Lasix 20 mg daily with improved edema but having some pins and needles in feet/legs.   Having tight, burning stinging at the toes and feet. Heaviness. Prickly.   Noting sodium is falling, K 5.2-5.4 still using           7/2020:  Hands not painful, knees not painful. Shoulder/cervical and down the arms, markedly improved. She still has some pain him hips and groin with walking and some weakness to the legs with edema. Some pain with fixing own hair.   She takes in AM regarding hips ache, some shoulder/arms, and again at night because hips/legs.   The clue is that the LDN new dose she had some ASE, previously tolerated 3mg daily fine.     Historical review of present Illness.   Patient with a recent chronic sinusitis that ultimately warranted a septoplasty, endoscopic sinus surgery and turbinate reduction 5/2020   Four weeks postop t increasingly worse she was noting pain in her throat and ears lasting several hours complaint of pdizziness she had symptoms of ear pain and decreased hearing in the left ear.  She  underwent a debridement at this visit she continue with compound nasal irrigations.  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    Patient did have repeat procedure on with biopsies taken 07/03/2020 showing right maxillary sinus chronically inflamed fibrotic polypoid portions benign nasal mucosa left maxillary sinus similar polypoid fragments ulcerated markedly inflamed respiratory mucosa focal foreign body giant cell response noted suggested that this might have been from her Gelfoam packing as a possible cause of this reaction    I have spoken with Dr. Quinn prior to patient's visit today and was a concern that she could have triggered some inflammatory response with the Gelfoam packing    Patient states she no longer has a headache is not noticing much ear drainage continues with profound loss of hearing on the left and rather significant on the right both sensorineural and some conductive changes.    Restarted on prednisone as of 7/20/20  10 mg patient has not noticed any change with her hearing in this time she did notice slight improvement with her joint aches and pains but is still relying on hydrocodone for the bulk of this.    She denies a persistent headache she does have some tenderness about the preauricular region particularly on the right more than the left.  She has denied jaw claudication changes in her voice or any amaurosis fugax.  She has no pre-existing history of  joint aches and pains hip or otherwise.  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  She had a workup that involved a negative rheumatoid factor, age appropriate sedimentation rate,C reactive protein JANET was positive 1:160 in speckled pattern        Previous: Hx:   She is having pain in her hand with stiffness and right 2nd PIP joint now unable to flex. 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   Hx of bilateral TKA 3 and 5 years ago and those are doing well.   She notes intermittent edema. Some hammer toe deformity bilaterally making shoes problematic but not painful otherwise.   Long hx of GERD-severe.   Cannot tolerate NSAIDs at all w/o gastritis.   Can use Hydrocodone PRN   Added Naltrexone at 3mg and doing very well  Lost 30# with WW.   Patient denies weight loss, rashes, dry eye, dry mouth, nasal or palatal ulcerations,  lymphadenopathy, Raynaud's, hx of DVT/miscarriages, psoriasis or family hx of psoriasis, rashes, serositis, anemia or other constitutional symptoms.  Asking about naltrexone  Component      Latest Ref Rng & Units 8/8/2020 7/29/2020 7/20/2020 7/13/2020   Sodium      136 - 145 mmol/L  126 (L) 128 (L)    Potassium      3.5 - 5.1 mmol/L  5.4 (H) 5.2 (H)    Chloride      95 - 110 mmol/L  92 (L) 92 (L)    CO2      23 - 29 mmol/L  25 29    Glucose      70 - 110 mg/dL  109 109    BUN, Bld      8 - 23 mg/dL  13 11    Creatinine      0.5 - 1.4 mg/dL  0.8 0.8    Calcium      8.7 - 10.5 mg/dL  9.5 9.7    Anion Gap      8 - 16 mmol/L  9 7 (L)    eGFR if African American      >60 mL/min/1.73 m:2  >60.0 >60.0    eGFR if non African American      >60 mL/min/1.73 m:2  >60.0 >60.0    Anti Sm Antibody      0.00 - 0.99 Ratio    0.04   Anti-Sm Interpretation      Negative    Negative   Anti Sm/RNP Antibody      0.00 - 0.99 Ratio    0.09   Anti-Sm/RNP Interpretation      Negative    Negative   JANET Screen      None Detected        Rheumatoid Factor      0.0 - 15.0 IU/mL       Sed Rate      0 - 36 mm/Hr 57 (H)  54 (H) 75 (H)   CRP      0.0 - 8.2 mg/L 75.4 (H) 56.1 (H) 57.5 (H) 99.4 (H)   Anti-Histone Antibody      0.0 - 0.9 Units    0.4   ds DNA Ab      Negative 1:10    Negative 1:10     Component      Latest Ref Rng & Units 6/25/2020 11/10/2018   Sodium      136 - 145 mmol/L     Potassium      3.5 - 5.1 mmol/L     Chloride      95 - 110 mmol/L     CO2      23 - 29 mmol/L     Glucose      70 - 110 mg/dL     BUN, Bld      8 - 23 mg/dL     Creatinine      0.5 - 1.4 mg/dL     Calcium      8.7 - 10.5 mg/dL     Anion Gap      8 - 16 mmol/L     eGFR if African American      >60 mL/min/1.73 m:2     eGFR if non African American      >60 mL/min/1.73 m:2     Anti Sm Antibody      0.00 - 0.99 Ratio     Anti-Sm Interpretation      Negative     Anti Sm/RNP Antibody      0.00 - 0.99 Ratio     Anti-Sm/RNP Interpretation      Negative     JANET Screen      None Detected Detected (A) Detected (A)   Rheumatoid Factor      0.0 - 15.0 IU/mL 11.6 <8.6   Sed Rate      0 - 36 mm/Hr     CRP      0.0 - 8.2 mg/L     Anti-Histone Antibody      0.0 - 0.9 Units     ds DNA Ab      Negative 1:10         REVIEW OF SYSTEMS:    Review of Systems   Constitutional: Negative for fever, malaise/fatigue and weight loss.   HENT: Negative for sore throat.    Eyes: Negative for double vision, photophobia and redness.   Respiratory: Negative for cough, shortness of breath and wheezing.    Cardiovascular: Negative for chest pain, palpitations and orthopnea.   Gastrointestinal: Negative for abdominal pain, constipation and diarrhea.   Genitourinary: Negative for dysuria, hematuria and urgency.   Musculoskeletal: Positive for joint pain. Negative for back pain and myalgias.   Skin: Negative for rash.   Neurological: Negative for dizziness, tingling, focal weakness and headaches.   Endo/Heme/Allergies: Does not bruise/bleed easily.   Psychiatric/Behavioral: Negative for depression,  "hallucinations and suicidal ideas.               Objective:            Past Medical History:   Diagnosis Date    Anesthesia     'Mother stop breathing after anesthesia"    Chronic sinusitis     Depression     GERD (gastroesophageal reflux disease)     PVC (premature ventricular contraction)      Family History   Problem Relation Age of Onset    Heart disease Mother     Arthritis Mother     Cancer Sister     Arthritis Sister     Diabetes Sister     Hypertension Sister      Social History     Tobacco Use    Smoking status: Former Smoker     Start date: 2/3/1955     Quit date: 1/3/1960     Years since quittin.0    Smokeless tobacco: Never Used   Substance Use Topics    Alcohol use: Never     Alcohol/week: 0.0 standard drinks    Drug use: Never         Current Outpatient Medications on File Prior to Visit   Medication Sig Dispense Refill    acetaminophen (TYLENOL) 500 MG tablet Take 500 mg by mouth every 6 (six) hours as needed for Pain.      B2-B6 phos-levomef simran-mecobal (EB-N3 DR) 1.3-70-6-4 mg CpDR Take 1 capsule by mouth once daily.      cetirizine (ZYRTEC) 10 MG tablet Take 10 mg by mouth once daily.      diclofenac sodium (VOLTAREN) 1 % Gel Apply 2 g topically 3 (three) times daily. 100 g 6    docusate sodium (COLACE) 100 MG capsule Take 300 mg by mouth once daily.       furosemide (LASIX) 20 MG tablet Take 1 tablet (20 mg total) by mouth 2 (two) times a day. One po every other day for edema 30 tablet 2    gabapentin (NEURONTIN) 400 MG capsule TAKE 1 CAPSULE(400 MG) BY MOUTH THREE TIMES DAILY (Patient taking differently: Take 400 mg by mouth 3 (three) times daily.) 90 capsule 6    HYDROcodone-acetaminophen (NORCO) 7.5-325 mg per tablet Take 1 tablet by mouth every 12 (twelve) hours as needed for Pain. 60 tablet 0    metoprolol succinate (TOPROL-XL) 50 MG 24 hr tablet Take 1 tablet (50 mg total) by mouth every evening. 30 tablet 11    omeprazole (PRILOSEC) 40 MG capsule TAKE 1 " CAPSULE(40 MG) BY MOUTH EVERY DAY 90 capsule 3    predniSONE (DELTASONE) 5 MG tablet Patient to take three tablets a day to equal 15 mg. (Patient taking differently: Take 10 mg by mouth once daily.) 90 tablet 6    vit C/E/Zn/coppr/lutein/zeaxan (PRESERVISION AREDS-2 ORAL) Take 1 capsule by mouth 2 (two) times a day.       [DISCONTINUED] naltrexone capsule Take 3 mg by mouth once daily.       No current facility-administered medications on file prior to visit.       Vitals:    01/06/22 1144   BP: 133/64   Pulse: 76       Physical Exam:    Physical Exam   Constitutional: She is oriented to person, place, and time. She appears well-developed and well-nourished.   HENT:   Head: Normocephalic and atraumatic.   Mouth/Throat: Oropharynx is clear and moist.   Eyes: Pupils are equal, round, and reactive to light. EOM are normal.   Neck: Normal range of motion.   Cardiovascular: Normal rate, regular rhythm and normal heart sounds.   Pulmonary/Chest: Effort normal and breath sounds normal.   Musculoskeletal:        Right shoulder: She exhibits normal range of motion, no tenderness and no swelling.        Left shoulder: She exhibits normal range of motion, no tenderness and no swelling.        Right elbow: She exhibits normal range of motion and no swelling. No tenderness found.        Left elbow: She exhibits normal range of motion and no swelling. No tenderness found.        Right wrist: She exhibits normal range of motion, no tenderness and no swelling.        Left wrist: She exhibits normal range of motion, no tenderness and no swelling.        Right knee: She exhibits normal range of motion and no swelling. No tenderness found.        Left knee: She exhibits normal range of motion and no swelling. No tenderness found.        Right hand: She exhibits decreased range of motion and tenderness. She exhibits no swelling.        Left hand: She exhibits decreased range of motion and tenderness. She exhibits no swelling.         Right foot: There is normal range of motion, no tenderness and no swelling.        Left foot: There is normal range of motion, no tenderness and no swelling.   Bony hypertrophy at the PIP and DIP joints. +squaring at the CMC joint. No active synovitis on 28 joint exam.    Neurological: She is alert and oriented to person, place, and time.   Skin: Skin is warm and dry.   Psychiatric: She has a normal mood and affect. Her behavior is normal.     Component      Latest Ref Rng & Units 2021          11:03 AM 11:03 AM   Respiratory Culture        No growth   Gram Stain (Respiratory)       No organisms seen No WBC's   AFB Culture & Smear           AFB CULTURE STAIN           GENET Prep           Fungus (Mycology) Culture           Aerobic Bacterial Culture             Component      Latest Ref Rng & Units 2021          11:03 AM   Respiratory Culture       No Staph aureus, MRSA or Pseudomonas isolated.   Gram Stain (Respiratory)       No organisms seen   AFB Culture & Smear          AFB CULTURE STAIN          GENET Prep          Fungus (Mycology) Culture          Aerobic Bacterial Culture            Component      Latest Ref Rng & Units 2021          11:03 AM 11:03 AM   Respiratory Culture       Normal respiratory nimesh    Gram Stain (Respiratory)       Moderate WBC's    AFB Culture & Smear        Culture in progress   AFB CULTURE STAIN        No acid fast bacilli seen.   KOH Prep        No yeast or fungal elements seen   Fungus (Mycology) Culture        Culture in progress   Aerobic Bacterial Culture                 Narrative  Performed by: [facility]  Patient - ONIEL ROSARIO                   - 1940 Sex- F   Med Rec # - [id number]                        Bimal Mckeon M.D.   The following is an electronic copy of report # [id number] from:   [facility]   [address] [place] [address]                         Phone [phone]    DIAGNOSIS:   09/22/2021  /sdc     1, 2.  RIGHT UPPER LOBE MASS BRUSHING AND FINE NEEDLE ASPIRATE BIOPSIES:   - NEGATIVE FOR MALIGNANT CELLS.     - PURULENT EXUDATE.     - A FEW FRAGMENTS OF BRONCHIAL MUCOSA WITH FLORID CHRONIC BRONCHITIS    AND REACTIVE SQUAMOUS    ATYPICAL METAPLASIA.       Comment:   Special stains (AFB and GMS) are negative for organisms. While no    granulomas are seen here, the inflammatory reaction appears to be    similar to that seen in the sinus material over the past few years    (Delta [id number], [id number], [id number]). This suggests a    continuation of an underlying systemic disease with the clinical    working diagnosis of Wegener's Granulomatosis currently being under    consideration. Bronchoscopic material, particularly of the lower    respiratory tract, is of low yield for a definitive diagnosis of that    entity. The previous sinus material will be reviewed with    consideration for that diagnosis.   _______________________________________________________________________   SPECIMEN AND SOURCE:   1. RUL mass brushing   2. RUL mass needle     CLINICAL INFORMATION:   Clinical Information:-gt Right Upper Lobe Mass   Specific Site:-gt RUL mass brushing T Brush; RUL Mass; RUL mass-    microbiology; RUL BAL; RUL washing;   Other Requests:-gt N/A     GROSS DESCRIPTION:   1. Received 40 mL of fluid in formalin, 1 dry slides and 1 slides in    95% alcohol. Prepared one cell block.     2. Received 40 mL of fluid in formalin, 1 dry slides and 1 slides in    95% alcohol. Prepared one cell block.     CODE: 0     Cytotechnologist: ABDIFATAH Marvin (ASCP)   Pathologist: Scott Johnson MD (Electronic Signature) 09/22/2021 2:34 PM     [facility], [facility] * [address] [place] [address] * [place]    [id number]   Technical services performed at: [facility], [facility] * [address]    [facility] * [place] [address]   Screening Site: [facility], [facility] * [address] [place] [address] *     [place] [id number]                         Post Rituxan image                                     Pre Rituxan image  Assessment:       Encounter Diagnoses   Name Primary?   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     Fracture of vertebra due to osteoporosis, initial encounter     Postmenopausal osteoporosis     Neuropathy           Plan:        Wegener's granulomatosis with renal involvement  -     ANTI-NEUTROPHILIC CYTOPLASMIC ANTIBODY; Future; Expected date: 01/06/2022  -     Proteinase 3 Autoantibodies; Future; Expected date: 01/06/2022  -     Sedimentation rate; Future; Expected date: 01/06/2022  -     C-Reactive Protein; Standing; Expected date: 01/06/2022  -     CBC Auto Differential; Standing; Expected date: 01/06/2022  -     X-Ray Chest PA And Lateral; Future; Expected date: 01/06/2022    Chronic kidney disease (CKD), stage IV (severe)    ANCA-associated vasculitis  -     ANTI-NEUTROPHILIC CYTOPLASMIC ANTIBODY; Future; Expected date: 01/06/2022  -     Proteinase 3 Autoantibodies; Future; Expected date: 01/06/2022  -     Sedimentation rate; Future; Expected date: 01/06/2022  -     C-Reactive Protein; Standing; Expected date: 01/06/2022  -     CBC Auto Differential; Standing; Expected date: 01/06/2022    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  -     ANTI-NEUTROPHILIC CYTOPLASMIC ANTIBODY; Future; Expected date: 01/06/2022  -     Proteinase 3 Autoantibodies; Future; Expected date: 01/06/2022  -     Sedimentation rate; Future; Expected date: 01/06/2022  -     C-Reactive Protein; Standing; Expected date: 01/06/2022  -     CBC Auto Differential; Standing; Expected date: 01/06/2022  -     X-Ray Chest PA And Lateral; Future; Expected date: 01/06/2022    Fracture of vertebra due to osteoporosis, initial encounter  -     X-Ray  Thoracic Spine AP Lateral; Future; Expected date: 01/06/2022    Postmenopausal osteoporosis    Neuropathy  -     gabapentin (NEURONTIN) 400 MG capsule; TAKE 1 CAPSULE(400 MG) BY MOUTH THREE TIMES DAILY  Dispense: 90 capsule; Refill: 6       Patient is a 81-year-old female she has had a recent robust inflammatory reaction within the sinus cavity as well as hearing deficit following a sinus surgery.  She has been repeated cultured postoperatively completed antibiotics and irrigations negative for any fungal infections or bacterial infections.   Patient was doing well on MTX with regard to ESR and CRP but hair loss.     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 Patient has tolerated RTX       t spot 5/2021 negative.    Hep neg.    covid completed #2        CKD advanced rather quickly following lasix 20mg every other day.    My rec is to recheck now that she has been off lasix x2 weeks   Will add UA, UPC as well but thus far did not have clear evidence of renal vasculitis. Patient did have casts with hematuria 10/12/21 prior to start of Rituxan   Spoke with Neph[facility] and they will kindly try to see her as soon as they can.     Continue omeprazole (prilosec)  40mg by mouth daily.          COVID vaccine #3 shot for immunosuppressed to be done March.       Patient hx of Osteopenia only, renal function not stable enough for oral bisphos. Recent t1-T12 intense pain: getting BMD. Likely we should just order Prolia.     she is having some spinous process tenderness today but pain not classic of vertebral fracture will check imaging  anyway. She had rib fractures last year as well.    Orders in for Prolia.         Decrease prednisone to 7.5mg (1.5 tablets) daily.     [place] go get COVID booster now   [place] we will get your #3 shot in March (best chance for response)     You will not need any order from the providers regarding vaccination you can call  [phone] for [facility] or use [facility]    For [facility] to schedule vaccine call [phone] and select option 1 or use Epic MyChart jared.    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 This will be at 6,12,18 months for now.    Labs in the next 2-3 weeks : CBC, sed, Crp, ANCA for Dr. Snyder       Check chest xray late February.        Will check  Labs for Rituxan response (CD19) and ANCA as we get closer (about March)   No follow-ups on file.      f/u 3 months  Thank you for allowing me to participate in the care of this very pleasant patient.       92 min consultation with greater than 50% spent in counseling, chart review and coordination of care. All questions answered.

## 2022-01-06 NOTE — PATIENT INSTRUCTIONS
Decrease prednisone to 7.5mg (1.5 tablets) daily.     [place] go get COVID booster now   [place] we will get your #3 shot in March (best chance for response)     You will not need any order from the providers regarding vaccination you can call  [phone] for [facility] or use [facility]    For [facility] to schedule vaccine call [phone] and select option 1 or use Epic MyChart jared.    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 This will be at 6,12,18 months for now.    Labs in the next 2-3 weeks : CBC, sed, Crp, ANCA for Dr. Snyder       Check chest xray late February.        Will check  Labs for Rituxan response (CD19) and ANCA as we get closer (about March)

## 2022-01-06 NOTE — PROGRESS NOTES
"Subjective:       Patient ID: Aracelis Weber is a 81 y.o. White female who presents for new patient evaluation for chronic renal failure.    Aracelis Weber is referred by Ryan Reddy MD to be evaluated for chronic renal failure.  She has been treated with rituxan and steroids for Wegener's.  She had KAREN during her hospitalization in November.  She reports she was taking bactrim but is no longer off of it.        Review of Systems   Constitutional: Negative for appetite change, chills and fever.   HENT: Positive for congestion.    Eyes: Negative for visual disturbance.   Respiratory: Positive for cough (dry cough). Negative for shortness of breath.    Cardiovascular: Positive for leg swelling. Negative for chest pain.   Gastrointestinal: Negative for abdominal pain, diarrhea, nausea and vomiting.   Genitourinary: Negative for difficulty urinating, dysuria and hematuria.   Musculoskeletal: Positive for arthralgias and back pain. Negative for myalgias.   Skin: Negative for rash.   Neurological: Negative for headaches.   Psychiatric/Behavioral: Negative for sleep disturbance.       The past medical, family and social histories were reviewed for this encounter.     Past Medical History:   Diagnosis Date    Anesthesia     'Mother stop breathing after anesthesia"    Chronic sinusitis     Depression     GERD (gastroesophageal reflux disease)     PVC (premature ventricular contraction)      Past Surgical History:   Procedure Laterality Date    ADENOIDECTOMY      APPENDECTOMY      BREAST BIOPSY      HYSTERECTOMY      JOINT REPLACEMENT Bilateral     KNEE    NAVIGATIONAL BRONCHOSCOPY Right 9/17/2021    Procedure: BRONCHOSCOPY, NAVIGATIONAL WITH BODY VISION;  Surgeon: Bimal Schwartz MD;  Location: [facility];  Service: Pulmonary;  Laterality: Right;    THUMB SURGERY      TONSILLECTOMY       Social History     Socioeconomic History    Marital status:    Tobacco Use    Smoking status: Former " "Smoker     Start date: 2/3/1955     Quit date: 1/3/1960     Years since quittin.0    Smokeless tobacco: Never Used   Substance and Sexual Activity    Alcohol use: Never     Alcohol/week: 0.0 standard drinks    Drug use: Never    Sexual activity: Never     Current Outpatient Medications   Medication Sig    acetaminophen (TYLENOL) 500 MG tablet Take 500 mg by mouth every 6 (six) hours as needed for Pain.    B2-B6 phos-levomef simran-mecobal (EB-N3 DR) 1.3-70-6-4 mg CpDR Take 1 capsule by mouth once daily.    cetirizine (ZYRTEC) 10 MG tablet Take 10 mg by mouth once daily.    cholecalciferol, vitamin D3, 125 mcg (5,000 unit) Tab 5,000 Units.    diclofenac sodium (VOLTAREN) 1 % Gel Apply 2 g topically 3 (three) times daily.    docusate sodium (COLACE) 100 MG capsule Take 300 mg by mouth once daily.     furosemide (LASIX) 20 MG tablet Take 1 tablet (20 mg total) by mouth 2 (two) times a day. One po every other day for edema    gabapentin (NEURONTIN) 400 MG capsule TAKE 1 CAPSULE(400 MG) BY MOUTH THREE TIMES DAILY    HYDROcodone-acetaminophen (NORCO) 7.5-325 mg per tablet Take 1 tablet by mouth every 12 (twelve) hours as needed for Pain.    metoprolol succinate (TOPROL-XL) 50 MG 24 hr tablet Take 1 tablet (50 mg total) by mouth every evening.    omeprazole (PRILOSEC) 40 MG capsule TAKE 1 CAPSULE(40 MG) BY MOUTH EVERY DAY    predniSONE (DELTASONE) 5 MG tablet Patient to take three tablets a day to equal 15 mg. (Patient taking differently: Take 10 mg by mouth once daily.)    vit C/E/Zn/coppr/lutein/zeaxan (PRESERVISION AREDS-2 ORAL) Take 1 capsule by mouth 2 (two) times a day.      No current facility-administered medications for this visit.       BP (!) 130/52 (BP Location: Left arm, Patient Position: Sitting)   Pulse 76   Ht 5' 2" (1.575 m)   Wt 65.8 kg (145 lb)   SpO2 98%   BMI 26.52 kg/m²     Objective:      Physical Exam  Vitals reviewed.   Constitutional:       General: She is not in acute " distress.     Appearance: She is well-developed.   HENT:      Head: Normocephalic and atraumatic.   Eyes:      General: No scleral icterus.     Conjunctiva/sclera: Conjunctivae normal.   Neck:      Vascular: No JVD.   Cardiovascular:      Rate and Rhythm: Normal rate and regular rhythm.      Heart sounds: Normal heart sounds. No murmur heard.  No friction rub. No gallop.    Pulmonary:      Effort: Pulmonary effort is normal. No respiratory distress.      Breath sounds: Normal breath sounds. No wheezing or rales.   Abdominal:      General: Bowel sounds are normal. There is no distension.      Palpations: Abdomen is soft.      Tenderness: There is no abdominal tenderness.   Musculoskeletal:      Cervical back: Normal range of motion.      Right lower leg: No edema.      Left lower leg: No edema.   Skin:     General: Skin is warm and dry.      Findings: No rash.   Neurological:      Mental Status: She is alert and oriented to person, place, and time.   Psychiatric:         Mood and Affect: Mood normal.         Behavior: Behavior normal.         Assessment:       1. Vasculitis    2. Stage 3 chronic kidney disease, unspecified whether stage 3a or 3b CKD    3. Edema, unspecified type    4. Wegener's disease, pulmonary        Plan:   Return to clinic in 6 weeks.  Labs for next visit include rp upc ua with micro.  Baseline creatinine is 0.8-1.1 since 2016.  UPC is 1.47 grams.  Renal US shows R 9.6 cm L 12.4 cm.  She is being treated for her disease and does seem to have responded.

## 2022-01-07 PROBLEM — D84.9 IMMUNOCOMPROMISED: Status: ACTIVE | Noted: 2022-01-07

## 2022-01-12 ENCOUNTER — DOCUMENT SCAN (OUTPATIENT)
Dept: HOME HEALTH SERVICES | Facility: HOSPITAL | Age: 82
End: 2022-01-12
Payer: MEDICARE

## 2022-01-13 ENCOUNTER — EXTERNAL HOME HEALTH (OUTPATIENT)
Dept: HOME HEALTH SERVICES | Facility: HOSPITAL | Age: 82
End: 2022-01-13
Payer: MEDICARE

## 2022-01-26 DIAGNOSIS — D84.9 IMMUNOSUPPRESSED STATUS: ICD-10-CM

## 2022-01-27 ENCOUNTER — PATIENT MESSAGE (OUTPATIENT)
Dept: RHEUMATOLOGY | Facility: CLINIC | Age: 82
End: 2022-01-27
Payer: MEDICARE

## 2022-02-03 DIAGNOSIS — D84.9 IMMUNOSUPPRESSED STATUS: ICD-10-CM

## 2022-02-05 DIAGNOSIS — D84.9 IMMUNOSUPPRESSED STATUS: ICD-10-CM

## 2022-02-07 ENCOUNTER — HOSPITAL ENCOUNTER (OUTPATIENT)
Dept: RADIOLOGY | Facility: HOSPITAL | Age: 82
Discharge: HOME | End: 2022-02-07
Attending: INTERNAL MEDICINE
Payer: MEDICARE

## 2022-02-07 DIAGNOSIS — M31.30 GRANULOMATOSIS WITH POLYANGIITIS WITH MULTISYSTEM INVOLVEMENT: ICD-10-CM

## 2022-02-07 DIAGNOSIS — M31.31 WEGENER'S GRANULOMATOSIS WITH RENAL INVOLVEMENT: ICD-10-CM

## 2022-02-07 DIAGNOSIS — D84.9 IMMUNOSUPPRESSED STATUS: ICD-10-CM

## 2022-02-07 PROCEDURE — 71046 X-RAY EXAM CHEST 2 VIEWS: CPT | Mod: 26,,, | Performed by: RADIOLOGY

## 2022-02-07 PROCEDURE — 71046 X-RAY EXAM CHEST 2 VIEWS: CPT | Mod: TC,FY,PO

## 2022-02-07 PROCEDURE — 71046 XR CHEST PA AND LATERAL: ICD-10-PCS | Mod: 26,,, | Performed by: RADIOLOGY

## 2022-02-08 DIAGNOSIS — D84.9 IMMUNOSUPPRESSED STATUS: ICD-10-CM

## 2022-02-09 ENCOUNTER — OFFICE VISIT (OUTPATIENT)
Dept: RHEUMATOLOGY | Facility: CLINIC | Age: 82
End: 2022-02-09
Payer: MEDICARE

## 2022-02-09 VITALS
BODY MASS INDEX: 27.33 KG/M2 | DIASTOLIC BLOOD PRESSURE: 61 MMHG | HEIGHT: 62 IN | HEART RATE: 73 BPM | SYSTOLIC BLOOD PRESSURE: 154 MMHG | WEIGHT: 148.5 LBS

## 2022-02-09 DIAGNOSIS — M31.30 GRANULOMATOSIS WITH POLYANGIITIS WITH PULMONARY INVOLVEMENT: Primary | ICD-10-CM

## 2022-02-09 DIAGNOSIS — F19.20 STEROID DEPENDENCE: ICD-10-CM

## 2022-02-09 DIAGNOSIS — D84.9 IMMUNOCOMPROMISED: ICD-10-CM

## 2022-02-09 DIAGNOSIS — R91.8 MASS OF UPPER LOBE OF RIGHT LUNG: ICD-10-CM

## 2022-02-09 PROCEDURE — 3288F FALL RISK ASSESSMENT DOCD: CPT | Mod: CPTII,S$GLB,, | Performed by: INTERNAL MEDICINE

## 2022-02-09 PROCEDURE — 99999 PR PBB SHADOW E&M-EST. PATIENT-LVL III: ICD-10-PCS | Mod: PBBFAC,,, | Performed by: INTERNAL MEDICINE

## 2022-02-09 PROCEDURE — 1101F PT FALLS ASSESS-DOCD LE1/YR: CPT | Mod: CPTII,S$GLB,, | Performed by: INTERNAL MEDICINE

## 2022-02-09 PROCEDURE — 3078F DIAST BP <80 MM HG: CPT | Mod: CPTII,S$GLB,, | Performed by: INTERNAL MEDICINE

## 2022-02-09 PROCEDURE — 99215 PR OFFICE/OUTPT VISIT, EST, LEVL V, 40-54 MIN: ICD-10-PCS | Mod: S$GLB,,, | Performed by: INTERNAL MEDICINE

## 2022-02-09 PROCEDURE — 1159F MED LIST DOCD IN RCRD: CPT | Mod: CPTII,S$GLB,, | Performed by: INTERNAL MEDICINE

## 2022-02-09 PROCEDURE — 3288F PR FALLS RISK ASSESSMENT DOCUMENTED: ICD-10-PCS | Mod: CPTII,S$GLB,, | Performed by: INTERNAL MEDICINE

## 2022-02-09 PROCEDURE — 99999 PR PBB SHADOW E&M-EST. PATIENT-LVL III: CPT | Mod: PBBFAC,,, | Performed by: INTERNAL MEDICINE

## 2022-02-09 PROCEDURE — 1159F PR MEDICATION LIST DOCUMENTED IN MEDICAL RECORD: ICD-10-PCS | Mod: CPTII,S$GLB,, | Performed by: INTERNAL MEDICINE

## 2022-02-09 PROCEDURE — 3077F PR MOST RECENT SYSTOLIC BLOOD PRESSURE >= 140 MM HG: ICD-10-PCS | Mod: CPTII,S$GLB,, | Performed by: INTERNAL MEDICINE

## 2022-02-09 PROCEDURE — 1125F PR PAIN SEVERITY QUANTIFIED, PAIN PRESENT: ICD-10-PCS | Mod: CPTII,S$GLB,, | Performed by: INTERNAL MEDICINE

## 2022-02-09 PROCEDURE — 1125F AMNT PAIN NOTED PAIN PRSNT: CPT | Mod: CPTII,S$GLB,, | Performed by: INTERNAL MEDICINE

## 2022-02-09 PROCEDURE — 3077F SYST BP >= 140 MM HG: CPT | Mod: CPTII,S$GLB,, | Performed by: INTERNAL MEDICINE

## 2022-02-09 PROCEDURE — 1101F PR PT FALLS ASSESS DOC 0-1 FALLS W/OUT INJ PAST YR: ICD-10-PCS | Mod: CPTII,S$GLB,, | Performed by: INTERNAL MEDICINE

## 2022-02-09 PROCEDURE — 99215 OFFICE O/P EST HI 40 MIN: CPT | Mod: S$GLB,,, | Performed by: INTERNAL MEDICINE

## 2022-02-09 PROCEDURE — 3078F PR MOST RECENT DIASTOLIC BLOOD PRESSURE < 80 MM HG: ICD-10-PCS | Mod: CPTII,S$GLB,, | Performed by: INTERNAL MEDICINE

## 2022-02-09 RX ORDER — PREDNISONE 2.5 MG/1
2.5 TABLET ORAL DAILY
Qty: 30 TABLET | Refills: 3 | Status: SHIPPED | OUTPATIENT
Start: 2022-02-09 | End: 2022-05-05 | Stop reason: SDUPTHER

## 2022-02-09 ASSESSMENT — ROUTINE ASSESSMENT OF PATIENT INDEX DATA (RAPID3)
PAIN SCORE: 3.5
PATIENT GLOBAL ASSESSMENT SCORE: 3.5
TOTAL RAPID3 SCORE: 3.33
FATIGUE SCORE: 2.2
PSYCHOLOGICAL DISTRESS SCORE: 1.1
MDHAQ FUNCTION SCORE: 0.9

## 2022-02-09 NOTE — PROGRESS NOTES
Subjective:          Chief Complaint: Aracelis Weber is a 81 y.o. female who had concerns including Disease Management.    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 Recent COVID exposure but negative.   Seen with Nephro.   Given her successful response with Ritux. Agree to continue maintanance with Ritux Q 4-6 months    Needs to get #3 COVID vaccine timing with RTX. - will time this with me 2 weeks prior to   Discussed Prolia  Working on [employer] for PreP with COVID> she is scheduled 2/10/22      12/9/2021  Patient's recent CXR 12/21/21 with marked improvement when compared to 10/21/21  Prednisone 10mg daily  S/p RTX x 4 infusions completion date. 11/5/21  Patient with rise in creatinine to 2.0 on 11/26 had been requiring lasix for marked edema since 11/1/21. Stopped 2 weeks ago. Edema is present but managed. Drinking about 40 oz water and 3 cups of coffee daily    Patient completed [facility] PT felt she was doing well home exercises about 2 weeks ago with acute pain in lumbar spine after exercising. Imaging lumbar few days ago without evidence of a fracture. She point to pain in the lumbosacral and radiating to buttock region. Ok to rise from seated. Pain more on left low back and buttock. No numbness no tingling but pain in the hamstring region. Comfortable sitting but not lying down, but she never sleeps in bed. Long hx of rather impressive scoliosis.       11/11/2021:   Inflammatory markers are markedly improved.   Cough dry still at night. Patient noting she has lost sense of smell. Can still taste.     Patient recently hospitatlized for Bronch with negative cultures to date.  There was a concern for possible atypical infection she has had enlarging right upper lobe cavitary lesion measures still has a right middle lobe lesion with some cavitation now as well.  I have discussed this case extensively with both Dr. Schwartz as well as Dr. Joseph upon  discharge we felt comfortable that this is likely Wegener's granulomatosis causing cavitary lesions.  Supporting sinus biopsy does note granuloma.    Patient was started on Imuran in March of 2021 but with the growth of her lesion we had rule out for any possible infection given her status of immunosuppression.  She is here today not in her usual state she appears fatigued pale she is not complaining of shortness of breath but does have a cough dry persistent.  She is noticing increased pedal edema.         9/13/21: patient with 2 falls 8 days apart. Spoke w/ pulm shortly following initial fall and we were scheduled for bronch when she sustained another fall.   Scheduled now for 9/17/21 bronch. RUL lesion.   Remains off Imuran  Prednisone 15mg daily.   Sinus congestion. Still productive     She is noting some productive cough at night no hemoptysis.   Patient denies SOB  Having more HA. -frontal and sinus, she is noting some drainage in right ear some mucous/blood.   No edema.   She notes fatigue, no fevers, no night sweats. No weight loss.   Skin easily bruising.     No personal hx of any malignancy.       7/20/2021:    Spoke with Pulm plan for bronch is able working on possible bx site vs BAL.   Need to r/o infectious/neoplastic and confirm for ANCA (GPA vasculitis)   Inflammatory markers markedly down .8  Now 14.   H/H improve from 7.4 to 8.2  Platelets normalized.   Renal stable over time    Patient did attend wedding with approx 300 people unmasked last weekend. Reviewed by recs for COVID precautions given    Current regimen:   pred 15mg (Na stable)  Holding Imuran    Interval events: PET with hypermetabolic lesions:   a. RUL cavitary/cystic lesion 1.9cm x 1.3cm  b. RUL spiculated 1.2cm x 1.4cm  c. RML 1.7cm x 2.0 cm  All with differential: infectious, inflammatory, neoplastic, grannulomatous (a) with favor of inflammation given the rapid change    Fungal immunodiffusion neg  Quant gold and T spot:  "neg  H/H improving     Sinus congestion, pressure, headache, x 3 weeks very productive with blowing nose, back on navage. :  started Cindamycin with DR Quinn x 10 days.   Patient denies any shortness breath.   Very fatigues.   No more fevers. Slight dry cough, no blood/ no mucous.   No SOB.       5/12/21 completed nasal irrigation with biopsy not definitive for GPA, but + inflammation and granulomas. Temporal artery bx negative. Patient with PCP same day as labs 6/2/21  dx with UTI on keflex.     Was seen apparently in ED in [place] for 104F she had altered mental status, dx with UTI, dehydration, treated with. Completed all 7 days of keflex and within 48 hours with another fever 104 F. She continues with congestion but no worse. Patient denies cough, hemoptysis, bloody nasal discharge. She has had no fever x 10 days. Checks twice daily.   while travelling told "congestion in chest on xray". Inflammatory markers very high again.   Patient with recent concern for wegeners needs CT chest. CXR with new airspace opacities right chest- need to r/o GGO vs infection. CT has been ordered pending scheduling.   Broad ABX coverage recommended for now will cc Dr. Natraajan.     I am limited here with her  severe edema from higher dose steroids. Max tolerated is 20mg daily pred  Started Imuran 5/5/2021. Very low dose 50mg BID (0.65mg/kg) tolerating VERY well.   H/H still low.         2/2021  Sinususitis, cx were negative.  Using nasal irrigation with mucoid disharge.   Patient continues with significant nasal discharge and blood with scabs. We discussed Wegeners but pathology sinus no vasculitis, granuloma noted.   No HA, no blurred vision. Recent sinus infection with HA for 10 days. cx +, but also repeat labs demonstarting +PR3 and +ANCA       Patient with c/o right > left 3,4 finger numbness that was intermittent until approximately 2 weeks about having near constant numbness in hands, pins and needles and pain.   Patient not noting " much improvement with change in position.     Off MTX 6  tabs weekly.   Doing well with Hydroxychloroquine.   Declined COVID vaccine    Gabapentin up to 300mg TID.   Now with Dr. Campbell doing PT for Plantar fasciitis. She is taking supplement and compound cream for joint and neuropathy. No response to tarsal tunnel injection per Dr. Campbell.   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 WBC ok.       11/2020  Patient with PMR   MTX at 4 tabs weekly new anemia. Thrombocytosis  Pred at 10mg   Complicated with peripheral edema rather severe , started MTX seeing trending CRP/ESR but persistent leg pain.   Seen with PCP for neuropathy s/sx not seeing much benefit with gabpentin  Seen with Cardiology- no concern for cardiac ECHO ok. dc'd lasix for hyponatremia.     8/2020  Pred 20mg with mild edema, pred 30 mg with severe edema.   Current Pred 15mg daily  Rising ESR again.   Lasix 20 mg daily with improved edema but having some pins and needles in feet/legs.   Having tight, burning stinging at the toes and feet. Heaviness. Prickly.   Noting sodium is falling, K 5.2-5.4 still using           7/2020:  Hands not painful, knees not painful. Shoulder/cervical and down the arms, markedly improved. She still has some pain him hips and groin with walking and some weakness to the legs with edema. Some pain with fixing own hair.   She takes in AM regarding hips ache, some shoulder/arms, and again at night because hips/legs.   The clue is that the LDN new dose she had some ASE, previously tolerated 3mg daily fine.     Historical review of present Illness.   Patient with a recent chronic sinusitis that ultimately warranted a septoplasty, endoscopic sinus surgery and turbinate reduction 5/2020   Four weeks postop t increasingly worse she was noting pain in her throat and ears lasting several hours complaint of pdizziness she had  symptoms of ear pain and decreased hearing in the left ear.  She underwent a debridement at this visit she continue with compound nasal irrigations.  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    Patient did have repeat procedure on with biopsies taken 07/03/2020 showing right maxillary sinus chronically inflamed fibrotic polypoid portions benign nasal mucosa left maxillary sinus similar polypoid fragments ulcerated markedly inflamed respiratory mucosa focal foreign body giant cell response noted suggested that this might have been from her Gelfoam packing as a possible cause of this reaction    I have spoken with Dr. Quinn prior to patient's visit today and was a concern that she could have triggered some inflammatory response with the Gelfoam packing    Patient states she no longer has a headache is not noticing much ear drainage continues with profound loss of hearing on the left and rather significant on the right both sensorineural and some conductive changes.    Restarted on prednisone as of 7/20/20  10 mg patient has not noticed any change with her hearing in this time she did notice slight improvement with her joint aches and pains but is still relying on hydrocodone for the bulk of this.    She denies a persistent headache she does have some tenderness about the preauricular region particularly on the right more than the left.  She has denied jaw claudication changes in her  voice or any amaurosis fugax.  She has no pre-existing history of joint aches and pains hip or otherwise.  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  She had a workup that involved a negative rheumatoid factor, age appropriate sedimentation rate,C reactive protein JANET was positive 1:160 in speckled pattern        Previous: Hx:   She is having pain in her hand with stiffness and right 2nd PIP joint now unable to flex. 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   Hx of bilateral TKA 3 and 5 years ago and those are doing well.   She notes intermittent edema. Some hammer toe deformity bilaterally making shoes problematic but not painful otherwise.   Long hx of GERD-severe.   Cannot tolerate NSAIDs at all w/o gastritis.   Can use Hydrocodone PRN   Added Naltrexone at 3mg and doing very well  Lost 30# with WW.   Patient denies weight loss, rashes, dry eye, dry mouth, nasal or palatal ulcerations,  lymphadenopathy, Raynaud's, hx of DVT/miscarriages, psoriasis or family hx of psoriasis, rashes, serositis, anemia or other constitutional symptoms.  Asking about naltrexone  Component      Latest Ref Rng & Units 8/8/2020 7/29/2020 7/20/2020 7/13/2020   Sodium      136 - 145 mmol/L  126 (L) 128 (L)    Potassium      3.5 - 5.1 mmol/L  5.4 (H) 5.2 (H)    Chloride      95 - 110 mmol/L  92 (L) 92 (L)    CO2      23 - 29 mmol/L  25 29    Glucose      70 - 110 mg/dL  109 109    BUN, Bld      8 - 23 mg/dL  13 11    Creatinine      0.5 - 1.4 mg/dL  0.8 0.8    Calcium      8.7 - 10.5 mg/dL  9.5 9.7    Anion Gap      8 - 16 mmol/L  9 7 (L)    eGFR if African American      >60 mL/min/1.73 m:2  >60.0 >60.0    eGFR if non African American      >60 mL/min/1.73 m:2  >60.0 >60.0    Anti Sm Antibody      0.00 - 0.99 Ratio    0.04   Anti-Sm Interpretation      Negative    Negative   Anti Sm/RNP Antibody      0.00 - 0.99 Ratio    0.09   Anti-Sm/RNP  Interpretation      Negative    Negative   JANET Screen      None Detected       Rheumatoid Factor      0.0 - 15.0 IU/mL       Sed Rate      0 - 36 mm/Hr 57 (H)  54 (H) 75 (H)   CRP      0.0 - 8.2 mg/L 75.4 (H) 56.1 (H) 57.5 (H) 99.4 (H)   Anti-Histone Antibody      0.0 - 0.9 Units    0.4   ds DNA Ab      Negative 1:10    Negative 1:10     Component      Latest Ref Rng & Units 6/25/2020 11/10/2018   Sodium      136 - 145 mmol/L     Potassium      3.5 - 5.1 mmol/L     Chloride      95 - 110 mmol/L     CO2      23 - 29 mmol/L     Glucose      70 - 110 mg/dL     BUN, Bld      8 - 23 mg/dL     Creatinine      0.5 - 1.4 mg/dL     Calcium      8.7 - 10.5 mg/dL     Anion Gap      8 - 16 mmol/L     eGFR if African American      >60 mL/min/1.73 m:2     eGFR if non African American      >60 mL/min/1.73 m:2     Anti Sm Antibody      0.00 - 0.99 Ratio     Anti-Sm Interpretation      Negative     Anti Sm/RNP Antibody      0.00 - 0.99 Ratio     Anti-Sm/RNP Interpretation      Negative     JANET Screen      None Detected Detected (A) Detected (A)   Rheumatoid Factor      0.0 - 15.0 IU/mL 11.6 <8.6   Sed Rate      0 - 36 mm/Hr     CRP      0.0 - 8.2 mg/L     Anti-Histone Antibody      0.0 - 0.9 Units     ds DNA Ab      Negative 1:10         REVIEW OF SYSTEMS:    Review of Systems   Constitutional: Negative for fever, malaise/fatigue and weight loss.   HENT: Negative for sore throat.    Eyes: Negative for double vision, photophobia and redness.   Respiratory: Negative for cough, shortness of breath and wheezing.    Cardiovascular: Negative for chest pain, palpitations and orthopnea.   Gastrointestinal: Negative for abdominal pain, constipation and diarrhea.   Genitourinary: Negative for dysuria, hematuria and urgency.   Musculoskeletal: Positive for joint pain. Negative for back pain and myalgias.   Skin: Negative for rash.   Neurological: Negative for dizziness, tingling, focal weakness and headaches.   Endo/Heme/Allergies: Does not  "bruise/bleed easily.   Psychiatric/Behavioral: Negative for depression, hallucinations and suicidal ideas.               Objective:            Past Medical History:   Diagnosis Date    Anesthesia     'Mother stop breathing after anesthesia"    Chronic sinusitis     Depression     GERD (gastroesophageal reflux disease)     PVC (premature ventricular contraction)      Family History   Problem Relation Age of Onset    Heart disease Mother     Arthritis Mother     Cancer Sister     Arthritis Sister     Diabetes Sister     Hypertension Sister      Social History     Tobacco Use    Smoking status: Former Smoker     Start date: 2/3/1955     Quit date: 1/3/1960     Years since quittin.1    Smokeless tobacco: Never Used   Substance Use Topics    Alcohol use: Never     Alcohol/week: 0.0 standard drinks    Drug use: Never         Current Outpatient Medications on File Prior to Visit   Medication Sig Dispense Refill    acetaminophen (TYLENOL) 500 MG tablet Take 500 mg by mouth every 6 (six) hours as needed for Pain.      B2-B6 phos-levomef simran-mecobal (EB-N3 DR) 1.3-70-6-4 mg CpDR Take 1 capsule by mouth once daily.      cetirizine (ZYRTEC) 10 MG tablet Take 10 mg by mouth once daily.      cholecalciferol, vitamin D3, 125 mcg (5,000 unit) Tab 5,000 Units.      diclofenac sodium (VOLTAREN) 1 % Gel Apply 2 g topically 3 (three) times daily. 100 g 6    docusate sodium (COLACE) 100 MG capsule Take 300 mg by mouth once daily.       furosemide (LASIX) 20 MG tablet Take 1 tablet (20 mg total) by mouth daily as needed (edema). 30 tablet 2    gabapentin (NEURONTIN) 400 MG capsule TAKE 1 CAPSULE(400 MG) BY MOUTH THREE TIMES DAILY 90 capsule 6    HYDROcodone-acetaminophen (NORCO) 7.5-325 mg per tablet Take 1 tablet by mouth every 12 (twelve) hours as needed for Pain. 60 tablet 0    metoprolol succinate (TOPROL-XL) 50 MG 24 hr tablet Take 1 tablet (50 mg total) by mouth every evening. 30 tablet 11    " omeprazole (PRILOSEC) 40 MG capsule TAKE 1 CAPSULE(40 MG) BY MOUTH EVERY DAY 90 capsule 3    predniSONE (DELTASONE) 5 MG tablet Take 2 tablets (10 mg total) by mouth once daily. 60 tablet 11    vit C/E/Zn/coppr/lutein/zeaxan (PRESERVISION AREDS-2 ORAL) Take 1 capsule by mouth 2 (two) times a day.       [DISCONTINUED] naltrexone capsule Take 3 mg by mouth once daily.       No current facility-administered medications on file prior to visit.       Vitals:    02/09/22 1128   BP: (!) 154/61   Pulse: 73       Physical Exam:    Physical Exam   Constitutional: She is oriented to person, place, and time. She appears well-developed and well-nourished.   HENT:   Head: Normocephalic and atraumatic.   Mouth/Throat: Oropharynx is clear and moist.   Eyes: Pupils are equal, round, and reactive to light. EOM are normal.   Neck: Normal range of motion.   Cardiovascular: Normal rate, regular rhythm and normal heart sounds.   Pulmonary/Chest: Effort normal and breath sounds normal.   Musculoskeletal:        Right shoulder: She exhibits normal range of motion, no tenderness and no swelling.        Left shoulder: She exhibits normal range of motion, no tenderness and no swelling.        Right elbow: She exhibits normal range of motion and no swelling. No tenderness found.        Left elbow: She exhibits normal range of motion and no swelling. No tenderness found.        Right wrist: She exhibits normal range of motion, no tenderness and no swelling.        Left wrist: She exhibits normal range of motion, no tenderness and no swelling.        Right knee: She exhibits normal range of motion and no swelling. No tenderness found.        Left knee: She exhibits normal range of motion and no swelling. No tenderness found.        Right hand: She exhibits decreased range of motion and tenderness. She exhibits no swelling.        Left hand: She exhibits decreased range of motion and tenderness. She exhibits no swelling.        Right foot:  There is normal range of motion, no tenderness and no swelling.        Left foot: There is normal range of motion, no tenderness and no swelling.   Bony hypertrophy at the PIP and DIP joints. +squaring at the CMC joint. No active synovitis on 28 joint exam.    Neurological: She is alert and oriented to person, place, and time.   Skin: Skin is warm and dry.   Psychiatric: She has a normal mood and affect. Her behavior is normal.     Component      Latest Ref Rng & Units 2021          11:03 AM 11:03 AM   Respiratory Culture        No growth   Gram Stain (Respiratory)       No organisms seen No WBC's   AFB Culture & Smear           AFB CULTURE STAIN           GENET Prep           Fungus (Mycology) Culture           Aerobic Bacterial Culture             Component      Latest Ref Rng & Units 2021          11:03 AM   Respiratory Culture       No Staph aureus, MRSA or Pseudomonas isolated.   Gram Stain (Respiratory)       No organisms seen   AFB Culture & Smear          AFB CULTURE STAIN          GENET Prep          Fungus (Mycology) Culture          Aerobic Bacterial Culture            Component      Latest Ref Rng & Units 2021          11:03 AM 11:03 AM   Respiratory Culture       Normal respiratory nimesh    Gram Stain (Respiratory)       Moderate WBC's    AFB Culture & Smear        Culture in progress   AFB CULTURE STAIN        No acid fast bacilli seen.   KOH Prep        No yeast or fungal elements seen   Fungus (Mycology) Culture        Culture in progress   Aerobic Bacterial Culture                 Narrative  [facility]  Patient - ONIEL ROSARIO                   - 1940 Sex- F   Med Rec # - [id number]                        Bimal Mckeon M.D.   The following is an electronic copy of report # [id number] from:   [facility]   [address] [place] [address]                         Phone [phone]   DIAGNOSIS:    [place]/2021  [place] [place]     1, 2.  RIGHT UPPER LOBE MASS BRUSHING AND FINE NEEDLE ASPIRATE BIOPSIES:   - NEGATIVE FOR MALIGNANT CELLS.     - PURULENT EXUDATE.     - A FEW FRAGMENTS OF BRONCHIAL MUCOSA WITH FLORID CHRONIC BRONCHITIS    AND REACTIVE SQUAMOUS    ATYPICAL METAPLASIA.       Comment:   Special stains (AFB and GMS) are negative for organisms. While no    granulomas are seen here, the inflammatory reaction appears to be    similar to that seen in the sinus material over the past few years    (Delta [id number], [id number], [id number]). This suggests a    continuation of an underlying systemic disease with the clinical    working diagnosis of Wegener's Granulomatosis currently being under    consideration. Bronchoscopic material, particularly of the lower    respiratory tract, is of low yield for a definitive diagnosis of that    entity. The previous sinus material will be reviewed with    consideration for that diagnosis.   _______________________________________________________________________   SPECIMEN AND SOURCE:   1. RUL mass brushing   2. RUL mass needle     CLINICAL INFORMATION:   Clinical Information:-gt Right Upper Lobe Mass   Specific Site:-gt RUL mass brushing T Brush; RUL Mass; RUL mass-    microbiology; RUL BAL; RUL washing;   Other Requests:-gt N/A     GROSS DESCRIPTION:   1. Received 40 mL of fluid in formalin, 1 dry slides and 1 slides in    95% alcohol. Prepared one cell block.     2. Received 40 mL of fluid in formalin, 1 dry slides and 1 slides in    95% alcohol. Prepared one cell block.     CODE: 0     Cytotechnologist: ABDIFATAH Marvin (ASCP)   Pathologist: Scott Johnson MD (Electronic Signature) 09/22/2021 2:34 PM     [facility], [facility] * [address] [place] [address] * [place]    [id number]   Technical services performed at: [facility], [employer] * [id number]    [facility] * [place] [address]   Screening Site: [facility], [facility] * [address] [place] [address] *    [facility]  [place] [address]                         Post Rituxan image                                     Pre Rituxan image  Assessment:       No diagnosis found.       Plan:        There are no diagnoses linked to this encounter.   Patient is a 81-year-old female she has had a recent robust inflammatory reaction within the sinus cavity as well as hearing deficit following a sinus surgery.  She has been repeated cultured postoperatively completed antibiotics and irrigations negative for any fungal infections or bacterial infections.   Patient was doing well on MTX with regard to ESR and CRP but hair loss.     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 Patient has tolerated RTX       t spot 5/2021 negative.    Hep neg.    covid completed #2        CKD advanced rather quickly following lasix 20mg every other day.    My rec is to recheck now that she has been off lasix x2 weeks   Will add UA, UPC as well but thus far did not have clear evidence of renal vasculitis. Patient did have casts with hematuria 10/12/21 prior to start of Rituxan   Spoke with Nephro and they will kindly try to see her as soon as they can.     Continue omeprazole (prilosec)  40mg by mouth daily.          COVID vaccine #3 shot for immunosuppressed to be done March.       Patient hx of Osteopenia only, renal function not stable enough for oral bisphos. Recent t1-T12 intense pain: getting BMD. Likely we should just order Prolia.     she is having some spinous process tenderness today but pain not classic of vertebral fracture will check imaging anyway. She had rib fractures  last year as well.    Orders in for Prolia. Pending new DXA from OB/GYN         Continue prednisone to 7.5mg (1.5 tablets) daily.       [place] we will get your #3 shot in March (best chance for response) only need 2 weeks prior to next infusion .   Scheduled for Sandi 2/10/22 agree she needs this done.     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 This will be at 6,12,18 months- was my initial plan, but recent rise in her ESR, persistent sinus infections and drainage I am concerned she needs to be dosed now at the 4 month nidhi. Will discuss with Dr. Allen as well.     Labs reviewed today noted CRP continues to be adequate, and improved while the ESR rising. Chest xray continues to show improvement.        Follow up in about 3 months (around 5/9/2022).      f/u 3 months  Thank you for allowing me to participate in the care of this very pleasant patient.       56 min consultation with greater than 50% spent in counseling, chart review and coordination of care. All questions answered.

## 2022-02-09 NOTE — PATIENT INSTRUCTIONS
1. Go get Evusheld infusion 2/10/22    2. I will call with plans for Rituxan sometime in March most likely we will start again.     3. I will call for your COVID vaccine #3 once I have Rituxan dates.

## 2022-02-10 ENCOUNTER — INFUSION (OUTPATIENT)
Dept: INFUSION THERAPY | Facility: HOSPITAL | Age: 82
End: 2022-02-10
Attending: INTERNAL MEDICINE
Payer: MEDICARE

## 2022-02-10 ENCOUNTER — OFFICE VISIT (OUTPATIENT)
Dept: NEPHROLOGY | Facility: CLINIC | Age: 82
End: 2022-02-10
Payer: MEDICARE

## 2022-02-10 VITALS
DIASTOLIC BLOOD PRESSURE: 64 MMHG | HEIGHT: 62 IN | OXYGEN SATURATION: 98 % | SYSTOLIC BLOOD PRESSURE: 142 MMHG | BODY MASS INDEX: 27.23 KG/M2 | HEART RATE: 63 BPM | WEIGHT: 148 LBS

## 2022-02-10 VITALS
HEART RATE: 64 BPM | SYSTOLIC BLOOD PRESSURE: 156 MMHG | TEMPERATURE: 98 F | DIASTOLIC BLOOD PRESSURE: 73 MMHG | RESPIRATION RATE: 18 BRPM

## 2022-02-10 DIAGNOSIS — N18.30 STAGE 3 CHRONIC KIDNEY DISEASE, UNSPECIFIED WHETHER STAGE 3A OR 3B CKD: Primary | ICD-10-CM

## 2022-02-10 DIAGNOSIS — E87.1 HYPONATREMIA: ICD-10-CM

## 2022-02-10 DIAGNOSIS — N06.9 ISOLATED PROTEINURIA WITH MORPHOLOGIC LESION: ICD-10-CM

## 2022-02-10 DIAGNOSIS — D84.9 IMMUNOCOMPROMISED: ICD-10-CM

## 2022-02-10 DIAGNOSIS — D84.9 IMMUNOSUPPRESSED STATUS: Primary | ICD-10-CM

## 2022-02-10 DIAGNOSIS — R60.9 EDEMA, UNSPECIFIED TYPE: ICD-10-CM

## 2022-02-10 DIAGNOSIS — M31.30 GRANULOMATOSIS WITH POLYANGIITIS WITH PULMONARY INVOLVEMENT: ICD-10-CM

## 2022-02-10 PROCEDURE — 3077F PR MOST RECENT SYSTOLIC BLOOD PRESSURE >= 140 MM HG: ICD-10-PCS | Mod: CPTII,S$GLB,, | Performed by: INTERNAL MEDICINE

## 2022-02-10 PROCEDURE — 3078F DIAST BP <80 MM HG: CPT | Mod: CPTII,S$GLB,, | Performed by: INTERNAL MEDICINE

## 2022-02-10 PROCEDURE — 1101F PT FALLS ASSESS-DOCD LE1/YR: CPT | Mod: CPTII,S$GLB,, | Performed by: INTERNAL MEDICINE

## 2022-02-10 PROCEDURE — 1101F PR PT FALLS ASSESS DOC 0-1 FALLS W/OUT INJ PAST YR: ICD-10-PCS | Mod: CPTII,S$GLB,, | Performed by: INTERNAL MEDICINE

## 2022-02-10 PROCEDURE — 1159F MED LIST DOCD IN RCRD: CPT | Mod: CPTII,S$GLB,, | Performed by: INTERNAL MEDICINE

## 2022-02-10 PROCEDURE — 3288F FALL RISK ASSESSMENT DOCD: CPT | Mod: CPTII,S$GLB,, | Performed by: INTERNAL MEDICINE

## 2022-02-10 PROCEDURE — M0220 HC INJECTION ADMIN, TIXAGEVIMAB-CILGAVIMAB, INCL POST ADMIN MONIT: HCPCS | Performed by: INTERNAL MEDICINE

## 2022-02-10 PROCEDURE — 63600175 PHARM REV CODE 636 W HCPCS: Mod: PN | Performed by: INTERNAL MEDICINE

## 2022-02-10 PROCEDURE — 3288F PR FALLS RISK ASSESSMENT DOCUMENTED: ICD-10-PCS | Mod: CPTII,S$GLB,, | Performed by: INTERNAL MEDICINE

## 2022-02-10 PROCEDURE — 3078F PR MOST RECENT DIASTOLIC BLOOD PRESSURE < 80 MM HG: ICD-10-PCS | Mod: CPTII,S$GLB,, | Performed by: INTERNAL MEDICINE

## 2022-02-10 PROCEDURE — 99214 PR OFFICE/OUTPT VISIT, EST, LEVL IV, 30-39 MIN: ICD-10-PCS | Mod: S$GLB,,, | Performed by: INTERNAL MEDICINE

## 2022-02-10 PROCEDURE — 99999 PR PBB SHADOW E&M-EST. PATIENT-LVL IV: ICD-10-PCS | Mod: PBBFAC,,, | Performed by: INTERNAL MEDICINE

## 2022-02-10 PROCEDURE — 99214 OFFICE O/P EST MOD 30 MIN: CPT | Mod: S$GLB,,, | Performed by: INTERNAL MEDICINE

## 2022-02-10 PROCEDURE — 1160F PR REVIEW ALL MEDS BY PRESCRIBER/CLIN PHARMACIST DOCUMENTED: ICD-10-PCS | Mod: CPTII,S$GLB,, | Performed by: INTERNAL MEDICINE

## 2022-02-10 PROCEDURE — 1160F RVW MEDS BY RX/DR IN RCRD: CPT | Mod: CPTII,S$GLB,, | Performed by: INTERNAL MEDICINE

## 2022-02-10 PROCEDURE — 99999 PR PBB SHADOW E&M-EST. PATIENT-LVL IV: CPT | Mod: PBBFAC,,, | Performed by: INTERNAL MEDICINE

## 2022-02-10 PROCEDURE — 1159F PR MEDICATION LIST DOCUMENTED IN MEDICAL RECORD: ICD-10-PCS | Mod: CPTII,S$GLB,, | Performed by: INTERNAL MEDICINE

## 2022-02-10 PROCEDURE — 3077F SYST BP >= 140 MM HG: CPT | Mod: CPTII,S$GLB,, | Performed by: INTERNAL MEDICINE

## 2022-02-10 RX ORDER — EPINEPHRINE 0.3 MG/.3ML
0.3 INJECTION SUBCUTANEOUS
Status: CANCELLED | OUTPATIENT
Start: 2022-02-10

## 2022-02-10 RX ORDER — DIPHENHYDRAMINE HCL 25 MG
25 CAPSULE ORAL ONCE
Status: CANCELLED | OUTPATIENT
Start: 2022-02-10 | End: 2022-02-10

## 2022-02-10 RX ORDER — ALBUTEROL SULFATE 90 UG/1
2 AEROSOL, METERED RESPIRATORY (INHALATION)
Status: CANCELLED | OUTPATIENT
Start: 2022-02-10

## 2022-02-10 RX ORDER — PREDNISONE 20 MG/1
40 TABLET ORAL ONCE
Status: CANCELLED | OUTPATIENT
Start: 2022-02-10 | End: 2022-02-10

## 2022-02-10 RX ORDER — ONDANSETRON 4 MG/1
4 TABLET, ORALLY DISINTEGRATING ORAL ONCE
Status: CANCELLED | OUTPATIENT
Start: 2022-02-10 | End: 2022-02-10

## 2022-02-10 RX ORDER — ACETAMINOPHEN 325 MG/1
650 TABLET ORAL ONCE
Status: CANCELLED | OUTPATIENT
Start: 2022-02-10 | End: 2022-02-10

## 2022-02-10 RX ADMIN — Medication 150 MG: at 11:02

## 2022-02-10 NOTE — PLAN OF CARE
Problem: Adult Inpatient Plan of Care  Goal: Plan of Care Review  Outcome: Ongoing, Progressing  Goal: Patient-Specific Goal (Individualized)  Outcome: Ongoing, Progressing     Patient received two gluteal intramuscular injections (left and right) of tixagevimab/cilgavimab (EVUSHELD) with no difficulties tolerating the medication. Monitored 1 hour post injection with no side effects. Left clinic with  no signs of distress.

## 2022-02-10 NOTE — PLAN OF CARE
Problem: Adult Inpatient Plan of Care  Goal: Plan of Care Review  2/10/2022 1235 by Radha Perry, RN  Outcome: Ongoing, Progressing    Goal: Patient-Specific Goal (Individualized)  2/10/2022 1235 by Radha Perry, RN  Outcome: Ongoing, Progressing     Patient received two gluteal intramuscular injections (left and right) of tixagevimab/cilgavimab (EVUSHELD) with no difficulties tolerating the medication. Monitored 1hr post injection with no issues. Left clinic with  in no distress.

## 2022-02-10 NOTE — PROGRESS NOTES
"Subjective:       Patient ID: Aracelis Weber is a 81 y.o. White female who presents for return patient evaluation for chronic renal failure.      She has been treated with rituxan and steroids for Wegener's.  She had KAREN during her hospitalization in November.  She has chronic neuropathy in her feet and has been having more trouble with urge incontinence.  Her edema has also been greater lately but she has not been taking her lasix since it makes her bladder symptoms worse.      Review of Systems   Constitutional: Negative for appetite change, chills and fever.   HENT: Positive for congestion and hearing loss.    Eyes: Negative for visual disturbance.   Respiratory: Positive for cough (dry cough). Negative for shortness of breath.    Cardiovascular: Positive for leg swelling. Negative for chest pain.   Gastrointestinal: Negative for abdominal pain, diarrhea, nausea and vomiting.   Genitourinary: Positive for difficulty urinating (urge incontinence). Negative for dysuria and hematuria.   Musculoskeletal: Negative for arthralgias, back pain and myalgias.   Skin: Negative for rash.   Neurological: Positive for numbness (feet B). Negative for headaches.   Psychiatric/Behavioral: Negative for sleep disturbance.       The past medical, family and social histories were reviewed for this encounter.     BP (!) 142/64 (BP Location: Right arm, Patient Position: Sitting)   Pulse 63   Ht 5' 2" (1.575 m)   Wt 67.1 kg (148 lb)   SpO2 98%   BMI 27.07 kg/m²     Objective:      Physical Exam  Vitals reviewed.   Constitutional:       General: She is not in acute distress.     Appearance: She is well-developed.   HENT:      Head: Normocephalic and atraumatic.   Eyes:      General: No scleral icterus.     Conjunctiva/sclera: Conjunctivae normal.   Neck:      Vascular: No JVD.   Cardiovascular:      Rate and Rhythm: Normal rate and regular rhythm.      Heart sounds: Normal heart sounds. No murmur heard.  No friction rub. No " gallop.    Pulmonary:      Effort: Pulmonary effort is normal. No respiratory distress.      Breath sounds: Normal breath sounds. No wheezing or rales.   Abdominal:      General: Bowel sounds are normal. There is no distension.      Palpations: Abdomen is soft.      Tenderness: There is no abdominal tenderness.   Musculoskeletal:      Cervical back: Normal range of motion.      Right lower leg: Edema (1-2+) present.      Left lower leg: Edema (1-2+) present.   Skin:     General: Skin is warm and dry.      Findings: No rash.   Neurological:      Mental Status: She is alert and oriented to person, place, and time.   Psychiatric:         Mood and Affect: Mood normal.         Behavior: Behavior normal.         Assessment:       1. Stage 3 chronic kidney disease, unspecified whether stage 3a or 3b CKD    2. Wegener's disease, pulmonary    3. Edema, unspecified type    4. Isolated proteinuria with morphologic lesion    5. Hyponatremia        Plan:   Return to clinic in 4 months.  Labs for next visit include rp upc ua upc pth.  Baseline creatinine is 0.8-1.1 since 2016.  UPC is 1.47 grams.  Renal US shows R 9.6 cm L 12.4 cm.  She is being treated for her disease and does seem to have responded.

## 2022-02-24 ENCOUNTER — TELEPHONE (OUTPATIENT)
Dept: RHEUMATOLOGY | Facility: CLINIC | Age: 82
End: 2022-02-24
Payer: MEDICARE

## 2022-02-24 NOTE — TELEPHONE ENCOUNTER
----- Message from Michelle Lopez, Patient Care Assistant sent at 2/24/2022  3:27 PM CST -----  Regarding: advice  Contact: pt  Type: Needs Medical Advice  Who Called:  pt   Best Call Back Number: [phone] (home)     Additional Information: pt states she would like a callback regarding concerns of her bone density test. Thanks!       LVM that Dr. Snyder is presently [facility]. Advised the patient that Dr. Reddy is recommending to continue Vitamin D and Dr. Snyder does agree with this. Anali

## 2022-03-07 ENCOUNTER — TELEPHONE (OUTPATIENT)
Dept: RHEUMATOLOGY | Facility: CLINIC | Age: 82
End: 2022-03-07
Payer: MEDICARE

## 2022-03-08 ENCOUNTER — TELEPHONE (OUTPATIENT)
Dept: RHEUMATOLOGY | Facility: CLINIC | Age: 82
End: 2022-03-08
Payer: MEDICARE

## 2022-03-08 NOTE — TELEPHONE ENCOUNTER
----- Message from Polly Blount sent at 3/8/2022  1:26 PM CST -----  Patient is requesting a return call regarding a shot she is supposed to get.  It is her 3rd shot,  the cancer center called her about scheduling it and she needs your approval. Please call her at [phone].  Thank you!    LVM asking for patient to call back tonight to clarify which shot. CG

## 2022-03-09 ENCOUNTER — TELEPHONE (OUTPATIENT)
Dept: RHEUMATOLOGY | Facility: CLINIC | Age: 82
End: 2022-03-09
Payer: MEDICARE

## 2022-03-09 DIAGNOSIS — D84.9 IMMUNOCOMPROMISED: ICD-10-CM

## 2022-03-09 DIAGNOSIS — M31.30 GRANULOMATOSIS WITH POLYANGIITIS WITH PULMONARY INVOLVEMENT: Primary | ICD-10-CM

## 2022-03-09 NOTE — TELEPHONE ENCOUNTER
----- Message from Yesy Weinstein sent at 3/9/2022  9:48 AM CST -----  .Type: Patient Call Back    Who called: Self     What is the request in detail: pt is calling to clarify medication     Can the clinic reply by [facility]?    Would the patient rather a call back or a response via [facility]? Call back     Best call back number: [phone]    Thank you    Third attempt to get information. LVM for patient to callback again. CG

## 2022-03-10 ENCOUNTER — PATIENT MESSAGE (OUTPATIENT)
Dept: RHEUMATOLOGY | Facility: CLINIC | Age: 82
End: 2022-03-10
Payer: MEDICARE

## 2022-03-10 RX ORDER — HEPARIN 100 UNIT/ML
500 SYRINGE INTRAVENOUS
Status: CANCELLED | OUTPATIENT
Start: 2022-04-01

## 2022-03-10 RX ORDER — SODIUM CHLORIDE 0.9 % (FLUSH) 0.9 %
10 SYRINGE (ML) INJECTION
Status: CANCELLED | OUTPATIENT
Start: 2022-04-01

## 2022-03-10 RX ORDER — ACETAMINOPHEN 325 MG/1
650 TABLET ORAL
Status: CANCELLED | OUTPATIENT
Start: 2022-04-01

## 2022-03-10 RX ORDER — METHYLPREDNISOLONE SOD SUCC 125 MG
80 VIAL (EA) INJECTION
Status: CANCELLED | OUTPATIENT
Start: 2022-04-01

## 2022-03-10 RX ORDER — DIPHENHYDRAMINE HCL 25 MG
25 CAPSULE ORAL
Status: CANCELLED | OUTPATIENT
Start: 2022-04-01

## 2022-03-10 NOTE — TELEPHONE ENCOUNTER
"We need specific drug names to answer this correctly:      1. Sandi - she received 2/10/22 but as of 2/24/22 FDA increased the dose so she will need a "catch up dose" to better help with Omicron subvariant coverage.   Have patient call infusion to set up but she is already scheduled for 3/16/22 that I can see.      2. She has orders for Prolia and no record she received. This is for osteoporosis and she does still need this if not given.    -given chronic steroids she absolutely qualifies to receive Prolia.    -plan is entered    3. I am ready for her to get her 3rd COVID vaccine: Pfizer dose #3. to be done prior to 3/20/22      4. We are going to start Rituxan at 1,000mg IV as a single infusion   starting 4/1/2022 for Wegeners maintanance.    -orders just placed not to infuse prior to 4/1/22.    5. Labs cbc, sed, crp and CMP to be done with the April infusion of Rituxan please.    -orders in.   "

## 2022-03-11 NOTE — TELEPHONE ENCOUNTER
"Per Dr Askew instructions:   1. Evushed "catch up dose" is scheduled for 3/16/22 per Chitra RN at [facility]  2. Prolia is being worked on per Chitra [facility]  3. Left voice message to inform Ms Weber it was time to have her 3rd Covid pfizer vaccine and that Dr askew would like it to be done prior to 3/20/22  $. Rituxan PA is being worked on by [facility]  "

## 2022-03-15 ENCOUNTER — IMMUNIZATION (OUTPATIENT)
Dept: FAMILY MEDICINE | Facility: CLINIC | Age: 82
End: 2022-03-15
Payer: MEDICARE

## 2022-03-15 ENCOUNTER — TELEPHONE (OUTPATIENT)
Dept: RHEUMATOLOGY | Facility: CLINIC | Age: 82
End: 2022-03-15
Payer: MEDICARE

## 2022-03-15 DIAGNOSIS — Z23 NEED FOR VACCINATION: Primary | ICD-10-CM

## 2022-03-15 PROCEDURE — 0053A COVID-19, MRNA, LNP-S, PF, 30 MCG/0.3 ML DOSE VACCINE (PFIZER): ICD-10-PCS | Mod: S$GLB,,, | Performed by: FAMILY MEDICINE

## 2022-03-15 PROCEDURE — 91305 COVID-19, MRNA, LNP-S, PF, 30 MCG/0.3 ML DOSE VACCINE (PFIZER): ICD-10-PCS | Mod: S$GLB,,, | Performed by: FAMILY MEDICINE

## 2022-03-15 PROCEDURE — 0053A COVID-19, MRNA, LNP-S, PF, 30 MCG/0.3 ML DOSE VACCINE (PFIZER): CPT | Mod: S$GLB,,, | Performed by: FAMILY MEDICINE

## 2022-03-15 PROCEDURE — 91305 COVID-19, MRNA, LNP-S, PF, 30 MCG/0.3 ML DOSE VACCINE (PFIZER): CPT | Mod: S$GLB,,, | Performed by: FAMILY MEDICINE

## 2022-03-15 NOTE — TELEPHONE ENCOUNTER
----- Message from Dia Jurado sent at 3/15/2022 10:42 AM CDT -----  Regarding: prolia  Contact: patient  Patient want to know if nurse can call [employer] regarding Prolia milligram and rx number, please call back at [phone] or fax [phone]    Case number [id number]    Left message for the patient that as far as I know this will be handled by the infusion dept and they have been forwarded this message. BERKLEY

## 2022-03-16 ENCOUNTER — TELEPHONE (OUTPATIENT)
Dept: INFUSION THERAPY | Facility: HOSPITAL | Age: 82
End: 2022-03-16
Payer: MEDICARE

## 2022-03-21 DIAGNOSIS — G62.9 NEUROPATHY: ICD-10-CM

## 2022-03-22 ENCOUNTER — TELEPHONE (OUTPATIENT)
Dept: INFUSION THERAPY | Facility: HOSPITAL | Age: 82
End: 2022-03-22
Payer: MEDICARE

## 2022-03-23 ENCOUNTER — TELEPHONE (OUTPATIENT)
Dept: RHEUMATOLOGY | Facility: CLINIC | Age: 82
End: 2022-03-23
Payer: MEDICARE

## 2022-03-23 RX ORDER — DICLOFENAC SODIUM 10 MG/G
GEL TOPICAL
Qty: 100 G | Refills: 6 | Status: SHIPPED | OUTPATIENT
Start: 2022-03-23 | End: 2023-01-04 | Stop reason: SDUPTHER

## 2022-03-23 RX ORDER — GABAPENTIN 400 MG/1
400 CAPSULE ORAL 3 TIMES DAILY
Qty: 270 CAPSULE | Refills: 0 | Status: SHIPPED | OUTPATIENT
Start: 2022-03-23 | End: 2022-07-27

## 2022-03-23 NOTE — TELEPHONE ENCOUNTER
----- Message from Susan Musa sent at 3/21/2022  4:32 PM CDT -----  Contact: pt  Type:  RX Refill Request    Who Called:  pt  Refill or New Rx:  refill  RX Name and Strength:  diclofenac sodium (VOLTAREN) 1 % Gel and gabapentin (NEURONTIN) 400 MG capsule  Preferred Pharmacy with phone number:    [facility] - [place], [place] - [address]  [address] [address]  Phone: [phone] Fax: [phone]  Best Call Back Number:  [phone]   Additional Information:   pt needs authorizations on one of the  medications and refill on the other one and pt also states because medications takes 10 days she need emergency refill  pt ask that nurse call her and let her know when they send everything over

## 2022-03-23 NOTE — TELEPHONE ENCOUNTER
Called pharmacy to give dosage of medication   ----- Message from Susan Musa sent at 3/21/2022  4:46 PM CDT -----  Contact: pt  Type: Needs Medical Advice  Who Called:  pt  Pharmacy name and phone #:   [facility] - [place], [place] - [address]  [address] [address]  Phone: [phone] Fax: [phone]     Best Call Back Number: [phone]   Additional Information:  pt needs to know the milgrams to medication prolia shots for her bones

## 2022-03-24 NOTE — TELEPHONE ENCOUNTER
Both are filled.   2nd part of phone room  note states she needed emergency fill notificaciton.     Does she need meds to be sent locally or did she just want staff to call her back once scripts signed?      Dr. CORBIN

## 2022-03-25 ENCOUNTER — INFUSION (OUTPATIENT)
Dept: INFUSION THERAPY | Facility: HOSPITAL | Age: 82
End: 2022-03-25
Attending: INTERNAL MEDICINE
Payer: MEDICARE

## 2022-03-25 VITALS
HEART RATE: 69 BPM | DIASTOLIC BLOOD PRESSURE: 78 MMHG | TEMPERATURE: 97 F | SYSTOLIC BLOOD PRESSURE: 145 MMHG | RESPIRATION RATE: 18 BRPM

## 2022-03-25 DIAGNOSIS — S22.31XD CLOSED FRACTURE OF ONE RIB OF RIGHT SIDE WITH ROUTINE HEALING, SUBSEQUENT ENCOUNTER: ICD-10-CM

## 2022-03-25 DIAGNOSIS — D84.9 IMMUNOCOMPROMISED: Primary | ICD-10-CM

## 2022-03-25 DIAGNOSIS — M81.0 POSTMENOPAUSAL OSTEOPOROSIS: ICD-10-CM

## 2022-03-25 DIAGNOSIS — F19.20 STEROID DEPENDENCE: ICD-10-CM

## 2022-03-25 DIAGNOSIS — N18.4 CHRONIC KIDNEY DISEASE (CKD), STAGE IV (SEVERE): ICD-10-CM

## 2022-03-25 PROCEDURE — M0220 HC INJECTION ADMIN, TIXAGEVIMAB-CILGAVIMAB, INCL POST ADMIN MONIT: HCPCS | Performed by: INTERNAL MEDICINE

## 2022-03-25 PROCEDURE — 63600175 PHARM REV CODE 636 W HCPCS: Mod: PN | Performed by: INTERNAL MEDICINE

## 2022-03-25 PROCEDURE — 96372 THER/PROPH/DIAG INJ SC/IM: CPT | Mod: PN

## 2022-03-25 PROCEDURE — 63600175 PHARM REV CODE 636 W HCPCS: Mod: JG,PN | Performed by: INTERNAL MEDICINE

## 2022-03-25 RX ORDER — ACETAMINOPHEN 325 MG/1
650 TABLET ORAL ONCE
Status: CANCELLED | OUTPATIENT
Start: 2022-03-25 | End: 2022-03-25

## 2022-03-25 RX ORDER — PREDNISONE 20 MG/1
40 TABLET ORAL ONCE
Status: CANCELLED | OUTPATIENT
Start: 2022-03-25 | End: 2022-03-25

## 2022-03-25 RX ORDER — EPINEPHRINE 0.3 MG/.3ML
0.3 INJECTION SUBCUTANEOUS
Status: CANCELLED | OUTPATIENT
Start: 2022-03-25

## 2022-03-25 RX ORDER — ALBUTEROL SULFATE 90 UG/1
2 AEROSOL, METERED RESPIRATORY (INHALATION) EVERY 4 HOURS PRN
Status: CANCELLED | OUTPATIENT
Start: 2022-03-25

## 2022-03-25 RX ORDER — ONDANSETRON 4 MG/1
4 TABLET, ORALLY DISINTEGRATING ORAL ONCE
Status: CANCELLED | OUTPATIENT
Start: 2022-03-25 | End: 2022-03-25

## 2022-03-25 RX ORDER — DIPHENHYDRAMINE HCL 25 MG
25 CAPSULE ORAL ONCE
Status: CANCELLED | OUTPATIENT
Start: 2022-03-25 | End: 2022-03-25

## 2022-03-25 RX ADMIN — DENOSUMAB 60 MG: 60 INJECTION SUBCUTANEOUS at 02:03

## 2022-03-25 RX ADMIN — Medication 150 MG: at 01:03

## 2022-03-25 NOTE — TELEPHONE ENCOUNTER
Left voicemail to inform Ms Weber that her medication refills for  Voltaren and Neurontin have been sent to pharmacy on 3/23/22. If any questions please call [phone].

## 2022-03-25 NOTE — PLAN OF CARE
Problem: Adult Inpatient Plan of Care  Goal: Plan of Care Review  Outcome: Ongoing, Progressing  Flowsheets (Taken 3/25/2022 1418)  Plan of Care Reviewed With:   patient   spouse  Goal: Patient-Specific Goal (Individualized)  Outcome: Ongoing, Progressing     Problem: Fatigue  Goal: Improved Activity Tolerance  Outcome: Ongoing, Progressing    Patient received two gluteal intramuscular injections (left and right) of tixagevimab/cilgavimab (EVUSHELD) with no difficulties tolerating the medication. Pt stayed 1 hour wait time with no reactions. Prolia given without reaction.  Left with  at her side in no distress.

## 2022-04-06 ENCOUNTER — TELEPHONE (OUTPATIENT)
Dept: INFUSION THERAPY | Facility: HOSPITAL | Age: 82
End: 2022-04-06
Payer: MEDICARE

## 2022-04-06 ENCOUNTER — TELEPHONE (OUTPATIENT)
Dept: RHEUMATOLOGY | Facility: CLINIC | Age: 82
End: 2022-04-06
Payer: MEDICARE

## 2022-04-06 NOTE — TELEPHONE ENCOUNTER
----- Message from Rain Pascal sent at 4/6/2022  9:16 AM CDT -----  Patient has been called several times and we have been unable to reach patient at this time.    Comment Entered User  RITUXAN 3/14/2022 11:13 AM Rain Pascal    SHAQUILLE 03/29/22 3/29/2022  3:05 PM Janet Mullen    [phone] [facility] 3/31/2022  2:30 PM Rain Pascal 04/04/22 @12:09 4/4/2022 12:09 PM Janet Mullen    [phone] [facility] 4/6/2022  9:15 AM Rain Pascal

## 2022-04-20 ENCOUNTER — INFUSION (OUTPATIENT)
Dept: INFUSION THERAPY | Facility: HOSPITAL | Age: 82
End: 2022-04-20
Attending: INTERNAL MEDICINE
Payer: MEDICARE

## 2022-04-20 VITALS
HEART RATE: 68 BPM | DIASTOLIC BLOOD PRESSURE: 65 MMHG | WEIGHT: 152.13 LBS | RESPIRATION RATE: 18 BRPM | TEMPERATURE: 98 F | SYSTOLIC BLOOD PRESSURE: 145 MMHG | BODY MASS INDEX: 27.82 KG/M2

## 2022-04-20 DIAGNOSIS — M31.30 GRANULOMATOSIS WITH POLYANGIITIS WITH PULMONARY INVOLVEMENT: Primary | ICD-10-CM

## 2022-04-20 PROCEDURE — 63600175 PHARM REV CODE 636 W HCPCS: Mod: JG,PN | Performed by: INTERNAL MEDICINE

## 2022-04-20 PROCEDURE — 96415 CHEMO IV INFUSION ADDL HR: CPT | Mod: PN

## 2022-04-20 PROCEDURE — 96413 CHEMO IV INFUSION 1 HR: CPT | Mod: PN

## 2022-04-20 PROCEDURE — 96375 TX/PRO/DX INJ NEW DRUG ADDON: CPT | Mod: PN

## 2022-04-20 PROCEDURE — 25000003 PHARM REV CODE 250: Mod: PN | Performed by: INTERNAL MEDICINE

## 2022-04-20 RX ORDER — METHYLPREDNISOLONE SOD SUCC 125 MG
80 VIAL (EA) INJECTION
Status: CANCELLED | OUTPATIENT
Start: 2022-04-21

## 2022-04-20 RX ORDER — ACETAMINOPHEN 325 MG/1
650 TABLET ORAL
Status: COMPLETED | OUTPATIENT
Start: 2022-04-20 | End: 2022-04-20

## 2022-04-20 RX ORDER — DIPHENHYDRAMINE HCL 25 MG
25 CAPSULE ORAL
Status: COMPLETED | OUTPATIENT
Start: 2022-04-20 | End: 2022-04-20

## 2022-04-20 RX ORDER — HEPARIN 100 UNIT/ML
500 SYRINGE INTRAVENOUS
Status: CANCELLED | OUTPATIENT
Start: 2022-04-21

## 2022-04-20 RX ORDER — DIPHENHYDRAMINE HCL 25 MG
25 CAPSULE ORAL
Status: CANCELLED | OUTPATIENT
Start: 2022-04-21

## 2022-04-20 RX ORDER — SODIUM CHLORIDE 0.9 % (FLUSH) 0.9 %
10 SYRINGE (ML) INJECTION
Status: DISCONTINUED | OUTPATIENT
Start: 2022-04-20 | End: 2022-04-20 | Stop reason: HOSPADM

## 2022-04-20 RX ORDER — SODIUM CHLORIDE 0.9 % (FLUSH) 0.9 %
10 SYRINGE (ML) INJECTION
Status: CANCELLED | OUTPATIENT
Start: 2022-04-21

## 2022-04-20 RX ORDER — HEPARIN 100 UNIT/ML
500 SYRINGE INTRAVENOUS
Status: DISCONTINUED | OUTPATIENT
Start: 2022-04-20 | End: 2022-04-20 | Stop reason: HOSPADM

## 2022-04-20 RX ORDER — ACETAMINOPHEN 325 MG/1
650 TABLET ORAL
Status: CANCELLED | OUTPATIENT
Start: 2022-04-21

## 2022-04-20 RX ADMIN — SODIUM CHLORIDE: 0.9 INJECTION, SOLUTION INTRAVENOUS at 12:04

## 2022-04-20 RX ADMIN — RITUXIMAB 1000 MG: 10 INJECTION, SOLUTION INTRAVENOUS at 12:04

## 2022-04-20 RX ADMIN — METHYLPREDNISOLONE SODIUM SUCCINATE 80 MG: 40 INJECTION, POWDER, FOR SOLUTION INTRAMUSCULAR; INTRAVENOUS at 12:04

## 2022-04-20 RX ADMIN — ACETAMINOPHEN 650 MG: 325 TABLET, FILM COATED ORAL at 12:04

## 2022-04-20 RX ADMIN — DIPHENHYDRAMINE HYDROCHLORIDE 25 MG: 25 CAPSULE ORAL at 12:04

## 2022-05-04 ENCOUNTER — TELEPHONE (OUTPATIENT)
Dept: RHEUMATOLOGY | Facility: CLINIC | Age: 82
End: 2022-05-04
Payer: MEDICARE

## 2022-05-04 NOTE — TELEPHONE ENCOUNTER
----- Message from Michelle Lopez, Patient Care Assistant sent at 5/4/2022  1:45 PM CDT -----  Regarding: appointment  Contact: pt  Type:  Sooner Appointment Request    Caller is requesting a sooner appointment.  Caller declined first available appointment listed below.  Caller will not accept being placed on the waitlist and is requesting a message be sent to doctor.    Name of Caller:  pt   When is the first available appointment?  08/2022  Symptoms:  3 month f/u   Best Call Back Number:  [phone] (home)     Additional Information:  please call pt to advise. Thanks!      Patient had cancelled May appointment. Offering 2:30 on 5-5-22. Asking the patient to call or email. CG

## 2022-05-04 NOTE — TELEPHONE ENCOUNTER
----- Message from Polly Blount sent at 5/4/2022  2:36 PM CDT -----  Patient is returning Anali's call to let her know she will be in for the 2:30 appt tomorrow.  Her number is [phone].  Thank you

## 2022-05-05 ENCOUNTER — LAB VISIT (OUTPATIENT)
Dept: LAB | Facility: HOSPITAL | Age: 82
End: 2022-05-05
Attending: INTERNAL MEDICINE
Payer: MEDICARE

## 2022-05-05 ENCOUNTER — OFFICE VISIT (OUTPATIENT)
Dept: RHEUMATOLOGY | Facility: CLINIC | Age: 82
End: 2022-05-05
Payer: MEDICARE

## 2022-05-05 VITALS
BODY MASS INDEX: 27.44 KG/M2 | WEIGHT: 149.13 LBS | HEIGHT: 62 IN | SYSTOLIC BLOOD PRESSURE: 158 MMHG | HEART RATE: 72 BPM | DIASTOLIC BLOOD PRESSURE: 71 MMHG

## 2022-05-05 DIAGNOSIS — M54.9 UPPER BACK PAIN: ICD-10-CM

## 2022-05-05 DIAGNOSIS — M79.661 PAIN IN RIGHT LOWER LEG: ICD-10-CM

## 2022-05-05 DIAGNOSIS — M31.31 WEGENER'S GRANULOMATOSIS WITH RENAL INVOLVEMENT: ICD-10-CM

## 2022-05-05 DIAGNOSIS — M35.3 POLYMYALGIA RHEUMATICA: ICD-10-CM

## 2022-05-05 DIAGNOSIS — M31.30 GRANULOMATOSIS WITH POLYANGIITIS WITH PULMONARY INVOLVEMENT: ICD-10-CM

## 2022-05-05 DIAGNOSIS — M31.31 WEGENER'S GRANULOMATOSIS WITH RENAL INVOLVEMENT: Primary | ICD-10-CM

## 2022-05-05 DIAGNOSIS — M54.50 MIDLINE LOW BACK PAIN WITHOUT SCIATICA, UNSPECIFIED CHRONICITY: ICD-10-CM

## 2022-05-05 LAB
ALBUMIN SERPL BCP-MCNC: 3.8 G/DL (ref 3.5–5.2)
ALP SERPL-CCNC: 66 U/L (ref 55–135)
ALT SERPL W/O P-5'-P-CCNC: 10 U/L (ref 10–44)
ANION GAP SERPL CALC-SCNC: 9 MMOL/L (ref 8–16)
AST SERPL-CCNC: 17 U/L (ref 10–40)
BASOPHILS # BLD AUTO: 0.05 K/UL (ref 0–0.2)
BASOPHILS NFR BLD: 0.5 % (ref 0–1.9)
BILIRUB SERPL-MCNC: 0.3 MG/DL (ref 0.1–1)
BUN SERPL-MCNC: 16 MG/DL (ref 8–23)
CALCIUM SERPL-MCNC: 9.7 MG/DL (ref 8.7–10.5)
CHLORIDE SERPL-SCNC: 103 MMOL/L (ref 95–110)
CO2 SERPL-SCNC: 25 MMOL/L (ref 23–29)
CREAT SERPL-MCNC: 1.2 MG/DL (ref 0.5–1.4)
CRP SERPL-MCNC: 8.4 MG/L (ref 0–8.2)
DIFFERENTIAL METHOD: ABNORMAL
EOSINOPHIL # BLD AUTO: 0 K/UL (ref 0–0.5)
EOSINOPHIL NFR BLD: 0.3 % (ref 0–8)
ERYTHROCYTE [DISTWIDTH] IN BLOOD BY AUTOMATED COUNT: 14.5 % (ref 11.5–14.5)
ERYTHROCYTE [SEDIMENTATION RATE] IN BLOOD BY WESTERGREN METHOD: 40 MM/HR (ref 0–36)
EST. GFR  (AFRICAN AMERICAN): 49.7 ML/MIN/1.73 M^2
EST. GFR  (NON AFRICAN AMERICAN): 43.1 ML/MIN/1.73 M^2
GLUCOSE SERPL-MCNC: 101 MG/DL (ref 70–110)
HCT VFR BLD AUTO: 35.4 % (ref 37–48.5)
HGB BLD-MCNC: 11 G/DL (ref 12–16)
IMM GRANULOCYTES # BLD AUTO: 0.07 K/UL (ref 0–0.04)
IMM GRANULOCYTES NFR BLD AUTO: 0.7 % (ref 0–0.5)
LYMPHOCYTES # BLD AUTO: 1 K/UL (ref 1–4.8)
LYMPHOCYTES NFR BLD: 10.2 % (ref 18–48)
MCH RBC QN AUTO: 29.6 PG (ref 27–31)
MCHC RBC AUTO-ENTMCNC: 31.1 G/DL (ref 32–36)
MCV RBC AUTO: 95 FL (ref 82–98)
MONOCYTES # BLD AUTO: 0.5 K/UL (ref 0.3–1)
MONOCYTES NFR BLD: 5.4 % (ref 4–15)
NEUTROPHILS # BLD AUTO: 8.1 K/UL (ref 1.8–7.7)
NEUTROPHILS NFR BLD: 82.9 % (ref 38–73)
NRBC BLD-RTO: 0 /100 WBC
PLATELET # BLD AUTO: 287 K/UL (ref 150–450)
PMV BLD AUTO: 10.4 FL (ref 9.2–12.9)
POTASSIUM SERPL-SCNC: 4.7 MMOL/L (ref 3.5–5.1)
PROT SERPL-MCNC: 6.8 G/DL (ref 6–8.4)
RBC # BLD AUTO: 3.72 M/UL (ref 4–5.4)
SODIUM SERPL-SCNC: 137 MMOL/L (ref 136–145)
WBC # BLD AUTO: 9.74 K/UL (ref 3.9–12.7)

## 2022-05-05 PROCEDURE — 3077F PR MOST RECENT SYSTOLIC BLOOD PRESSURE >= 140 MM HG: ICD-10-PCS | Mod: CPTII,S$GLB,, | Performed by: INTERNAL MEDICINE

## 2022-05-05 PROCEDURE — 99999 PR PBB SHADOW E&M-EST. PATIENT-LVL III: CPT | Mod: PBBFAC,,, | Performed by: INTERNAL MEDICINE

## 2022-05-05 PROCEDURE — 3078F DIAST BP <80 MM HG: CPT | Mod: CPTII,S$GLB,, | Performed by: INTERNAL MEDICINE

## 2022-05-05 PROCEDURE — 99215 OFFICE O/P EST HI 40 MIN: CPT | Mod: S$GLB,,, | Performed by: INTERNAL MEDICINE

## 2022-05-05 PROCEDURE — 1159F PR MEDICATION LIST DOCUMENTED IN MEDICAL RECORD: ICD-10-PCS | Mod: CPTII,S$GLB,, | Performed by: INTERNAL MEDICINE

## 2022-05-05 PROCEDURE — 3288F FALL RISK ASSESSMENT DOCD: CPT | Mod: CPTII,S$GLB,, | Performed by: INTERNAL MEDICINE

## 2022-05-05 PROCEDURE — 99215 PR OFFICE/OUTPT VISIT, EST, LEVL V, 40-54 MIN: ICD-10-PCS | Mod: S$GLB,,, | Performed by: INTERNAL MEDICINE

## 2022-05-05 PROCEDURE — 3288F PR FALLS RISK ASSESSMENT DOCUMENTED: ICD-10-PCS | Mod: CPTII,S$GLB,, | Performed by: INTERNAL MEDICINE

## 2022-05-05 PROCEDURE — 83516 IMMUNOASSAY NONANTIBODY: CPT | Performed by: INTERNAL MEDICINE

## 2022-05-05 PROCEDURE — 99999 PR PBB SHADOW E&M-EST. PATIENT-LVL III: ICD-10-PCS | Mod: PBBFAC,,, | Performed by: INTERNAL MEDICINE

## 2022-05-05 PROCEDURE — 3077F SYST BP >= 140 MM HG: CPT | Mod: CPTII,S$GLB,, | Performed by: INTERNAL MEDICINE

## 2022-05-05 PROCEDURE — 85652 RBC SED RATE AUTOMATED: CPT | Performed by: INTERNAL MEDICINE

## 2022-05-05 PROCEDURE — 1125F AMNT PAIN NOTED PAIN PRSNT: CPT | Mod: CPTII,S$GLB,, | Performed by: INTERNAL MEDICINE

## 2022-05-05 PROCEDURE — 85025 COMPLETE CBC W/AUTO DIFF WBC: CPT | Performed by: INTERNAL MEDICINE

## 2022-05-05 PROCEDURE — 1101F PR PT FALLS ASSESS DOC 0-1 FALLS W/OUT INJ PAST YR: ICD-10-PCS | Mod: CPTII,S$GLB,, | Performed by: INTERNAL MEDICINE

## 2022-05-05 PROCEDURE — 36415 COLL VENOUS BLD VENIPUNCTURE: CPT | Mod: PO | Performed by: INTERNAL MEDICINE

## 2022-05-05 PROCEDURE — 1159F MED LIST DOCD IN RCRD: CPT | Mod: CPTII,S$GLB,, | Performed by: INTERNAL MEDICINE

## 2022-05-05 PROCEDURE — 80053 COMPREHEN METABOLIC PANEL: CPT | Performed by: INTERNAL MEDICINE

## 2022-05-05 PROCEDURE — 86255 FLUORESCENT ANTIBODY SCREEN: CPT | Mod: 59 | Performed by: INTERNAL MEDICINE

## 2022-05-05 PROCEDURE — 1101F PT FALLS ASSESS-DOCD LE1/YR: CPT | Mod: CPTII,S$GLB,, | Performed by: INTERNAL MEDICINE

## 2022-05-05 PROCEDURE — 3078F PR MOST RECENT DIASTOLIC BLOOD PRESSURE < 80 MM HG: ICD-10-PCS | Mod: CPTII,S$GLB,, | Performed by: INTERNAL MEDICINE

## 2022-05-05 PROCEDURE — 86140 C-REACTIVE PROTEIN: CPT | Performed by: INTERNAL MEDICINE

## 2022-05-05 PROCEDURE — 1125F PR PAIN SEVERITY QUANTIFIED, PAIN PRESENT: ICD-10-PCS | Mod: CPTII,S$GLB,, | Performed by: INTERNAL MEDICINE

## 2022-05-05 RX ORDER — PREDNISONE 5 MG/1
5 TABLET ORAL DAILY
Qty: 90 TABLET | Refills: 3 | Status: SHIPPED | OUTPATIENT
Start: 2022-05-05 | End: 2023-05-01

## 2022-05-05 RX ORDER — PREDNISONE 2.5 MG/1
2.5 TABLET ORAL DAILY
Qty: 90 TABLET | Refills: 3 | Status: SHIPPED | OUTPATIENT
Start: 2022-05-05 | End: 2022-05-16 | Stop reason: SDUPTHER

## 2022-05-05 RX ORDER — HYDROCODONE BITARTRATE AND ACETAMINOPHEN 7.5; 325 MG/1; MG/1
1 TABLET ORAL EVERY 12 HOURS PRN
Qty: 60 TABLET | Refills: 0 | Status: SHIPPED | OUTPATIENT
Start: 2022-05-05 | End: 2023-05-05

## 2022-05-05 ASSESSMENT — ROUTINE ASSESSMENT OF PATIENT INDEX DATA (RAPID3)
TOTAL RAPID3 SCORE: 3.78
PSYCHOLOGICAL DISTRESS SCORE: 0
PATIENT GLOBAL ASSESSMENT SCORE: 4.5
MDHAQ FUNCTION SCORE: 0.7
PAIN SCORE: 4.5
FATIGUE SCORE: 1.1

## 2022-05-05 NOTE — PROGRESS NOTES
Subjective:          Chief Complaint: Aracelis Weber is a 81 y.o. female who had concerns including Disease Management.    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 Still rhinitis, using navage. No antibiotics from ENT since last visit. She is still clearing significant mucous faint with dried blood.   Dry cough, no fevers, no SOB  Mild HA only with elevated BP.   Completed Rituxan maintanence at 4 months 4/20/2022.   Prolia 3/25/22  Evusheld with catch up dose 2/10/22 and 3/25/2022.     Major complaints: pins, needles, stinging, burning in feet day and night is constant. Dr. Campbell: EB-N5 supplement is helping some, stopped for months and noted significant worsening. Dr. Campbell did increase from EB-N3.   Gabapentin 400mg TID  Hydrocodone only PRN  Prednisone 7.5mg       2/2022  Doing well no cough, no fevers. Recent COVID exposure but negative.   Seen with Nephro.   Given her successful response with Ritux. Agree to continue maintanance with Ritux Q 4-6 months    Needs to get #3 COVID vaccine timing with RTX. - will time this with me 2 weeks prior to   Discussed Prolia  Working on Evusheld for PreP with COVID> she is scheduled 2/10/22      12/9/2021  Patient's recent CXR 12/21/21 with marked improvement when compared to 10/21/21  Prednisone 10mg daily  S/p RTX x 4 infusions completion date. 11/5/21  Patient with rise in creatinine to 2.0 on 11/26 had been requiring lasix for marked edema since 11/1/21. Stopped 2 weeks ago. Edema is present but managed. Drinking about 40 oz water and 3 cups of coffee daily    Patient completed [facility] PT felt she was doing well home exercises about 2 weeks ago with acute pain in lumbar spine after exercising. Imaging lumbar few days ago without evidence of a fracture. She point to pain in the lumbosacral and radiating to buttock region. Ok to rise from seated. Pain more on left low back and buttock. No numbness no tingling but  pain in the hamstring region. Comfortable sitting but not lying down, but she never sleeps in bed. Long hx of rather impressive scoliosis.       11/11/2021:   Inflammatory markers are markedly improved.   Cough dry still at night. Patient noting she has lost sense of smell. Can still taste.     Patient recently hospitatlized for Bronch with negative cultures to date.  There was a concern for possible atypical infection she has had enlarging right upper lobe cavitary lesion measures still has a right middle lobe lesion with some cavitation now as well.  I have discussed this case extensively with both Dr. Schwartz as well as Dr. Joseph upon discharge we felt comfortable that this is likely Wegener's granulomatosis causing cavitary lesions.  Supporting sinus biopsy does note granuloma.    Patient was started on Imuran in March of 2021 but with the growth of her lesion we had rule out for any possible infection given her status of immunosuppression.  She is here today not in her usual state she appears fatigued pale she is not complaining of shortness of breath but does have a cough dry persistent.  She is noticing increased pedal edema.         9/13/21: patient with 2 falls 8 days apart. Spoke w/ pulm shortly following initial fall and we were scheduled for bronch when she sustained another fall.   Scheduled now for 9/17/21 bronch. RUL lesion.   Remains off Imuran  Prednisone 15mg daily.   Sinus congestion. Still productive     She is noting some productive cough at night no hemoptysis.   Patient denies SOB  Having more HA. -frontal and sinus, she is noting some drainage in right ear some mucous/blood.   No edema.   She notes fatigue, no fevers, no night sweats. No weight loss.   Skin easily bruising.     No personal hx of any malignancy.       7/20/2021:    Spoke with Pulm plan for bronch is able working on possible bx site vs BAL.   Need to r/o infectious/neoplastic and confirm for ANCA (GPA vasculitis)   Inflammatory  "markers markedly down .8  Now 14.   H/H improve from 7.4 to 8.2  Platelets normalized.   Renal stable over time    Patient did attend wedding with approx 300 people unmasked last weekend. Reviewed by recs for COVID precautions given    Current regimen:   pred 15mg (Na stable)  Holding Imuran    Interval events: PET with hypermetabolic lesions:   a. RUL cavitary/cystic lesion 1.9cm x 1.3cm  b. RUL spiculated 1.2cm x 1.4cm  c. RML 1.7cm x 2.0 cm  All with differential: infectious, inflammatory, neoplastic, grannulomatous (a) with favor of inflammation given the rapid change    Fungal immunodiffusion neg  Quant gold and T spot: neg  H/H improving     Sinus congestion, pressure, headache, x 3 weeks very productive with blowing nose, back on navage. :  started Cindamycin with DR Quinn x 10 days.   Patient denies any shortness breath.   Very fatigues.   No more fevers. Slight dry cough, no blood/ no mucous.   No SOB.       5/12/21 completed nasal irrigation with biopsy not definitive for GPA, but + inflammation and granulomas. Temporal artery bx negative. Patient with PCP same day as labs 6/2/21  dx with UTI on keflex.     Was seen apparently in ED in [place] for 104F she had altered mental status, dx with UTI, dehydration, treated with. Completed all 7 days of keflex and within 48 hours with another fever 104 F. She continues with congestion but no worse. Patient denies cough, hemoptysis, bloody nasal discharge. She has had no fever x 10 days. Checks twice daily.   while travelling told "congestion in chest on xray". Inflammatory markers very high again.   Patient with recent concern for wegeners needs CT chest. CXR with new airspace opacities right chest- need to r/o GGO vs infection. CT has been ordered pending scheduling.   Broad ABX coverage recommended for now will cc Dr. Natarajan.     I am limited here with her  severe edema from higher dose steroids. Max tolerated is 20mg daily pred  Started Imuran 5/5/2021. " Very low dose 50mg BID (0.65mg/kg) tolerating VERY well.   H/H still low.         2/2021  Sinususitis, cx were negative.  Using nasal irrigation with mucoid disharge.   Patient continues with significant nasal discharge and blood with scabs. We discussed Wegeners but pathology sinus no vasculitis, granuloma noted.   No HA, no blurred vision. Recent sinus infection with HA for 10 days. cx +, but also repeat labs demonstarting +PR3 and +ANCA       Patient with c/o right > left 3,4 finger numbness that was intermittent until approximately 2 weeks about having near constant numbness in hands, pins and needles and pain.   Patient not noting much improvement with change in position.     Off MTX 6  tabs weekly.   Doing well with Hydroxychloroquine.   Declined COVID vaccine    Gabapentin up to 300mg TID.   Now with Dr. Campbell doing PT for Plantar fasciitis. She is taking supplement and compound cream for joint and neuropathy. No response to tarsal tunnel injection per Dr. Campbell.   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 WBC ok.       11/2020  Patient with PMR   MTX at 4 tabs weekly new anemia. Thrombocytosis  Pred at 10mg   Complicated with peripheral edema rather severe , started MTX seeing trending CRP/ESR but persistent leg pain.   Seen with PCP for neuropathy s/sx not seeing much benefit with gabpentin  Seen with Cardiology- no concern for cardiac ECHO ok. dc'd lasix for hyponatremia.     8/2020  Pred 20mg with mild edema, pred 30 mg with severe edema.   Current Pred 15mg daily  Rising ESR again.   Lasix 20 mg daily with improved edema but having some pins and needles in feet/legs.   Having tight, burning stinging at the toes and feet. Heaviness. Prickly.   Noting sodium is falling, K 5.2-5.4 still using           7/2020:  Hands not painful, knees not painful. Shoulder/cervical and down the arms, markedly improved.  She still has some pain him hips and groin with walking and some weakness to the legs with edema. Some pain with fixing own hair.   She takes in AM regarding hips ache, some shoulder/arms, and again at night because hips/legs.   The clue is that the LDN new dose she had some ASE, previously tolerated 3mg daily fine.     Historical review of present Illness.   Patient with a recent chronic sinusitis that ultimately warranted a septoplasty, endoscopic sinus surgery and turbinate reduction 5/2020   Four weeks postop t increasingly worse she was noting pain in her throat and ears lasting several hours complaint of pdizziness she had symptoms of ear pain and decreased hearing in the left ear.  She underwent a debridement at this visit she continue with compound nasal irrigations.  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    Patient did have repeat procedure on with biopsies taken 07/03/2020 showing right maxillary sinus chronically inflamed fibrotic polypoid portions benign nasal mucosa left maxillary sinus similar polypoid fragments ulcerated markedly inflamed respiratory mucosa focal foreign body giant cell response noted suggested that this might have been from her Gelfoam packing as a possible cause of this reaction    I have spoken with Dr. Quinn prior to patient's visit today and was a concern that she could have triggered some inflammatory response with the Gelfoam  packing    Patient states she no longer has a headache is not noticing much ear drainage continues with profound loss of hearing on the left and rather significant on the right both sensorineural and some conductive changes.    Restarted on prednisone as of 7/20/20  10 mg patient has not noticed any change with her hearing in this time she did notice slight improvement with her joint aches and pains but is still relying on hydrocodone for the bulk of this.    She denies a persistent headache she does have some tenderness about the preauricular region particularly on the right more than the left.  She has denied jaw claudication changes in her voice or any amaurosis fugax.  She has no pre-existing history of joint aches and pains hip or otherwise.  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  She had a workup that involved a negative rheumatoid factor, age appropriate sedimentation rate,C reactive protein JANET was positive 1:160 in speckled pattern        Previous: Hx:   She is having pain in her hand with stiffness and right 2nd PIP joint now unable to flex. 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   Hx of bilateral TKA 3 and 5 years ago and those are doing well.   She notes intermittent edema. Some hammer toe deformity bilaterally making shoes problematic but not painful otherwise.   Long hx of GERD-severe.   Cannot tolerate NSAIDs at all w/o gastritis.   Can use Hydrocodone PRN   Added Naltrexone at 3mg and doing very well  Lost 30# with WW.   Patient denies weight loss, rashes, dry eye, dry mouth, nasal or palatal ulcerations,  lymphadenopathy, Raynaud's, hx of DVT/miscarriages, psoriasis or family hx of psoriasis, rashes, serositis, anemia or other constitutional symptoms.  Asking about naltrexone  Component      Latest Ref Rng & Units 8/8/2020 7/29/2020 7/20/2020 7/13/2020   Sodium      136 - 145 mmol/L  126 (L) 128 (L)     Potassium      3.5 - 5.1 mmol/L  5.4 (H) 5.2 (H)    Chloride      95 - 110 mmol/L  92 (L) 92 (L)    CO2      23 - 29 mmol/L  25 29    Glucose      70 - 110 mg/dL  109 109    BUN, Bld      8 - 23 mg/dL  13 11    Creatinine      0.5 - 1.4 mg/dL  0.8 0.8    Calcium      8.7 - 10.5 mg/dL  9.5 9.7    Anion Gap      8 - 16 mmol/L  9 7 (L)    eGFR if African American      >60 mL/min/1.73 m:2  >60.0 >60.0    eGFR if non African American      >60 mL/min/1.73 m:2  >60.0 >60.0    Anti Sm Antibody      0.00 - 0.99 Ratio    0.04   Anti-Sm Interpretation      Negative    Negative   Anti Sm/RNP Antibody      0.00 - 0.99 Ratio    0.09   Anti-Sm/RNP Interpretation      Negative    Negative   JANET Screen      None Detected       Rheumatoid Factor      0.0 - 15.0 IU/mL       Sed Rate      0 - 36 mm/Hr 57 (H)  54 (H) 75 (H)   CRP      0.0 - 8.2 mg/L 75.4 (H) 56.1 (H) 57.5 (H) 99.4 (H)   Anti-Histone Antibody      0.0 - 0.9 Units    0.4   ds DNA Ab      Negative 1:10    Negative 1:10     Component      Latest Ref Rng & Units 6/25/2020 11/10/2018   Sodium      136 - 145 mmol/L     Potassium      3.5 - 5.1 mmol/L     Chloride      95 - 110 mmol/L     CO2      23 - 29 mmol/L     Glucose      70 - 110 mg/dL     BUN, Bld      8 - 23 mg/dL     Creatinine      0.5 - 1.4 mg/dL     Calcium      8.7 - 10.5 mg/dL     Anion Gap      8 - 16 mmol/L     eGFR if African American      >60 mL/min/1.73 m:2     eGFR if non African American      >60 mL/min/1.73 m:2     Anti Sm Antibody      0.00 - 0.99 Ratio     Anti-Sm Interpretation      Negative     Anti Sm/RNP Antibody      0.00 - 0.99 Ratio     Anti-Sm/RNP Interpretation      Negative     JANET Screen      None Detected Detected (A) Detected (A)   Rheumatoid Factor      0.0 - 15.0 IU/mL 11.6 <8.6   Sed Rate      0 - 36 mm/Hr     CRP      0.0 - 8.2 mg/L     Anti-Histone Antibody      0.0 - 0.9 Units     ds DNA Ab      Negative 1:10         REVIEW OF SYSTEMS:    Review of Systems   Constitutional:  "Negative for fever, malaise/fatigue and weight loss.   HENT: Negative for sore throat.    Eyes: Negative for double vision, photophobia and redness.   Respiratory: Negative for cough, shortness of breath and wheezing.    Cardiovascular: Negative for chest pain, palpitations and orthopnea.   Gastrointestinal: Negative for abdominal pain, constipation and diarrhea.   Genitourinary: Negative for dysuria, hematuria and urgency.   Musculoskeletal: Positive for joint pain. Negative for back pain and myalgias.   Skin: Negative for rash.   Neurological: Negative for dizziness, tingling, focal weakness and headaches.   Endo/Heme/Allergies: Does not bruise/bleed easily.   Psychiatric/Behavioral: Negative for depression, hallucinations and suicidal ideas.               Objective:            Past Medical History:   Diagnosis Date    Anesthesia     'Mother stop breathing after anesthesia"    Chronic sinusitis     Depression     GERD (gastroesophageal reflux disease)     PVC (premature ventricular contraction)      Family History   Problem Relation Age of Onset    Heart disease Mother     Arthritis Mother     Cancer Sister     Arthritis Sister     Diabetes Sister     Hypertension Sister      Social History     Tobacco Use    Smoking status: Former Smoker     Start date: 2/3/1955     Quit date: 1/3/1960     Years since quittin.3    Smokeless tobacco: Never Used   Substance Use Topics    Alcohol use: Never     Alcohol/week: 0.0 standard drinks    Drug use: Never         Current Outpatient Medications on File Prior to Visit   Medication Sig Dispense Refill    acetaminophen (TYLENOL) 500 MG tablet Take 500 mg by mouth every 6 (six) hours as needed for Pain.      B2-B6 phos-levomef simran-mecobal (EB-N3 DR) 1.3-70-6-4 mg CpDR Take 1 capsule by mouth once daily.      cetirizine (ZYRTEC) 10 MG tablet Take 1 tablet (10 mg total) by mouth once daily. 90 tablet 0    cholecalciferol, vitamin D3, (VITAMIN D3) 125 mcg " (5,000 unit) Tab Take 1 tablet (5,000 Units total) by mouth once daily. 90 tablet 0    diclofenac sodium (VOLTAREN) 1 % Gel APPLY TO THE AFFECTED AREA(S) 2 GRAMS THREE TIMES DAILY 100 g 6    docusate sodium (COLACE) 100 MG capsule Take 1 capsule (100 mg total) by mouth 2 (two) times daily. 270 capsule 0    gabapentin (NEURONTIN) 400 MG capsule Take 1 capsule (400 mg total) by mouth 3 (three) times daily. 270 capsule 0    HYDROcodone-acetaminophen (NORCO) 7.5-325 mg per tablet Take 1 tablet by mouth every 12 (twelve) hours as needed for Pain. 60 tablet 0    metoprolol succinate (TOPROL-XL) 50 MG 24 hr tablet Take 1 tablet (50 mg total) by mouth once daily. 90 tablet 3    omeprazole (PRILOSEC) 40 MG capsule Take 1 capsule (40 mg total) by mouth every morning. 90 capsule 0    predniSONE (DELTASONE) 2.5 MG tablet Take 1 tablet (2.5 mg total) by mouth once daily. 30 tablet 3    predniSONE (DELTASONE) 5 MG tablet Take 2 tablets (10 mg total) by mouth once daily. (Patient taking differently: Take 5 mg by mouth once daily.) 60 tablet 11    vit C/E/Zn/coppr/lutein/zeaxan (PRESERVISION AREDS-2 ORAL) Take 1 capsule by mouth 2 (two) times a day.       furosemide (LASIX) 20 MG tablet Take 1 tablet (20 mg total) by mouth once daily. (Patient not taking: Reported on 5/5/2022) 180 tablet 0    [DISCONTINUED] naltrexone capsule Take 3 mg by mouth once daily.       No current facility-administered medications on file prior to visit.       Vitals:    05/05/22 1522   BP: (!) 158/71   Pulse: 72       Physical Exam:    Physical Exam   Constitutional: She is oriented to person, place, and time. She appears well-developed and well-nourished.   HENT:   Head: Normocephalic and atraumatic.   Mouth/Throat: Oropharynx is clear and moist.   Eyes: Pupils are equal, round, and reactive to light. EOM are normal.   Neck: Normal range of motion.   Cardiovascular: Normal rate, regular rhythm and normal heart sounds.   Pulmonary/Chest: Effort  normal and breath sounds normal.   Musculoskeletal:        Right shoulder: She exhibits normal range of motion, no tenderness and no swelling.        Left shoulder: She exhibits normal range of motion, no tenderness and no swelling.        Right elbow: She exhibits normal range of motion and no swelling. No tenderness found.        Left elbow: She exhibits normal range of motion and no swelling. No tenderness found.        Right wrist: She exhibits normal range of motion, no tenderness and no swelling.        Left wrist: She exhibits normal range of motion, no tenderness and no swelling.        Right knee: She exhibits normal range of motion and no swelling. No tenderness found.        Left knee: She exhibits normal range of motion and no swelling. No tenderness found.        Right hand: She exhibits decreased range of motion and tenderness. She exhibits no swelling.        Left hand: She exhibits decreased range of motion and tenderness. She exhibits no swelling.        Right foot: There is normal range of motion, no tenderness and no swelling.        Left foot: There is normal range of motion, no tenderness and no swelling.   Bony hypertrophy at the PIP and DIP joints. +squaring at the CMC joint. No active synovitis on 28 joint exam.    Neurological: She is alert and oriented to person, place, and time.   Skin: Skin is warm and dry.   Psychiatric: She has a normal mood and affect. Her behavior is normal.     Component      Latest Ref Rng & Units 9/17/2021 9/17/2021          11:03 AM 11:03 AM   Respiratory Culture        No growth   Gram Stain (Respiratory)       No organisms seen No WBC's   AFB Culture & Smear           AFB CULTURE STAIN           GENET Prep           Fungus (Mycology) Culture           Aerobic Bacterial Culture             Component      Latest Ref Rng & Units 9/17/2021          11:03 AM   Respiratory Culture       No Staph aureus, MRSA or Pseudomonas isolated.   Gram Stain (Respiratory)       No  organisms seen   AFB Culture & Smear          AFB CULTURE STAIN          GENET Prep          Fungus (Mycology) Culture          Aerobic Bacterial Culture            Component      Latest Ref Rng & Units 2021          11:03 AM 11:03 AM   Respiratory Culture       Normal respiratory nimesh    Gram Stain (Respiratory)       Moderate WBC's    AFB Culture & Smear        Culture in progress   AFB CULTURE STAIN        No acid fast bacilli seen.   KOH Prep        No yeast or fungal elements seen   Fungus (Mycology) Culture        Culture in progress   Aerobic Bacterial Culture                 Narrative  Performed by: [facility]  Patient - ONIEL ROSARIO                   - 1940 Sex- F   Med Rec # - [id number]                        Bimal Mckeon M.D.   The following is an electronic copy of report # [id number] from:   [facility]   [address] [place] [address]                         Phone [phone]   DIAGNOSIS:   2021  JL/shakeel     1, 2.  RIGHT UPPER LOBE MASS BRUSHING AND FINE NEEDLE ASPIRATE BIOPSIES:   - NEGATIVE FOR MALIGNANT CELLS.     - PURULENT EXUDATE.     - A FEW FRAGMENTS OF BRONCHIAL MUCOSA WITH FLORID CHRONIC BRONCHITIS    AND REACTIVE SQUAMOUS    ATYPICAL METAPLASIA.       Comment:   Special stains (AFB and GMS) are negative for organisms. While no    granulomas are seen here, the inflammatory reaction appears to be    similar to that seen in the sinus material over the past few years    (Delta [id number], [id number], [id number]). This suggests a    continuation of an underlying systemic disease with the clinical    working diagnosis of Wegener's Granulomatosis currently being under    consideration. Bronchoscopic material, particularly of the lower    respiratory tract, is of low yield for a definitive diagnosis of that    entity. The previous sinus material will be reviewed with    consideration for that diagnosis.    _______________________________________________________________________   SPECIMEN AND SOURCE:   1. RUL mass brushing   2. RUL mass needle     CLINICAL INFORMATION:   Clinical Information:-gt Right Upper Lobe Mass   Specific Site:-gt RUL mass brushing T Brush; RUL Mass; RUL mass-    microbiology; RUL BAL; RUL washing;   Other Requests:-gt N/A     GROSS DESCRIPTION:   1. Received 40 mL of fluid in formalin, 1 dry slides and 1 slides in    95% alcohol. Prepared one cell block.     2. Received 40 mL of fluid in formalin, 1 dry slides and 1 slides in    95% alcohol. Prepared one cell block.     CODE: 0     Cytotechnologist: ABDIFATAH Marvin (ASCP)   Pathologist: Scott Johnson MD (Electronic Signature) 09/22/2021 2:34 PM     [facility] * [address] [place] [address] * [place] [address]   Technical services performed at: [facility] * [address] [place] [address] * [place] [address]   Screening Site: [facility] * [address] [place] [address] *    [place] [address]                         Post Rituxan image                                     Pre Rituxan image  Assessment:       Encounter Diagnoses   Name Primary?    Polymyalgia rheumatica     Upper back pain     Midline low back pain without sciatica, unspecified chronicity     Pain in right lower leg     Wegener's disease, pulmonary     Wegener's granulomatosis with renal involvement Yes          Plan:        Wegener's granulomatosis with renal involvement  -     CBC Auto Differential; Standing; Expected date: 05/05/2022  -     Comprehensive Metabolic Panel; Standing; Expected date: 05/05/2022  -     Sedimentation rate; Standing; Expected date: 05/05/2022  -     C-Reactive Protein; Standing; Expected date: 05/05/2022  -     Proteinase 3 Autoantibodies; Future; Expected date: 05/05/2022  -     ANTI-NEUTROPHILIC CYTOPLASMIC ANTIBODY; Future; Expected date: 05/05/2022    Polymyalgia rheumatica  -      HYDROcodone-acetaminophen (NORCO) 7.5-325 mg per tablet; Take 1 tablet by mouth every 12 (twelve) hours as needed for Pain.  Dispense: 60 tablet; Refill: 0    Upper back pain  -     HYDROcodone-acetaminophen (NORCO) 7.5-325 mg per tablet; Take 1 tablet by mouth every 12 (twelve) hours as needed for Pain.  Dispense: 60 tablet; Refill: 0    Midline low back pain without sciatica, unspecified chronicity  -     HYDROcodone-acetaminophen (NORCO) 7.5-325 mg per tablet; Take 1 tablet by mouth every 12 (twelve) hours as needed for Pain.  Dispense: 60 tablet; Refill: 0    Pain in right lower leg  -     HYDROcodone-acetaminophen (NORCO) 7.5-325 mg per tablet; Take 1 tablet by mouth every 12 (twelve) hours as needed for Pain.  Dispense: 60 tablet; Refill: 0    Wegener's disease, pulmonary  -     predniSONE (DELTASONE) 2.5 MG tablet; Take 1 tablet (2.5 mg total) by mouth once daily.  Dispense: 90 tablet; Refill: 3  -     CBC Auto Differential; Standing; Expected date: 05/05/2022  -     Comprehensive Metabolic Panel; Standing; Expected date: 05/05/2022  -     Sedimentation rate; Standing; Expected date: 05/05/2022  -     C-Reactive Protein; Standing; Expected date: 05/05/2022  -     Proteinase 3 Autoantibodies; Future; Expected date: 05/05/2022  -     ANTI-NEUTROPHILIC CYTOPLASMIC ANTIBODY; Future; Expected date: 05/05/2022    Other orders  -     predniSONE (DELTASONE) 5 MG tablet; Take 1 tablet (5 mg total) by mouth once daily.  Dispense: 90 tablet; Refill: 3       Patient is a 81-year-old female she has had a recent robust inflammatory reaction within the sinus cavity as well as hearing deficit following a sinus surgery.  She has been repeated cultured postoperatively completed antibiotics and irrigations negative for any fungal infections or bacterial infections.   Patient was doing well on MTX with regard to ESR and CRP but hair loss.     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 Patient has tolerated RTX       t spot 5/2021 negative.    Hep neg.    covid completed #2        CKD advanced rather quickly following lasix 20mg every other day.    My rec is to recheck now that she has been off lasix x2 weeks   . Patient did have casts with hematuria 10/12/21 prior to start of Rituxan. Improved following RTX   Spoke with [facility] and they will kindly try to see her as soon as they can.     Continue omeprazole (prilosec)  40mg by mouth daily.          COVID vaccine #3 shot for immunosuppressed to be done March.       Patient hx of Osteopenia only, renal function not stable enough for oral bisphos. Recent t1-T12 intense pain: getting BMD. Likely we should just order Prolia.     she is having some spinous process tenderness today but pain not classic of vertebral fracture will check imaging anyway. She had rib fractures last year as well.    Completed. Prolia. Pending new DXA from OB/GYN     Continue prednisone to 7.5mg (1.5 tablets) daily.     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 This will be at 6,12,18 months- was my initial plan, but recent rise in her ESR, persistent sinus infections and drainage we administered Maintanance dose 4/20/2022.     Tracking labs and serologies for next dose.     Labs reviewed today noted CRP continues to be adequate, and improved while the ESR rising. Chest xray continues to show improvement.        No follow-ups on file.      f/u 3 months  Thank you for allowing me to participate in the care of this very pleasant patient.       40 min consultation with  greater than 50% spent in counseling, chart review and coordination of care. All questions answered.

## 2022-05-06 ENCOUNTER — TELEPHONE (OUTPATIENT)
Dept: RHEUMATOLOGY | Facility: CLINIC | Age: 82
End: 2022-05-06
Payer: MEDICARE

## 2022-05-06 NOTE — TELEPHONE ENCOUNTER
----- Message from Eden Snyder DO sent at 5/6/2022  9:08 AM CDT -----  Call patient labs are continuing to improve. Anemia better now at 11 was down to 9.  Kidneys better  Markers for inflammation still elevated but much better.  Dr CORBIN

## 2022-05-07 LAB — PROTEINASE3 IGG SER-ACNC: 0.7 U

## 2022-05-09 LAB
ANCA AB TITR SER IF: NORMAL TITER
P-ANCA TITR SER IF: NORMAL TITER

## 2022-05-12 DIAGNOSIS — M31.30 GRANULOMATOSIS WITH POLYANGIITIS WITH PULMONARY INVOLVEMENT: ICD-10-CM

## 2022-05-12 RX ORDER — PREDNISONE 2.5 MG/1
2.5 TABLET ORAL DAILY
Qty: 90 TABLET | Refills: 3 | Status: CANCELLED | OUTPATIENT
Start: 2022-05-12 | End: 2023-05-12

## 2022-05-12 RX ORDER — PREDNISONE 5 MG/1
5 TABLET ORAL DAILY
Qty: 90 TABLET | Refills: 3 | Status: CANCELLED | OUTPATIENT
Start: 2022-05-12 | End: 2023-05-12

## 2022-05-12 NOTE — TELEPHONE ENCOUNTER
----- Message from Nguyễn Metz sent at 5/12/2022 11:20 AM CDT -----  Contact: Pt  Type: Needs Medical Advice    Who Called: pt      Best Call Back Number [phone]       Requesting a call back regarding- pt is requesting a start fill predniSONE (DELTASONE) 5 MG tablet. Also predniSONE (DELTASONE) 2.5 MG tablet    Please send to -[facility] [employer] [id number] [address] [address] [address] [facility] [address]             Please Advise- Thank you

## 2022-05-12 NOTE — TELEPHONE ENCOUNTER
----- Message from Nguyễn Metz sent at 5/12/2022 11:20 AM CDT -----  Contact: Pt  Type: Needs Medical Advice    Who Called: pt      Best Call Back Number [phone]       Requesting a call back regarding- pt is requesting a start fill predniSONE (DELTASONE) 5 MG tablet. Also predniSONE (DELTASONE) 2.5 MG tablet    Please send to -[facility] #[id number] [facility] [address] [address] [address] [facility] [address]             Please Advise- Thank you

## 2022-05-16 RX ORDER — PREDNISONE 2.5 MG/1
2.5 TABLET ORAL DAILY
Qty: 90 TABLET | Refills: 3 | Status: SHIPPED | OUTPATIENT
Start: 2022-05-16 | End: 2022-07-27

## 2022-06-06 ENCOUNTER — TELEPHONE (OUTPATIENT)
Dept: RHEUMATOLOGY | Facility: CLINIC | Age: 82
End: 2022-06-06
Payer: MEDICARE

## 2022-06-06 NOTE — TELEPHONE ENCOUNTER
----- Message from Scott Mead sent at 6/6/2022  2:17 PM CDT -----  Contact: Self  Type:  Patient Returning Call    Who Called:  Patient  Who Left Message for Patient:  Anali Rowland Call Back Number:  [phone]   Additional Information:   Called to follow up on packet dropped off by Dr Hernandez Friday.   States it is an in-depth report with pictures regarding Dr's findings of neuropathy in feet.     States Dr Hernandez was looking for approval of her program to treat the neuropathy  Would like a call with status    Spoke to Dr. Vazquez with Dr. Snyder's request for no oral supplements. Faxed approval for foot cream. Detailed report was delivered. Asking for help to find this report for Dr. Snyder to review. CG

## 2022-06-07 ENCOUNTER — TELEPHONE (OUTPATIENT)
Dept: RHEUMATOLOGY | Facility: CLINIC | Age: 82
End: 2022-06-07
Payer: MEDICARE

## 2022-06-07 NOTE — TELEPHONE ENCOUNTER
----- Message from Dorothy COLLIN Martinezna sent at 6/7/2022 10:09 AM CDT -----  Regarding: advice  Contact: patient  Type: Needs Medical Advice  Who Called:  patient  Symptoms (please be specific):    How long has patient had these symptoms:    Pharmacy name and phone #:    Best Call Back Number: [phone] (home)   Additional Information: Patient would like Anali to call her. She needs to know if she received the packet from Dr Vazquez with the results. She would like to know doctor's opinion. She needs to know before her appointment on Thursday. Please call patient to advise.Thanks!    Patient assured that all instructions given by phone and fax to Dr. Vazquez. Patient aware only topical gel on feet. CG

## 2022-06-17 ENCOUNTER — LAB VISIT (OUTPATIENT)
Dept: LAB | Facility: HOSPITAL | Age: 82
End: 2022-06-17
Attending: INTERNAL MEDICINE
Payer: MEDICARE

## 2022-06-17 DIAGNOSIS — N18.30 STAGE 3 CHRONIC KIDNEY DISEASE, UNSPECIFIED WHETHER STAGE 3A OR 3B CKD: ICD-10-CM

## 2022-06-17 LAB
ALBUMIN SERPL BCP-MCNC: 3.7 G/DL (ref 3.5–5.2)
ANION GAP SERPL CALC-SCNC: 12 MMOL/L (ref 8–16)
BUN SERPL-MCNC: 20 MG/DL (ref 8–23)
CALCIUM SERPL-MCNC: 9.5 MG/DL (ref 8.7–10.5)
CHLORIDE SERPL-SCNC: 98 MMOL/L (ref 95–110)
CO2 SERPL-SCNC: 22 MMOL/L (ref 23–29)
CREAT SERPL-MCNC: 1.1 MG/DL (ref 0.5–1.4)
EST. GFR  (AFRICAN AMERICAN): 55.2 ML/MIN/1.73 M^2
EST. GFR  (NON AFRICAN AMERICAN): 47.9 ML/MIN/1.73 M^2
GLUCOSE SERPL-MCNC: 89 MG/DL (ref 70–110)
PHOSPHATE SERPL-MCNC: 4 MG/DL (ref 2.7–4.5)
POTASSIUM SERPL-SCNC: 4.3 MMOL/L (ref 3.5–5.1)
PTH-INTACT SERPL-MCNC: 53.5 PG/ML (ref 9–77)
SODIUM SERPL-SCNC: 132 MMOL/L (ref 136–145)

## 2022-06-17 PROCEDURE — 80069 RENAL FUNCTION PANEL: CPT | Performed by: INTERNAL MEDICINE

## 2022-06-17 PROCEDURE — 36415 COLL VENOUS BLD VENIPUNCTURE: CPT | Mod: PO | Performed by: INTERNAL MEDICINE

## 2022-06-17 PROCEDURE — 83970 ASSAY OF PARATHORMONE: CPT | Performed by: INTERNAL MEDICINE

## 2022-06-21 ENCOUNTER — TELEPHONE (OUTPATIENT)
Dept: NEPHROLOGY | Facility: CLINIC | Age: 82
End: 2022-06-21
Payer: MEDICARE

## 2022-06-21 NOTE — TELEPHONE ENCOUNTER
----- Message from Dia Jurado sent at 6/21/2022  1:33 PM CDT -----  Regarding: returning call  Contact: patient  Type:  Patient Returning Call    Who Called:  Patient   Who Left Message for Patient:  notsure  Does the patient know what this is regarding?:  no  Best Call Back Number:  [phone] (home)     Case number [id number]

## 2022-06-22 ENCOUNTER — OFFICE VISIT (OUTPATIENT)
Dept: NEPHROLOGY | Facility: CLINIC | Age: 82
End: 2022-06-22
Payer: MEDICARE

## 2022-06-22 VITALS
HEART RATE: 68 BPM | OXYGEN SATURATION: 98 % | BODY MASS INDEX: 27.42 KG/M2 | HEIGHT: 62 IN | SYSTOLIC BLOOD PRESSURE: 108 MMHG | WEIGHT: 149 LBS | DIASTOLIC BLOOD PRESSURE: 48 MMHG

## 2022-06-22 DIAGNOSIS — N06.9 ISOLATED PROTEINURIA WITH MORPHOLOGIC LESION: ICD-10-CM

## 2022-06-22 DIAGNOSIS — E87.1 HYPONATREMIA: ICD-10-CM

## 2022-06-22 DIAGNOSIS — N18.30 STAGE 3 CHRONIC KIDNEY DISEASE, UNSPECIFIED WHETHER STAGE 3A OR 3B CKD: Primary | ICD-10-CM

## 2022-06-22 DIAGNOSIS — M31.30 GRANULOMATOSIS WITH POLYANGIITIS WITH PULMONARY INVOLVEMENT: ICD-10-CM

## 2022-06-22 PROCEDURE — 3288F FALL RISK ASSESSMENT DOCD: CPT | Mod: CPTII,S$GLB,, | Performed by: INTERNAL MEDICINE

## 2022-06-22 PROCEDURE — 1160F PR REVIEW ALL MEDS BY PRESCRIBER/CLIN PHARMACIST DOCUMENTED: ICD-10-PCS | Mod: CPTII,S$GLB,, | Performed by: INTERNAL MEDICINE

## 2022-06-22 PROCEDURE — 3078F PR MOST RECENT DIASTOLIC BLOOD PRESSURE < 80 MM HG: ICD-10-PCS | Mod: CPTII,S$GLB,, | Performed by: INTERNAL MEDICINE

## 2022-06-22 PROCEDURE — 99999 PR PBB SHADOW E&M-EST. PATIENT-LVL III: ICD-10-PCS | Mod: PBBFAC,,, | Performed by: INTERNAL MEDICINE

## 2022-06-22 PROCEDURE — 99999 PR PBB SHADOW E&M-EST. PATIENT-LVL III: CPT | Mod: PBBFAC,,, | Performed by: INTERNAL MEDICINE

## 2022-06-22 PROCEDURE — 1159F MED LIST DOCD IN RCRD: CPT | Mod: CPTII,S$GLB,, | Performed by: INTERNAL MEDICINE

## 2022-06-22 PROCEDURE — 1100F PR PT FALLS ASSESS DOC 2+ FALLS/FALL W/INJURY/YR: ICD-10-PCS | Mod: CPTII,S$GLB,, | Performed by: INTERNAL MEDICINE

## 2022-06-22 PROCEDURE — 99214 OFFICE O/P EST MOD 30 MIN: CPT | Mod: S$GLB,,, | Performed by: INTERNAL MEDICINE

## 2022-06-22 PROCEDURE — 3074F SYST BP LT 130 MM HG: CPT | Mod: CPTII,S$GLB,, | Performed by: INTERNAL MEDICINE

## 2022-06-22 PROCEDURE — 1159F PR MEDICATION LIST DOCUMENTED IN MEDICAL RECORD: ICD-10-PCS | Mod: CPTII,S$GLB,, | Performed by: INTERNAL MEDICINE

## 2022-06-22 PROCEDURE — 3078F DIAST BP <80 MM HG: CPT | Mod: CPTII,S$GLB,, | Performed by: INTERNAL MEDICINE

## 2022-06-22 PROCEDURE — 1100F PTFALLS ASSESS-DOCD GE2>/YR: CPT | Mod: CPTII,S$GLB,, | Performed by: INTERNAL MEDICINE

## 2022-06-22 PROCEDURE — 3074F PR MOST RECENT SYSTOLIC BLOOD PRESSURE < 130 MM HG: ICD-10-PCS | Mod: CPTII,S$GLB,, | Performed by: INTERNAL MEDICINE

## 2022-06-22 PROCEDURE — 99214 PR OFFICE/OUTPT VISIT, EST, LEVL IV, 30-39 MIN: ICD-10-PCS | Mod: S$GLB,,, | Performed by: INTERNAL MEDICINE

## 2022-06-22 PROCEDURE — 3288F PR FALLS RISK ASSESSMENT DOCUMENTED: ICD-10-PCS | Mod: CPTII,S$GLB,, | Performed by: INTERNAL MEDICINE

## 2022-06-22 PROCEDURE — 1160F RVW MEDS BY RX/DR IN RCRD: CPT | Mod: CPTII,S$GLB,, | Performed by: INTERNAL MEDICINE

## 2022-06-22 NOTE — PROGRESS NOTES
"Subjective:       Patient ID: Aracelis Weber is a 81 y.o. White female who presents for return patient evaluation for chronic renal failure.      She has been treated with rituxan and steroids for Wegener's.  She had KAREN during her hospitalization in November.  She has chronic neuropathy in her feet.      Review of Systems   Constitutional: Negative for appetite change, chills and fever.   HENT: Positive for congestion and hearing loss.    Eyes: Negative for visual disturbance.   Respiratory: Positive for cough (dry cough). Negative for shortness of breath.    Cardiovascular: Positive for leg swelling. Negative for chest pain.   Gastrointestinal: Negative for abdominal pain, diarrhea, nausea and vomiting.   Genitourinary: Positive for difficulty urinating (urge incontinence). Negative for dysuria and hematuria.   Musculoskeletal: Negative for arthralgias, back pain and myalgias.   Skin: Negative for rash.   Neurological: Positive for numbness (feet B). Negative for headaches.   Psychiatric/Behavioral: Negative for sleep disturbance.       The past medical, family and social histories were reviewed for this encounter.     BP (!) 108/48 (BP Location: Left arm, Patient Position: Sitting, BP Method: Medium (Manual))   Pulse 68   Ht 5' 2" (1.575 m)   Wt 67.6 kg (149 lb)   SpO2 98%   BMI 27.25 kg/m²     Objective:      Physical Exam  Vitals reviewed.   Constitutional:       General: She is not in acute distress.     Appearance: She is well-developed.   HENT:      Head: Normocephalic and atraumatic.   Eyes:      General: No scleral icterus.     Conjunctiva/sclera: Conjunctivae normal.   Neck:      Vascular: No JVD.   Cardiovascular:      Rate and Rhythm: Normal rate and regular rhythm.      Heart sounds: Normal heart sounds. No murmur heard.    No friction rub. No gallop.   Pulmonary:      Effort: Pulmonary effort is normal. No respiratory distress.      Breath sounds: Normal breath sounds. No wheezing or " rales.   Abdominal:      General: Bowel sounds are normal. There is no distension.      Palpations: Abdomen is soft.      Tenderness: There is no abdominal tenderness.   Musculoskeletal:      Cervical back: Normal range of motion.      Right lower leg: Edema (1-2+) present.      Left lower leg: Edema (1-2+) present.   Skin:     General: Skin is warm and dry.      Findings: No rash.   Neurological:      Mental Status: She is alert and oriented to person, place, and time.   Psychiatric:         Mood and Affect: Mood normal.         Behavior: Behavior normal.         Assessment:       1. Stage 3 chronic kidney disease, unspecified whether stage 3a or 3b CKD    2. Wegener's disease, pulmonary    3. Isolated proteinuria with morphologic lesion    4. Hyponatremia        Plan:   Return to clinic in 6 months.  Labs for next visit include rp upc pth  Vit c/d mg q 3 mo per so.  Baseline creatinine is 0.8-1.1 since 2016.  UPC is 0.39 grams.  Renal US shows R 9.6 cm L 12.4 cm.  She was treated for her disease and has responded well.   Blood pressure is controlled on the current regimen.  Continue current medications as prescribed and reviewed.

## 2022-06-23 ENCOUNTER — PATIENT MESSAGE (OUTPATIENT)
Dept: NEPHROLOGY | Facility: CLINIC | Age: 82
End: 2022-06-23
Payer: MEDICARE

## 2022-07-14 ENCOUNTER — OFFICE VISIT (OUTPATIENT)
Dept: UROLOGY | Facility: CLINIC | Age: 82
End: 2022-07-14
Payer: MEDICARE

## 2022-07-14 DIAGNOSIS — N39.45 CONTINUOUS LEAKAGE OF URINE: Primary | ICD-10-CM

## 2022-07-14 DIAGNOSIS — N32.81 OAB (OVERACTIVE BLADDER): ICD-10-CM

## 2022-07-14 PROCEDURE — 3288F PR FALLS RISK ASSESSMENT DOCUMENTED: ICD-10-PCS | Mod: CPTII,S$GLB,,

## 2022-07-14 PROCEDURE — 1160F PR REVIEW ALL MEDS BY PRESCRIBER/CLIN PHARMACIST DOCUMENTED: ICD-10-PCS | Mod: CPTII,S$GLB,,

## 2022-07-14 PROCEDURE — 99999 PR PBB SHADOW E&M-EST. PATIENT-LVL III: ICD-10-PCS | Mod: PBBFAC,,,

## 2022-07-14 PROCEDURE — 1100F PTFALLS ASSESS-DOCD GE2>/YR: CPT | Mod: CPTII,S$GLB,,

## 2022-07-14 PROCEDURE — 1159F MED LIST DOCD IN RCRD: CPT | Mod: CPTII,S$GLB,,

## 2022-07-14 PROCEDURE — 87086 URINE CULTURE/COLONY COUNT: CPT

## 2022-07-14 PROCEDURE — 3288F FALL RISK ASSESSMENT DOCD: CPT | Mod: CPTII,S$GLB,,

## 2022-07-14 PROCEDURE — 99999 PR PBB SHADOW E&M-EST. PATIENT-LVL III: CPT | Mod: PBBFAC,,,

## 2022-07-14 PROCEDURE — 1100F PR PT FALLS ASSESS DOC 2+ FALLS/FALL W/INJURY/YR: ICD-10-PCS | Mod: CPTII,S$GLB,,

## 2022-07-14 PROCEDURE — 1159F PR MEDICATION LIST DOCUMENTED IN MEDICAL RECORD: ICD-10-PCS | Mod: CPTII,S$GLB,,

## 2022-07-14 PROCEDURE — 99203 PR OFFICE/OUTPT VISIT, NEW, LEVL III, 30-44 MIN: ICD-10-PCS | Mod: S$GLB,,,

## 2022-07-14 PROCEDURE — 1160F RVW MEDS BY RX/DR IN RCRD: CPT | Mod: CPTII,S$GLB,,

## 2022-07-14 PROCEDURE — 99203 OFFICE O/P NEW LOW 30 MIN: CPT | Mod: S$GLB,,,

## 2022-07-14 NOTE — PROGRESS NOTES
"[facility] Note  Staff: Amanda Chow FNP-C    PCP: MD Barry    Chief Complaint: Urinary Incontinence    Subjective:        HPI: Aracelis Weber is a 81 y.o. female NEW PATIENT presents today for evaluation of urinary incontinence. She is accompanied by her . She states her bladder cannot hold urine anymore. She states she wears 2 pads at a time and changes them about 15 times a day/night. She states she is ok when sitting down, but when she stands up the urine flows out. She describes urgency, frequency, and urge incontinence. She denies dysuria, hematuria, fever, and feeling of incomplete bladder emptying. She is currently not taking any bladder medications.     Questions asked pt during office visit today:  Urgency:Yes , incontinence with urgency? Yes ;   DysuriaNo  Gross HematuriaNo  History of UTI: No    History of Kidney Stones?:  No    Constipation issues?:  Yes    REVIEW OF SYSTEMS:  Review of Systems   Constitutional: Negative.  Negative for chills and fever.   HENT: Negative.    Eyes: Negative.    Respiratory: Negative.    Cardiovascular: Negative.    Gastrointestinal: Negative.  Negative for abdominal pain, nausea and vomiting.   Genitourinary: Positive for frequency and urgency. Negative for dysuria, flank pain and hematuria.   Musculoskeletal: Negative.  Negative for back pain.   Skin: Negative.    Neurological: Negative.    Endo/Heme/Allergies: Negative.    Psychiatric/Behavioral: Negative.        PMHx:  Past Medical History:   Diagnosis Date    Anesthesia     'Mother stop breathing after anesthesia"    Chronic sinusitis     Depression     GERD (gastroesophageal reflux disease)     PVC (premature ventricular contraction)        PSHx:  Past Surgical History:   Procedure Laterality Date    ADENOIDECTOMY      APPENDECTOMY      BREAST BIOPSY      HYSTERECTOMY      JOINT REPLACEMENT Bilateral     KNEE    NAVIGATIONAL BRONCHOSCOPY Right 9/17/2021    " Procedure: BRONCHOSCOPY, NAVIGATIONAL WITH BODY VISION;  Surgeon: Bimal Schwartz MD;  Location: [facility];  Service: Pulmonary;  Laterality: Right;    THUMB SURGERY      TONSILLECTOMY         Fam Hx:   malignancies: No , gyn malignancies: No   kidney stones: No     Soc Hx:  , lives in [place]    Allergies:  Plaquenil [hydroxychloroquine], Iodinated contrast media, Levofloxacin, and Penicillins    Medications: reviewed     Objective:   There were no vitals filed for this visit.    Physical Exam  Constitutional:       Appearance: Normal appearance.   HENT:      Head: Normocephalic.      Mouth/Throat:      Mouth: Mucous membranes are moist.   Eyes:      Conjunctiva/sclera: Conjunctivae normal.   Pulmonary:      Effort: Pulmonary effort is normal.   Abdominal:      General: There is no distension.      Palpations: Abdomen is soft.      Tenderness: There is no abdominal tenderness.   Musculoskeletal:         General: Normal range of motion.      Cervical back: Normal range of motion.   Skin:     General: Skin is warm.   Neurological:      Mental Status: She is alert and oriented to person, place, and time.   Psychiatric:         Mood and Affect: Mood normal.         Behavior: Behavior normal.           LABS REVIEW:  UA today:   Color:Clear, Yellow  Spec. Grav.  <1.005  PH  5.5  Negative for nitrates, protein, glucose, ketones, urobili, bili, and blood.  Trace leuks    Assessment:       1. Continuous leakage of urine    2. OAB (overactive bladder)          Plan:     1.  Referral placed for pelvic floor physical therapy as pt does not wish to take more medications  We discussed conservative measures to control urgency and frequency including avoiding bladder irritants, timed voiding, not postponing voiding, and bowel regimen (as distended bowel has extrinsic compressive effect on bladder. Discussed bladder irritants include coffe (even decaf), tea, alcohol, soda, spicy foods, acidic juices (orange, tomato),  vinegar, and artificial sweeteners.    F/u As Needed    [facility]: Active    Amanda Chow, REIDP-C

## 2022-07-16 LAB
BACTERIA UR CULT: NORMAL
BACTERIA UR CULT: NORMAL

## 2022-07-26 DIAGNOSIS — M31.30 GRANULOMATOSIS WITH POLYANGIITIS WITH PULMONARY INVOLVEMENT: ICD-10-CM

## 2022-07-27 RX ORDER — PREDNISONE 2.5 MG/1
2.5 TABLET ORAL DAILY
Qty: 30 TABLET | Refills: 11 | Status: SHIPPED | OUTPATIENT
Start: 2022-07-27 | End: 2022-07-27 | Stop reason: SDUPTHER

## 2022-07-27 RX ORDER — PREDNISONE 2.5 MG/1
2.5 TABLET ORAL DAILY
Qty: 90 TABLET | Refills: 3 | Status: SHIPPED | OUTPATIENT
Start: 2022-07-27 | End: 2023-08-02

## 2022-08-08 ENCOUNTER — OFFICE VISIT (OUTPATIENT)
Dept: RHEUMATOLOGY | Facility: CLINIC | Age: 82
End: 2022-08-08
Payer: MEDICARE

## 2022-08-08 VITALS
WEIGHT: 150 LBS | HEIGHT: 65 IN | HEART RATE: 70 BPM | SYSTOLIC BLOOD PRESSURE: 124 MMHG | DIASTOLIC BLOOD PRESSURE: 64 MMHG | BODY MASS INDEX: 24.99 KG/M2

## 2022-08-08 DIAGNOSIS — M35.3 POLYMYALGIA RHEUMATICA: Primary | ICD-10-CM

## 2022-08-08 PROCEDURE — 99999 PR PBB SHADOW E&M-EST. PATIENT-LVL III: ICD-10-PCS | Mod: PBBFAC,,, | Performed by: INTERNAL MEDICINE

## 2022-08-08 PROCEDURE — 3074F PR MOST RECENT SYSTOLIC BLOOD PRESSURE < 130 MM HG: ICD-10-PCS | Mod: CPTII,S$GLB,, | Performed by: INTERNAL MEDICINE

## 2022-08-08 PROCEDURE — 99999 PR PBB SHADOW E&M-EST. PATIENT-LVL III: CPT | Mod: PBBFAC,,, | Performed by: INTERNAL MEDICINE

## 2022-08-08 PROCEDURE — 1125F AMNT PAIN NOTED PAIN PRSNT: CPT | Mod: CPTII,S$GLB,, | Performed by: INTERNAL MEDICINE

## 2022-08-08 PROCEDURE — 3074F SYST BP LT 130 MM HG: CPT | Mod: CPTII,S$GLB,, | Performed by: INTERNAL MEDICINE

## 2022-08-08 PROCEDURE — 3078F PR MOST RECENT DIASTOLIC BLOOD PRESSURE < 80 MM HG: ICD-10-PCS | Mod: CPTII,S$GLB,, | Performed by: INTERNAL MEDICINE

## 2022-08-08 PROCEDURE — 99215 OFFICE O/P EST HI 40 MIN: CPT | Mod: S$GLB,,, | Performed by: INTERNAL MEDICINE

## 2022-08-08 PROCEDURE — 99215 PR OFFICE/OUTPT VISIT, EST, LEVL V, 40-54 MIN: ICD-10-PCS | Mod: S$GLB,,, | Performed by: INTERNAL MEDICINE

## 2022-08-08 PROCEDURE — 1125F PR PAIN SEVERITY QUANTIFIED, PAIN PRESENT: ICD-10-PCS | Mod: CPTII,S$GLB,, | Performed by: INTERNAL MEDICINE

## 2022-08-08 PROCEDURE — 3078F DIAST BP <80 MM HG: CPT | Mod: CPTII,S$GLB,, | Performed by: INTERNAL MEDICINE

## 2022-08-08 ASSESSMENT — ROUTINE ASSESSMENT OF PATIENT INDEX DATA (RAPID3)
PSYCHOLOGICAL DISTRESS SCORE: 2.2
PATIENT GLOBAL ASSESSMENT SCORE: 4.5
MDHAQ FUNCTION SCORE: 0.7
PAIN SCORE: 4.5
TOTAL RAPID3 SCORE: 3.78

## 2022-08-08 NOTE — PROGRESS NOTES
"    Subjective:          Chief Complaint: Aracelis Weber is a 81 y.o. female who had concerns including Disease Management.    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   Sinuses still congestion  +dry cough but no fevers, night time dry cough "tickle"   No SOB.     5/5/2022:   Patient doing very well. Still rhinitis, using navage. No antibiotics from ENT since last visit. She is still clearing significant mucous faint with dried blood.   Dry cough, no fevers, no SOB  Mild HA only with elevated BP.   Completed Rituxan maintanence at 4 months 4/20/2022.   Prolia 3/25/22  Evusheld with catch up dose 2/10/22 and 3/25/2022.     Major complaints: pins, needles, stinging, burning in feet day and night is constant. Dr. Campbell: EB-N5 supplement is helping some, stopped for months and noted significant worsening. Dr. Campbell did increase from EB-N3.   Gabapentin 400mg TID  Hydrocodone only PRN  Prednisone 7.5mg       2/2022  Doing well no cough, no fevers. Recent COVID exposure but negative.   Seen with Nephro.   Given her successful response with Ritux. Agree to continue maintanance with Ritux Q 4-6 months    Needs to get #3 COVID vaccine timing with RTX. - will time this with me 2 weeks prior to   Discussed Prolia  Working on Evusheld for PreP with COVID> she is scheduled 2/10/22      12/9/2021  Patient's recent CXR 12/21/21 with marked improvement when compared to 10/21/21  Prednisone 10mg daily  S/p RTX x 4 infusions completion date. 11/5/21  Patient with rise in creatinine to 2.0 on 11/26 had been requiring lasix for marked edema since 11/1/21. Stopped 2 weeks ago. Edema is present but managed. Drinking about 40 oz water and 3 cups of coffee daily    Patient completed C PT felt she was doing well home exercises about 2 weeks ago with acute pain in lumbar spine after exercising. " Imaging lumbar few days ago without evidence of a fracture. She point to pain in the lumbosacral and radiating to buttock region. Ok to rise from seated. Pain more on left low back and buttock. No numbness no tingling but pain in the hamstring region. Comfortable sitting but not lying down, but she never sleeps in bed. Long hx of rather impressive scoliosis.       11/11/2021:   Inflammatory markers are markedly improved.   Cough dry still at night. Patient noting she has lost sense of smell. Can still taste.     Patient recently hospitatlized for Bronch with negative cultures to date.  There was a concern for possible atypical infection she has had enlarging right upper lobe cavitary lesion measures still has a right middle lobe lesion with some cavitation now as well.  I have discussed this case extensively with both Dr. Schwartz as well as Dr. Joseph upon discharge we felt comfortable that this is likely Wegener's granulomatosis causing cavitary lesions.  Supporting sinus biopsy does note granuloma.    Patient was started on Imuran in March of 2021 but with the growth of her lesion we had rule out for any possible infection given her status of immunosuppression.  She is here today not in her usual state she appears fatigued pale she is not complaining of shortness of breath but does have a cough dry persistent.  She is noticing increased pedal edema.         9/13/21: patient with 2 falls 8 days apart. Spoke w/ pulm shortly following initial fall and we were scheduled for bronch when she sustained another fall.   Scheduled now for 9/17/21 bronch. RUL lesion.   Remains off Imuran  Prednisone 15mg daily.   Sinus congestion. Still productive     She is noting some productive cough at night no hemoptysis.   Patient denies SOB  Having more HA. -frontal and sinus, she is noting some drainage in right ear some mucous/blood.   No edema.   She notes fatigue, no fevers, no night sweats. No weight loss.   Skin easily bruising.  "    No personal hx of any malignancy.       7/20/2021:    Spoke with Pulm plan for bronch is able working on possible bx site vs BAL.   Need to r/o infectious/neoplastic and confirm for ANCA (GPA vasculitis)   Inflammatory markers markedly down .8  Now 14.   H/H improve from 7.4 to 8.2  Platelets normalized.   Renal stable over time    Patient did attend wedding with approx 300 people unmasked last weekend. Reviewed by recs for COVID precautions given    Current regimen:   pred 15mg (Na stable)  Holding Imuran    Interval events: PET with hypermetabolic lesions:   a. RUL cavitary/cystic lesion 1.9cm x 1.3cm  b. RUL spiculated 1.2cm x 1.4cm  c. RML 1.7cm x 2.0 cm  All with differential: infectious, inflammatory, neoplastic, grannulomatous (a) with favor of inflammation given the rapid change    Fungal immunodiffusion neg  Quant gold and T spot: neg  H/H improving     Sinus congestion, pressure, headache, x 3 weeks very productive with blowing nose, back on navage. :  started Cindamycin with DR Quinn x 10 days.   Patient denies any shortness breath.   Very fatigues.   No more fevers. Slight dry cough, no blood/ no mucous.   No SOB.       5/12/21 completed nasal irrigation with biopsy not definitive for GPA, but + inflammation and granulomas. Temporal artery bx negative. Patient with PCP same day as labs 6/2/21  dx with UTI on keflex.     Was seen apparently in ED in [place] for 104F she had altered mental status, dx with UTI, dehydration, treated with. Completed all 7 days of keflex and within 48 hours with another fever 104 F. She continues with congestion but no worse. Patient denies cough, hemoptysis, bloody nasal discharge. She has had no fever x 10 days. Checks twice daily.   while travelling told "congestion in chest on xray". Inflammatory markers very high again.   Patient with recent concern for wegeners needs CT chest. CXR with new airspace opacities right chest- need to r/o GGO vs infection. CT has been " ordered pending scheduling.   Broad ABX coverage recommended for now will cc Dr. Natarajan.     I am limited here with her  severe edema from higher dose steroids. Max tolerated is 20mg daily pred  Started Imuran 5/5/2021. Very low dose 50mg BID (0.65mg/kg) tolerating VERY well.   H/H still low.         2/2021  Sinususitis, cx were negative.  Using nasal irrigation with mucoid disharge.   Patient continues with significant nasal discharge and blood with scabs. We discussed Wegeners but pathology sinus no vasculitis, granuloma noted.   No HA, no blurred vision. Recent sinus infection with HA for 10 days. cx +, but also repeat labs demonstarting +PR3 and +ANCA       Patient with c/o right > left 3,4 finger numbness that was intermittent until approximately 2 weeks about having near constant numbness in hands, pins and needles and pain.   Patient not noting much improvement with change in position.     Off MTX 6  tabs weekly.   Doing well with Hydroxychloroquine.   Declined COVID vaccine    Gabapentin up to 300mg TID.   Now with Dr. Campbell doing PT for Plantar fasciitis. She is taking supplement and compound cream for joint and neuropathy. No response to tarsal tunnel injection per Dr. Campbell.   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 WBC ok.       11/2020  Patient with PMR   MTX at 4 tabs weekly new anemia. Thrombocytosis  Pred at 10mg   Complicated with peripheral edema rather severe , started MTX seeing trending CRP/ESR but persistent leg pain.   Seen with PCP for neuropathy s/sx not seeing much benefit with gabpentin  Seen with Cardiology- no concern for cardiac ECHO ok. dc'd lasix for hyponatremia.     8/2020  Pred 20mg with mild edema, pred 30 mg with severe edema.   Current Pred 15mg daily  Rising ESR again.   Lasix 20 mg daily with improved edema but having some pins and needles in feet/legs.   Having tight,  burning stinging at the toes and feet. Heaviness. Prickly.   Noting sodium is falling, K 5.2-5.4 still using           7/2020:  Hands not painful, knees not painful. Shoulder/cervical and down the arms, markedly improved. She still has some pain him hips and groin with walking and some weakness to the legs with edema. Some pain with fixing own hair.   She takes in AM regarding hips ache, some shoulder/arms, and again at night because hips/legs.   The clue is that the LDN new dose she had some ASE, previously tolerated 3mg daily fine.     Historical review of present Illness.   Patient with a recent chronic sinusitis that ultimately warranted a septoplasty, endoscopic sinus surgery and turbinate reduction 5/2020   Four weeks postop t increasingly worse she was noting pain in her throat and ears lasting several hours complaint of pdizziness she had symptoms of ear pain and decreased hearing in the left ear.  She underwent a debridement at this visit she continue with compound nasal irrigations.  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    Patient did have repeat procedure on with biopsies taken 07/03/2020 showing right maxillary sinus chronically inflamed fibrotic polypoid portions benign nasal mucosa left maxillary sinus similar polypoid fragments ulcerated markedly inflamed respiratory mucosa focal foreign body giant cell response noted suggested that this might have been from her  Gelfoam packing as a possible cause of this reaction    I have spoken with Dr. Quinn prior to patient's visit today and was a concern that she could have triggered some inflammatory response with the Gelfoam packing    Patient states she no longer has a headache is not noticing much ear drainage continues with profound loss of hearing on the left and rather significant on the right both sensorineural and some conductive changes.    Restarted on prednisone as of 7/20/20  10 mg patient has not noticed any change with her hearing in this time she did notice slight improvement with her joint aches and pains but is still relying on hydrocodone for the bulk of this.    She denies a persistent headache she does have some tenderness about the preauricular region particularly on the right more than the left.  She has denied jaw claudication changes in her voice or any amaurosis fugax.  She has no pre-existing history of joint aches and pains hip or otherwise.  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  She had a workup that involved a negative rheumatoid factor, age appropriate sedimentation rate,C reactive protein JANET was positive 1:160 in speckled pattern        Previous: Hx:   She is having pain in her hand with stiffness and right 2nd PIP joint now unable to flex. 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   Hx of bilateral TKA 3 and 5 years ago and those are doing well.   She notes intermittent edema. Some hammer toe deformity bilaterally making shoes problematic but not painful otherwise.   Long hx of GERD-severe.   Cannot tolerate NSAIDs at all w/o gastritis.   Can use Hydrocodone PRN   Added Naltrexone at 3mg and doing very well  Lost 30# with WW.   Patient denies weight loss, rashes, dry eye, dry mouth, nasal or palatal ulcerations,  lymphadenopathy, Raynaud's, hx of DVT/miscarriages, psoriasis or family hx of psoriasis, rashes,  serositis, anemia or other constitutional symptoms.  Asking about naltrexone  Component      Latest Ref Rng & Units 8/8/2020 7/29/2020 7/20/2020 7/13/2020   Sodium      136 - 145 mmol/L  126 (L) 128 (L)    Potassium      3.5 - 5.1 mmol/L  5.4 (H) 5.2 (H)    Chloride      95 - 110 mmol/L  92 (L) 92 (L)    CO2      23 - 29 mmol/L  25 29    Glucose      70 - 110 mg/dL  109 109    BUN, Bld      8 - 23 mg/dL  13 11    Creatinine      0.5 - 1.4 mg/dL  0.8 0.8    Calcium      8.7 - 10.5 mg/dL  9.5 9.7    Anion Gap      8 - 16 mmol/L  9 7 (L)    eGFR if African American      >60 mL/min/1.73 m:2  >60.0 >60.0    eGFR if non African American      >60 mL/min/1.73 m:2  >60.0 >60.0    Anti Sm Antibody      0.00 - 0.99 Ratio    0.04   Anti-Sm Interpretation      Negative    Negative   Anti Sm/RNP Antibody      0.00 - 0.99 Ratio    0.09   Anti-Sm/RNP Interpretation      Negative    Negative   JANTE Screen      None Detected       Rheumatoid Factor      0.0 - 15.0 IU/mL       Sed Rate      0 - 36 mm/Hr 57 (H)  54 (H) 75 (H)   CRP      0.0 - 8.2 mg/L 75.4 (H) 56.1 (H) 57.5 (H) 99.4 (H)   Anti-Histone Antibody      0.0 - 0.9 Units    0.4   ds DNA Ab      Negative 1:10    Negative 1:10     Component      Latest Ref Rng & Units 6/25/2020 11/10/2018   Sodium      136 - 145 mmol/L     Potassium      3.5 - 5.1 mmol/L     Chloride      95 - 110 mmol/L     CO2      23 - 29 mmol/L     Glucose      70 - 110 mg/dL     BUN, Bld      8 - 23 mg/dL     Creatinine      0.5 - 1.4 mg/dL     Calcium      8.7 - 10.5 mg/dL     Anion Gap      8 - 16 mmol/L     eGFR if African American      >60 mL/min/1.73 m:2     eGFR if non African American      >60 mL/min/1.73 m:2     Anti Sm Antibody      0.00 - 0.99 Ratio     Anti-Sm Interpretation      Negative     Anti Sm/RNP Antibody      0.00 - 0.99 Ratio     Anti-Sm/RNP Interpretation      Negative     JANET Screen      None Detected Detected (A) Detected (A)   Rheumatoid Factor      0.0 - 15.0 IU/mL 11.6 <8.6   Sed  "Rate      0 - 36 mm/Hr     CRP      0.0 - 8.2 mg/L     Anti-Histone Antibody      0.0 - 0.9 Units     ds DNA Ab      Negative 1:10         REVIEW OF SYSTEMS:    Review of Systems   Constitutional: Negative for fever, malaise/fatigue and weight loss.   HENT: Negative for sore throat.    Eyes: Negative for double vision, photophobia and redness.   Respiratory: Negative for cough, shortness of breath and wheezing.    Cardiovascular: Negative for chest pain, palpitations and orthopnea.   Gastrointestinal: Negative for abdominal pain, constipation and diarrhea.   Genitourinary: Negative for dysuria, hematuria and urgency.   Musculoskeletal: Positive for joint pain. Negative for back pain and myalgias.   Skin: Negative for rash.   Neurological: Negative for dizziness, tingling, focal weakness and headaches.   Endo/Heme/Allergies: Does not bruise/bleed easily.   Psychiatric/Behavioral: Negative for depression, hallucinations and suicidal ideas.               Objective:            Past Medical History:   Diagnosis Date    Anesthesia     'Mother stop breathing after anesthesia"    Chronic sinusitis     Depression     GERD (gastroesophageal reflux disease)     PVC (premature ventricular contraction)      Family History   Problem Relation Age of Onset    Heart disease Mother     Arthritis Mother     Cancer Sister     Arthritis Sister     Diabetes Sister     Hypertension Sister      Social History     Tobacco Use    Smoking status: Former Smoker     Start date: 2/3/1955     Quit date: 1/3/1960     Years since quittin.6    Smokeless tobacco: Never Used   Substance Use Topics    Alcohol use: Never     Alcohol/week: 0.0 standard drinks    Drug use: Never         Current Outpatient Medications on File Prior to Visit   Medication Sig Dispense Refill    acetaminophen (TYLENOL) 500 MG tablet Take 500 mg by mouth every 6 (six) hours as needed for Pain.      ascorbate calcium (MAHSA-C ORAL) Take by mouth.      B2-B6 phos-levomef " simran-mecobal (EB-N3 DR) 1.3-70-6-4 mg CpDR Take 1 capsule by mouth once daily.      cetirizine (ZYRTEC) 10 MG tablet TAKE 1 TABLET EVERY DAY 90 tablet 0    cholecalciferol, vitamin D3, (VITAMIN D3) 125 mcg (5,000 unit) Tab Take 1 tablet (5,000 Units total) by mouth once daily. 90 tablet 0    diclofenac sodium (VOLTAREN) 1 % Gel APPLY TO THE AFFECTED AREA(S) 2 GRAMS THREE TIMES DAILY 100 g 6    furosemide (LASIX) 20 MG tablet TAKE 1 TABLET EVERY DAY 90 tablet 3    gabapentin (NEURONTIN) 400 MG capsule TAKE 1 CAPSULE THREE TIMES DAILY 270 capsule 0    HYDROcodone-acetaminophen (NORCO) 7.5-325 mg per tablet Take 1 tablet by mouth every 12 (twelve) hours as needed for Pain. 60 tablet 0    metoprolol succinate (TOPROL-XL) 50 MG 24 hr tablet Take 1 tablet (50 mg total) by mouth once daily. 90 tablet 3    omeprazole (PRILOSEC) 40 MG capsule TAKE 1 CAPSULE EVERY MORNING 90 capsule 0    predniSONE (DELTASONE) 2.5 MG tablet Take 1 tablet (2.5 mg total) by mouth once daily. 90 tablet 3    predniSONE (DELTASONE) 5 MG tablet Take 1 tablet (5 mg total) by mouth once daily. 90 tablet 3    vit C/E/Zn/coppr/lutein/zeaxan (PRESERVISION AREDS-2 ORAL) Take 1 capsule by mouth 2 (two) times a day.       [DISCONTINUED] naltrexone capsule Take 3 mg by mouth once daily.       No current facility-administered medications on file prior to visit.       Vitals:    08/08/22 1619   BP: 124/64   Pulse: 70       Physical Exam:    Physical Exam   Constitutional: She is oriented to person, place, and time. She appears well-developed and well-nourished.   HENT:   Head: Normocephalic and atraumatic.   Mouth/Throat: Oropharynx is clear and moist.   Eyes: Pupils are equal, round, and reactive to light. EOM are normal.   Neck: Normal range of motion.   Cardiovascular: Normal rate, regular rhythm and normal heart sounds.   Pulmonary/Chest: Effort normal and breath sounds normal.   Musculoskeletal:        Right shoulder: She exhibits normal range of motion,  no tenderness and no swelling.        Left shoulder: She exhibits normal range of motion, no tenderness and no swelling.        Right elbow: She exhibits normal range of motion and no swelling. No tenderness found.        Left elbow: She exhibits normal range of motion and no swelling. No tenderness found.        Right wrist: She exhibits normal range of motion, no tenderness and no swelling.        Left wrist: She exhibits normal range of motion, no tenderness and no swelling.        Right knee: She exhibits normal range of motion and no swelling. No tenderness found.        Left knee: She exhibits normal range of motion and no swelling. No tenderness found.        Right hand: She exhibits decreased range of motion and tenderness. She exhibits no swelling.        Left hand: She exhibits decreased range of motion and tenderness. She exhibits no swelling.        Right foot: There is normal range of motion, no tenderness and no swelling.        Left foot: There is normal range of motion, no tenderness and no swelling.   Bony hypertrophy at the PIP and DIP joints. +squaring at the CMC joint. No active synovitis on 28 joint exam.    Neurological: She is alert and oriented to person, place, and time.   Skin: Skin is warm and dry.   Psychiatric: She has a normal mood and affect. Her behavior is normal.     Component      Latest Ref Rng & Units 9/17/2021 9/17/2021          11:03 AM 11:03 AM   Respiratory Culture        No growth   Gram Stain (Respiratory)       No organisms seen No WBC's   AFB Culture & Smear           AFB CULTURE STAIN           GENET Prep           Fungus (Mycology) Culture           Aerobic Bacterial Culture             Component      Latest Ref Rng & Units 9/17/2021          11:03 AM   Respiratory Culture       No Staph aureus, MRSA or Pseudomonas isolated.   Gram Stain (Respiratory)       No organisms seen   AFB Culture & Smear          AFB CULTURE STAIN          GENET Prep          Fungus (Mycology)  Culture          Aerobic Bacterial Culture            Component      Latest Ref Rng & Units 2021          11:03 AM 11:03 AM   Respiratory Culture       Normal respiratory nimesh    Gram Stain (Respiratory)       Moderate WBC's    AFB Culture & Smear        Culture in progress   AFB CULTURE STAIN        No acid fast bacilli seen.   KOH Prep        No yeast or fungal elements seen   Fungus (Mycology) Culture        Culture in progress   Aerobic Bacterial Culture                 Narrative  Performed by: [facility]  Patient - ONIEL ROSARIO                   - 1940 Sex- F   Med Rec # - [id number]                        Bimal Mckeon M.D.   The following is an electronic copy of report # [id number] from:   [facility]   [address] [place] [address]                         Phone [phone]   DIAGNOSIS:   2021  JL/shakeel     1, 2.  RIGHT UPPER LOBE MASS BRUSHING AND FINE NEEDLE ASPIRATE BIOPSIES:   - NEGATIVE FOR MALIGNANT CELLS.     - PURULENT EXUDATE.     - A FEW FRAGMENTS OF BRONCHIAL MUCOSA WITH FLORID CHRONIC BRONCHITIS    AND REACTIVE SQUAMOUS    ATYPICAL METAPLASIA.       Comment:   Special stains (AFB and GMS) are negative for organisms. While no    granulomas are seen here, the inflammatory reaction appears to be    similar to that seen in the sinus material over the past few years    (Delta [id number], [id number], [id number]). 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 Bronchoscopic material, particularly of the lower    respiratory tract, is of low yield for a definitive diagnosis of that    entity. The previous sinus material will be reviewed with    consideration for that diagnosis.   _______________________________________________________________________   SPECIMEN AND SOURCE:   1. RUL mass brushing   2.  RUL mass needle     CLINICAL INFORMATION:   Clinical Information:-gt Right Upper Lobe Mass   Specific Site:-gt RUL mass brushing T Brush; RUL Mass; RUL mass-    microbiology; RUL BAL; RUL washing;   Other Requests:-gt N/A     GROSS DESCRIPTION:   1. Received 40 mL of fluid in formalin, 1 dry slides and 1 slides in    95% alcohol. Prepared one cell block.     2. Received 40 mL of fluid in formalin, 1 dry slides and 1 slides in    95% alcohol. Prepared one cell block.     CODE: 0     Cytotechnologist: ABDIFATAH Marvin (ASCP)   Pathologist: Scott Johnson MD (Electronic Signature) 09/22/2021 2:34 PM     [facility], [facility] * [address] [place] [address] * [place] [address]   Technical services performed at: [facility] * [address] [place] [address] * [place] [address]   Screening Site: [facility], [facility] * [address] [place] [address] *    [place] [address]                         Post Rituxan image                                     Pre Rituxan image  Assessment:       Encounter Diagnosis   Name Primary?    Polymyalgia rheumatica Yes          Plan:        Polymyalgia rheumatica  -     ANTI-NEUTROPHILIC CYTOPLASMIC ANTIBODY; Future; Expected date: 08/08/2022  -     Proteinase 3 Autoantibodies; Future; Expected date: 08/08/2022  -     Sedimentation rate; Future; Expected date: 08/08/2022  -     C-Reactive Protein; Standing; Expected date: 08/08/2022  -     CBC Auto Differential; Standing; Expected date: 08/08/2022  -     Basic Metabolic Panel; Future; Expected date: 08/08/2022  -     X-Ray Chest PA And Lateral; Future; Expected date: 08/08/2022     Patient is a 81-year-old female who presented with robust inflammatory reaction within the sinus cavity as well as hearing deficit following a sinus surgery.  She has been repeated cultured postoperatively completed antibiotics and irrigations negative for any fungal infections or bacterial infections. We initially suspected Temporal arteritis but  ultimately diagnosed with GPA/Wegener's -Sinus and pulmonary involvement.     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    Completed RTX 11/5/21 Induction at 4 weekly doses of 375mg/m2. Our plans were for Rituxan at 500mg on day 0 and 4 as per below, but due to scheduling with COVID,  vaccines/evusheld, etc and auth, transportation,  we elected for 4/20/22 single dose  at 1,000mg. 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 Patient has tolerated and performed very well on a modified schedule of a single dose of 1,000mg  at the same intervals (instead of two 500mg doses 14 days apart) and we will continue with this schedule as long as markers remain stable.       t spot 5/2021 negative.    Hep neg.    covid completed #3 now.    Evusheld Q 6 months for now as we slowly get vaccinations on board in between infusions.        CKD advanced rather quickly following lasix 20mg every other day.    F/u with Nephro.     Continue omeprazole (prilosec)  40mg by mouth daily.        Patient hx of Osteopenia only, renal function not stable enough for oral bisphos. Chronic steroids.    Continue Prolia every 6 months.     Continue prednisone to 7.5mg (1.5 tablets) daily.     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 This will be at 6,12,18 months-  due to above we administered a 1,000mg as single dose 4/20/22 and next will be approx 10//2022     Tracking labs and serologies for next dose.     Evusheld next is 9/2022.     COVID vaccination #4 will be also 9/2022. [facility]  users can check availability and schedule their vaccinations via [facility] or by calling [phone].      No follow-ups on file.      f/u 3 months  Thank you for allowing me to participate in the care of this very pleasant patient.       40 min consultation with greater than 50% spent in counseling, chart review and coordination of care. All questions answered.                                                          Subjective:          Chief Complaint: Aracelis Weber is a 81 y.o. female who had concerns including Disease Management.    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 Still rhinitis, using navage. No antibiotics from ENT since last visit. She is still clearing significant mucous faint with dried blood.   Dry cough, no fevers, no SOB  Mild HA only with elevated BP.   Completed Rituxan maintanence at 4 months 4/20/2022.   Prolia 3/25/22  Evusheld with catch up dose 2/10/22 and 3/25/2022.     Major complaints: pins, needles, stinging, burning in feet day and night is constant. Dr. Campbell: EB-N5 supplement is helping some, stopped for months and noted significant worsening. Dr. Campbell did increase from EB-N3.   Gabapentin 400mg TID  Hydrocodone only PRN  Prednisone 7.5mg       2/2022  Doing well no cough, no fevers. Recent COVID exposure but negative.   Seen with Nephro.   Given her successful response with Ritux. Agree to continue maintanance with Ritux Q 4-6 months    Needs to get #3 COVID vaccine timing with RTX. - will time this with me 2 weeks prior to   Discussed Prolia  Working on Evusheld for PreP with COVID> she is scheduled 2/10/22      12/9/2021  Patient's recent CXR 12/21/21 with marked improvement when compared to 10/21/21  Prednisone 10mg daily  S/p RTX x 4 infusions completion date. 11/5/21  Patient with rise in creatinine to 2.0 on 11/26 had been requiring lasix for marked edema since 11/1/21. Stopped 2 weeks ago. Edema is present but  managed. Drinking about 40 oz water and 3 cups of coffee daily    Patient completed [facility] PT felt she was doing well home exercises about 2 weeks ago with acute pain in lumbar spine after exercising. Imaging lumbar few days ago without evidence of a fracture. She point to pain in the lumbosacral and radiating to buttock region. Ok to rise from seated. Pain more on left low back and buttock. No numbness no tingling but pain in the hamstring region. Comfortable sitting but not lying down, but she never sleeps in bed. Long hx of rather impressive scoliosis.       11/11/2021:   Inflammatory markers are markedly improved.   Cough dry still at night. Patient noting she has lost sense of smell. Can still taste.     Patient recently hospitatlized for Bronch with negative cultures to date.  There was a concern for possible atypical infection she has had enlarging right upper lobe cavitary lesion measures still has a right middle lobe lesion with some cavitation now as well.  I have discussed this case extensively with both Dr. Schwartz as well as Dr. Joseph upon discharge we felt comfortable that this is likely Wegener's granulomatosis causing cavitary lesions.  Supporting sinus biopsy does note granuloma.    Patient was started on Imuran in March of 2021 but with the growth of her lesion we had rule out for any possible infection given her status of immunosuppression.  She is here today not in her usual state she appears fatigued pale she is not complaining of shortness of breath but does have a cough dry persistent.  She is noticing increased pedal edema.         9/13/21: patient with 2 falls 8 days apart. Spoke w/ pulm shortly following initial fall and we were scheduled for bronch when she sustained another fall.   Scheduled now for 9/17/21 bronch. RUL lesion.   Remains off Imuran  Prednisone 15mg daily.   Sinus congestion. Still productive     She is noting some productive cough at night no hemoptysis.   Patient denies  "SOB  Having more HA. -frontal and sinus, she is noting some drainage in right ear some mucous/blood.   No edema.   She notes fatigue, no fevers, no night sweats. No weight loss.   Skin easily bruising.     No personal hx of any malignancy.       7/20/2021:    Spoke with Pulm plan for bronch is able working on possible bx site vs BAL.   Need to r/o infectious/neoplastic and confirm for ANCA (GPA vasculitis)   Inflammatory markers markedly down .8  Now 14.   H/H improve from 7.4 to 8.2  Platelets normalized.   Renal stable over time    Patient did attend wedding with approx 300 people unmasked last weekend. Reviewed by recs for COVID precautions given    Current regimen:   pred 15mg (Na stable)  Holding Imuran    Interval events: PET with hypermetabolic lesions:   a. RUL cavitary/cystic lesion 1.9cm x 1.3cm  b. RUL spiculated 1.2cm x 1.4cm  c. RML 1.7cm x 2.0 cm  All with differential: infectious, inflammatory, neoplastic, grannulomatous (a) with favor of inflammation given the rapid change    Fungal immunodiffusion neg  Quant gold and T spot: neg  H/H improving     Sinus congestion, pressure, headache, x 3 weeks very productive with blowing nose, back on navage. :  started Cindamycin with DR Quinn x 10 days.   Patient denies any shortness breath.   Very fatigues.   No more fevers. Slight dry cough, no blood/ no mucous.   No SOB.       5/12/21 completed nasal irrigation with biopsy not definitive for GPA, but + inflammation and granulomas. Temporal artery bx negative. Patient with PCP same day as labs 6/2/21  dx with UTI on keflex.     Was seen apparently in ED in [place] for 104F she had altered mental status, dx with UTI, dehydration, treated with. Completed all 7 days of keflex and within 48 hours with another fever 104 F. She continues with congestion but no worse. Patient denies cough, hemoptysis, bloody nasal discharge. She has had no fever x 10 days. Checks twice daily.   while travelling told "congestion " "in chest on xray". Inflammatory markers very high again.   Patient with recent concern for wegeners needs CT chest. CXR with new airspace opacities right chest- need to r/o GGO vs infection. CT has been ordered pending scheduling.   Broad ABX coverage recommended for now will cc Dr. Natarajan.     I am limited here with her  severe edema from higher dose steroids. Max tolerated is 20mg daily pred  Started Imuran 5/5/2021. Very low dose 50mg BID (0.65mg/kg) tolerating VERY well.   H/H still low.         2/2021  Sinususitis, cx were negative.  Using nasal irrigation with mucoid disharge.   Patient continues with significant nasal discharge and blood with scabs. We discussed Wegeners but pathology sinus no vasculitis, granuloma noted.   No HA, no blurred vision. Recent sinus infection with HA for 10 days. cx +, but also repeat labs demonstarting +PR3 and +ANCA       Patient with c/o right > left 3,4 finger numbness that was intermittent until approximately 2 weeks about having near constant numbness in hands, pins and needles and pain.   Patient not noting much improvement with change in position.     Off MTX 6  tabs weekly.   Doing well with Hydroxychloroquine.   Declined COVID vaccine    Gabapentin up to 300mg TID.   Now with Dr. Campbell doing PT for Plantar fasciitis. She is taking supplement and compound cream for joint and neuropathy. No response to tarsal tunnel injection per Dr. Campbell.   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 WBC ok.       11/2020  Patient with PMR   MTX at 4 tabs weekly new anemia. Thrombocytosis  Pred at 10mg   Complicated with peripheral edema rather severe , started MTX seeing trending CRP/ESR but persistent leg pain.   Seen with PCP for neuropathy s/sx not seeing much benefit with gabpentin  Seen with Cardiology- no concern for cardiac ECHO ok. dc'd lasix for hyponatremia.     8/2020  Pred " 20mg with mild edema, pred 30 mg with severe edema.   Current Pred 15mg daily  Rising ESR again.   Lasix 20 mg daily with improved edema but having some pins and needles in feet/legs.   Having tight, burning stinging at the toes and feet. Heaviness. Prickly.   Noting sodium is falling, K 5.2-5.4 still using           7/2020:  Hands not painful, knees not painful. Shoulder/cervical and down the arms, markedly improved. She still has some pain him hips and groin with walking and some weakness to the legs with edema. Some pain with fixing own hair.   She takes in AM regarding hips ache, some shoulder/arms, and again at night because hips/legs.   The clue is that the LDN new dose she had some ASE, previously tolerated 3mg daily fine.     Historical review of present Illness.   Patient with a recent chronic sinusitis that ultimately warranted a septoplasty, endoscopic sinus surgery and turbinate reduction 5/2020   Four weeks postop t increasingly worse she was noting pain in her throat and ears lasting several hours complaint of pdizziness she had symptoms of ear pain and decreased hearing in the left ear.  She underwent a debridement at this visit she continue with compound nasal irrigations.  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    Patient did have repeat procedure on with biopsies taken 07/03/2020 showing right maxillary sinus chronically inflamed fibrotic polypoid portions benign  nasal mucosa left maxillary sinus similar polypoid fragments ulcerated markedly inflamed respiratory mucosa focal foreign body giant cell response noted suggested that this might have been from her Gelfoam packing as a possible cause of this reaction    I have spoken with Dr. Quinn prior to patient's visit today and was a concern that she could have triggered some inflammatory response with the Gelfoam packing    Patient states she no longer has a headache is not noticing much ear drainage continues with profound loss of hearing on the left and rather significant on the right both sensorineural and some conductive changes.    Restarted on prednisone as of 7/20/20  10 mg patient has not noticed any change with her hearing in this time she did notice slight improvement with her joint aches and pains but is still relying on hydrocodone for the bulk of this.    She denies a persistent headache she does have some tenderness about the preauricular region particularly on the right more than the left.  She has denied jaw claudication changes in her voice or any amaurosis fugax.  She has no pre-existing history of joint aches and pains hip or otherwise.  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  She had a workup that involved a negative rheumatoid factor, age appropriate sedimentation rate,C reactive protein JANET was positive 1:160 in speckled pattern        Previous: Hx:   She is having pain in her hand with stiffness and right 2nd PIP joint now unable to flex. 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   Hx of bilateral TKA 3 and 5 years ago and those are doing well.   She notes intermittent edema. Some hammer toe deformity bilaterally making shoes problematic but not painful otherwise.   Long hx of GERD-severe.   Cannot tolerate NSAIDs at all w/o gastritis.   Can use Hydrocodone PRN   Added Naltrexone at 3mg and doing very well  Lost  30# with WW.   Patient denies weight loss, rashes, dry eye, dry mouth, nasal or palatal ulcerations,  lymphadenopathy, Raynaud's, hx of DVT/miscarriages, psoriasis or family hx of psoriasis, rashes, serositis, anemia or other constitutional symptoms.  Asking about naltrexone  Component      Latest Ref Rng & Units 8/8/2020 7/29/2020 7/20/2020 7/13/2020   Sodium      136 - 145 mmol/L  126 (L) 128 (L)    Potassium      3.5 - 5.1 mmol/L  5.4 (H) 5.2 (H)    Chloride      95 - 110 mmol/L  92 (L) 92 (L)    CO2      23 - 29 mmol/L  25 29    Glucose      70 - 110 mg/dL  109 109    BUN, Bld      8 - 23 mg/dL  13 11    Creatinine      0.5 - 1.4 mg/dL  0.8 0.8    Calcium      8.7 - 10.5 mg/dL  9.5 9.7    Anion Gap      8 - 16 mmol/L  9 7 (L)    eGFR if African American      >60 mL/min/1.73 m:2  >60.0 >60.0    eGFR if non African American      >60 mL/min/1.73 m:2  >60.0 >60.0    Anti Sm Antibody      0.00 - 0.99 Ratio    0.04   Anti-Sm Interpretation      Negative    Negative   Anti Sm/RNP Antibody      0.00 - 0.99 Ratio    0.09   Anti-Sm/RNP Interpretation      Negative    Negative   JANET Screen      None Detected       Rheumatoid Factor      0.0 - 15.0 IU/mL       Sed Rate      0 - 36 mm/Hr 57 (H)  54 (H) 75 (H)   CRP      0.0 - 8.2 mg/L 75.4 (H) 56.1 (H) 57.5 (H) 99.4 (H)   Anti-Histone Antibody      0.0 - 0.9 Units    0.4   ds DNA Ab      Negative 1:10    Negative 1:10     Component      Latest Ref Rng & Units 6/25/2020 11/10/2018   Sodium      136 - 145 mmol/L     Potassium      3.5 - 5.1 mmol/L     Chloride      95 - 110 mmol/L     CO2      23 - 29 mmol/L     Glucose      70 - 110 mg/dL     BUN, Bld      8 - 23 mg/dL     Creatinine      0.5 - 1.4 mg/dL     Calcium      8.7 - 10.5 mg/dL     Anion Gap      8 - 16 mmol/L     eGFR if African American      >60 mL/min/1.73 m:2     eGFR if non African American      >60 mL/min/1.73 m:2     Anti Sm Antibody      0.00 - 0.99 Ratio     Anti-Sm Interpretation      Negative     Anti  "Sm/RNP Antibody      0.00 - 0.99 Ratio     Anti-Sm/RNP Interpretation      Negative     JANET Screen      None Detected Detected (A) Detected (A)   Rheumatoid Factor      0.0 - 15.0 IU/mL 11.6 <8.6   Sed Rate      0 - 36 mm/Hr     CRP      0.0 - 8.2 mg/L     Anti-Histone Antibody      0.0 - 0.9 Units     ds DNA Ab      Negative 1:10         REVIEW OF SYSTEMS:    Review of Systems   Constitutional: Negative for fever, malaise/fatigue and weight loss.   HENT: Negative for sore throat.    Eyes: Negative for double vision, photophobia and redness.   Respiratory: Negative for cough, shortness of breath and wheezing.    Cardiovascular: Negative for chest pain, palpitations and orthopnea.   Gastrointestinal: Negative for abdominal pain, constipation and diarrhea.   Genitourinary: Negative for dysuria, hematuria and urgency.   Musculoskeletal: Positive for joint pain. Negative for back pain and myalgias.   Skin: Negative for rash.   Neurological: Negative for dizziness, tingling, focal weakness and headaches.   Endo/Heme/Allergies: Does not bruise/bleed easily.   Psychiatric/Behavioral: Negative for depression, hallucinations and suicidal ideas.               Objective:            Past Medical History:   Diagnosis Date    Anesthesia     'Mother stop breathing after anesthesia"    Chronic sinusitis     Depression     GERD (gastroesophageal reflux disease)     PVC (premature ventricular contraction)      Family History   Problem Relation Age of Onset    Heart disease Mother     Arthritis Mother     Cancer Sister     Arthritis Sister     Diabetes Sister     Hypertension Sister      Social History     Tobacco Use    Smoking status: Former Smoker     Start date: 2/3/1955     Quit date: 1/3/1960     Years since quittin.6    Smokeless tobacco: Never Used   Substance Use Topics    Alcohol use: Never     Alcohol/week: 0.0 standard drinks    Drug use: Never         Current Outpatient Medications on File Prior to Visit   Medication " Sig Dispense Refill    acetaminophen (TYLENOL) 500 MG tablet Take 500 mg by mouth every 6 (six) hours as needed for Pain.      ascorbate calcium (MAHSA-C ORAL) Take by mouth.      B2-B6 phos-levomef simran-mecobal (EB-N3 DR) 1.3-70-6-4 mg CpDR Take 1 capsule by mouth once daily.      cetirizine (ZYRTEC) 10 MG tablet TAKE 1 TABLET EVERY DAY 90 tablet 0    cholecalciferol, vitamin D3, (VITAMIN D3) 125 mcg (5,000 unit) Tab Take 1 tablet (5,000 Units total) by mouth once daily. 90 tablet 0    diclofenac sodium (VOLTAREN) 1 % Gel APPLY TO THE AFFECTED AREA(S) 2 GRAMS THREE TIMES DAILY 100 g 6    furosemide (LASIX) 20 MG tablet TAKE 1 TABLET EVERY DAY 90 tablet 3    gabapentin (NEURONTIN) 400 MG capsule TAKE 1 CAPSULE THREE TIMES DAILY 270 capsule 0    HYDROcodone-acetaminophen (NORCO) 7.5-325 mg per tablet Take 1 tablet by mouth every 12 (twelve) hours as needed for Pain. 60 tablet 0    metoprolol succinate (TOPROL-XL) 50 MG 24 hr tablet Take 1 tablet (50 mg total) by mouth once daily. 90 tablet 3    omeprazole (PRILOSEC) 40 MG capsule TAKE 1 CAPSULE EVERY MORNING 90 capsule 0    predniSONE (DELTASONE) 2.5 MG tablet Take 1 tablet (2.5 mg total) by mouth once daily. 90 tablet 3    predniSONE (DELTASONE) 5 MG tablet Take 1 tablet (5 mg total) by mouth once daily. 90 tablet 3    vit C/E/Zn/coppr/lutein/zeaxan (PRESERVISION AREDS-2 ORAL) Take 1 capsule by mouth 2 (two) times a day.       [DISCONTINUED] naltrexone capsule Take 3 mg by mouth once daily.       No current facility-administered medications on file prior to visit.       Vitals:    08/08/22 1619   BP: 124/64   Pulse: 70       Physical Exam:    Physical Exam   Constitutional: She is oriented to person, place, and time. She appears well-developed and well-nourished.   HENT:   Head: Normocephalic and atraumatic.   Mouth/Throat: Oropharynx is clear and moist.   Eyes: Pupils are equal, round, and reactive to light. EOM are normal.   Neck: Normal range of motion.    Cardiovascular: Normal rate, regular rhythm and normal heart sounds.   Pulmonary/Chest: Effort normal and breath sounds normal.   Musculoskeletal:        Right shoulder: She exhibits normal range of motion, no tenderness and no swelling.        Left shoulder: She exhibits normal range of motion, no tenderness and no swelling.        Right elbow: She exhibits normal range of motion and no swelling. No tenderness found.        Left elbow: She exhibits normal range of motion and no swelling. No tenderness found.        Right wrist: She exhibits normal range of motion, no tenderness and no swelling.        Left wrist: She exhibits normal range of motion, no tenderness and no swelling.        Right knee: She exhibits normal range of motion and no swelling. No tenderness found.        Left knee: She exhibits normal range of motion and no swelling. No tenderness found.        Right hand: She exhibits decreased range of motion and tenderness. She exhibits no swelling.        Left hand: She exhibits decreased range of motion and tenderness. She exhibits no swelling.        Right foot: There is normal range of motion, no tenderness and no swelling.        Left foot: There is normal range of motion, no tenderness and no swelling.   Bony hypertrophy at the PIP and DIP joints. +squaring at the CMC joint. No active synovitis on 28 joint exam.    Neurological: She is alert and oriented to person, place, and time.   Skin: Skin is warm and dry.   Psychiatric: She has a normal mood and affect. Her behavior is normal.     Component      Latest Ref Rng & Units 9/17/2021 9/17/2021          11:03 AM 11:03 AM   Respiratory Culture        No growth   Gram Stain (Respiratory)       No organisms seen No WBC's   AFB Culture & Smear           AFB CULTURE STAIN           GENET Prep           Fungus (Mycology) Culture           Aerobic Bacterial Culture             Component      Latest Ref Rng & Units 9/17/2021          11:03 AM   Respiratory  Culture       No Staph aureus, MRSA or Pseudomonas isolated.   Gram Stain (Respiratory)       No organisms seen   AFB Culture & Smear          AFB CULTURE STAIN          GENET Prep          Fungus (Mycology) Culture          Aerobic Bacterial Culture            Component      Latest Ref Rng & Units 2021          11:03 AM 11:03 AM   Respiratory Culture       Normal respiratory nimesh    Gram Stain (Respiratory)       Moderate WBC's    AFB Culture & Smear        Culture in progress   AFB CULTURE STAIN        No acid fast bacilli seen.   KOH Prep        No yeast or fungal elements seen   Fungus (Mycology) Culture        Culture in progress   Aerobic Bacterial Culture                 Narrative  Performed by: DPAOBIE  Patient - ONIEL ROSARIO                   - 1940 Sex- F   Med Rec # - [id number]                        Bimal Mckeon M.D.   The following is an electronic copy of report # [id number] from:   [facility]   [address] [place] [address]                         Phone [phone]   DIAGNOSIS:   2021  JL/shakeel     1, 2.  RIGHT UPPER LOBE MASS BRUSHING AND FINE NEEDLE ASPIRATE BIOPSIES:   - NEGATIVE FOR MALIGNANT CELLS.     - PURULENT EXUDATE.     - A FEW FRAGMENTS OF BRONCHIAL MUCOSA WITH FLORID CHRONIC BRONCHITIS    AND REACTIVE SQUAMOUS    ATYPICAL METAPLASIA.       Comment:   Special stains (AFB and GMS) are negative for organisms. While no    granulomas are seen here, the inflammatory reaction appears to be    similar to that seen in the sinus material over the past few years    (Delta [id number], [id number], [id number]). 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 Bronchoscopic material, particularly of the lower    respiratory tract, is of low yield for a definitive diagnosis of that    entity. The  previous sinus material will be reviewed with    consideration for that diagnosis.   _______________________________________________________________________   SPECIMEN AND SOURCE:   1. RUL mass brushing   2. RUL mass needle     CLINICAL INFORMATION:   Clinical Information:-gt Right Upper Lobe Mass   Specific Site:-gt RUL mass brushing T Brush; RUL Mass; RUL mass-    microbiology; RUL BAL; RUL washing;   Other Requests:-gt N/A     GROSS DESCRIPTION:   1. Received 40 mL of fluid in formalin, 1 dry slides and 1 slides in    95% alcohol. Prepared one cell block.     2. Received 40 mL of fluid in formalin, 1 dry slides and 1 slides in    95% alcohol. Prepared one cell block.     CODE: 0     Cytotechnologist: ABDIFATAH Marvin (ASCP)   Pathologist: Scott Johnson MD (Electronic Signature) 09/22/2021 2:34 PM     [facility] [employer] * [address] [place] [address] * [place] [address]   Technical services performed at: [facility] * [address] [place] [address] * [place] [address]   Screening Site: [facility] [facility] * [address] [place] [address] *    [place] [address]                         Post Rituxan image                                     Pre Rituxan image  Assessment:       No diagnosis found.       Plan:        There are no diagnoses linked to this encounter.   Patient is a 81-year-old female she has had a recent robust inflammatory reaction within the sinus cavity as well as hearing deficit following a sinus surgery.  She has been repeated cultured postoperatively completed antibiotics and irrigations negative for any fungal infections or bacterial infections.   Patient was doing well on MTX with regard to ESR and CRP but hair loss.     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    Completed RTX 11/5/21 Induction   Review of data and feel : The best data supporting the use of  as maintenance therapy come from the Maintenance of Remission using  Rituximab in Systemic ANCA-associated Vasculitis (MAINRITSAN) trial with less relapse for PR3+ paitients when treated with Rituxan at 500mg IV at 6, 12, and 18months when compared to Imuran. Patient has tolerated RTX       t spot 5/2021 negative.    Hep neg.    covid completed #2        CKD advanced rather quickly following lasix 20mg every other day.    My rec is to recheck now that she has been off lasix x2 weeks   . Patient did have casts with hematuria 10/12/21 prior to start of Rituxan. Improved following RTX   Spoke with Nephro and they will kindly try to see her as soon as they can.     Continue omeprazole (prilosec)  40mg by mouth daily.          COVID vaccine #3 shot for immunosuppressed to be done March.       Patient hx of Osteopenia only, renal function not stable enough for oral bisphos. Recent t1-T12 intense pain: getting BMD. Likely we should just order Prolia.     she is having some spinous process tenderness today but pain not classic of vertebral fracture will check imaging anyway. She had rib fractures last year as well.    Completed. Prolia. Pending new DXA from OB/GYN     Continue prednisone to 7.5mg (1.5 tablets) daily.     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 This will be at 6,12,18 months- was my initial plan, but recent rise in her ESR, persistent sinus infections and drainage we administered Maintanance dose 4/20/2022. Which was     Tracking labs and serologies for next dose.     Labs reviewed today noted CRP continues to be adequate, and improved while the ESR rising. Chest xray continues to show improvement.        No follow-ups on file.      f/u 3 months  Thank you for allowing me to participate in the care of this very pleasant patient.       40 min consultation with greater than 50% spent in counseling, chart review and coordination of care. All questions answered.

## 2022-08-08 NOTE — PATIENT INSTRUCTIONS
Sandi next is 9/2022.     COVID vaccination #4 will be also 9/2022. [facility] users can check availability and schedule their vaccinations via [facility] or by calling [phone].

## 2022-08-12 ENCOUNTER — HOSPITAL ENCOUNTER (OUTPATIENT)
Dept: RADIOLOGY | Facility: HOSPITAL | Age: 82
Discharge: HOME | End: 2022-08-12
Attending: INTERNAL MEDICINE
Payer: MEDICARE

## 2022-08-12 DIAGNOSIS — M35.3 POLYMYALGIA RHEUMATICA: ICD-10-CM

## 2022-08-12 PROCEDURE — 71046 XR CHEST PA AND LATERAL: ICD-10-PCS | Mod: 26,,, | Performed by: RADIOLOGY

## 2022-08-12 PROCEDURE — 71046 X-RAY EXAM CHEST 2 VIEWS: CPT | Mod: TC,FY,PO

## 2022-08-12 PROCEDURE — 71046 X-RAY EXAM CHEST 2 VIEWS: CPT | Mod: 26,,, | Performed by: RADIOLOGY

## 2022-09-06 ENCOUNTER — TELEPHONE (OUTPATIENT)
Dept: RHEUMATOLOGY | Facility: CLINIC | Age: 82
End: 2022-09-06
Payer: MEDICARE

## 2022-09-06 NOTE — TELEPHONE ENCOUNTER
"Patient is given lab results and the comments from the doctor that she is doing well except for the chronic low sodium. Patient voices understanding but is concerned about her forearms being easily bruised  and a "coffee brown"color. She has had doxycycline recently but patient states her arms looked bad before she got on doxycycline. She did avoid sunshine while on the this.  Please advise. CG  "

## 2022-09-20 ENCOUNTER — TELEPHONE (OUTPATIENT)
Dept: INFUSION THERAPY | Facility: HOSPITAL | Age: 82
End: 2022-09-20
Payer: MEDICARE

## 2022-09-20 NOTE — TELEPHONE ENCOUNTER
----- Message from Anastasia Summers sent at 9/20/2022  9:06 AM CDT -----  Type: Needs Medical Advice  Who Called:  Patient   Symptoms (please be specific):    How long has patient had these symptoms:    Pharmacy name and phone #:    Best Call Back Number: [phone]  Additional Information: Patient is requesting a call back to reschedule her appt. on 10/19 at 11:30.

## 2022-09-21 ENCOUNTER — TELEPHONE (OUTPATIENT)
Dept: UROLOGY | Facility: CLINIC | Age: 82
End: 2022-09-21
Payer: MEDICARE

## 2022-09-21 NOTE — TELEPHONE ENCOUNTER
Returned call to patient in regards to a message that was left for Urology department. Voicemail message was left for patient to call [phone].

## 2022-09-21 NOTE — TELEPHONE ENCOUNTER
----- Message from Lester Goodson sent at 9/20/2022  8:27 AM CDT -----  Type: Needs Medical Advice  Who Called:  Patient    Best Call Back Number: [phone]  Additional Information: Patient states that she would like a callback regarding questions about her Pelvic  PT referral.

## 2022-09-21 NOTE — TELEPHONE ENCOUNTER
----- Message from Kathya Fitzgerald sent at 9/21/2022 10:59 AM CDT -----  Contact: Pt  Type:  Patient Returning Call    Who Called:  Pt  Who Left Message for Patient:  Kayla  Kashif Call Back Number:  [phone]  Additional Information:  Please call back.  Thanks.

## 2022-09-23 ENCOUNTER — LAB VISIT (OUTPATIENT)
Dept: LAB | Facility: HOSPITAL | Age: 82
End: 2022-09-23
Attending: INTERNAL MEDICINE
Payer: MEDICARE

## 2022-09-23 DIAGNOSIS — N18.30 STAGE 3 CHRONIC KIDNEY DISEASE, UNSPECIFIED WHETHER STAGE 3A OR 3B CKD: ICD-10-CM

## 2022-09-23 LAB
25(OH)D3+25(OH)D2 SERPL-MCNC: 71 NG/ML (ref 30–96)
MAGNESIUM SERPL-MCNC: 2.1 MG/DL (ref 1.6–2.6)

## 2022-09-23 PROCEDURE — 36415 COLL VENOUS BLD VENIPUNCTURE: CPT | Mod: PO | Performed by: INTERNAL MEDICINE

## 2022-09-23 PROCEDURE — 82180 ASSAY OF ASCORBIC ACID: CPT | Performed by: INTERNAL MEDICINE

## 2022-09-23 PROCEDURE — 82306 VITAMIN D 25 HYDROXY: CPT | Performed by: INTERNAL MEDICINE

## 2022-09-23 PROCEDURE — 83735 ASSAY OF MAGNESIUM: CPT | Performed by: INTERNAL MEDICINE

## 2022-09-26 LAB — VIT C SERPL-MCNC: 24 MG/L (ref 2–19)

## 2022-09-27 ENCOUNTER — TELEPHONE (OUTPATIENT)
Dept: NEPHROLOGY | Facility: CLINIC | Age: 82
End: 2022-09-27
Payer: MEDICARE

## 2022-09-27 ENCOUNTER — TELEPHONE (OUTPATIENT)
Dept: RHEUMATOLOGY | Facility: CLINIC | Age: 82
End: 2022-09-27
Payer: MEDICARE

## 2022-09-27 RX ORDER — CYANOCOBALAMIN (VITAMIN B-12) 50 MCG
TABLET ORAL
COMMUNITY
End: 2023-01-09 | Stop reason: ALTCHOICE

## 2022-09-27 NOTE — TELEPHONE ENCOUNTER
Should she continue to take Zina C?  Please advise.  We will call as she might not can see mychart.

## 2022-09-27 NOTE — TELEPHONE ENCOUNTER
----- Message from Prasanna Poole sent at 9/27/2022  4:13 PM CDT -----  Type: Needs Medical Advice  Who Called:  pt  Symptoms (please be specific):  pt said she need to get a message to nurse Anali she said she would like to speak to her she will understand--she had 3 major meds she have to take at the cancer center and a infusion she wanted to know if Anali is she can take all this in one day will it be safe for her--said she normally take the shot on one day and the infusions on another--the cancer center wanted her to do it all in one day--but she need to know if it's safe to do so--please call and advise  Best Call Back Number: [phone] (home)     Additional Information: thank you    Patient booked for Evusheld, Rituxin and Prolia all the same day on 10-21-22. Patient asking if this is OK to do. Please advise. CG

## 2022-09-27 NOTE — TELEPHONE ENCOUNTER
----- Message from Stan Haley sent at 9/27/2022  4:25 PM CDT -----  Contact: self  Type: Needs Medical Advice  Who Called: Patient   Best Call Back Number: [phone]  Additional Information: Pt states  she took a recent blood test and just needs to be medically advised on what the results are saying she is not sure. Plz call pt to get that done.Thanks

## 2022-09-27 NOTE — TELEPHONE ENCOUNTER
Patient will be receiving her 2nd maintenance dose of Rituxan at 1,000mg  single dose. 10/2022  Solumedrol 80mg IV   All other premeds as per orders.       Give Evusheld as she is overdue.     Delay Prolia for another time please. I do not want all 3 injections on same day.       Dr. Snyder

## 2022-09-27 NOTE — TELEPHONE ENCOUNTER
----- Message from Stan Haley sent at 9/27/2022  4:25 PM CDT -----  Contact: self  Type: Needs Medical Advice  Who Called: Patient   Best Call Back Number: [phone]  Additional Information: Pt states  she took a recent blood test and just needs to be medically advised on what the results are saying she is not sure. Plz call pt to get that done.Thanks

## 2022-09-28 ENCOUNTER — TELEPHONE (OUTPATIENT)
Dept: INFUSION THERAPY | Facility: HOSPITAL | Age: 82
End: 2022-09-28
Payer: MEDICARE

## 2022-09-28 NOTE — TELEPHONE ENCOUNTER
Patient is left message that Prolia will be rescheduled by infusion staff as she will only get Rituxin and Evusheld together. CG

## 2022-09-28 NOTE — TELEPHONE ENCOUNTER
----- Message from Anali Del Rio LPN sent at 9/28/2022  8:54 AM CDT -----  Regarding: new instructions  Patient will be receiving her 2nd maintenance dose of Rituxan at 1,000mg  single dose. 10/2022  Solumedrol 80mg IV   All other premeds as per orders.         Give Evusheld as she is overdue.      Delay Prolia for another time please. I do not want all 3 injections on same day.         Dr. Snyder

## 2022-10-04 NOTE — TELEPHONE ENCOUNTER
Patient is concerned about her vitamin C being high . Should she continue taking sneha C .  has deferred patient to .

## 2022-10-05 ENCOUNTER — TELEPHONE (OUTPATIENT)
Dept: RHEUMATOLOGY | Facility: CLINIC | Age: 82
End: 2022-10-05
Payer: MEDICARE

## 2022-10-05 NOTE — TELEPHONE ENCOUNTER
Spoke to the patient and gave  her instructions from Dr. Snyder regarding Vitamin C. Patient voices understanding. CG

## 2022-10-17 ENCOUNTER — INFUSION (OUTPATIENT)
Dept: INFUSION THERAPY | Facility: HOSPITAL | Age: 82
End: 2022-10-17
Attending: INTERNAL MEDICINE
Payer: MEDICARE

## 2022-10-17 VITALS
RESPIRATION RATE: 18 BRPM | HEART RATE: 72 BPM | DIASTOLIC BLOOD PRESSURE: 68 MMHG | TEMPERATURE: 98 F | SYSTOLIC BLOOD PRESSURE: 132 MMHG

## 2022-10-17 DIAGNOSIS — N18.4 CHRONIC KIDNEY DISEASE (CKD), STAGE IV (SEVERE): ICD-10-CM

## 2022-10-17 DIAGNOSIS — S22.31XD CLOSED FRACTURE OF ONE RIB OF RIGHT SIDE WITH ROUTINE HEALING, SUBSEQUENT ENCOUNTER: ICD-10-CM

## 2022-10-17 DIAGNOSIS — M81.0 POSTMENOPAUSAL OSTEOPOROSIS: Primary | ICD-10-CM

## 2022-10-17 DIAGNOSIS — F19.20 STEROID DEPENDENCE: ICD-10-CM

## 2022-10-17 PROCEDURE — 63600175 PHARM REV CODE 636 W HCPCS: Mod: JG,PN | Performed by: INTERNAL MEDICINE

## 2022-10-17 PROCEDURE — 96372 THER/PROPH/DIAG INJ SC/IM: CPT | Mod: PN

## 2022-10-17 RX ADMIN — DENOSUMAB 60 MG: 60 INJECTION SUBCUTANEOUS at 02:10

## 2022-10-20 ENCOUNTER — TELEPHONE (OUTPATIENT)
Dept: RHEUMATOLOGY | Facility: CLINIC | Age: 82
End: 2022-10-20
Payer: MEDICARE

## 2022-10-20 NOTE — TELEPHONE ENCOUNTER
----- Message from Prasanna Poole sent at 10/20/2022  3:57 PM CDT -----  Type: Needs Medical Advice  Who Called:  pt   Symptoms (please be specific):  pt said she need to speak to the nurse said she keep having fever and she think she need reschedule her appt for tomorrow with the infusion center but she need to speak to nurse Briones first--and she need to let the office know in general she sick--please call and advise-- she also said she have a home COVID test and it told her not to use if she have a nose bleed and she do have a nose bleed-she also said her  is sick as well--said she would like to speak to Anali today before the office close so she will know what to do  Best Call Back Number: [phone] (home)     Additional Information: thank you    LVM that Dr. Snyder is wanting her to call and get checked by PCP or ENT to see if this is a sinus infection. If not better in one week please call Dr. Snyder's office. Infusion will be rescheduled when she is well: infusion department has been notified. BERKLEY

## 2022-10-25 ENCOUNTER — TELEPHONE (OUTPATIENT)
Dept: RHEUMATOLOGY | Facility: CLINIC | Age: 82
End: 2022-10-25

## 2022-10-25 ENCOUNTER — PATIENT OUTREACH (OUTPATIENT)
Dept: RHEUMATOLOGY | Facility: CLINIC | Age: 82
End: 2022-10-25
Payer: MEDICARE

## 2022-10-25 DIAGNOSIS — F19.20 STEROID DEPENDENCE: ICD-10-CM

## 2022-10-25 DIAGNOSIS — M31.30 GRANULOMATOSIS WITH POLYANGIITIS WITH PULMONARY INVOLVEMENT: ICD-10-CM

## 2022-10-25 DIAGNOSIS — I77.82 ANCA-ASSOCIATED VASCULITIS: ICD-10-CM

## 2022-10-25 DIAGNOSIS — U07.1 COVID-19: ICD-10-CM

## 2022-10-25 DIAGNOSIS — D84.9 IMMUNOCOMPROMISED: Primary | ICD-10-CM

## 2022-10-25 PROCEDURE — 99358 PROLONG SERVICE W/O CONTACT: CPT | Mod: S$GLB,,, | Performed by: INTERNAL MEDICINE

## 2022-10-25 PROCEDURE — 99358 PR PROLONGED SERV,NO CONTACT,1ST HR: ICD-10-PCS | Mod: S$GLB,,, | Performed by: INTERNAL MEDICINE

## 2022-10-25 NOTE — TELEPHONE ENCOUNTER
----- Message from Stan Haley sent at 10/25/2022  2:30 PM CDT -----  Contact: self  Type: Needs Medical Advice  Who Called: Patient   Best Call Back Number: [phone] or [phone]  Additional Information: Pt states Nurse Anali wants her to know both have tested Positive for Covid. Plz call pt to call and medically advise patient on what she should do next. Thanks     Patient and  have both tested positive today with two home tests. Awaiting a call back from PCP. Did see ENT last week who sent chart notes. Per patient: he thought it might be flu.     Patient on 7.5 mg prednisone daily. Please advise if there is anything she needs to do from a rheumatology standpoint.      is running fever for 4 days and coughing constantly. May go to urgent care to rule out pneumonia depending on what PCP says when he calls back.    CG

## 2022-10-25 NOTE — TELEPHONE ENCOUNTER
Patient  is notified that Paxlovid has been called in and to follow the directions on the package. Patient has heard from her PCP and she [facility]. Patient will call and update us if needed. BERKLEY

## 2022-10-25 NOTE — PROGRESS NOTES
"  Patient tested + COVID by home testing   COVID risk score is 2 (incorrect) patient with Wegeners Vasculitis on Rituxan therapy and immunosuppressed with suboptinal vaccination.   S/p Evusheld.   S/p 3 COVID vaccine all while taking Rituxan  Chronic steroid  Chronic lung and renal disease.     Checked interactions of meds via [employer] interaction    Ok for renal dose of Paxlovid.   eGFR ?30 to <60 mL/minute: Nirmatrelvir 150 mg and ritonavir 100 mg twice daily. Note: In the event that the Paxlovid 150 mg; 100 mg Dose Pack is not available, refer to "Important Paxlovid EUA Dispensing Information for Patients with Moderate Renal Impairment" for more information (FDA 2022).   "

## 2022-11-01 ENCOUNTER — TELEPHONE (OUTPATIENT)
Dept: RHEUMATOLOGY | Facility: CLINIC | Age: 82
End: 2022-11-01
Payer: MEDICARE

## 2022-11-01 NOTE — TELEPHONE ENCOUNTER
----- Message from Charo Gonzalez sent at 11/1/2022  3:06 PM CDT -----  Contact: pt  Type: Needs Medical Advice    Who Called: pt  Best Call Back Number: [phone]    Inquiry/Question: pt didn't realize she has an appt on 11/08/2022. She needs to cancel that and r/s it due to no transportation. She states she does need an appt on a Monday. Please call pt and advise.       Thank you~       Moved to 11-14-22 at 3pm. Patient confirms. CG

## 2022-11-14 ENCOUNTER — OFFICE VISIT (OUTPATIENT)
Dept: RHEUMATOLOGY | Facility: CLINIC | Age: 82
End: 2022-11-14
Payer: MEDICARE

## 2022-11-14 VITALS
WEIGHT: 150.81 LBS | HEART RATE: 66 BPM | SYSTOLIC BLOOD PRESSURE: 129 MMHG | HEIGHT: 64 IN | BODY MASS INDEX: 25.75 KG/M2 | DIASTOLIC BLOOD PRESSURE: 59 MMHG

## 2022-11-14 DIAGNOSIS — D84.9 IMMUNOSUPPRESSION: ICD-10-CM

## 2022-11-14 DIAGNOSIS — M31.30 GRANULOMATOSIS WITH POLYANGIITIS WITH PULMONARY INVOLVEMENT: Primary | ICD-10-CM

## 2022-11-14 DIAGNOSIS — M81.0 POSTMENOPAUSAL OSTEOPOROSIS: ICD-10-CM

## 2022-11-14 DIAGNOSIS — F19.20 STEROID DEPENDENCE: ICD-10-CM

## 2022-11-14 DIAGNOSIS — D84.9 IMMUNOCOMPROMISED: ICD-10-CM

## 2022-11-14 DIAGNOSIS — R91.8 PULMONARY NODULES: ICD-10-CM

## 2022-11-14 DIAGNOSIS — I77.6 VASCULITIS: ICD-10-CM

## 2022-11-14 PROCEDURE — 1101F PR PT FALLS ASSESS DOC 0-1 FALLS W/OUT INJ PAST YR: ICD-10-PCS | Mod: CPTII,S$GLB,, | Performed by: INTERNAL MEDICINE

## 2022-11-14 PROCEDURE — 3288F FALL RISK ASSESSMENT DOCD: CPT | Mod: CPTII,S$GLB,, | Performed by: INTERNAL MEDICINE

## 2022-11-14 PROCEDURE — 99999 PR PBB SHADOW E&M-EST. PATIENT-LVL IV: CPT | Mod: PBBFAC,,, | Performed by: INTERNAL MEDICINE

## 2022-11-14 PROCEDURE — 3074F PR MOST RECENT SYSTOLIC BLOOD PRESSURE < 130 MM HG: ICD-10-PCS | Mod: CPTII,S$GLB,, | Performed by: INTERNAL MEDICINE

## 2022-11-14 PROCEDURE — 3078F DIAST BP <80 MM HG: CPT | Mod: CPTII,S$GLB,, | Performed by: INTERNAL MEDICINE

## 2022-11-14 PROCEDURE — 99999 PR PBB SHADOW E&M-EST. PATIENT-LVL IV: ICD-10-PCS | Mod: PBBFAC,,, | Performed by: INTERNAL MEDICINE

## 2022-11-14 PROCEDURE — 3078F PR MOST RECENT DIASTOLIC BLOOD PRESSURE < 80 MM HG: ICD-10-PCS | Mod: CPTII,S$GLB,, | Performed by: INTERNAL MEDICINE

## 2022-11-14 PROCEDURE — 1125F AMNT PAIN NOTED PAIN PRSNT: CPT | Mod: CPTII,S$GLB,, | Performed by: INTERNAL MEDICINE

## 2022-11-14 PROCEDURE — 3288F PR FALLS RISK ASSESSMENT DOCUMENTED: ICD-10-PCS | Mod: CPTII,S$GLB,, | Performed by: INTERNAL MEDICINE

## 2022-11-14 PROCEDURE — 1101F PT FALLS ASSESS-DOCD LE1/YR: CPT | Mod: CPTII,S$GLB,, | Performed by: INTERNAL MEDICINE

## 2022-11-14 PROCEDURE — 99215 OFFICE O/P EST HI 40 MIN: CPT | Mod: S$GLB,,, | Performed by: INTERNAL MEDICINE

## 2022-11-14 PROCEDURE — 1125F PR PAIN SEVERITY QUANTIFIED, PAIN PRESENT: ICD-10-PCS | Mod: CPTII,S$GLB,, | Performed by: INTERNAL MEDICINE

## 2022-11-14 PROCEDURE — 3074F SYST BP LT 130 MM HG: CPT | Mod: CPTII,S$GLB,, | Performed by: INTERNAL MEDICINE

## 2022-11-14 PROCEDURE — 99215 PR OFFICE/OUTPT VISIT, EST, LEVL V, 40-54 MIN: ICD-10-PCS | Mod: S$GLB,,, | Performed by: INTERNAL MEDICINE

## 2022-11-14 RX ORDER — ALBUTEROL SULFATE 90 UG/1
AEROSOL, METERED RESPIRATORY (INHALATION)
COMMUNITY
Start: 2022-10-21 | End: 2023-10-16 | Stop reason: ALTCHOICE

## 2022-11-14 ASSESSMENT — ROUTINE ASSESSMENT OF PATIENT INDEX DATA (RAPID3)
FATIGUE SCORE: 0
TOTAL RAPID3 SCORE: 2.67
PSYCHOLOGICAL DISTRESS SCORE: 2.2
MDHAQ FUNCTION SCORE: 0.6
PAIN SCORE: 3
PATIENT GLOBAL ASSESSMENT SCORE: 3

## 2022-11-14 NOTE — PROGRESS NOTES
"    Subjective:          Chief Complaint: Aracelis Weber is a 82 y.o. female who had concerns including Disease Management.    HPI:    +ANCA +PR3/ negative MPO   GPA (Wegeners)     11/14/2022:   COVID 11/1/2022 took her quite some time to recover from cough but overall did well.  Did not take PAXLOVID. We were late getting started on meds as initially thought it was influenza.     She is feeling markedly better: reviewed her CXR markedly improved with regard to her RUL cavitary lesion.   We will get her back on Rituxan.     8/8/22: peripheral neuropathy still problematic on gabapentin and working with Chiro for a plan to see if this will help.   Sinuses still congestion  +dry cough but no fevers, night time dry cough "tickle"   No SOB.     5/5/2022:   Patient doing very well. Still rhinitis, using navage. No antibiotics from ENT since last visit. She is still clearing significant mucous faint with dried blood.   Dry cough, no fevers, no SOB  Mild HA only with elevated BP.   Completed Rituxan maintanence at 4 months 4/20/2022.   Prolia 3/25/22  Evusheld with catch up dose 2/10/22 and 3/25/2022.     Major complaints: pins, needles, stinging, burning in feet day and night is constant. Dr. Campbell: EB-N5 supplement is helping some, stopped for months and noted significant worsening. Dr. Campbell did increase from EB-N3.   Gabapentin 400mg TID  Hydrocodone only PRN  Prednisone 7.5mg       2/2022  Doing well no cough, no fevers. Recent COVID exposure but negative.   Seen with Nephro.   Given her successful response with Ritux. Agree to continue maintanance with Ritux Q 4-6 months    Needs to get #3 COVID vaccine timing with RTX. - will time this with me 2 weeks prior to   Discussed Prolia  Working on Evusheld for PreP with COVID> she is scheduled 2/10/22      12/9/2021  Patient's recent CXR 12/21/21 with marked improvement when compared to 10/21/21  Prednisone 10mg daily  S/p RTX x 4 infusions completion date. " 11/5/21  Patient with rise in creatinine to 2.0 on 11/26 had been requiring lasix for marked edema since 11/1/21. Stopped 2 weeks ago. Edema is present but managed. Drinking about 40 oz water and 3 cups of coffee daily    Patient completed [facility] PT felt she was doing well home exercises about 2 weeks ago with acute pain in lumbar spine after exercising. Imaging lumbar few days ago without evidence of a fracture. She point to pain in the lumbosacral and radiating to buttock region. Ok to rise from seated. Pain more on left low back and buttock. No numbness no tingling but pain in the hamstring region. Comfortable sitting but not lying down, but she never sleeps in bed. Long hx of rather impressive scoliosis.       11/11/2021:   Inflammatory markers are markedly improved.   Cough dry still at night. Patient noting she has lost sense of smell. Can still taste.     Patient recently hospitatlized for Bronch with negative cultures to date.  There was a concern for possible atypical infection she has had enlarging right upper lobe cavitary lesion measures still has a right middle lobe lesion with some cavitation now as well.  I have discussed this case extensively with both Dr. Schwartz as well as Dr. Joseph upon discharge we felt comfortable that this is likely Wegener's granulomatosis causing cavitary lesions.  Supporting sinus biopsy does note granuloma.    Patient was started on Imuran in March of 2021 but with the growth of her lesion we had rule out for any possible infection given her status of immunosuppression.  She is here today not in her usual state she appears fatigued pale she is not complaining of shortness of breath but does have a cough dry persistent.  She is noticing increased pedal edema.         9/13/21: patient with 2 falls 8 days apart. Spoke w/ pulm shortly following initial fall and we were scheduled for bronch when she sustained another fall.   Scheduled now for 9/17/21 bronch. RUL lesion.   Remains  off Imuran  Prednisone 15mg daily.   Sinus congestion. Still productive     She is noting some productive cough at night no hemoptysis.   Patient denies SOB  Having more HA. -frontal and sinus, she is noting some drainage in right ear some mucous/blood.   No edema.   She notes fatigue, no fevers, no night sweats. No weight loss.   Skin easily bruising.     No personal hx of any malignancy.       7/20/2021:    Spoke with Pulm plan for bronch is able working on possible bx site vs BAL.   Need to r/o infectious/neoplastic and confirm for ANCA (GPA vasculitis)   Inflammatory markers markedly down .8  Now 14.   H/H improve from 7.4 to 8.2  Platelets normalized.   Renal stable over time    Patient did attend wedding with approx 300 people unmasked last weekend. Reviewed by recs for COVID precautions given    Current regimen:   pred 15mg (Na stable)  Holding Imuran    Interval events: PET with hypermetabolic lesions:   a. RUL cavitary/cystic lesion 1.9cm x 1.3cm  b. RUL spiculated 1.2cm x 1.4cm  c. RML 1.7cm x 2.0 cm  All with differential: infectious, inflammatory, neoplastic, grannulomatous (a) with favor of inflammation given the rapid change    Fungal immunodiffusion neg  Quant gold and T spot: neg  H/H improving     Sinus congestion, pressure, headache, x 3 weeks very productive with blowing nose, back on navage. :  started Cindamycin with DR Quinn x 10 days.   Patient denies any shortness breath.   Very fatigues.   No more fevers. Slight dry cough, no blood/ no mucous.   No SOB.       5/12/21 completed nasal irrigation with biopsy not definitive for GPA, but + inflammation and granulomas. Temporal artery bx negative. Patient with PCP same day as labs 6/2/21  dx with UTI on keflex.     Was seen apparently in ED in [place] for 104F she had altered mental status, dx with UTI, dehydration, treated with. Completed all 7 days of keflex and within 48 hours with another fever 104 F. She continues with congestion but no  "worse. Patient denies cough, hemoptysis, bloody nasal discharge. She has had no fever x 10 days. Checks twice daily.   while travelling told "congestion in chest on xray". Inflammatory markers very high again.   Patient with recent concern for wegeners needs CT chest. CXR with new airspace opacities right chest- need to r/o GGO vs infection. CT has been ordered pending scheduling.   Broad ABX coverage recommended for now will cc Dr. Natarajan.     I am limited here with her  severe edema from higher dose steroids. Max tolerated is 20mg daily pred  Started Imuran 5/5/2021. Very low dose 50mg BID (0.65mg/kg) tolerating VERY well.   H/H still low.         2/2021  Sinususitis, cx were negative.  Using nasal irrigation with mucoid disharge.   Patient continues with significant nasal discharge and blood with scabs. We discussed Wegeners but pathology sinus no vasculitis, granuloma noted.   No HA, no blurred vision. Recent sinus infection with HA for 10 days. cx +, but also repeat labs demonstarting +PR3 and +ANCA       Patient with c/o right > left 3,4 finger numbness that was intermittent until approximately 2 weeks about having near constant numbness in hands, pins and needles and pain.   Patient not noting much improvement with change in position.     Off MTX 6  tabs weekly.   Doing well with Hydroxychloroquine.   Declined COVID vaccine    Gabapentin up to 300mg TID.   Now with Dr. Campbell doing PT for Plantar fasciitis. She is taking supplement and compound cream for joint and neuropathy. No response to tarsal tunnel injection per Dr. Campbell.   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 WBC ok.       11/2020  Patient with PMR   MTX at 4 tabs weekly new anemia. Thrombocytosis  Pred at 10mg   Complicated with peripheral edema rather severe , started MTX seeing trending CRP/ESR but persistent leg pain.   Seen with PCP for " neuropathy s/sx not seeing much benefit with gabpentin  Seen with Cardiology- no concern for cardiac ECHO ok. dc'd lasix for hyponatremia.     8/2020  Pred 20mg with mild edema, pred 30 mg with severe edema.   Current Pred 15mg daily  Rising ESR again.   Lasix 20 mg daily with improved edema but having some pins and needles in feet/legs.   Having tight, burning stinging at the toes and feet. Heaviness. Prickly.   Noting sodium is falling, K 5.2-5.4 still using           7/2020:  Hands not painful, knees not painful. Shoulder/cervical and down the arms, markedly improved. She still has some pain him hips and groin with walking and some weakness to the legs with edema. Some pain with fixing own hair.   She takes in AM regarding hips ache, some shoulder/arms, and again at night because hips/legs.   The clue is that the LDN new dose she had some ASE, previously tolerated 3mg daily fine.     Historical review of present Illness.   Patient with a recent chronic sinusitis that ultimately warranted a septoplasty, endoscopic sinus surgery and turbinate reduction 5/2020   Four weeks postop t increasingly worse she was noting pain in her throat and ears lasting several hours complaint of pdizziness she had symptoms of ear pain and decreased hearing in the left ear.  She underwent a debridement at this visit she continue with compound nasal irrigations.  Follow-up June 16 she had had tympanostomy tubes placed for pressure within the ear and decreased hearing was complaining of headaches and sinus pressure, mucoid drainage      Patient was showing a mixed conductive and s patient has notes that approximately 06/25/2026 she received vitamin-D B12 injection and by the next morning 06/26/2028 she felt like she has been hit by a [employer] train with shoulder cervical hip and extending into upper and lower extremity pain is this time that she had called my office to restart her naltrexone which we are using for erosive  osteoarthritis.    Patient did have repeat procedure on with biopsies taken 07/03/2020 showing right maxillary sinus chronically inflamed fibrotic polypoid portions benign nasal mucosa left maxillary sinus similar polypoid fragments ulcerated markedly inflamed respiratory mucosa focal foreign body giant cell response noted suggested that this might have been from her Gelfoam packing as a possible cause of this reaction    I have spoken with Dr. Quinn prior to patient's visit today and was a concern that she could have triggered some inflammatory response with the Gelfoam packing    Patient states she no longer has a headache is not noticing much ear drainage continues with profound loss of hearing on the left and rather significant on the right both sensorineural and some conductive changes.    Restarted on prednisone as of 7/20/20  10 mg patient has not noticed any change with her hearing in this time she did notice slight improvement with her joint aches and pains but is still relying on hydrocodone for the bulk of this.    She denies a persistent headache she does have some tenderness about the preauricular region particularly on the right more than the left.  She has denied jaw claudication changes in her voice or any amaurosis fugax.  She has no pre-existing history of joint aches and pains hip or otherwise.  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  She had a workup that involved a negative rheumatoid factor, age appropriate sedimentation rate,C reactive protein JANET was positive 1:160 in speckled pattern        Previous: Hx:   She is having pain in her hand with stiffness and right 2nd PIP joint now unable to flex. 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   Hx of bilateral TKA 3 and 5 years ago and those are doing well.   She notes intermittent edema. Some hammer toe deformity bilaterally making shoes problematic but  not painful otherwise.   Long hx of GERD-severe.   Cannot tolerate NSAIDs at all w/o gastritis.   Can use Hydrocodone PRN   Added Naltrexone at 3mg and doing very well  Lost 30# with WW.   Patient denies weight loss, rashes, dry eye, dry mouth, nasal or palatal ulcerations,  lymphadenopathy, Raynaud's, hx of DVT/miscarriages, psoriasis or family hx of psoriasis, rashes, serositis, anemia or other constitutional symptoms.  Asking about naltrexone  Component      Latest Ref Rng & Units 8/8/2020 7/29/2020 7/20/2020 7/13/2020   Sodium      136 - 145 mmol/L  126 (L) 128 (L)    Potassium      3.5 - 5.1 mmol/L  5.4 (H) 5.2 (H)    Chloride      95 - 110 mmol/L  92 (L) 92 (L)    CO2      23 - 29 mmol/L  25 29    Glucose      70 - 110 mg/dL  109 109    BUN, Bld      8 - 23 mg/dL  13 11    Creatinine      0.5 - 1.4 mg/dL  0.8 0.8    Calcium      8.7 - 10.5 mg/dL  9.5 9.7    Anion Gap      8 - 16 mmol/L  9 7 (L)    eGFR if African American      >60 mL/min/1.73 m:2  >60.0 >60.0    eGFR if non African American      >60 mL/min/1.73 m:2  >60.0 >60.0    Anti Sm Antibody      0.00 - 0.99 Ratio    0.04   Anti-Sm Interpretation      Negative    Negative   Anti Sm/RNP Antibody      0.00 - 0.99 Ratio    0.09   Anti-Sm/RNP Interpretation      Negative    Negative   JANET Screen      None Detected       Rheumatoid Factor      0.0 - 15.0 IU/mL       Sed Rate      0 - 36 mm/Hr 57 (H)  54 (H) 75 (H)   CRP      0.0 - 8.2 mg/L 75.4 (H) 56.1 (H) 57.5 (H) 99.4 (H)   Anti-Histone Antibody      0.0 - 0.9 Units    0.4   ds DNA Ab      Negative 1:10    Negative 1:10     Component      Latest Ref Rng & Units 6/25/2020 11/10/2018   Sodium      136 - 145 mmol/L     Potassium      3.5 - 5.1 mmol/L     Chloride      95 - 110 mmol/L     CO2      23 - 29 mmol/L     Glucose      70 - 110 mg/dL     BUN, Bld      8 - 23 mg/dL     Creatinine      0.5 - 1.4 mg/dL     Calcium      8.7 - 10.5 mg/dL     Anion Gap      8 - 16 mmol/L     eGFR if        ">60 mL/min/1.73 m:2     eGFR if non African American      >60 mL/min/1.73 m:2     Anti Sm Antibody      0.00 - 0.99 Ratio     Anti-Sm Interpretation      Negative     Anti Sm/RNP Antibody      0.00 - 0.99 Ratio     Anti-Sm/RNP Interpretation      Negative     JANET Screen      None Detected Detected (A) Detected (A)   Rheumatoid Factor      0.0 - 15.0 IU/mL 11.6 <8.6   Sed Rate      0 - 36 mm/Hr     CRP      0.0 - 8.2 mg/L     Anti-Histone Antibody      0.0 - 0.9 Units     ds DNA Ab      Negative 1:10         REVIEW OF SYSTEMS:    Review of Systems   Constitutional: Negative for fever, malaise/fatigue and weight loss.   HENT: Negative for sore throat.    Eyes: Negative for double vision, photophobia and redness.   Respiratory: Negative for cough, shortness of breath and wheezing.    Cardiovascular: Negative for chest pain, palpitations and orthopnea.   Gastrointestinal: Negative for abdominal pain, constipation and diarrhea.   Genitourinary: Negative for dysuria, hematuria and urgency.   Musculoskeletal: Positive for joint pain. Negative for back pain and myalgias.   Skin: Negative for rash.   Neurological: Negative for dizziness, tingling, focal weakness and headaches.   Endo/Heme/Allergies: Does not bruise/bleed easily.   Psychiatric/Behavioral: Negative for depression, hallucinations and suicidal ideas.               Objective:            Past Medical History:   Diagnosis Date    Anesthesia     'Mother stop breathing after anesthesia"    Chronic sinusitis     Depression     GERD (gastroesophageal reflux disease)     PVC (premature ventricular contraction)      Family History   Problem Relation Age of Onset    Heart disease Mother     Arthritis Mother     Cancer Sister     Arthritis Sister     Diabetes Sister     Hypertension Sister      Social History     Tobacco Use    Smoking status: Former     Types: Cigarettes     Start date: 2/3/1955     Quit date: 1/3/1960     Years since quittin.9    Smokeless tobacco: " Never   Substance Use Topics    Alcohol use: Never     Alcohol/week: 0.0 standard drinks    Drug use: Never         Current Outpatient Medications on File Prior to Visit   Medication Sig Dispense Refill    ascorbate calcium (MAHSA-C ORAL) Take by mouth.      ascorbate calcium-bioflavonoid (MAHSA-C WITH BIOFLAVONOIDS) 1,000-200 mg Tab Take by mouth.      B2-B6 phos-levomef simran-mecobal (EB-N3 DR) 1.3-70-6-4 mg CpDR Take 1 capsule by mouth once daily.      cetirizine (ZYRTEC) 10 MG tablet Take 1 tablet (10 mg total) by mouth once daily. 90 tablet 0    cholecalciferol, vitamin D3, (VITAMIN D3) 125 mcg (5,000 unit) Tab Take 1 tablet (5,000 Units total) by mouth once daily. 90 tablet 0    diclofenac sodium (VOLTAREN) 1 % Gel APPLY TO THE AFFECTED AREA(S) 2 GRAMS THREE TIMES DAILY 100 g 6    gabapentin (NEURONTIN) 400 MG capsule TAKE 1 CAPSULE THREE TIMES DAILY 270 capsule 0    metoprolol succinate (TOPROL-XL) 50 MG 24 hr tablet Take 1 tablet (50 mg total) by mouth once daily. 90 tablet 3    omeprazole (PRILOSEC) 40 MG capsule Take 1 capsule (40 mg total) by mouth every morning. 90 capsule 0    predniSONE (DELTASONE) 2.5 MG tablet Take 1 tablet (2.5 mg total) by mouth once daily. 90 tablet 3    predniSONE (DELTASONE) 5 MG tablet Take 1 tablet (5 mg total) by mouth once daily. 90 tablet 3    vit C/E/Zn/coppr/lutein/zeaxan (PRESERVISION AREDS-2 ORAL) Take 1 capsule by mouth 2 (two) times a day.       acetaminophen (TYLENOL) 500 MG tablet Take 500 mg by mouth every 6 (six) hours as needed for Pain.      albuterol (PROVENTIL/VENTOLIN HFA) 90 mcg/actuation inhaler INHALE 2 PUFFS BY MOUTH EVERY 4 TO 6 HOURS AS NEEDED FOR SHORTNESS OF BREATH OR COUGH      furosemide (LASIX) 20 MG tablet TAKE 1 TABLET EVERY DAY (Patient not taking: Reported on 11/14/2022) 90 tablet 3    HYDROcodone-acetaminophen (NORCO) 7.5-325 mg per tablet Take 1 tablet by mouth every 12 (twelve) hours as needed for Pain. (Patient not taking: Reported on  11/14/2022) 60 tablet 0    [DISCONTINUED] naltrexone capsule Take 3 mg by mouth once daily.       No current facility-administered medications on file prior to visit.       Vitals:    11/14/22 1523   BP: (!) 129/59   Pulse: 66       Physical Exam:    Physical Exam   Constitutional: She is oriented to person, place, and time. She appears well-developed and well-nourished.   HENT:   Head: Normocephalic and atraumatic.   Mouth/Throat: Oropharynx is clear and moist.   Eyes: Pupils are equal, round, and reactive to light. EOM are normal.   Neck: Normal range of motion.   Cardiovascular: Normal rate, regular rhythm and normal heart sounds.   Pulmonary/Chest: Effort normal and breath sounds normal.   Musculoskeletal:        Right shoulder: She exhibits normal range of motion, no tenderness and no swelling.        Left shoulder: She exhibits normal range of motion, no tenderness and no swelling.        Right elbow: She exhibits normal range of motion and no swelling. No tenderness found.        Left elbow: She exhibits normal range of motion and no swelling. No tenderness found.        Right wrist: She exhibits normal range of motion, no tenderness and no swelling.        Left wrist: She exhibits normal range of motion, no tenderness and no swelling.        Right knee: She exhibits normal range of motion and no swelling. No tenderness found.        Left knee: She exhibits normal range of motion and no swelling. No tenderness found.        Right hand: She exhibits decreased range of motion and tenderness. She exhibits no swelling.        Left hand: She exhibits decreased range of motion and tenderness. She exhibits no swelling.        Right foot: There is normal range of motion, no tenderness and no swelling.        Left foot: There is normal range of motion, no tenderness and no swelling.   Bony hypertrophy at the PIP and DIP joints. +squaring at the CMC joint. No active synovitis on 28 joint exam.    Neurological: She is  alert and oriented to person, place, and time.   Skin: Skin is warm and dry.   Psychiatric: She has a normal mood and affect. Her behavior is normal.     Component      Latest Ref Rng & Units 2021          11:03 AM 11:03 AM   Respiratory Culture        No growth   Gram Stain (Respiratory)       No organisms seen No WBC's   AFB Culture & Smear           AFB CULTURE STAIN           GENET Prep           Fungus (Mycology) Culture           Aerobic Bacterial Culture             Component      Latest Ref Rng & Units 2021          11:03 AM   Respiratory Culture       No Staph aureus, MRSA or Pseudomonas isolated.   Gram Stain (Respiratory)       No organisms seen   AFB Culture & Smear          AFB CULTURE STAIN          GENET Prep          Fungus (Mycology) Culture          Aerobic Bacterial Culture            Component      Latest Ref Rng & Units 2021          11:03 AM 11:03 AM   Respiratory Culture       Normal respiratory nimesh    Gram Stain (Respiratory)       Moderate WBC's    AFB Culture & Smear        Culture in progress   AFB CULTURE STAIN        No acid fast bacilli seen.   KOH Prep        No yeast or fungal elements seen   Fungus (Mycology) Culture        Culture in progress   Aerobic Bacterial Culture                 Narrative  Performed by: SHAWN  Patient - ONIEL ROSARIO                   - 1940 Sex- F   Med Rec # - [id number]                        Bimal Mckeon M.D.   The following is an electronic copy of report # [id number] from:   [facility]   [address] [place] [address]                         Phone [phone]   DIAGNOSIS:   2021  JL/shakeel     1, 2.  RIGHT UPPER LOBE MASS BRUSHING AND FINE NEEDLE ASPIRATE BIOPSIES:   - NEGATIVE FOR MALIGNANT CELLS.     - PURULENT EXUDATE.     - A FEW FRAGMENTS OF BRONCHIAL MUCOSA WITH FLORID CHRONIC BRONCHITIS    AND REACTIVE SQUAMOUS    ATYPICAL METAPLASIA.        Comment:   Special stains (AFB and GMS) are negative for organisms. While no    granulomas are seen here, the inflammatory reaction appears to be    similar to that seen in the sinus material over the past few years    (Delta [id number], [id number], [id number]). 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 Bronchoscopic material, particularly of the lower    respiratory tract, is of low yield for a definitive diagnosis of that    entity. The previous sinus material will be reviewed with    consideration for that diagnosis.   _______________________________________________________________________   SPECIMEN AND SOURCE:   1. RUL mass brushing   2. RUL mass needle     CLINICAL INFORMATION:   Clinical Information:-gt Right Upper Lobe Mass   Specific Site:-gt RUL mass brushing T Brush; RUL Mass; RUL mass-    microbiology; RUL BAL; RUL washing;   Other Requests:-gt N/A     GROSS DESCRIPTION:   1. Received 40 mL of fluid in formalin, 1 dry slides and 1 slides in    95% alcohol. Prepared one cell block.     2. Received 40 mL of fluid in formalin, 1 dry slides and 1 slides in    95% alcohol. Prepared one cell block.     CODE: 0     Cytotechnologist: ABDIFATAH Marvin (ASCP)   Pathologist: Scott Johnson MD (Electronic Signature) 09/22/2021 2:34 PM     [facility], [facility] * [facility] [facility] * [place]    [address]   Technical services performed at: [facility], [facility] * [id number]    [facility] * [place] [address]   Screening Site: [facility], [facility] * [facility] [address] [facility] *    [place] [address]                         Post Rituxan image                                     Pre Rituxan image  Assessment:       Encounter Diagnoses   Name Primary?    Wegener's disease, pulmonary Yes    Vasculitis     Steroid dependence     Pulmonary nodules     Postmenopausal  osteoporosis     Immunosuppression     Immunocompromised             Plan:        Wegener's disease, pulmonary  -     Cancel: riTUXimab (RITUXAN) 500 mg in sodium chloride 0.9% 500 mL infusion (conc: 1 mg/mL)    Vasculitis    Steroid dependence    Pulmonary nodules    Postmenopausal osteoporosis    Immunosuppression    Immunocompromised       Patient is a 82-year-old female who presented with robust inflammatory reaction within the sinus cavity as well as hearing deficit following a sinus surgery.  She has been repeated cultured postoperatively completed antibiotics and irrigations negative for any fungal infections or bacterial infections. 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    Completed RTX 11/5/21 Induction at 4 weekly doses of 375mg/m2. Our plans were for Rituxan at 500mg on day 0 and 4 as per below, but due to scheduling with COVID,  vaccines/evusheld, etc and auth, transportation,  we elected for 4/20/22 single dose  at 1,000mg. This has proven to work very well and is well tolerated by the patient extrememly well     The best data supporting the use of  as maintenance therapy come from the Maintenance of Remission using Rituximab in Systemic ANCA-associated Vasculitis (MAINRITSAN) trial with less relapse for PR3+ paitients when treated with Rituxan at 500mg IV at 6, 12, and 18months when compared to Imuran.      t spot 5/2021 negative.    Hep neg.    covid completed #3 now.    Evusheld Q 6 months for now as we slowly get vaccinations on board in between infusions.    +COVID 10/25/22 and recovered.        CKD advanced rather quickly , improved and stabilized with RTX.    F/u with Nephro.     Continue omeprazole (prilosec)  40mg by mouth daily.        Patient hx of Osteopenia only, renal function not stable enough for oral bisphos. Chronic  steroids.    Continue Prolia every 6 months.     Continue prednisone to 7.5mg (1.5 tablets) daily.     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 This will be at 6,12,18 months-  due to above we administered a 1,000mg as single dose 4/20/22 and next was 10/2022, but delayed with COVID we will get her started now for her next Rituxan 500mg single dose.     CRP WNL 10/2022  ESR rising at 47mm/hr but this was following COVID.   CXR during COVID showed continued resolution with previous RUL lesion.       Evusheld next is 9/2022.        No follow-ups on file.      f/u 3 months  Thank you for allowing me to participate in the care of this very pleasant patient.       40 min consultation with greater than 50% spent in counseling, chart review and coordination of care. All questions answered.                                                          Subjective:          Chief Complaint: Aracelis Weber is a 82 y.o. female who had concerns including Disease Management.    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 Still rhinitis, using navage. No antibiotics from ENT since last visit. She is still clearing significant mucous faint with dried blood.   Dry cough, no fevers, no SOB  Mild HA only with elevated BP.   Completed Rituxan maintanence at 4 months 4/20/2022.   Prolia 3/25/22  Evusheld with catch up dose 2/10/22 and 3/25/2022.     Major complaints: pins, needles, stinging, burning in feet day and night is constant. Dr. Campbell: EB-N5 supplement is helping some, stopped for months and noted significant worsening. Dr. Campbell did increase from EB-N3.   Gabapentin 400mg TID  Hydrocodone only PRN  Prednisone 7.5mg       2/2022  Doing well no cough, no fevers. Recent COVID exposure but negative.   Seen with Nephro.   Given her successful response with Ritux. Agree to continue maintanance with Ritux Q 4-6 months    Needs to  get #3 COVID vaccine timing with RTX. - will time this with me 2 weeks prior to   Discussed Prolia  Working on Sandi for PreP with COVID> she is scheduled 2/10/22      12/9/2021  Patient's recent CXR 12/21/21 with marked improvement when compared to 10/21/21  Prednisone 10mg daily  S/p RTX x 4 infusions completion date. 11/5/21  Patient with rise in creatinine to 2.0 on 11/26 had been requiring lasix for marked edema since 11/1/21. Stopped 2 weeks ago. Edema is present but managed. Drinking about 40 oz water and 3 cups of coffee daily    Patient completed C PT felt she was doing well home exercises about 2 weeks ago with acute pain in lumbar spine after exercising. Imaging lumbar few days ago without evidence of a fracture. She point to pain in the lumbosacral and radiating to buttock region. Ok to rise from seated. Pain more on left low back and buttock. No numbness no tingling but pain in the hamstring region. Comfortable sitting but not lying down, but she never sleeps in bed. Long hx of rather impressive scoliosis.       11/11/2021:   Inflammatory markers are markedly improved.   Cough dry still at night. Patient noting she has lost sense of smell. Can still taste.     Patient recently hospitatlized for Bronch with negative cultures to date.  There was a concern for possible atypical infection she has had enlarging right upper lobe cavitary lesion measures still has a right middle lobe lesion with some cavitation now as well.  I have discussed this case extensively with both Dr. Schwartz as well as Dr. Joseph upon discharge we felt comfortable that this is likely Wegener's granulomatosis causing cavitary lesions.  Supporting sinus biopsy does note granuloma.    Patient was started on Imuran in March of 2021 but with the growth of her lesion we had rule out for any possible infection given her status of immunosuppression.  She is here today not in her usual state she appears fatigued pale she is not  complaining of shortness of breath but does have a cough dry persistent.  She is noticing increased pedal edema.         9/13/21: patient with 2 falls 8 days apart. Spoke w/ pulm shortly following initial fall and we were scheduled for bronch when she sustained another fall.   Scheduled now for 9/17/21 bronch. RUL lesion.   Remains off Imuran  Prednisone 15mg daily.   Sinus congestion. Still productive     She is noting some productive cough at night no hemoptysis.   Patient denies SOB  Having more HA. -frontal and sinus, she is noting some drainage in right ear some mucous/blood.   No edema.   She notes fatigue, no fevers, no night sweats. No weight loss.   Skin easily bruising.     No personal hx of any malignancy.       7/20/2021:    Spoke with Pulm plan for bronch is able working on possible bx site vs BAL.   Need to r/o infectious/neoplastic and confirm for ANCA (GPA vasculitis)   Inflammatory markers markedly down .8  Now 14.   H/H improve from 7.4 to 8.2  Platelets normalized.   Renal stable over time    Patient did attend wedding with approx 300 people unmasked last weekend. Reviewed by recs for COVID precautions given    Current regimen:   pred 15mg (Na stable)  Holding Imuran    Interval events: PET with hypermetabolic lesions:   a. RUL cavitary/cystic lesion 1.9cm x 1.3cm  b. RUL spiculated 1.2cm x 1.4cm  c. RML 1.7cm x 2.0 cm  All with differential: infectious, inflammatory, neoplastic, grannulomatous (a) with favor of inflammation given the rapid change    Fungal immunodiffusion neg  Quant gold and T spot: neg  H/H improving     Sinus congestion, pressure, headache, x 3 weeks very productive with blowing nose, back on navage. :  started Cindamycin with DR Quinn x 10 days.   Patient denies any shortness breath.   Very fatigues.   No more fevers. Slight dry cough, no blood/ no mucous.   No SOB.       5/12/21 completed nasal irrigation with biopsy not definitive for GPA, but + inflammation and  "granulomas. Temporal artery bx negative. Patient with PCP same day as labs 6/2/21  dx with UTI on keflex.     Was seen apparently in ED in [place] for 104F she had altered mental status, dx with UTI, dehydration, treated with. Completed all 7 days of keflex and within 48 hours with another fever 104 F. She continues with congestion but no worse. Patient denies cough, hemoptysis, bloody nasal discharge. She has had no fever x 10 days. Checks twice daily.   while travelling told "congestion in chest on xray". Inflammatory markers very high again.   Patient with recent concern for wegeners needs CT chest. CXR with new airspace opacities right chest- need to r/o GGO vs infection. CT has been ordered pending scheduling.   Broad ABX coverage recommended for now will cc Dr. Natarajan.     I am limited here with her  severe edema from higher dose steroids. Max tolerated is 20mg daily pred  Started Imuran 5/5/2021. Very low dose 50mg BID (0.65mg/kg) tolerating VERY well.   H/H still low.         2/2021  Sinususitis, cx were negative.  Using nasal irrigation with mucoid disharge.   Patient continues with significant nasal discharge and blood with scabs. We discussed Wegeners but pathology sinus no vasculitis, granuloma noted.   No HA, no blurred vision. Recent sinus infection with HA for 10 days. cx +, but also repeat labs demonstarting +PR3 and +ANCA       Patient with c/o right > left 3,4 finger numbness that was intermittent until approximately 2 weeks about having near constant numbness in hands, pins and needles and pain.   Patient not noting much improvement with change in position.     Off MTX 6  tabs weekly.   Doing well with Hydroxychloroquine.   Declined COVID vaccine    Gabapentin up to 300mg TID.   Now with Dr. Campbell doing PT for Plantar fasciitis. She is taking supplement and compound cream for joint and neuropathy. No response to tarsal tunnel injection per Dr. Campbell.   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 WBC ok.       11/2020  Patient with PMR   MTX at 4 tabs weekly new anemia. Thrombocytosis  Pred at 10mg   Complicated with peripheral edema rather severe , started MTX seeing trending CRP/ESR but persistent leg pain.   Seen with PCP for neuropathy s/sx not seeing much benefit with gabpentin  Seen with Cardiology- no concern for cardiac ECHO ok. dc'd lasix for hyponatremia.     8/2020  Pred 20mg with mild edema, pred 30 mg with severe edema.   Current Pred 15mg daily  Rising ESR again.   Lasix 20 mg daily with improved edema but having some pins and needles in feet/legs.   Having tight, burning stinging at the toes and feet. Heaviness. Prickly.   Noting sodium is falling, K 5.2-5.4 still using           7/2020:  Hands not painful, knees not painful. Shoulder/cervical and down the arms, markedly improved. She still has some pain him hips and groin with walking and some weakness to the legs with edema. Some pain with fixing own hair.   She takes in AM regarding hips ache, some shoulder/arms, and again at night because hips/legs.   The clue is that the LDN new dose she had some ASE, previously tolerated 3mg daily fine.     Historical review of present Illness.   Patient with a recent chronic sinusitis that ultimately warranted a septoplasty, endoscopic sinus surgery and turbinate reduction 5/2020   Four weeks postop t increasingly worse she was noting pain in her throat and ears lasting several hours complaint of pdizziness she had symptoms of ear pain and decreased hearing in the left ear.  She underwent a debridement at this visit she continue with compound nasal irrigations.  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    Patient did have repeat procedure on with biopsies taken 07/03/2020 showing right maxillary sinus chronically inflamed fibrotic polypoid portions benign nasal mucosa left maxillary sinus similar polypoid fragments ulcerated markedly inflamed respiratory mucosa focal foreign body giant cell response noted suggested that this might have been from her Gelfoam packing as a possible cause of this reaction    I have spoken with Dr. Quinn prior to patient's visit today and was a concern that she could have triggered some inflammatory response with the Gelfoam packing    Patient states she no longer has a headache is not noticing much ear drainage continues with profound loss of hearing on the left and rather significant on the right both sensorineural and some conductive changes.    Restarted on prednisone as of 7/20/20  10 mg patient has not noticed any change with her hearing in this time she did notice slight improvement with her joint aches and pains but is still relying on hydrocodone for the bulk of this.    She denies a persistent headache she does have some tenderness about the preauricular region particularly on the right more than the left.  She has denied jaw claudication changes in her voice or any amaurosis fugax.  She has no pre-existing history of joint aches and pains hip or otherwise.  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  She had a workup that involved a negative rheumatoid factor, age appropriate sedimentation rate,C reactive protein JANET was positive 1:160 in speckled pattern        Previous: Hx:    She is having pain in her hand with stiffness and right 2nd PIP joint now unable to flex. 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   Hx of bilateral TKA 3 and 5 years ago and those are doing well.   She notes intermittent edema. Some hammer toe deformity bilaterally making shoes problematic but not painful otherwise.   Long hx of GERD-severe.   Cannot tolerate NSAIDs at all w/o gastritis.   Can use Hydrocodone PRN   Added Naltrexone at 3mg and doing very well  Lost 30# with WW.   Patient denies weight loss, rashes, dry eye, dry mouth, nasal or palatal ulcerations,  lymphadenopathy, Raynaud's, hx of DVT/miscarriages, psoriasis or family hx of psoriasis, rashes, serositis, anemia or other constitutional symptoms.  Asking about naltrexone  Component      Latest Ref Rng & Units 8/8/2020 7/29/2020 7/20/2020 7/13/2020   Sodium      136 - 145 mmol/L  126 (L) 128 (L)    Potassium      3.5 - 5.1 mmol/L  5.4 (H) 5.2 (H)    Chloride      95 - 110 mmol/L  92 (L) 92 (L)    CO2      23 - 29 mmol/L  25 29    Glucose      70 - 110 mg/dL  109 109    BUN, Bld      8 - 23 mg/dL  13 11    Creatinine      0.5 - 1.4 mg/dL  0.8 0.8    Calcium      8.7 - 10.5 mg/dL  9.5 9.7    Anion Gap      8 - 16 mmol/L  9 7 (L)    eGFR if African American      >60 mL/min/1.73 m:2  >60.0 >60.0    eGFR if non African American      >60 mL/min/1.73 m:2  >60.0 >60.0    Anti Sm Antibody      0.00 - 0.99 Ratio    0.04   Anti-Sm Interpretation      Negative    Negative   Anti Sm/RNP Antibody      0.00 - 0.99 Ratio    0.09   Anti-Sm/RNP Interpretation      Negative    Negative   JANET Screen      None Detected       Rheumatoid Factor      0.0 - 15.0 IU/mL       Sed Rate      0 - 36 mm/Hr 57 (H)  54 (H) 75 (H)   CRP      0.0 - 8.2 mg/L 75.4 (H) 56.1 (H) 57.5 (H) 99.4 (H)   Anti-Histone Antibody      0.0 - 0.9 Units    0.4   ds DNA Ab      Negative 1:10    Negative 1:10     Component      Latest Ref Rng & Units 6/25/2020 11/10/2018  "  Sodium      136 - 145 mmol/L     Potassium      3.5 - 5.1 mmol/L     Chloride      95 - 110 mmol/L     CO2      23 - 29 mmol/L     Glucose      70 - 110 mg/dL     BUN, Bld      8 - 23 mg/dL     Creatinine      0.5 - 1.4 mg/dL     Calcium      8.7 - 10.5 mg/dL     Anion Gap      8 - 16 mmol/L     eGFR if African American      >60 mL/min/1.73 m:2     eGFR if non African American      >60 mL/min/1.73 m:2     Anti Sm Antibody      0.00 - 0.99 Ratio     Anti-Sm Interpretation      Negative     Anti Sm/RNP Antibody      0.00 - 0.99 Ratio     Anti-Sm/RNP Interpretation      Negative     JANET Screen      None Detected Detected (A) Detected (A)   Rheumatoid Factor      0.0 - 15.0 IU/mL 11.6 <8.6   Sed Rate      0 - 36 mm/Hr     CRP      0.0 - 8.2 mg/L     Anti-Histone Antibody      0.0 - 0.9 Units     ds DNA Ab      Negative 1:10         REVIEW OF SYSTEMS:    Review of Systems   Constitutional: Negative for fever, malaise/fatigue and weight loss.   HENT: Negative for sore throat.    Eyes: Negative for double vision, photophobia and redness.   Respiratory: Negative for cough, shortness of breath and wheezing.    Cardiovascular: Negative for chest pain, palpitations and orthopnea.   Gastrointestinal: Negative for abdominal pain, constipation and diarrhea.   Genitourinary: Negative for dysuria, hematuria and urgency.   Musculoskeletal: Positive for joint pain. Negative for back pain and myalgias.   Skin: Negative for rash.   Neurological: Negative for dizziness, tingling, focal weakness and headaches.   Endo/Heme/Allergies: Does not bruise/bleed easily.   Psychiatric/Behavioral: Negative for depression, hallucinations and suicidal ideas.               Objective:            Past Medical History:   Diagnosis Date    Anesthesia     'Mother stop breathing after anesthesia"    Chronic sinusitis     Depression     GERD (gastroesophageal reflux disease)     PVC (premature ventricular contraction)      Family History   Problem " Relation Age of Onset    Heart disease Mother     Arthritis Mother     Cancer Sister     Arthritis Sister     Diabetes Sister     Hypertension Sister      Social History     Tobacco Use    Smoking status: Former     Types: Cigarettes     Start date: 2/3/1955     Quit date: 1/3/1960     Years since quittin.9    Smokeless tobacco: Never   Substance Use Topics    Alcohol use: Never     Alcohol/week: 0.0 standard drinks    Drug use: Never         Current Outpatient Medications on File Prior to Visit   Medication Sig Dispense Refill    ascorbate calcium (MAHSA-C ORAL) Take by mouth.      ascorbate calcium-bioflavonoid (MAHSA-C WITH BIOFLAVONOIDS) 1,000-200 mg Tab Take by mouth.      B2-B6 phos-levomef simran-mecobal (EB-N3 DR) 1.3-70-6-4 mg CpDR Take 1 capsule by mouth once daily.      cetirizine (ZYRTEC) 10 MG tablet Take 1 tablet (10 mg total) by mouth once daily. 90 tablet 0    cholecalciferol, vitamin D3, (VITAMIN D3) 125 mcg (5,000 unit) Tab Take 1 tablet (5,000 Units total) by mouth once daily. 90 tablet 0    diclofenac sodium (VOLTAREN) 1 % Gel APPLY TO THE AFFECTED AREA(S) 2 GRAMS THREE TIMES DAILY 100 g 6    gabapentin (NEURONTIN) 400 MG capsule TAKE 1 CAPSULE THREE TIMES DAILY 270 capsule 0    metoprolol succinate (TOPROL-XL) 50 MG 24 hr tablet Take 1 tablet (50 mg total) by mouth once daily. 90 tablet 3    omeprazole (PRILOSEC) 40 MG capsule Take 1 capsule (40 mg total) by mouth every morning. 90 capsule 0    predniSONE (DELTASONE) 2.5 MG tablet Take 1 tablet (2.5 mg total) by mouth once daily. 90 tablet 3    predniSONE (DELTASONE) 5 MG tablet Take 1 tablet (5 mg total) by mouth once daily. 90 tablet 3    vit C/E/Zn/coppr/lutein/zeaxan (PRESERVISION AREDS-2 ORAL) Take 1 capsule by mouth 2 (two) times a day.       acetaminophen (TYLENOL) 500 MG tablet Take 500 mg by mouth every 6 (six) hours as needed for Pain.      albuterol (PROVENTIL/VENTOLIN HFA) 90 mcg/actuation inhaler INHALE 2 PUFFS BY MOUTH EVERY 4 TO  6 HOURS AS NEEDED FOR SHORTNESS OF BREATH OR COUGH      furosemide (LASIX) 20 MG tablet TAKE 1 TABLET EVERY DAY (Patient not taking: Reported on 11/14/2022) 90 tablet 3    HYDROcodone-acetaminophen (NORCO) 7.5-325 mg per tablet Take 1 tablet by mouth every 12 (twelve) hours as needed for Pain. (Patient not taking: Reported on 11/14/2022) 60 tablet 0    [DISCONTINUED] naltrexone capsule Take 3 mg by mouth once daily.       No current facility-administered medications on file prior to visit.       Vitals:    11/14/22 1523   BP: (!) 129/59   Pulse: 66       Physical Exam:    Physical Exam   Constitutional: She is oriented to person, place, and time. She appears well-developed and well-nourished.   HENT:   Head: Normocephalic and atraumatic.   Mouth/Throat: Oropharynx is clear and moist.   Eyes: Pupils are equal, round, and reactive to light. EOM are normal.   Neck: Normal range of motion.   Cardiovascular: Normal rate, regular rhythm and normal heart sounds.   Pulmonary/Chest: Effort normal and breath sounds normal.   Musculoskeletal:        Right shoulder: She exhibits normal range of motion, no tenderness and no swelling.        Left shoulder: She exhibits normal range of motion, no tenderness and no swelling.        Right elbow: She exhibits normal range of motion and no swelling. No tenderness found.        Left elbow: She exhibits normal range of motion and no swelling. No tenderness found.        Right wrist: She exhibits normal range of motion, no tenderness and no swelling.        Left wrist: She exhibits normal range of motion, no tenderness and no swelling.        Right knee: She exhibits normal range of motion and no swelling. No tenderness found.        Left knee: She exhibits normal range of motion and no swelling. No tenderness found.        Right hand: She exhibits decreased range of motion and tenderness. She exhibits no swelling.        Left hand: She exhibits decreased range of motion and tenderness.  She exhibits no swelling.        Right foot: There is normal range of motion, no tenderness and no swelling.        Left foot: There is normal range of motion, no tenderness and no swelling.   Bony hypertrophy at the PIP and DIP joints. +squaring at the CMC joint. No active synovitis on 28 joint exam.    Neurological: She is alert and oriented to person, place, and time.   Skin: Skin is warm and dry.   Psychiatric: She has a normal mood and affect. Her behavior is normal.     Component      Latest Ref Rng & Units 2021          11:03 AM 11:03 AM   Respiratory Culture        No growth   Gram Stain (Respiratory)       No organisms seen No WBC's   AFB Culture & Smear           AFB CULTURE STAIN           GENET Prep           Fungus (Mycology) Culture           Aerobic Bacterial Culture             Component      Latest Ref Rng & Units 2021          11:03 AM   Respiratory Culture       No Staph aureus, MRSA or Pseudomonas isolated.   Gram Stain (Respiratory)       No organisms seen   AFB Culture & Smear          AFB CULTURE STAIN          GENET Prep          Fungus (Mycology) Culture          Aerobic Bacterial Culture            Component      Latest Ref Rng & Units 2021          11:03 AM 11:03 AM   Respiratory Culture       Normal respiratory nimesh    Gram Stain (Respiratory)       Moderate WBC's    AFB Culture & Smear        Culture in progress   AFB CULTURE STAIN        No acid fast bacilli seen.   KOH Prep        No yeast or fungal elements seen   Fungus (Mycology) Culture        Culture in progress   Aerobic Bacterial Culture                 Narrative  Performed by: [facility]  Patient - ONIEL ROSARIO                   - 1940 Sex- F   Med Rec # - [id number]                        Bimal Mckeon M.D.   The following is an electronic copy of report # [id number] from:   [facility]   [address] [place] [address]                          Phone [phone]   DIAGNOSIS:   09/22/2021  /sdc     1, 2.  RIGHT UPPER LOBE MASS BRUSHING AND FINE NEEDLE ASPIRATE BIOPSIES:   - NEGATIVE FOR MALIGNANT CELLS.     - PURULENT EXUDATE.     - A FEW FRAGMENTS OF BRONCHIAL MUCOSA WITH FLORID CHRONIC BRONCHITIS    AND REACTIVE SQUAMOUS    ATYPICAL METAPLASIA.       Comment:   Special stains (AFB and GMS) are negative for organisms. While no    granulomas are seen here, the inflammatory reaction appears to be    similar to that seen in the sinus material over the past few years    (Delta [id number], [id number], [id number]). 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 Bronchoscopic material, particularly of the lower    respiratory tract, is of low yield for a definitive diagnosis of that    entity. The previous sinus material will be reviewed with    consideration for that diagnosis.   _______________________________________________________________________   SPECIMEN AND SOURCE:   1. RUL mass brushing   2. RUL mass needle     CLINICAL INFORMATION:   Clinical Information:-gt Right Upper Lobe Mass   Specific Site:-gt RUL mass brushing T Brush; RUL Mass; RUL mass-    microbiology; RUL BAL; RUL washing;   Other Requests:-gt N/A     GROSS DESCRIPTION:   1. Received 40 mL of fluid in formalin, 1 dry slides and 1 slides in    95% alcohol. Prepared one cell block.     2. Received 40 mL of fluid in formalin, 1 dry slides and 1 slides in    95% alcohol. Prepared one cell block.     CODE: 0     Cytotechnologist: ABDIFATAH Marvin (ASCP)   Pathologist: Scott Johnson MD (Electronic Signature) 09/22/2021 2:34 PM     [facility] * [address] [facility] * [place]    [address]   Technical services performed at: [facility] * [id number]    [facility] * [place] [address]   Screening Site: [facility] *  [facility] [facility] *    [place] [address]                         Post Rituxan image                                     Pre Rituxan image  Assessment:       No diagnosis found.       Plan:        There are no diagnoses linked to this encounter.   Patient is a 82-year-old female she has had a recent robust inflammatory reaction within the sinus cavity as well as hearing deficit following a sinus surgery.  She has been repeated cultured postoperatively completed antibiotics and irrigations negative for any fungal infections or bacterial infections.   Patient was doing well on MTX with regard to ESR and CRP but hair loss.     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 Patient has tolerated RTX last 4/20/2022, she had to cancel 10/21/2022 for COVID.   Will need to get this rescheduled asap.       t spot 5/2021 negative.    Hep neg.    covid completed #2        CKD        Patient did have casts with hematuria 10/12/21 prior to start of Rituxan. Improved following RTX       Continue omeprazole (prilosec)  40mg by mouth daily.          COVID vaccine #3 shot for immunosuppressed to be done March.       Patient hx of Osteopenia only, renal function not stable enough for oral bisphos. Recent t1-T12 intense pain: getting BMD. Likely we should just order Prolia.     she is having some spinous process tenderness today but pain not classic of vertebral fracture will check imaging anyway. She had rib fractures last year as well.    Completed. Prolia. Pending new DXA from OB/GYN     Continue prednisone to 7.5mg (1.5 tablets) daily.      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 This will be at 6,12,18 months- was my initial plan, but recent rise in her ESR, persistent sinus infections and drainage we administered Maintanance dose 4/20/2022. Which was     Tracking labs and serologies for next dose.     Labs reviewed today noted CRP continues to be adequate, and improved while the ESR rising. Chest xray continues to show improvement.        No follow-ups on file.      f/u 3 months  Thank you for allowing me to participate in the care of this very pleasant patient.       40 min consultation with greater than 50% spent in counseling, chart review and coordination of care. All questions answered.

## 2022-11-15 ENCOUNTER — TELEPHONE (OUTPATIENT)
Dept: INFUSION THERAPY | Facility: HOSPITAL | Age: 82
End: 2022-11-15
Payer: MEDICARE

## 2022-11-15 NOTE — TELEPHONE ENCOUNTER
----- Message from Anali Del Rio LPN sent at 11/14/2022  4:24 PM CST -----  Regarding: rituxin  Patient finally clear to book Rituxin. She got the green light form Dr. Snyder today. Anali/[id number]

## 2022-11-17 ENCOUNTER — PATIENT MESSAGE (OUTPATIENT)
Dept: RHEUMATOLOGY | Facility: CLINIC | Age: 82
End: 2022-11-17
Payer: MEDICARE

## 2022-12-02 ENCOUNTER — INFUSION (OUTPATIENT)
Dept: INFUSION THERAPY | Facility: HOSPITAL | Age: 82
End: 2022-12-02
Attending: INTERNAL MEDICINE
Payer: MEDICARE

## 2022-12-02 VITALS
DIASTOLIC BLOOD PRESSURE: 54 MMHG | HEIGHT: 64 IN | WEIGHT: 151.56 LBS | BODY MASS INDEX: 25.88 KG/M2 | TEMPERATURE: 98 F | RESPIRATION RATE: 18 BRPM | HEART RATE: 73 BPM | SYSTOLIC BLOOD PRESSURE: 108 MMHG

## 2022-12-02 DIAGNOSIS — M31.30 GRANULOMATOSIS WITH POLYANGIITIS WITH PULMONARY INVOLVEMENT: Primary | ICD-10-CM

## 2022-12-02 DIAGNOSIS — M31.31 WEGENER'S GRANULOMATOSIS WITH RENAL INVOLVEMENT: Primary | ICD-10-CM

## 2022-12-02 LAB
HBV CORE IGM SERPL QL IA: NORMAL
HBV SURFACE AG SERPL QL IA: NORMAL

## 2022-12-02 PROCEDURE — 86705 HEP B CORE ANTIBODY IGM: CPT | Performed by: INTERNAL MEDICINE

## 2022-12-02 PROCEDURE — 96366 THER/PROPH/DIAG IV INF ADDON: CPT | Mod: PN

## 2022-12-02 PROCEDURE — 36415 COLL VENOUS BLD VENIPUNCTURE: CPT | Mod: PN | Performed by: INTERNAL MEDICINE

## 2022-12-02 PROCEDURE — 63600175 PHARM REV CODE 636 W HCPCS: Mod: PN | Performed by: INTERNAL MEDICINE

## 2022-12-02 PROCEDURE — 87340 HEPATITIS B SURFACE AG IA: CPT | Performed by: INTERNAL MEDICINE

## 2022-12-02 PROCEDURE — 96415 CHEMO IV INFUSION ADDL HR: CPT | Mod: PN

## 2022-12-02 PROCEDURE — 96365 THER/PROPH/DIAG IV INF INIT: CPT | Mod: PN

## 2022-12-02 PROCEDURE — 96413 CHEMO IV INFUSION 1 HR: CPT | Mod: PN

## 2022-12-02 PROCEDURE — 25000003 PHARM REV CODE 250: Mod: PN | Performed by: INTERNAL MEDICINE

## 2022-12-02 RX ORDER — HEPARIN 100 UNIT/ML
500 SYRINGE INTRAVENOUS
Status: CANCELLED | OUTPATIENT
Start: 2022-12-03

## 2022-12-02 RX ORDER — ACETAMINOPHEN 325 MG/1
650 TABLET ORAL
Status: COMPLETED | OUTPATIENT
Start: 2022-12-02 | End: 2022-12-02

## 2022-12-02 RX ORDER — DIPHENHYDRAMINE HCL 25 MG
25 CAPSULE ORAL
Status: CANCELLED | OUTPATIENT
Start: 2022-12-03

## 2022-12-02 RX ORDER — ACETAMINOPHEN 325 MG/1
650 TABLET ORAL
Status: CANCELLED | OUTPATIENT
Start: 2022-12-03

## 2022-12-02 RX ORDER — SODIUM CHLORIDE 0.9 % (FLUSH) 0.9 %
10 SYRINGE (ML) INJECTION
Status: DISCONTINUED | OUTPATIENT
Start: 2022-12-02 | End: 2022-12-02 | Stop reason: HOSPADM

## 2022-12-02 RX ORDER — SODIUM CHLORIDE 0.9 % (FLUSH) 0.9 %
10 SYRINGE (ML) INJECTION
Status: CANCELLED | OUTPATIENT
Start: 2022-12-03

## 2022-12-02 RX ORDER — METHYLPREDNISOLONE SOD SUCC 125 MG
80 VIAL (EA) INJECTION
Status: CANCELLED | OUTPATIENT
Start: 2022-12-03

## 2022-12-02 RX ORDER — DIPHENHYDRAMINE HCL 25 MG
25 CAPSULE ORAL
Status: COMPLETED | OUTPATIENT
Start: 2022-12-02 | End: 2022-12-02

## 2022-12-02 RX ADMIN — DIPHENHYDRAMINE HYDROCHLORIDE 25 MG: 25 CAPSULE ORAL at 11:12

## 2022-12-02 RX ADMIN — METHYLPREDNISOLONE SODIUM SUCCINATE 80 MG: 40 INJECTION, POWDER, FOR SOLUTION INTRAMUSCULAR; INTRAVENOUS at 11:12

## 2022-12-02 RX ADMIN — SODIUM CHLORIDE: 0.9 INJECTION, SOLUTION INTRAVENOUS at 11:12

## 2022-12-02 RX ADMIN — ACETAMINOPHEN 650 MG: 325 TABLET, FILM COATED ORAL at 11:12

## 2022-12-02 RX ADMIN — RITUXIMAB 500 MG: 10 INJECTION, SOLUTION INTRAVENOUS at 12:12

## 2022-12-02 NOTE — PLAN OF CARE
Problem: Adult Inpatient Plan of Care  Goal: Patient-Specific Goal (Individualized)  Outcome: Ongoing, Progressing  Flowsheets (Taken 12/2/2022 1505)  Anxieties, Fears or Concerns: None  Individualized Care Needs: Recliner, blanket  Patient-Specific Goals (Include Timeframe): Infection prevention during treatment.     Problem: Fatigue  Goal: Improved Activity Tolerance  Intervention: Promote Improved Energy  Flowsheets (Taken 12/2/2022 1505)  Fatigue Management:   activity schedule adjusted   frequent rest breaks encouraged   paced activity encouraged   fatigue-related activity identified  Sleep/Rest Enhancement:   regular sleep/rest pattern promoted   relaxation techniques promoted  Activity Management:   Ambulated -L4   Ambulated in pritchard - L4     Problem: Adult Inpatient Plan of Care  Goal: Plan of Care Review  Outcome: Ongoing, Progressing  Flowsheets (Taken 12/2/2022 1505)  Plan of Care Reviewed With: patient  Tolerated treatment with no known distress.  Ambulated from infusion center with steady gait.

## 2022-12-06 PROBLEM — N39.45 CONTINUOUS LEAKAGE OF URINE: Status: ACTIVE | Noted: 2022-12-06

## 2022-12-06 PROBLEM — N32.81 OAB (OVERACTIVE BLADDER): Status: ACTIVE | Noted: 2022-12-06

## 2023-01-06 ENCOUNTER — LAB VISIT (OUTPATIENT)
Dept: LAB | Facility: HOSPITAL | Age: 83
End: 2023-01-06
Attending: INTERNAL MEDICINE
Payer: MEDICARE

## 2023-01-06 DIAGNOSIS — M31.31 WEGENER'S GRANULOMATOSIS WITH RENAL INVOLVEMENT: ICD-10-CM

## 2023-01-06 DIAGNOSIS — M31.30 GRANULOMATOSIS WITH POLYANGIITIS WITH PULMONARY INVOLVEMENT: ICD-10-CM

## 2023-01-06 DIAGNOSIS — N18.30 STAGE 3 CHRONIC KIDNEY DISEASE, UNSPECIFIED WHETHER STAGE 3A OR 3B CKD: ICD-10-CM

## 2023-01-06 PROCEDURE — 36415 COLL VENOUS BLD VENIPUNCTURE: CPT | Mod: PO | Performed by: INTERNAL MEDICINE

## 2023-01-06 PROCEDURE — 82306 VITAMIN D 25 HYDROXY: CPT | Performed by: INTERNAL MEDICINE

## 2023-01-06 PROCEDURE — 80053 COMPREHEN METABOLIC PANEL: CPT | Performed by: INTERNAL MEDICINE

## 2023-01-06 PROCEDURE — 86140 C-REACTIVE PROTEIN: CPT | Performed by: INTERNAL MEDICINE

## 2023-01-06 PROCEDURE — 80069 RENAL FUNCTION PANEL: CPT | Performed by: INTERNAL MEDICINE

## 2023-01-06 PROCEDURE — 83735 ASSAY OF MAGNESIUM: CPT | Performed by: INTERNAL MEDICINE

## 2023-01-06 PROCEDURE — 82180 ASSAY OF ASCORBIC ACID: CPT | Performed by: INTERNAL MEDICINE

## 2023-01-06 PROCEDURE — 83970 ASSAY OF PARATHORMONE: CPT | Performed by: INTERNAL MEDICINE

## 2023-01-06 PROCEDURE — 85651 RBC SED RATE NONAUTOMATED: CPT | Mod: PO | Performed by: INTERNAL MEDICINE

## 2023-01-06 PROCEDURE — 85025 COMPLETE CBC W/AUTO DIFF WBC: CPT | Performed by: INTERNAL MEDICINE

## 2023-01-07 LAB
25(OH)D3+25(OH)D2 SERPL-MCNC: 65 NG/ML (ref 30–96)
ALBUMIN SERPL BCP-MCNC: 3.9 G/DL (ref 3.5–5.2)
ALBUMIN SERPL BCP-MCNC: 3.9 G/DL (ref 3.5–5.2)
ALP SERPL-CCNC: 55 U/L (ref 55–135)
ALT SERPL W/O P-5'-P-CCNC: 14 U/L (ref 10–44)
ANION GAP SERPL CALC-SCNC: 12 MMOL/L (ref 8–16)
ANION GAP SERPL CALC-SCNC: 12 MMOL/L (ref 8–16)
AST SERPL-CCNC: 20 U/L (ref 10–40)
BASOPHILS # BLD AUTO: 0.07 K/UL (ref 0–0.2)
BASOPHILS NFR BLD: 0.9 % (ref 0–1.9)
BILIRUB SERPL-MCNC: 0.4 MG/DL (ref 0.1–1)
BUN SERPL-MCNC: 26 MG/DL (ref 8–23)
BUN SERPL-MCNC: 26 MG/DL (ref 8–23)
CALCIUM SERPL-MCNC: 11.1 MG/DL (ref 8.7–10.5)
CALCIUM SERPL-MCNC: 11.1 MG/DL (ref 8.7–10.5)
CHLORIDE SERPL-SCNC: 98 MMOL/L (ref 95–110)
CHLORIDE SERPL-SCNC: 98 MMOL/L (ref 95–110)
CO2 SERPL-SCNC: 28 MMOL/L (ref 23–29)
CO2 SERPL-SCNC: 28 MMOL/L (ref 23–29)
CREAT SERPL-MCNC: 1.6 MG/DL (ref 0.5–1.4)
CREAT SERPL-MCNC: 1.6 MG/DL (ref 0.5–1.4)
CRP SERPL-MCNC: 5.3 MG/L (ref 0–8.2)
DIFFERENTIAL METHOD: ABNORMAL
EOSINOPHIL # BLD AUTO: 0.1 K/UL (ref 0–0.5)
EOSINOPHIL NFR BLD: 1.8 % (ref 0–8)
ERYTHROCYTE [DISTWIDTH] IN BLOOD BY AUTOMATED COUNT: 13.6 % (ref 11.5–14.5)
ERYTHROCYTE [SEDIMENTATION RATE] IN BLOOD BY PHOTOMETRIC METHOD: 33 MM/HR (ref 0–36)
EST. GFR  (NO RACE VARIABLE): 32.4 ML/MIN/1.73 M^2
EST. GFR  (NO RACE VARIABLE): 32.4 ML/MIN/1.73 M^2
GLUCOSE SERPL-MCNC: 97 MG/DL (ref 70–110)
GLUCOSE SERPL-MCNC: 97 MG/DL (ref 70–110)
HCT VFR BLD AUTO: 34.8 % (ref 37–48.5)
HGB BLD-MCNC: 11.4 G/DL (ref 12–16)
IMM GRANULOCYTES # BLD AUTO: 0.09 K/UL (ref 0–0.04)
IMM GRANULOCYTES NFR BLD AUTO: 1.1 % (ref 0–0.5)
LYMPHOCYTES # BLD AUTO: 1.2 K/UL (ref 1–4.8)
LYMPHOCYTES NFR BLD: 15.6 % (ref 18–48)
MAGNESIUM SERPL-MCNC: 1.9 MG/DL (ref 1.6–2.6)
MCH RBC QN AUTO: 30.7 PG (ref 27–31)
MCHC RBC AUTO-ENTMCNC: 32.8 G/DL (ref 32–36)
MCV RBC AUTO: 94 FL (ref 82–98)
MONOCYTES # BLD AUTO: 1.2 K/UL (ref 0.3–1)
MONOCYTES NFR BLD: 15.1 % (ref 4–15)
NEUTROPHILS # BLD AUTO: 5.2 K/UL (ref 1.8–7.7)
NEUTROPHILS NFR BLD: 65.5 % (ref 38–73)
NRBC BLD-RTO: 0 /100 WBC
PHOSPHATE SERPL-MCNC: 3.8 MG/DL (ref 2.7–4.5)
PLATELET # BLD AUTO: 406 K/UL (ref 150–450)
PMV BLD AUTO: 9.3 FL (ref 9.2–12.9)
POTASSIUM SERPL-SCNC: 4 MMOL/L (ref 3.5–5.1)
POTASSIUM SERPL-SCNC: 4 MMOL/L (ref 3.5–5.1)
PROT SERPL-MCNC: 6.8 G/DL (ref 6–8.4)
PTH-INTACT SERPL-MCNC: 11.9 PG/ML (ref 9–77)
RBC # BLD AUTO: 3.71 M/UL (ref 4–5.4)
SODIUM SERPL-SCNC: 138 MMOL/L (ref 136–145)
SODIUM SERPL-SCNC: 138 MMOL/L (ref 136–145)
WBC # BLD AUTO: 7.94 K/UL (ref 3.9–12.7)

## 2023-01-09 ENCOUNTER — OFFICE VISIT (OUTPATIENT)
Dept: NEPHROLOGY | Facility: CLINIC | Age: 83
End: 2023-01-09
Payer: MEDICARE

## 2023-01-09 VITALS
DIASTOLIC BLOOD PRESSURE: 62 MMHG | WEIGHT: 151 LBS | HEIGHT: 64 IN | BODY MASS INDEX: 25.78 KG/M2 | OXYGEN SATURATION: 94 % | SYSTOLIC BLOOD PRESSURE: 136 MMHG | HEART RATE: 65 BPM

## 2023-01-09 DIAGNOSIS — E87.1 HYPONATREMIA: ICD-10-CM

## 2023-01-09 DIAGNOSIS — N18.31 STAGE 3A CHRONIC KIDNEY DISEASE: Primary | ICD-10-CM

## 2023-01-09 DIAGNOSIS — M31.30 GRANULOMATOSIS WITH POLYANGIITIS WITH PULMONARY INVOLVEMENT: ICD-10-CM

## 2023-01-09 DIAGNOSIS — E83.52 HYPERCALCEMIA: ICD-10-CM

## 2023-01-09 DIAGNOSIS — N06.9 ISOLATED PROTEINURIA WITH MORPHOLOGIC LESION: ICD-10-CM

## 2023-01-09 PROCEDURE — 99214 PR OFFICE/OUTPT VISIT, EST, LEVL IV, 30-39 MIN: ICD-10-PCS | Mod: S$GLB,,, | Performed by: INTERNAL MEDICINE

## 2023-01-09 PROCEDURE — 3288F PR FALLS RISK ASSESSMENT DOCUMENTED: ICD-10-PCS | Mod: CPTII,S$GLB,, | Performed by: INTERNAL MEDICINE

## 2023-01-09 PROCEDURE — 1159F MED LIST DOCD IN RCRD: CPT | Mod: CPTII,S$GLB,, | Performed by: INTERNAL MEDICINE

## 2023-01-09 PROCEDURE — 1159F PR MEDICATION LIST DOCUMENTED IN MEDICAL RECORD: ICD-10-PCS | Mod: CPTII,S$GLB,, | Performed by: INTERNAL MEDICINE

## 2023-01-09 PROCEDURE — 3075F SYST BP GE 130 - 139MM HG: CPT | Mod: CPTII,S$GLB,, | Performed by: INTERNAL MEDICINE

## 2023-01-09 PROCEDURE — 3078F PR MOST RECENT DIASTOLIC BLOOD PRESSURE < 80 MM HG: ICD-10-PCS | Mod: CPTII,S$GLB,, | Performed by: INTERNAL MEDICINE

## 2023-01-09 PROCEDURE — 3078F DIAST BP <80 MM HG: CPT | Mod: CPTII,S$GLB,, | Performed by: INTERNAL MEDICINE

## 2023-01-09 PROCEDURE — 1101F PR PT FALLS ASSESS DOC 0-1 FALLS W/OUT INJ PAST YR: ICD-10-PCS | Mod: CPTII,S$GLB,, | Performed by: INTERNAL MEDICINE

## 2023-01-09 PROCEDURE — 99999 PR PBB SHADOW E&M-EST. PATIENT-LVL IV: CPT | Mod: PBBFAC,,, | Performed by: INTERNAL MEDICINE

## 2023-01-09 PROCEDURE — 1101F PT FALLS ASSESS-DOCD LE1/YR: CPT | Mod: CPTII,S$GLB,, | Performed by: INTERNAL MEDICINE

## 2023-01-09 PROCEDURE — 99214 OFFICE O/P EST MOD 30 MIN: CPT | Mod: S$GLB,,, | Performed by: INTERNAL MEDICINE

## 2023-01-09 PROCEDURE — 3075F PR MOST RECENT SYSTOLIC BLOOD PRESS GE 130-139MM HG: ICD-10-PCS | Mod: CPTII,S$GLB,, | Performed by: INTERNAL MEDICINE

## 2023-01-09 PROCEDURE — 3288F FALL RISK ASSESSMENT DOCD: CPT | Mod: CPTII,S$GLB,, | Performed by: INTERNAL MEDICINE

## 2023-01-09 PROCEDURE — 99999 PR PBB SHADOW E&M-EST. PATIENT-LVL IV: ICD-10-PCS | Mod: PBBFAC,,, | Performed by: INTERNAL MEDICINE

## 2023-01-09 RX ORDER — POLYETHYLENE GLYCOL AND PROPYLENE GLYCOL 4; 3 MG/ML; MG/ML
SOLUTION/ DROPS OPHTHALMIC 2 TIMES DAILY
COMMUNITY

## 2023-01-09 NOTE — PROGRESS NOTES
"Subjective:       Patient ID: Aracelis Weber is a 82 y.o. White female who presents for return patient evaluation for chronic renal failure.      She has been treated with rituxan and steroids for Wegener's q 4-6 months.  She had COVID in November 2022.  She has chronic neuropathy in her feet.      Review of Systems   Constitutional:  Negative for appetite change, chills and fever.   HENT:  Positive for congestion and hearing loss.    Eyes:  Negative for visual disturbance.   Respiratory:  Positive for cough (dry cough). Negative for shortness of breath.    Cardiovascular:  Positive for leg swelling. Negative for chest pain.   Gastrointestinal:  Negative for abdominal pain, diarrhea, nausea and vomiting.   Genitourinary:  Positive for difficulty urinating (urge incontinence). Negative for dysuria and hematuria.   Musculoskeletal:  Negative for arthralgias, back pain and myalgias.   Skin:  Negative for rash.   Neurological:  Positive for numbness (feet B). Negative for headaches.   Psychiatric/Behavioral:  Negative for sleep disturbance.        The past medical, family and social histories were reviewed for this encounter.     /62 (BP Location: Right arm, Patient Position: Sitting, BP Method: Medium (Manual))   Pulse 65   Ht 5' 4" (1.626 m)   Wt 68.5 kg (151 lb)   SpO2 (!) 94%   BMI 25.92 kg/m²     Objective:      Physical Exam  Vitals reviewed.   Constitutional:       General: She is not in acute distress.     Appearance: She is well-developed.   HENT:      Head: Normocephalic and atraumatic.   Eyes:      General: No scleral icterus.     Conjunctiva/sclera: Conjunctivae normal.   Neck:      Vascular: No JVD.   Cardiovascular:      Rate and Rhythm: Normal rate and regular rhythm.      Heart sounds: Normal heart sounds. No murmur heard.    No friction rub. No gallop.   Pulmonary:      Effort: Pulmonary effort is normal. No respiratory distress.      Breath sounds: Normal breath sounds. No wheezing or " rales.   Abdominal:      General: Bowel sounds are normal. There is no distension.      Palpations: Abdomen is soft.      Tenderness: There is no abdominal tenderness.   Musculoskeletal:      Cervical back: Normal range of motion.      Right lower leg: Edema (trace) present.      Left lower leg: Edema (trace) present.   Skin:     General: Skin is warm and dry.      Findings: No rash.   Neurological:      Mental Status: She is alert and oriented to person, place, and time.   Psychiatric:         Mood and Affect: Mood normal.         Behavior: Behavior normal.       Assessment:       1. Stage 3a chronic kidney disease    2. Wegener's disease, pulmonary    3. Isolated proteinuria with morphologic lesion    4. Hyponatremia    5. Hypercalcemia        Plan:   Return to clinic in 6 months.  Labs for next visit include rp upc pth q 3 mo per so.  RP later this month in 2-3 weeks.  Baseline creatinine is 0.8-1.1 since 2016.  UPC is negative.  PTH is 12 with  calcium of 11.1.  We will back off of the D3 supplementation.  Renal US shows R 9.6 cm L 12.4 cm.  She was treated for her disease and has responded well.   Blood pressure is controlled on the current regimen.  Continue current medications as prescribed and reviewed.

## 2023-01-10 LAB — VIT C SERPL-MCNC: 12 MG/L (ref 2–19)

## 2023-01-13 ENCOUNTER — CLINICAL SUPPORT (OUTPATIENT)
Dept: REHABILITATION | Facility: HOSPITAL | Age: 83
End: 2023-01-13
Payer: MEDICARE

## 2023-01-13 DIAGNOSIS — N39.45 CONTINUOUS LEAKAGE OF URINE: ICD-10-CM

## 2023-01-13 DIAGNOSIS — N32.81 OAB (OVERACTIVE BLADDER): Primary | ICD-10-CM

## 2023-01-13 DIAGNOSIS — N81.89 PELVIC FLOOR WEAKNESS IN FEMALE: ICD-10-CM

## 2023-01-13 PROCEDURE — 97110 THERAPEUTIC EXERCISES: CPT | Mod: PN

## 2023-01-13 PROCEDURE — 97112 NEUROMUSCULAR REEDUCATION: CPT | Mod: PN

## 2023-01-13 NOTE — PROGRESS NOTES
"[facility]  Pelvic Health Physical Therapy Progress Note    Visit Date: 1/13/2023    Name: Aracelis Weber  MRN: [id number]  Therapy Diagnosis:   Encounter Diagnoses   Name Primary?    OAB (overactive bladder) Yes    Continuous leakage of urine     Pelvic floor weakness in female        Physician: Amanda Chow NP  Physician Orders: PT eval and treat, pelvic health urinary   Medical Diagnosis from Referral:   N39.45 (ICD-10-CM) - Continuous leakage of urine   N32.81 (ICD-10-CM) - OAB (overactive bladder)      Evaluation Date: 12/5/2022  Authorization: 20 visits 12/31/23  Plan of Care Expiration: 10 Weeks     Visit:1/20  3 total    Time In: 9:13  Time Out: 10:06  Total Billable Time: 53 minutes      Subjective     Pt states: Still continuous UI; wakes up wet 2x a night and has to change clothes. Also has frequency - voided prior to session and has urge to go by the end of it today.  Not sure she is doing the abdominal exercise correctly.    Pain: 0/10  Location: N/A    Objective     Treatment:    Therapeutic exercise for core strengthening performed for 10 min:  TA contraction: 2x10 relaxed with LE elevated on wedge, verbal/tactile cues for proper contraction.  Isometric Clam Shell    Next:  +Bridges    Neuromuscular re-education to promote kinesthetic awareness and proprioception performed for 43 min:   Diaphragmatic Breathing: verbal cues to improve abdominal mobility, pt with increased upper accessory muscles resulting in "chest breathing"  NMRE with EMG biofeedback using external anal sensors. Pt with resting tone of 2 microvolts. Pt performed endurance program on 3"x2/10" W/R cycle for 20 reps. Verbal cues for breath coordination and had pt count out loud to prevent breath hold during Kegel.   Pt to begin kegel endurance holds 3x10 reps holding 3".  Pt performed Kegel ex in supine and sitting today to ensure she is performing correctly. Pt able to sense some lift of perineum when performing " seated kegel on towel roll.   Discussed breath holding and it's effect on pressure down into PF - pt to check in during the day to ensure she is breathing when performing ADL's.      Not performed:  Activity Modification: education provided on double voiding, decreased bearing down with urination.        Education Provided  [x] Progress toward goals   [x] Role of therapy   [x] Activity modification  [x] Reviewed HEP  -Kegel endurance program  -Check in on breathing throughout day with ADL's    Home Exercises Provided: yes.  Exercises were reviewed and Aracelis was able to demonstrate them prior to the end of the session.  Aracelis demonstrated good  understanding of the education provided.   See EMR under Pt Instruction for exercises provided 12/12/2022, 1/13/23      Assessment     Pt has transferred treatment to [facility] for more ease scheduling sessions.  Reviewed prior exercises - pt was performing a very forceful TA contraction and holding breath so cued her to perform more gently and count out loud to ensure breathing. EMG biofeedback today reveals good resting muscle tone and adequate initial mm recruitment but poor endurance. Performed kegel endurance program with verbal cues for sequencing and breathing. Pt given endurance kegels for HEP and is to continue prior HEP. Discussed that strengthening PFM is a process and will take 4-6 weeks of consistent HEP to see results.     Aracelis is progressing towards her goals. Pt will continue to benefit from skilled outpatient physical therapy to address the deficits listed in the problem list box on initial evaluation, provide pt/family education and to maximize pt's level of independence in the home and community environment. Pt's spiritual, cultural and educational needs considered and pt agreeable to plan of care and goals.  Pt prognosis is Good.     Goals  Short Term Goals: 8 weeks  (ongoing)  1. Pt will demonstrate appropriate diaphragmatic breathing  technique to prevent adverse affects to adjacent structures.   2. Pt will be independent with double voiding techniques 100% of the time to ensure full bladder emptying and decrease pt's risk of infection.   Pt will tolerate HEP to improve impairments and independence with ADL's.      Long Term Goals: 16 weeks  1.  Pt to demonstrate improved PFM strength to 4/5 bilaterally to allow for improved continence.   2. Pt will report a decrease in pad use to 4 pads per day.  3.Pt will maintain a voiding interval of > or = 2 hours for improved activity tolerance and healthy bladder habits.   4.Pt will urinate without crede/valsalva to prevent adverse effects to adjacent structures.  5.Pt will be independent with double voiding techniques 100% of the time to ensure full bladder emptying and decrease pt's risk of infection.   6.Pt will be independent with HEP for self management of symptoms.     Plan   Plan of Care Certification: 12/5/2022 to 4/30/2022.    Continue with established plan of care working toward PT goals.    Court Cardenas, PT,PT  DPT

## 2023-01-13 NOTE — PATIENT INSTRUCTIONS
Deep core/lower abdominals - tighten lower abdominals like firming a rubber band across front of hips. Can feel just inside hip bones for correct contraction. Do 1x10 reps daiy. Brace like you're going to get punched in the belly.        Home Exercise Program: 01/13/2023    Kegels    Endurance Holds  Perform a long kegel (contract and LIFT the pelvic floor muscles as if you're trying to stop the stream of urine and passage of gas).    Make sure you're just using the internal muscles without holding your breath.  Let go and relax everything for 10 seconds.   Hold 3 seconds. Repeat 10 times, 3 sets per day.      **Check in during the day to make sure you aren't holding your breath when you bend and get things out of the dryer, .

## 2023-01-23 ENCOUNTER — TELEPHONE (OUTPATIENT)
Dept: NEPHROLOGY | Facility: CLINIC | Age: 83
End: 2023-01-23

## 2023-01-23 NOTE — TELEPHONE ENCOUNTER
----- Message from Serena Donovan sent at 1/23/2023 12:21 PM CST -----  Contact: pt at [phone]  Type: Needs Medical Advice  Who Called:  pt  Best Call Back Number: [phone]  Additional Information: pt is calling the office to speak with the nurse in regards to having rescheduled her lab appt from today to this coming Friday. The pt would like to make sure this is okay to schedule that late in the week. Please call back to advise.

## 2023-01-27 ENCOUNTER — LAB VISIT (OUTPATIENT)
Dept: LAB | Facility: HOSPITAL | Age: 83
End: 2023-01-27
Attending: INTERNAL MEDICINE
Payer: MEDICARE

## 2023-01-27 DIAGNOSIS — N18.31 STAGE 3A CHRONIC KIDNEY DISEASE: ICD-10-CM

## 2023-01-27 PROCEDURE — 36415 COLL VENOUS BLD VENIPUNCTURE: CPT | Mod: PO | Performed by: INTERNAL MEDICINE

## 2023-01-27 PROCEDURE — 80069 RENAL FUNCTION PANEL: CPT | Performed by: INTERNAL MEDICINE

## 2023-01-28 LAB
ALBUMIN SERPL BCP-MCNC: 3.5 G/DL (ref 3.5–5.2)
ANION GAP SERPL CALC-SCNC: 9 MMOL/L (ref 8–16)
BUN SERPL-MCNC: 19 MG/DL (ref 8–23)
CALCIUM SERPL-MCNC: 9.9 MG/DL (ref 8.7–10.5)
CHLORIDE SERPL-SCNC: 97 MMOL/L (ref 95–110)
CO2 SERPL-SCNC: 26 MMOL/L (ref 23–29)
CREAT SERPL-MCNC: 1.3 MG/DL (ref 0.5–1.4)
EST. GFR  (NO RACE VARIABLE): 41.6 ML/MIN/1.73 M^2
GLUCOSE SERPL-MCNC: 76 MG/DL (ref 70–110)
PHOSPHATE SERPL-MCNC: 3.3 MG/DL (ref 2.7–4.5)
POTASSIUM SERPL-SCNC: 4 MMOL/L (ref 3.5–5.1)
SODIUM SERPL-SCNC: 132 MMOL/L (ref 136–145)

## 2023-01-30 ENCOUNTER — PATIENT MESSAGE (OUTPATIENT)
Dept: RHEUMATOLOGY | Facility: CLINIC | Age: 83
End: 2023-01-30
Payer: MEDICARE

## 2023-01-30 ENCOUNTER — TELEPHONE (OUTPATIENT)
Dept: RHEUMATOLOGY | Facility: CLINIC | Age: 83
End: 2023-01-30
Payer: MEDICARE

## 2023-01-30 NOTE — TELEPHONE ENCOUNTER
----- Message from Mavis Villanueva sent at 1/30/2023  4:46 PM CST -----  Contact: ONIEL ROSARIO  Type: Needs Medical Advice  Who Called:  Pt   Best Call Back Number: [phone] (home) [phone] (work)    Additional Information: Patient is calling office regarding Rx refill on  gabapentin (NEURONTIN) 400 MG capsule. Patient states [facility] is been trying to contact office for renewal on Rx. Patient has one week left of medication. Please call back and advise

## 2023-02-08 ENCOUNTER — TELEPHONE (OUTPATIENT)
Dept: RHEUMATOLOGY | Facility: CLINIC | Age: 83
End: 2023-02-08

## 2023-02-08 DIAGNOSIS — R53.81 MALAISE AND FATIGUE: ICD-10-CM

## 2023-02-08 DIAGNOSIS — R53.83 MALAISE AND FATIGUE: ICD-10-CM

## 2023-02-08 DIAGNOSIS — D84.9 IMMUNOSUPPRESSION: ICD-10-CM

## 2023-02-08 DIAGNOSIS — M31.30 GRANULOMATOSIS WITH POLYANGIITIS WITH PULMONARY INVOLVEMENT: Primary | ICD-10-CM

## 2023-02-08 NOTE — TELEPHONE ENCOUNTER
Patient current on RTX Q 6 month maintanance following induction for Wegeners.     12/2/22 (Delayed with COVID infection) RTX was 500mg with solumedrol 80mg IM    Completed Rituxan 1000mg maintenance at 4 months 4/20/2022.     Induction 10/2021.     Continued nasal congestion and drainage.   Recent significant nosebleeds. Endoscopy with recurrent crusting despite office debridement by ENT and continued irrigation.     Will repeat serologies and inflammatory markers and check CD 19 levels first week March and see her later that month.     Dr. Snyder

## 2023-03-06 ENCOUNTER — LAB VISIT (OUTPATIENT)
Dept: LAB | Facility: HOSPITAL | Age: 83
End: 2023-03-06
Attending: INTERNAL MEDICINE
Payer: MEDICARE

## 2023-03-06 DIAGNOSIS — R53.83 MALAISE AND FATIGUE: ICD-10-CM

## 2023-03-06 DIAGNOSIS — R53.81 MALAISE AND FATIGUE: ICD-10-CM

## 2023-03-06 DIAGNOSIS — D84.9 IMMUNOSUPPRESSION: ICD-10-CM

## 2023-03-06 DIAGNOSIS — M31.30 GRANULOMATOSIS WITH POLYANGIITIS WITH PULMONARY INVOLVEMENT: ICD-10-CM

## 2023-03-06 LAB
CRP SERPL-MCNC: 4.4 MG/L (ref 0–8.2)
ERYTHROCYTE [SEDIMENTATION RATE] IN BLOOD BY PHOTOMETRIC METHOD: 25 MM/HR (ref 0–36)
HBV CORE AB SERPL QL IA: NORMAL

## 2023-03-06 PROCEDURE — 86360 T CELL ABSOLUTE COUNT/RATIO: CPT | Performed by: INTERNAL MEDICINE

## 2023-03-06 PROCEDURE — 86140 C-REACTIVE PROTEIN: CPT | Performed by: INTERNAL MEDICINE

## 2023-03-06 PROCEDURE — 86355 B CELLS TOTAL COUNT: CPT | Performed by: INTERNAL MEDICINE

## 2023-03-06 PROCEDURE — 86357 NK CELLS TOTAL COUNT: CPT | Performed by: INTERNAL MEDICINE

## 2023-03-06 PROCEDURE — 86706 HEP B SURFACE ANTIBODY: CPT | Mod: 91 | Performed by: INTERNAL MEDICINE

## 2023-03-06 PROCEDURE — 86359 T CELLS TOTAL COUNT: CPT | Performed by: INTERNAL MEDICINE

## 2023-03-06 PROCEDURE — 87340 HEPATITIS B SURFACE AG IA: CPT | Performed by: INTERNAL MEDICINE

## 2023-03-06 PROCEDURE — 86704 HEP B CORE ANTIBODY TOTAL: CPT | Performed by: INTERNAL MEDICINE

## 2023-03-06 PROCEDURE — 85652 RBC SED RATE AUTOMATED: CPT | Performed by: INTERNAL MEDICINE

## 2023-03-06 PROCEDURE — 86036 ANCA SCREEN EACH ANTIBODY: CPT | Mod: 59 | Performed by: INTERNAL MEDICINE

## 2023-03-07 LAB
CD3+CD4+ CELLS # BLD: 1086 CELLS/UL (ref 300–1400)
CD3+CD4+ CELLS NFR BLD: 69.7 % (ref 28–57)
HBV SURFACE AB SER-ACNC: <3 MIU/ML
HBV SURFACE AB SER-ACNC: NORMAL M[IU]/ML
HBV SURFACE AG SERPL QL IA: NORMAL
LYMPHOCYTES NFR CSF MANUAL: 0 % (ref 6–19)
LYMPHOCYTES NFR CSF MANUAL: 0 CELLS/UL (ref 100–500)
LYMPHOCYTES NFR CSF MANUAL: 1480 CELLS/UL (ref 700–2100)
LYMPHOCYTES NFR CSF MANUAL: 2.72 % (ref 0.9–3.6)
LYMPHOCYTES NFR CSF MANUAL: 25.6 % (ref 10–39)
LYMPHOCYTES NFR CSF MANUAL: 399 CELLS/UL (ref 200–900)
LYMPHOCYTES NFR CSF MANUAL: 4.8 % (ref 7–31)
LYMPHOCYTES NFR CSF MANUAL: 73 CELLS/UL (ref 90–600)
LYMPHOCYTES NFR CSF MANUAL: 95 % (ref 55–83)

## 2023-03-09 LAB
ANCA AB TITR SER IF: NORMAL TITER
P-ANCA TITR SER IF: NORMAL TITER

## 2023-03-27 ENCOUNTER — TELEPHONE (OUTPATIENT)
Dept: RHEUMATOLOGY | Facility: CLINIC | Age: 83
End: 2023-03-27
Payer: MEDICARE

## 2023-03-27 ENCOUNTER — OFFICE VISIT (OUTPATIENT)
Dept: RHEUMATOLOGY | Facility: CLINIC | Age: 83
End: 2023-03-27
Payer: MEDICARE

## 2023-03-27 VITALS
HEART RATE: 75 BPM | DIASTOLIC BLOOD PRESSURE: 71 MMHG | SYSTOLIC BLOOD PRESSURE: 137 MMHG | HEIGHT: 64 IN | WEIGHT: 137.13 LBS | BODY MASS INDEX: 23.41 KG/M2

## 2023-03-27 DIAGNOSIS — D84.821 IMMUNOSUPPRESSION DUE TO DRUG THERAPY: ICD-10-CM

## 2023-03-27 DIAGNOSIS — Z79.899 HIGH RISK MEDICATIONS (NOT ANTICOAGULANTS) LONG-TERM USE: ICD-10-CM

## 2023-03-27 DIAGNOSIS — M31.31 GRANULOMATOSIS WITH POLYANGIITIS WITH RENAL INVOLVEMENT: Primary | ICD-10-CM

## 2023-03-27 DIAGNOSIS — Z79.899 IMMUNOSUPPRESSION DUE TO DRUG THERAPY: ICD-10-CM

## 2023-03-27 PROCEDURE — 1159F MED LIST DOCD IN RCRD: CPT | Mod: CPTII,S$GLB,, | Performed by: INTERNAL MEDICINE

## 2023-03-27 PROCEDURE — 99215 PR OFFICE/OUTPT VISIT, EST, LEVL V, 40-54 MIN: ICD-10-PCS | Mod: S$GLB,,, | Performed by: INTERNAL MEDICINE

## 2023-03-27 PROCEDURE — 3075F SYST BP GE 130 - 139MM HG: CPT | Mod: CPTII,S$GLB,, | Performed by: INTERNAL MEDICINE

## 2023-03-27 PROCEDURE — 3078F DIAST BP <80 MM HG: CPT | Mod: CPTII,S$GLB,, | Performed by: INTERNAL MEDICINE

## 2023-03-27 PROCEDURE — 1159F PR MEDICATION LIST DOCUMENTED IN MEDICAL RECORD: ICD-10-PCS | Mod: CPTII,S$GLB,, | Performed by: INTERNAL MEDICINE

## 2023-03-27 PROCEDURE — 1101F PT FALLS ASSESS-DOCD LE1/YR: CPT | Mod: CPTII,S$GLB,, | Performed by: INTERNAL MEDICINE

## 2023-03-27 PROCEDURE — 99999 PR PBB SHADOW E&M-EST. PATIENT-LVL IV: CPT | Mod: PBBFAC,,, | Performed by: INTERNAL MEDICINE

## 2023-03-27 PROCEDURE — 3288F FALL RISK ASSESSMENT DOCD: CPT | Mod: CPTII,S$GLB,, | Performed by: INTERNAL MEDICINE

## 2023-03-27 PROCEDURE — 3288F PR FALLS RISK ASSESSMENT DOCUMENTED: ICD-10-PCS | Mod: CPTII,S$GLB,, | Performed by: INTERNAL MEDICINE

## 2023-03-27 PROCEDURE — 99215 OFFICE O/P EST HI 40 MIN: CPT | Mod: S$GLB,,, | Performed by: INTERNAL MEDICINE

## 2023-03-27 PROCEDURE — 3078F PR MOST RECENT DIASTOLIC BLOOD PRESSURE < 80 MM HG: ICD-10-PCS | Mod: CPTII,S$GLB,, | Performed by: INTERNAL MEDICINE

## 2023-03-27 PROCEDURE — 1101F PR PT FALLS ASSESS DOC 0-1 FALLS W/OUT INJ PAST YR: ICD-10-PCS | Mod: CPTII,S$GLB,, | Performed by: INTERNAL MEDICINE

## 2023-03-27 PROCEDURE — 1125F PR PAIN SEVERITY QUANTIFIED, PAIN PRESENT: ICD-10-PCS | Mod: CPTII,S$GLB,, | Performed by: INTERNAL MEDICINE

## 2023-03-27 PROCEDURE — 1125F AMNT PAIN NOTED PAIN PRSNT: CPT | Mod: CPTII,S$GLB,, | Performed by: INTERNAL MEDICINE

## 2023-03-27 PROCEDURE — 3075F PR MOST RECENT SYSTOLIC BLOOD PRESS GE 130-139MM HG: ICD-10-PCS | Mod: CPTII,S$GLB,, | Performed by: INTERNAL MEDICINE

## 2023-03-27 PROCEDURE — 99999 PR PBB SHADOW E&M-EST. PATIENT-LVL IV: ICD-10-PCS | Mod: PBBFAC,,, | Performed by: INTERNAL MEDICINE

## 2023-03-27 RX ORDER — TOBRAMYCIN AND DEXAMETHASONE 3; 1 MG/ML; MG/ML
SUSPENSION/ DROPS OPHTHALMIC
COMMUNITY
Start: 2023-03-03 | End: 2023-07-11

## 2023-03-27 RX ORDER — NEOMYCIN SULFATE, POLYMYXIN B SULFATE AND DEXAMETHASONE 3.5; 10000; 1 MG/ML; [USP'U]/ML; MG/ML
SUSPENSION/ DROPS OPHTHALMIC
COMMUNITY
Start: 2023-03-16 | End: 2023-10-16 | Stop reason: ALTCHOICE

## 2023-03-27 RX ORDER — PREDNISOLONE ACETATE 10 MG/ML
SUSPENSION/ DROPS OPHTHALMIC
COMMUNITY
Start: 2023-02-06 | End: 2023-07-11

## 2023-03-27 ASSESSMENT — ROUTINE ASSESSMENT OF PATIENT INDEX DATA (RAPID3)
FATIGUE SCORE: 1.1
PAIN SCORE: 6
PSYCHOLOGICAL DISTRESS SCORE: 2.2
MDHAQ FUNCTION SCORE: 0.6
TOTAL RAPID3 SCORE: 4.67
PATIENT GLOBAL ASSESSMENT SCORE: 6

## 2023-03-27 NOTE — TELEPHONE ENCOUNTER
----- Message from Stan Haley sent at 3/27/2023  4:30 PM CDT -----  Contact: self  Type: Needs Medical Advice  Who Called: Patient   Best Call Back Number: [phone]  Additional Information: Patient states Dr. Snyder wants her to stop a medication forgot which one it was and when does she want her to stop it. Plz call pt. Thanks     Clarified she is to go down to 5 mg prednisone daily. CG

## 2023-03-27 NOTE — PROGRESS NOTES
"    Subjective:          Chief Complaint: Aracelis Weber is a 82 y.o. female who had concerns including Disease Management.    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 Allred for eval with excessive tearing. While there discussed her repeat debridement of her sinuses with crusting sinuses with Dr. Quinn. Patient labs 3/23- with normal ESR and normal CRP. She has constant wu with her sinuses and sinus plugging.     She was recently treated for eye infection with Dr. Butts.     Incontinence: having trouble with this, and unable to complete PT at clinic, but did home exercise not helping. Declined meds but would like to have this revisited.         11/14/2022:   COVID 11/1/2022 took her quite some time to recover from cough but overall did well.  Did not take PAXLOVID. We were late getting started on meds as initially thought it was influenza.     She is feeling markedly better: reviewed her CXR markedly improved with regard to her RUL cavitary lesion.   We will get her back on Rituxan.     8/8/22: peripheral neuropathy still problematic on gabapentin and working with Chiro for a plan to see if this will help.   Sinuses still congestion  +dry cough but no fevers, night time dry cough "tickle"   No SOB.     5/5/2022:   Patient doing very well. Still rhinitis, using navage. No antibiotics from ENT since last visit. She is still clearing significant mucous faint with dried blood.   Dry cough, no fevers, no SOB  Mild HA only with elevated BP.   Completed Rituxan maintanence at 4 months 4/20/2022.   Prolia 3/25/22  Evusheld with catch up dose 2/10/22 and 3/25/2022.     Major complaints: pins, needles, stinging, burning in feet day and night is constant. Dr. Campbell: EB-N5 supplement is helping some, stopped for months and noted significant worsening. Dr. Campbell did increase from EB-N3.   Gabapentin 400mg TID  Hydrocodone only PRN  Prednisone 7.5mg "       2/2022  Doing well no cough, no fevers. Recent COVID exposure but negative.   Seen with Nephro.   Given her successful response with Ritux. Agree to continue maintanance with Ritux Q 4-6 months    Needs to get #3 COVID vaccine timing with RTX. - will time this with me 2 weeks prior to   Discussed Prolia  Working on Evlanreeld for PreP with COVID> she is scheduled 2/10/22      12/9/2021  Patient's recent CXR 12/21/21 with marked improvement when compared to 10/21/21  Prednisone 10mg daily  S/p RTX x 4 infusions completion date. 11/5/21  Patient with rise in creatinine to 2.0 on 11/26 had been requiring lasix for marked edema since 11/1/21. Stopped 2 weeks ago. Edema is present but managed. Drinking about 40 oz water and 3 cups of coffee daily    Patient completed C PT felt she was doing well home exercises about 2 weeks ago with acute pain in lumbar spine after exercising. Imaging lumbar few days ago without evidence of a fracture. She point to pain in the lumbosacral and radiating to buttock region. Ok to rise from seated. Pain more on left low back and buttock. No numbness no tingling but pain in the hamstring region. Comfortable sitting but not lying down, but she never sleeps in bed. Long hx of rather impressive scoliosis.       11/11/2021:   Inflammatory markers are markedly improved.   Cough dry still at night. Patient noting she has lost sense of smell. Can still taste.     Patient recently hospitatlized for Bronch with negative cultures to date.  There was a concern for possible atypical infection she has had enlarging right upper lobe cavitary lesion measures still has a right middle lobe lesion with some cavitation now as well.  I have discussed this case extensively with both Dr. Schwartz as well as Dr. Joseph upon discharge we felt comfortable that this is likely Wegener's granulomatosis causing cavitary lesions.  Supporting sinus biopsy does note granuloma.    Patient was started on Imuran in March  of 2021 but with the growth of her lesion we had rule out for any possible infection given her status of immunosuppression.  She is here today not in her usual state she appears fatigued pale she is not complaining of shortness of breath but does have a cough dry persistent.  She is noticing increased pedal edema.         9/13/21: patient with 2 falls 8 days apart. Spoke w/ pulm shortly following initial fall and we were scheduled for bronch when she sustained another fall.   Scheduled now for 9/17/21 bronch. RUL lesion.   Remains off Imuran  Prednisone 15mg daily.   Sinus congestion. Still productive     She is noting some productive cough at night no hemoptysis.   Patient denies SOB  Having more HA. -frontal and sinus, she is noting some drainage in right ear some mucous/blood.   No edema.   She notes fatigue, no fevers, no night sweats. No weight loss.   Skin easily bruising.     No personal hx of any malignancy.       7/20/2021:    Spoke with Pulm plan for bronch is able working on possible bx site vs BAL.   Need to r/o infectious/neoplastic and confirm for ANCA (GPA vasculitis)   Inflammatory markers markedly down .8  Now 14.   H/H improve from 7.4 to 8.2  Platelets normalized.   Renal stable over time    Patient did attend wedding with approx 300 people unmasked last weekend. Reviewed by recs for COVID precautions given    Current regimen:   pred 15mg (Na stable)  Holding Imuran    Interval events: PET with hypermetabolic lesions:   a. RUL cavitary/cystic lesion 1.9cm x 1.3cm  b. RUL spiculated 1.2cm x 1.4cm  c. RML 1.7cm x 2.0 cm  All with differential: infectious, inflammatory, neoplastic, grannulomatous (a) with favor of inflammation given the rapid change    Fungal immunodiffusion neg  Quant gold and T spot: neg  H/H improving     Sinus congestion, pressure, headache, x 3 weeks very productive with blowing nose, back on navage. :  started Cindamycin with DR Quinn x 10 days.   Patient denies any  "shortness breath.   Very fatigues.   No more fevers. Slight dry cough, no blood/ no mucous.   No SOB.       5/12/21 completed nasal irrigation with biopsy not definitive for GPA, but + inflammation and granulomas. Temporal artery bx negative. Patient with PCP same day as labs 6/2/21  dx with UTI on keflex.     Was seen apparently in ED in [place] for 104F she had altered mental status, dx with UTI, dehydration, treated with. Completed all 7 days of keflex and within 48 hours with another fever 104 F. She continues with congestion but no worse. Patient denies cough, hemoptysis, bloody nasal discharge. She has had no fever x 10 days. Checks twice daily.   while travelling told "congestion in chest on xray". Inflammatory markers very high again.   Patient with recent concern for wegeners needs CT chest. CXR with new airspace opacities right chest- need to r/o GGO vs infection. CT has been ordered pending scheduling.   Broad ABX coverage recommended for now will cc Dr. Natarajan.     I am limited here with her  severe edema from higher dose steroids. Max tolerated is 20mg daily pred  Started Imuran 5/5/2021. Very low dose 50mg BID (0.65mg/kg) tolerating VERY well.   H/H still low.         2/2021  Sinususitis, cx were negative.  Using nasal irrigation with mucoid disharge.   Patient continues with significant nasal discharge and blood with scabs. We discussed Wegeners but pathology sinus no vasculitis, granuloma noted.   No HA, no blurred vision. Recent sinus infection with HA for 10 days. cx +, but also repeat labs demonstarting +PR3 and +ANCA       Patient with c/o right > left 3,4 finger numbness that was intermittent until approximately 2 weeks about having near constant numbness in hands, pins and needles and pain.   Patient not noting much improvement with change in position.     Off MTX 6  tabs weekly.   Doing well with Hydroxychloroquine.   Declined COVID vaccine    Gabapentin up to 300mg TID.   Now with Dr. Campbell doing " PT for Plantar fasciitis. She is taking supplement and compound cream for joint and neuropathy. No response to tarsal tunnel injection per Dr. Campbell.   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 WBC ok.       11/2020  Patient with PMR   MTX at 4 tabs weekly new anemia. Thrombocytosis  Pred at 10mg   Complicated with peripheral edema rather severe , started MTX seeing trending CRP/ESR but persistent leg pain.   Seen with PCP for neuropathy s/sx not seeing much benefit with gabpentin  Seen with Cardiology- no concern for cardiac ECHO ok. dc'd lasix for hyponatremia.     8/2020  Pred 20mg with mild edema, pred 30 mg with severe edema.   Current Pred 15mg daily  Rising ESR again.   Lasix 20 mg daily with improved edema but having some pins and needles in feet/legs.   Having tight, burning stinging at the toes and feet. Heaviness. Prickly.   Noting sodium is falling, K 5.2-5.4 still using           7/2020:  Hands not painful, knees not painful. Shoulder/cervical and down the arms, markedly improved. She still has some pain him hips and groin with walking and some weakness to the legs with edema. Some pain with fixing own hair.   She takes in AM regarding hips ache, some shoulder/arms, and again at night because hips/legs.   The clue is that the LDN new dose she had some ASE, previously tolerated 3mg daily fine.     Historical review of present Illness.   Patient with a recent chronic sinusitis that ultimately warranted a septoplasty, endoscopic sinus surgery and turbinate reduction 5/2020   Four weeks postop t increasingly worse she was noting pain in her throat and ears lasting several hours complaint of pdizziness she had symptoms of ear pain and decreased hearing in the left ear.  She underwent a debridement at this visit she continue with compound nasal irrigations.  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    Patient did have repeat procedure on with biopsies taken 07/03/2020 showing right maxillary sinus chronically inflamed fibrotic polypoid portions benign nasal mucosa left maxillary sinus similar polypoid fragments ulcerated markedly inflamed respiratory mucosa focal foreign body giant cell response noted suggested that this might have been from her Gelfoam packing as a possible cause of this reaction    I have spoken with Dr. Quinn prior to patient's visit today and was a concern that she could have triggered some inflammatory response with the Gelfoam packing    Patient states she no longer has a headache is not noticing much ear drainage continues with profound loss of hearing on the left and rather significant on the right both sensorineural and some conductive changes.    Restarted on prednisone as of 7/20/20  10 mg patient has not noticed any change with her hearing in this time she did notice slight improvement with her joint aches and pains but is still relying on hydrocodone for the bulk of this.    She denies a persistent headache she does have some tenderness about the preauricular region particularly on the right more than the left.  She has denied jaw claudication changes in her voice or any amaurosis fugax.  She has no pre-existing history of joint aches and pains hip or otherwise.  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  She had a workup that involved a negative rheumatoid factor, age appropriate sedimentation rate,C reactive protein JANET was positive 1:160 in speckled pattern        Previous: Hx:   She is having pain in her hand with stiffness and right 2nd PIP joint now unable to flex. 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   Hx of bilateral TKA 3 and 5 years ago and those are doing well.   She notes intermittent edema. Some hammer toe deformity bilaterally making shoes problematic but not painful otherwise.   Long hx of GERD-severe.   Cannot tolerate NSAIDs at all w/o gastritis.   Can use Hydrocodone PRN   Added Naltrexone at 3mg and doing very well  Lost 30# with WW.   Patient denies weight loss, rashes, dry eye, dry mouth, nasal or palatal ulcerations,  lymphadenopathy, Raynaud's, hx of DVT/miscarriages, psoriasis or family hx of psoriasis, rashes, serositis, anemia or other constitutional symptoms.  Asking about naltrexone  Component      Latest Ref Rng & Units 8/8/2020 7/29/2020 7/20/2020 7/13/2020   Sodium      136 - 145 mmol/L  126 (L) 128 (L)    Potassium      3.5 - 5.1 mmol/L  5.4 (H) 5.2 (H)    Chloride      95 - 110 mmol/L  92 (L) 92 (L)    CO2      23 - 29 mmol/L  25 29    Glucose      70 - 110 mg/dL  109 109    BUN, Bld      8 - 23 mg/dL  13 11    Creatinine      0.5 - 1.4 mg/dL  0.8 0.8    Calcium      8.7 - 10.5 mg/dL  9.5 9.7    Anion Gap      8 - 16 mmol/L  9 7 (L)    eGFR if African American      >60 mL/min/1.73 m:2  >60.0 >60.0    eGFR if non African American      >60 mL/min/1.73 m:2  >60.0 >60.0    Anti Sm Antibody      0.00 - 0.99 Ratio    0.04   Anti-Sm Interpretation      Negative    Negative   Anti Sm/RNP Antibody      0.00 - 0.99 Ratio    0.09   Anti-Sm/RNP Interpretation      Negative    Negative   JANET Screen      None Detected       Rheumatoid Factor      0.0 - 15.0 IU/mL       Sed Rate      0 - 36 mm/Hr 57 (H)  54 (H) 75 (H)   CRP      0.0 - 8.2 mg/L 75.4  (H) 56.1 (H) 57.5 (H) 99.4 (H)   Anti-Histone Antibody      0.0 - 0.9 Units    0.4   ds DNA Ab      Negative 1:10    Negative 1:10     Component      Latest Ref Rng & Units 6/25/2020 11/10/2018   Sodium      136 - 145 mmol/L     Potassium      3.5 - 5.1 mmol/L     Chloride      95 - 110 mmol/L     CO2      23 - 29 mmol/L     Glucose      70 - 110 mg/dL     BUN, Bld      8 - 23 mg/dL     Creatinine      0.5 - 1.4 mg/dL     Calcium      8.7 - 10.5 mg/dL     Anion Gap      8 - 16 mmol/L     eGFR if African American      >60 mL/min/1.73 m:2     eGFR if non African American      >60 mL/min/1.73 m:2     Anti Sm Antibody      0.00 - 0.99 Ratio     Anti-Sm Interpretation      Negative     Anti Sm/RNP Antibody      0.00 - 0.99 Ratio     Anti-Sm/RNP Interpretation      Negative     JANET Screen      None Detected Detected (A) Detected (A)   Rheumatoid Factor      0.0 - 15.0 IU/mL 11.6 <8.6   Sed Rate      0 - 36 mm/Hr     CRP      0.0 - 8.2 mg/L     Anti-Histone Antibody      0.0 - 0.9 Units     ds DNA Ab      Negative 1:10         REVIEW OF SYSTEMS:    Review of Systems   Constitutional: Negative for fever, malaise/fatigue and weight loss.   HENT: Negative for sore throat.    Eyes: Negative for double vision, photophobia and redness.   Respiratory: Negative for cough, shortness of breath and wheezing.    Cardiovascular: Negative for chest pain, palpitations and orthopnea.   Gastrointestinal: Negative for abdominal pain, constipation and diarrhea.   Genitourinary: Negative for dysuria, hematuria and urgency.   Musculoskeletal: Positive for joint pain. Negative for back pain and myalgias.   Skin: Negative for rash.   Neurological: Negative for dizziness, tingling, focal weakness and headaches.   Endo/Heme/Allergies: Does not bruise/bleed easily.   Psychiatric/Behavioral: Negative for depression, hallucinations and suicidal ideas.               Objective:            Past Medical History:   Diagnosis Date    Anesthesia     'Mother  "stop breathing after anesthesia"    Chronic sinusitis     Depression     GERD (gastroesophageal reflux disease)     PVC (premature ventricular contraction)      Family History   Problem Relation Age of Onset    Heart disease Mother     Arthritis Mother     Cancer Sister     Arthritis Sister     Diabetes Sister     Hypertension Sister      Social History     Tobacco Use    Smoking status: Former     Types: Cigarettes     Start date: 2/3/1955     Quit date: 1/3/1960     Years since quittin.2    Smokeless tobacco: Never   Substance Use Topics    Alcohol use: Never     Alcohol/week: 0.0 standard drinks    Drug use: Never         Current Outpatient Medications on File Prior to Visit   Medication Sig Dispense Refill    B2-B6 phos-levomef simran-mecobal (EB-N3 DR) 1.3-70-6-4 mg CpDR Take 1 capsule by mouth once daily.      cetirizine (ZYRTEC) 10 MG tablet TAKE 1 TABLET ONE TIME DAILY 90 tablet 0    denosumab (PROLIA SUBQ) Inject into the skin. Two times a year      diclofenac sodium (VOLTAREN) 1 % Gel APPLY TO THE AFFECTED AREA(S) 2 GRAMS THREE TIMES DAILY 100 g 6    gabapentin (NEURONTIN) 400 MG capsule TAKE 1 CAPSULE THREE TIMES DAILY 270 capsule 3    HYDROcodone-acetaminophen (NORCO) 7.5-325 mg per tablet Take 1 tablet by mouth every 12 (twelve) hours as needed for Pain. 60 tablet 0    metoprolol succinate (TOPROL-XL) 50 MG 24 hr tablet TAKE 1 TABLET EVERY DAY 90 tablet 3    neomycin-polymyxin-dexamethasone (MAXITROL) 3.5mg/mL-10,000 unit/mL-0.1 % DrpS       omeprazole (PRILOSEC) 40 MG capsule TAKE 1 CAPSULE EVERY MORNING 90 capsule 0    peg 400-propylene glycol, PF, (SYSTANE, PF,) 0.4-0.3 % Dpet Apply to eye 2 (two) times a day.      prednisoLONE acetate (PRED FORTE) 1 % DrpS SHAKE LIQUID AND INSTILL 1 DROP IN RIGHT EYE FOUR TIMES DAILY FOR 1 WEEK THEN STOP      predniSONE (DELTASONE) 2.5 MG tablet Take 1 tablet (2.5 mg total) by mouth once daily. 90 tablet 3    predniSONE (DELTASONE) 5 MG tablet Take 1 tablet (5 mg " total) by mouth once daily. 90 tablet 3    sodium chloride 0.9% SolP 500 mL with riTUXimab 10 mg/mL Conc 375 mg/m2 Inject 375 mg/m2 into the vein. 4 to 6 months      tobramycin-dexAMETHasone 0.3-0.1% (TOBRADEX) 0.3-0.1 % DrpS SHAKE LIQUID AND INSTILL 1 DROP IN RIGHT EYE FOUR TIMES DAILY FOR 2 WEEKS THEN STOP      acetaminophen (TYLENOL) 500 MG tablet Take 500 mg by mouth every 6 (six) hours as needed for Pain.      albuterol (PROVENTIL/VENTOLIN HFA) 90 mcg/actuation inhaler INHALE 2 PUFFS BY MOUTH EVERY 4 TO 6 HOURS AS NEEDED FOR SHORTNESS OF BREATH OR COUGH      ascorbate calcium (MAHSA-C ORAL) Take by mouth.      cholecalciferol, vitamin D3, (VITAMIN D3) 125 mcg (5,000 unit) Tab Take 1 tablet (5,000 Units total) by mouth once daily. (Patient not taking: Reported on 3/27/2023) 90 tablet 0    furosemide (LASIX) 20 MG tablet TAKE 1 TABLET EVERY DAY (Patient not taking: Reported on 1/9/2023) 90 tablet 3    vit C/E/Zn/coppr/lutein/zeaxan (PRESERVISION AREDS-2 ORAL) Take 1 capsule by mouth 2 (two) times a day.       [DISCONTINUED] naltrexone capsule Take 3 mg by mouth once daily.       No current facility-administered medications on file prior to visit.       Vitals:    03/27/23 1447   BP: 137/71   Pulse: 75       Physical Exam:    Physical Exam   Constitutional: She is oriented to person, place, and time. She appears well-developed and well-nourished.   HENT:   Head: Normocephalic and atraumatic.   Mouth/Throat: Oropharynx is clear and moist.   Eyes: Pupils are equal, round, and reactive to light. EOM are normal.   Neck: Normal range of motion.   Cardiovascular: Normal rate, regular rhythm and normal heart sounds.   Pulmonary/Chest: Effort normal and breath sounds normal.   Musculoskeletal:        Right shoulder: She exhibits normal range of motion, no tenderness and no swelling.        Left shoulder: She exhibits normal range of motion, no tenderness and no swelling.        Right elbow: She exhibits normal range of  motion and no swelling. No tenderness found.        Left elbow: She exhibits normal range of motion and no swelling. No tenderness found.        Right wrist: She exhibits normal range of motion, no tenderness and no swelling.        Left wrist: She exhibits normal range of motion, no tenderness and no swelling.        Right knee: She exhibits normal range of motion and no swelling. No tenderness found.        Left knee: She exhibits normal range of motion and no swelling. No tenderness found.        Right hand: She exhibits decreased range of motion and tenderness. She exhibits no swelling.        Left hand: She exhibits decreased range of motion and tenderness. She exhibits no swelling.        Right foot: There is normal range of motion, no tenderness and no swelling.        Left foot: There is normal range of motion, no tenderness and no swelling.   Bony hypertrophy at the PIP and DIP joints. +squaring at the CMC joint. No active synovitis on 28 joint exam.    Neurological: She is alert and oriented to person, place, and time.   Skin: Skin is warm and dry.   Psychiatric: She has a normal mood and affect. Her behavior is normal.     Component      Latest Ref Rng & Units 9/17/2021 9/17/2021          11:03 AM 11:03 AM   Respiratory Culture        No growth   Gram Stain (Respiratory)       No organisms seen No WBC's   AFB Culture & Smear           AFB CULTURE STAIN           GENET Prep           Fungus (Mycology) Culture           Aerobic Bacterial Culture             Component      Latest Ref Rng & Units 9/17/2021          11:03 AM   Respiratory Culture       No Staph aureus, MRSA or Pseudomonas isolated.   Gram Stain (Respiratory)       No organisms seen   AFB Culture & Smear          AFB CULTURE STAIN          GENET Prep          Fungus (Mycology) Culture          Aerobic Bacterial Culture            Component      Latest Ref Rng & Units 9/17/2021 9/17/2021          11:03 AM 11:03 AM   Respiratory Culture       Normal  respiratory nimesh    Gram Stain (Respiratory)       Moderate WBC's    AFB Culture & Smear        Culture in progress   AFB CULTURE STAIN        No acid fast bacilli seen.   KOH Prep        No yeast or fungal elements seen   Fungus (Mycology) Culture        Culture in progress   Aerobic Bacterial Culture                 Narrative  Performed by: [facility]  Patient - ONIEL ROSARIO                   - 1940 Sex- F   Med Rec # - [id number]                        Bimal Mckeon M.D.   The following is an electronic copy of report # [id number] from:   [facility]   [address] [place] [id number]                         Phone [phone]   DIAGNOSIS:   2021  JL/sdc     1, 2.  RIGHT UPPER LOBE MASS BRUSHING AND FINE NEEDLE ASPIRATE BIOPSIES:   - NEGATIVE FOR MALIGNANT CELLS.     - PURULENT EXUDATE.     - A FEW FRAGMENTS OF BRONCHIAL MUCOSA WITH FLORID CHRONIC BRONCHITIS    AND REACTIVE SQUAMOUS    ATYPICAL METAPLASIA.       Comment:   Special stains (AFB and GMS) are negative for organisms. While no    granulomas are seen here, the inflammatory reaction appears to be    similar to that seen in the sinus material over the past few years    (Delta [id number], [id number], [id number]). 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 Bronchoscopic material, particularly of the lower    respiratory tract, is of low yield for a definitive diagnosis of that    entity. The previous sinus material will be reviewed with    consideration for that diagnosis.   _______________________________________________________________________   SPECIMEN AND SOURCE:   1. RUL mass brushing   2. RUL mass needle     CLINICAL INFORMATION:   Clinical Information:-gt Right Upper Lobe Mass   Specific Site:-gt RUL mass brushing T Brush; RUL Mass; RUL mass-    microbiology;  RUL BAL; RUL washing;   Other Requests:-gt N/A     GROSS DESCRIPTION:   1. Received 40 mL of fluid in formalin, 1 dry slides and 1 slides in    95% alcohol. Prepared one cell block.     2. Received 40 mL of fluid in formalin, 1 dry slides and 1 slides in    95% alcohol. Prepared one cell block.     CODE: 0     Cytotechnologist: ABDIFATAH Marvin (ASCP)   Pathologist: Scott Johnson MD (Electronic Signature) 09/22/2021 2:34 PM     [facility], [facility] * [address] [facility] * [place]    [id number]   Technical services performed at: [facility], [facility] * [address]    [facility] * [place] [address]   Screening Site: [facility], [facility] * [address] [place] [address] *    [place] [address]                         Post Rituxan image                                     Pre Rituxan image  Assessment:       Encounter Diagnoses   Name Primary?    Granulomatosis with polyangiitis with renal involvement Yes    Immunosuppression due to drug therapy     High risk medications (not anticoagulants) long-term use             Plan:        Granulomatosis with polyangiitis with renal involvement    Immunosuppression due to drug therapy    High risk medications (not anticoagulants) long-term use         Patient is a 82-year-old female who presented with robust inflammatory reaction within the sinus cavity as well as hearing deficit following a sinus surgery.  She has been repeated cultured postoperatively completed antibiotics and irrigations negative for any fungal infections or bacterial infections. 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    Completed RTX 11/5/21 Induction at 4 weekly doses of 375mg/m2. Our plans were for Rituxan at 500mg  on day 0 and 15 as per below, but due to scheduling with COVID,  vaccines/evusheld, etc and auth,  transportation,  we elected for 4/20/22 single dose  at 1,000mg. This has proven to work very well and is well tolerated by the patient extrememly well     The best data supporting the use of  as maintenance therapy come from the Maintenance of Remission using Rituximab in Systemic ANCA-associated Vasculitis (MAINRITSAN) trial with less relapse for PR3+ paitients when treated with Rituxan at 500mg IV  on days 0 and 15. at 6, 12, and 18months when compared to Imuran.      t spot 5/2021 negative.    Hep neg.    covid completed #3 now.      +COVID 10/25/22 and recovered.        CKD advanced rather quickly , improved and stabilized with RTX.    F/u with Nephro.     Continue omeprazole (prilosec)  40mg by mouth daily.        Patient hx of Osteopenia only, renal function not stable enough for oral bisphos. Chronic steroids.    Continue Prolia every 6 months.     Continue prednisone to 5mg (1 tablets) daily.         CRP WNL 10/2022  CXR during COVID showed continued resolution with previous RUL lesion.         No follow-ups on file.      f/u 3 months  Thank you for allowing me to participate in the care of this very pleasant patient.       40 min consultation with greater than 50% spent in counseling, chart review and coordination of care. All questions answered.                                                          Subjective:          Chief Complaint: Aracelis Weber is a 82 y.o. female who had concerns including Disease Management.    HPI:    +ANCA +PR3/ negative MPO   GPA (Wegeners) , chronic steroids, osteporosis on Prolia    3/2023:   Patient current on RTX Q 6 month maintanance following induction for Wegeners.    12/2/22 (Delayed COVID infection) RTX was 500mg with solumedrol 80mg IM   Completed Rituxan 1000mg for 1st  maintenance at 4 months 4/20/2022. due to rise in symptoms, and ESR   Induction 10/2021.      Continued nasal congestion and drainage.   Recent significant nosebleeds. Endoscopy with  recurrent crusting despite office debridement by ENT and continued irrigation.      Will repeat serologies and inflammatory markers and check CD 19 levels first week March and see her later that month.   Patient doing very well. Still rhinitis, using navage. No antibiotics from ENT since last visit. She is still clearing significant mucous faint with dried blood.   Dry cough, no fevers, no SOB\  Prolia: 10/17/2022       11/2022  Patient doing very well. Still rhinitis, using navage. No antibiotics from ENT since last visit. She is still clearing significant mucous faint with dried blood.   Dry cough, no fevers, no SOB  Mild HA only with elevated BP.   Completed Rituxan maintanence at 4 months 4/20/2022.   Prolia 3/25/22  Evusheld with catch up dose 2/10/22 and 3/25/2022    5/5/2022:   Patient doing very well. Still rhinitis, using navage. No antibiotics from ENT since last visit. She is still clearing significant mucous faint with dried blood.   Dry cough, no fevers, no SOB  Mild HA only with elevated BP.   Completed Rituxan maintanence at 4 months 4/20/2022.   Prolia 3/25/22  Evusheld with catch up dose 2/10/22 and 3/25/2022.     Major complaints: pins, needles, stinging, burning in feet day and night is constant. Dr. Campbell: EB-N5 supplement is helping some, stopped for months and noted significant worsening. Dr. Campbell did increase from EB-N3.   Gabapentin 400mg TID  Hydrocodone only PRN  Prednisone 7.5mg       2/2022  Doing well no cough, no fevers. Recent COVID exposure but negative.   Seen with Nephro.   Given her successful response with Ritux. Agree to continue maintanance with Ritux Q 4-6 months    Needs to get #3 COVID vaccine timing with RTX. - will time this with me 2 weeks prior to   Discussed Prolia  Working on Evusheld for PreP with COVID> she is scheduled 2/10/22      12/9/2021  Patient's recent CXR 12/21/21 with marked improvement when compared to 10/21/21  Prednisone 10mg daily  S/p RTX x 4  infusions completion date. 11/5/21  Patient with rise in creatinine to 2.0 on 11/26 had been requiring lasix for marked edema since 11/1/21. Stopped 2 weeks ago. Edema is present but managed. Drinking about 40 oz water and 3 cups of coffee daily    Patient completed [facility] PT felt she was doing well home exercises about 2 weeks ago with acute pain in lumbar spine after exercising. Imaging lumbar few days ago without evidence of a fracture. She point to pain in the lumbosacral and radiating to buttock region. Ok to rise from seated. Pain more on left low back and buttock. No numbness no tingling but pain in the hamstring region. Comfortable sitting but not lying down, but she never sleeps in bed. Long hx of rather impressive scoliosis.       11/11/2021:   Inflammatory markers are markedly improved.   Cough dry still at night. Patient noting she has lost sense of smell. Can still taste.     Patient recently hospitatlized for Bronch with negative cultures to date.  There was a concern for possible atypical infection she has had enlarging right upper lobe cavitary lesion measures still has a right middle lobe lesion with some cavitation now as well.  I have discussed this case extensively with both Dr. Schwartz as well as Dr. Joseph upon discharge we felt comfortable that this is likely Wegener's granulomatosis causing cavitary lesions.  Supporting sinus biopsy does note granuloma.    Patient was started on Imuran in March of 2021 but with the growth of her lesion we had rule out for any possible infection given her status of immunosuppression.  She is here today not in her usual state she appears fatigued pale she is not complaining of shortness of breath but does have a cough dry persistent.  She is noticing increased pedal edema.         9/13/21: patient with 2 falls 8 days apart. Spoke w/ pulm shortly following initial fall and we were scheduled for bronch when she sustained another fall.   Scheduled now for 9/17/21  bronch. RUL lesion.   Remains off Imuran  Prednisone 15mg daily.   Sinus congestion. Still productive     She is noting some productive cough at night no hemoptysis.   Patient denies SOB  Having more HA. -frontal and sinus, she is noting some drainage in right ear some mucous/blood.   No edema.   She notes fatigue, no fevers, no night sweats. No weight loss.   Skin easily bruising.     No personal hx of any malignancy.       7/20/2021:    Spoke with Pulm plan for bronch is able working on possible bx site vs BAL.   Need to r/o infectious/neoplastic and confirm for ANCA (GPA vasculitis)   Inflammatory markers markedly down .8  Now 14.   H/H improve from 7.4 to 8.2  Platelets normalized.   Renal stable over time    Patient did attend wedding with approx 300 people unmasked last weekend. Reviewed by recs for COVID precautions given    Current regimen:   pred 15mg (Na stable)  Holding Imuran    Interval events: PET with hypermetabolic lesions:   a. RUL cavitary/cystic lesion 1.9cm x 1.3cm  b. RUL spiculated 1.2cm x 1.4cm  c. RML 1.7cm x 2.0 cm  All with differential: infectious, inflammatory, neoplastic, grannulomatous (a) with favor of inflammation given the rapid change    Fungal immunodiffusion neg  Quant gold and T spot: neg  H/H improving     Sinus congestion, pressure, headache, x 3 weeks very productive with blowing nose, back on navage. :  started Cindamycin with DR Quinn x 10 days.   Patient denies any shortness breath.   Very fatigues.   No more fevers. Slight dry cough, no blood/ no mucous.   No SOB.       5/12/21 completed nasal irrigation with biopsy not definitive for GPA, but + inflammation and granulomas. Temporal artery bx negative. Patient with PCP same day as labs 6/2/21  dx with UTI on keflex.     Was seen apparently in ED in [place] for 104F she had altered mental status, dx with UTI, dehydration, treated with. Completed all 7 days of keflex and within 48 hours with another fever 104 F. She  "continues with congestion but no worse. Patient denies cough, hemoptysis, bloody nasal discharge. She has had no fever x 10 days. Checks twice daily.   while travelling told "congestion in chest on xray". Inflammatory markers very high again.   Patient with recent concern for wegeners needs CT chest. CXR with new airspace opacities right chest- need to r/o GGO vs infection. CT has been ordered pending scheduling.   Broad ABX coverage recommended for now will cc Dr. Natarajan.     I am limited here with her  severe edema from higher dose steroids. Max tolerated is 20mg daily pred  Started Imuran 5/5/2021. Very low dose 50mg BID (0.65mg/kg) tolerating VERY well.   H/H still low.         2/2021  Sinususitis, cx were negative.  Using nasal irrigation with mucoid disharge.   Patient continues with significant nasal discharge and blood with scabs. We discussed Wegeners but pathology sinus no vasculitis, granuloma noted.   No HA, no blurred vision. Recent sinus infection with HA for 10 days. cx +, but also repeat labs demonstarting +PR3 and +ANCA       Patient with c/o right > left 3,4 finger numbness that was intermittent until approximately 2 weeks about having near constant numbness in hands, pins and needles and pain.   Patient not noting much improvement with change in position.     Off MTX 6  tabs weekly.   Doing well with Hydroxychloroquine.   Declined COVID vaccine    Gabapentin up to 300mg TID.   Now with Dr. Campbell doing PT for Plantar fasciitis. She is taking supplement and compound cream for joint and neuropathy. No response to tarsal tunnel injection per Dr. Campbell.   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 WBC ok.       11/2020  Patient with PMR   MTX at 4 tabs weekly new anemia. Thrombocytosis  Pred at 10mg   Complicated with peripheral edema rather severe , started MTX seeing trending CRP/ESR but persistent " leg pain.   Seen with PCP for neuropathy s/sx not seeing much benefit with gabpentin  Seen with Cardiology- no concern for cardiac ECHO ok. dc'd lasix for hyponatremia.     8/2020  Pred 20mg with mild edema, pred 30 mg with severe edema.   Current Pred 15mg daily  Rising ESR again.   Lasix 20 mg daily with improved edema but having some pins and needles in feet/legs.   Having tight, burning stinging at the toes and feet. Heaviness. Prickly.   Noting sodium is falling, K 5.2-5.4 still using           7/2020:  Hands not painful, knees not painful. Shoulder/cervical and down the arms, markedly improved. She still has some pain him hips and groin with walking and some weakness to the legs with edema. Some pain with fixing own hair.   She takes in AM regarding hips ache, some shoulder/arms, and again at night because hips/legs.   The clue is that the LDN new dose she had some ASE, previously tolerated 3mg daily fine.     Historical review of present Illness.   Patient with a recent chronic sinusitis that ultimately warranted a septoplasty, endoscopic sinus surgery and turbinate reduction 5/2020   Four weeks postop t increasingly worse she was noting pain in her throat and ears lasting several hours complaint of pdizziness she had symptoms of ear pain and decreased hearing in the left ear.  She underwent a debridement at this visit she continue with compound nasal irrigations.  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    Patient did have repeat procedure on with biopsies taken 07/03/2020 showing right maxillary sinus chronically inflamed fibrotic polypoid portions benign nasal mucosa left maxillary sinus similar polypoid fragments ulcerated markedly inflamed respiratory mucosa focal foreign body giant cell response noted suggested that this might have been from her Gelfoam packing as a possible cause of this reaction    I have spoken with Dr. Quinn prior to patient's visit today and was a concern that she could have triggered some inflammatory response with the Gelfoam packing    Patient states she no longer has a headache is not noticing much ear drainage continues with profound loss of hearing on the left and rather significant on the right both sensorineural and some conductive changes.    Restarted on prednisone as of 7/20/20  10 mg patient has not noticed any change with her hearing in this time she did notice slight improvement with her joint aches and pains but is still relying on hydrocodone for the bulk of this.    She denies a persistent headache she does have some tenderness about the preauricular region particularly on the right more than the left.  She has denied jaw claudication changes in her voice or any amaurosis fugax.  She has no pre-existing history of joint aches and pains hip or otherwise.  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  She had a workup that involved a negative rheumatoid factor, age appropriate sedimentation rate,C reactive protein JANET was positive 1:160 in speckled pattern        Previous: Hx:   She is having pain in her hand with stiffness and right 2nd PIP joint now unable to flex. 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   Hx of bilateral TKA 3 and 5 years ago and those are doing well.   She notes intermittent edema. Some hammer toe deformity bilaterally making shoes  problematic but not painful otherwise.   Long hx of GERD-severe.   Cannot tolerate NSAIDs at all w/o gastritis.   Can use Hydrocodone PRN   Added Naltrexone at 3mg and doing very well  Lost 30# with WW.   Patient denies weight loss, rashes, dry eye, dry mouth, nasal or palatal ulcerations,  lymphadenopathy, Raynaud's, hx of DVT/miscarriages, psoriasis or family hx of psoriasis, rashes, serositis, anemia or other constitutional symptoms.  Asking about naltrexone  Component      Latest Ref Rng & Units 8/8/2020 7/29/2020 7/20/2020 7/13/2020   Sodium      136 - 145 mmol/L  126 (L) 128 (L)    Potassium      3.5 - 5.1 mmol/L  5.4 (H) 5.2 (H)    Chloride      95 - 110 mmol/L  92 (L) 92 (L)    CO2      23 - 29 mmol/L  25 29    Glucose      70 - 110 mg/dL  109 109    BUN, Bld      8 - 23 mg/dL  13 11    Creatinine      0.5 - 1.4 mg/dL  0.8 0.8    Calcium      8.7 - 10.5 mg/dL  9.5 9.7    Anion Gap      8 - 16 mmol/L  9 7 (L)    eGFR if African American      >60 mL/min/1.73 m:2  >60.0 >60.0    eGFR if non African American      >60 mL/min/1.73 m:2  >60.0 >60.0    Anti Sm Antibody      0.00 - 0.99 Ratio    0.04   Anti-Sm Interpretation      Negative    Negative   Anti Sm/RNP Antibody      0.00 - 0.99 Ratio    0.09   Anti-Sm/RNP Interpretation      Negative    Negative   JANET Screen      None Detected       Rheumatoid Factor      0.0 - 15.0 IU/mL       Sed Rate      0 - 36 mm/Hr 57 (H)  54 (H) 75 (H)   CRP      0.0 - 8.2 mg/L 75.4 (H) 56.1 (H) 57.5 (H) 99.4 (H)   Anti-Histone Antibody      0.0 - 0.9 Units    0.4   ds DNA Ab      Negative 1:10    Negative 1:10     Component      Latest Ref Rng & Units 6/25/2020 11/10/2018   Sodium      136 - 145 mmol/L     Potassium      3.5 - 5.1 mmol/L     Chloride      95 - 110 mmol/L     CO2      23 - 29 mmol/L     Glucose      70 - 110 mg/dL     BUN, Bld      8 - 23 mg/dL     Creatinine      0.5 - 1.4 mg/dL     Calcium      8.7 - 10.5 mg/dL     Anion Gap      8 - 16 mmol/L     eGFR if African  "American      >60 mL/min/1.73 m:2     eGFR if non African American      >60 mL/min/1.73 m:2     Anti Sm Antibody      0.00 - 0.99 Ratio     Anti-Sm Interpretation      Negative     Anti Sm/RNP Antibody      0.00 - 0.99 Ratio     Anti-Sm/RNP Interpretation      Negative     JANET Screen      None Detected Detected (A) Detected (A)   Rheumatoid Factor      0.0 - 15.0 IU/mL 11.6 <8.6   Sed Rate      0 - 36 mm/Hr     CRP      0.0 - 8.2 mg/L     Anti-Histone Antibody      0.0 - 0.9 Units     ds DNA Ab      Negative 1:10         REVIEW OF SYSTEMS:    Review of Systems   Constitutional: Negative for fever, malaise/fatigue and weight loss.   HENT: Negative for sore throat.    Eyes: Negative for double vision, photophobia and redness.   Respiratory: Negative for cough, shortness of breath and wheezing.    Cardiovascular: Negative for chest pain, palpitations and orthopnea.   Gastrointestinal: Negative for abdominal pain, constipation and diarrhea.   Genitourinary: Negative for dysuria, hematuria and urgency.   Musculoskeletal: Positive for joint pain. Negative for back pain and myalgias.   Skin: Negative for rash.   Neurological: Negative for dizziness, tingling, focal weakness and headaches.   Endo/Heme/Allergies: Does not bruise/bleed easily.   Psychiatric/Behavioral: Negative for depression, hallucinations and suicidal ideas.               Objective:            Past Medical History:   Diagnosis Date    Anesthesia     'Mother stop breathing after anesthesia"    Chronic sinusitis     Depression     GERD (gastroesophageal reflux disease)     PVC (premature ventricular contraction)      Family History   Problem Relation Age of Onset    Heart disease Mother     Arthritis Mother     Cancer Sister     Arthritis Sister     Diabetes Sister     Hypertension Sister      Social History     Tobacco Use    Smoking status: Former     Types: Cigarettes     Start date: 2/3/1955     Quit date: 1/3/1960     Years since quittin.2    " Smokeless tobacco: Never   Substance Use Topics    Alcohol use: Never     Alcohol/week: 0.0 standard drinks    Drug use: Never         Current Outpatient Medications on File Prior to Visit   Medication Sig Dispense Refill    B2-B6 phos-levomef simran-mecobal (EB-N3 DR) 1.3-70-6-4 mg CpDR Take 1 capsule by mouth once daily.      cetirizine (ZYRTEC) 10 MG tablet TAKE 1 TABLET ONE TIME DAILY 90 tablet 0    denosumab (PROLIA SUBQ) Inject into the skin. Two times a year      diclofenac sodium (VOLTAREN) 1 % Gel APPLY TO THE AFFECTED AREA(S) 2 GRAMS THREE TIMES DAILY 100 g 6    gabapentin (NEURONTIN) 400 MG capsule TAKE 1 CAPSULE THREE TIMES DAILY 270 capsule 3    HYDROcodone-acetaminophen (NORCO) 7.5-325 mg per tablet Take 1 tablet by mouth every 12 (twelve) hours as needed for Pain. 60 tablet 0    metoprolol succinate (TOPROL-XL) 50 MG 24 hr tablet TAKE 1 TABLET EVERY DAY 90 tablet 3    neomycin-polymyxin-dexamethasone (MAXITROL) 3.5mg/mL-10,000 unit/mL-0.1 % DrpS       omeprazole (PRILOSEC) 40 MG capsule TAKE 1 CAPSULE EVERY MORNING 90 capsule 0    peg 400-propylene glycol, PF, (SYSTANE, PF,) 0.4-0.3 % Dpet Apply to eye 2 (two) times a day.      prednisoLONE acetate (PRED FORTE) 1 % DrpS SHAKE LIQUID AND INSTILL 1 DROP IN RIGHT EYE FOUR TIMES DAILY FOR 1 WEEK THEN STOP      predniSONE (DELTASONE) 2.5 MG tablet Take 1 tablet (2.5 mg total) by mouth once daily. 90 tablet 3    predniSONE (DELTASONE) 5 MG tablet Take 1 tablet (5 mg total) by mouth once daily. 90 tablet 3    sodium chloride 0.9% SolP 500 mL with riTUXimab 10 mg/mL Conc 375 mg/m2 Inject 375 mg/m2 into the vein. 4 to 6 months      tobramycin-dexAMETHasone 0.3-0.1% (TOBRADEX) 0.3-0.1 % DrpS SHAKE LIQUID AND INSTILL 1 DROP IN RIGHT EYE FOUR TIMES DAILY FOR 2 WEEKS THEN STOP      acetaminophen (TYLENOL) 500 MG tablet Take 500 mg by mouth every 6 (six) hours as needed for Pain.      albuterol (PROVENTIL/VENTOLIN HFA) 90 mcg/actuation inhaler INHALE 2 PUFFS BY MOUTH  EVERY 4 TO 6 HOURS AS NEEDED FOR SHORTNESS OF BREATH OR COUGH      ascorbate calcium (MAHSA-C ORAL) Take by mouth.      cholecalciferol, vitamin D3, (VITAMIN D3) 125 mcg (5,000 unit) Tab Take 1 tablet (5,000 Units total) by mouth once daily. (Patient not taking: Reported on 3/27/2023) 90 tablet 0    furosemide (LASIX) 20 MG tablet TAKE 1 TABLET EVERY DAY (Patient not taking: Reported on 1/9/2023) 90 tablet 3    vit C/E/Zn/coppr/lutein/zeaxan (PRESERVISION AREDS-2 ORAL) Take 1 capsule by mouth 2 (two) times a day.       [DISCONTINUED] naltrexone capsule Take 3 mg by mouth once daily.       No current facility-administered medications on file prior to visit.       Vitals:    03/27/23 1447   BP: 137/71   Pulse: 75       Physical Exam:    Physical Exam   Constitutional: She is oriented to person, place, and time. She appears well-developed and well-nourished.   HENT:   Head: Normocephalic and atraumatic.   Mouth/Throat: Oropharynx is clear and moist.   Eyes: Pupils are equal, round, and reactive to light. EOM are normal.   Neck: Normal range of motion.   Cardiovascular: Normal rate, regular rhythm and normal heart sounds.   Pulmonary/Chest: Effort normal and breath sounds normal.   Musculoskeletal:        Right shoulder: She exhibits normal range of motion, no tenderness and no swelling.        Left shoulder: She exhibits normal range of motion, no tenderness and no swelling.        Right elbow: She exhibits normal range of motion and no swelling. No tenderness found.        Left elbow: She exhibits normal range of motion and no swelling. No tenderness found.        Right wrist: She exhibits normal range of motion, no tenderness and no swelling.        Left wrist: She exhibits normal range of motion, no tenderness and no swelling.        Right knee: She exhibits normal range of motion and no swelling. No tenderness found.        Left knee: She exhibits normal range of motion and no swelling. No tenderness found.         Right hand: She exhibits decreased range of motion and tenderness. She exhibits no swelling.        Left hand: She exhibits decreased range of motion and tenderness. She exhibits no swelling.        Right foot: There is normal range of motion, no tenderness and no swelling.        Left foot: There is normal range of motion, no tenderness and no swelling.   Bony hypertrophy at the PIP and DIP joints. +squaring at the CMC joint. No active synovitis on 28 joint exam.    Neurological: She is alert and oriented to person, place, and time.   Skin: Skin is warm and dry.   Psychiatric: She has a normal mood and affect. Her behavior is normal.     Component      Latest Ref Rng & Units 2021          11:03 AM 11:03 AM   Respiratory Culture        No growth   Gram Stain (Respiratory)       No organisms seen No WBC's   AFB Culture & Smear           AFB CULTURE STAIN           GENET Prep           Fungus (Mycology) Culture           Aerobic Bacterial Culture             Component      Latest Ref Rng & Units 2021          11:03 AM   Respiratory Culture       No Staph aureus, MRSA or Pseudomonas isolated.   Gram Stain (Respiratory)       No organisms seen   AFB Culture & Smear          AFB CULTURE STAIN          GENET Prep          Fungus (Mycology) Culture          Aerobic Bacterial Culture            Component      Latest Ref Rng & Units 2021          11:03 AM 11:03 AM   Respiratory Culture       Normal respiratory nimesh    Gram Stain (Respiratory)       Moderate WBC's    AFB Culture & Smear        Culture in progress   AFB CULTURE STAIN        No acid fast bacilli seen.   KOH Prep        No yeast or fungal elements seen   Fungus (Mycology) Culture        Culture in progress   Aerobic Bacterial Culture                 Narrative  Performed by: DPAOBIE  Patient - ONIEL ROSARIO                   - 1940 Sex- F   Med Rec # - [id number]                        Bimal Mckeon M.D.   The following  is an electronic copy of report # [id number] from:   [facility]   [facility] [place] [address]                         Phone [phone]   DIAGNOSIS:   09/22/2021  LINSEY/shakeel     1, 2.  RIGHT UPPER LOBE MASS BRUSHING AND FINE NEEDLE ASPIRATE BIOPSIES:   - NEGATIVE FOR MALIGNANT CELLS.     - PURULENT EXUDATE.     - A FEW FRAGMENTS OF BRONCHIAL MUCOSA WITH FLORID CHRONIC BRONCHITIS    AND REACTIVE SQUAMOUS    ATYPICAL METAPLASIA.       Comment:   Special stains (AFB and GMS) are negative for organisms. While no    granulomas are seen here, the inflammatory reaction appears to be    similar to that seen in the sinus material over the past few years    (Delta [id number], [id number], [id number]). 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 Bronchoscopic material, particularly of the lower    respiratory tract, is of low yield for a definitive diagnosis of that    entity. The previous sinus material will be reviewed with    consideration for that diagnosis.   _______________________________________________________________________   SPECIMEN AND SOURCE:   1. RUL mass brushing   2. RUL mass needle     CLINICAL INFORMATION:   Clinical Information:-gt Right Upper Lobe Mass   Specific Site:-gt RUL mass brushing T Brush; RUL Mass; RUL mass-    microbiology; RUL BAL; RUL washing;   Other Requests:-gt N/A     GROSS DESCRIPTION:   1. Received 40 mL of fluid in formalin, 1 dry slides and 1 slides in    95% alcohol. Prepared one cell block.     2. Received 40 mL of fluid in formalin, 1 dry slides and 1 slides in    95% alcohol. Prepared one cell block.     CODE: 0     Cytotechnologist: ABDIFATAH Marvin (ASCP)   Pathologist: Scott Johnson MD (Electronic Signature) 09/22/2021 2:34 PM     [facility], [facility] * [address] [place] [address] * [place]     [address]   Technical services performed at: [facility], [facility] * [address]    [facility] * [place] [address]   Screening Site: [facility] * [address] [facility] *    [place] [address]                         Post Rituxan image                                     Pre Rituxan image  Assessment:       Encounter Diagnoses   Name Primary?    Granulomatosis with polyangiitis with renal involvement Yes    Immunosuppression due to drug therapy     High risk medications (not anticoagulants) long-term use           Plan:        Granulomatosis with polyangiitis with renal involvement    Immunosuppression due to drug therapy    High risk medications (not anticoagulants) long-term use       Patient is a 82-year-old female she has had a recent robust inflammatory reaction within the sinus cavity as well as hearing deficit following a sinus surgery.  She has been repeated cultured postoperatively completed antibiotics and irrigations negative for any fungal infections or bacterial infections.   Patient was doing well on MTX with regard to ESR and CRP, but hair loss.     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    Completed RTX 11/5/21 Induction    Review of data and feel : The best data supporting the use of  as maintenance therapy come from the Maintenance of Remission using Rituximab in Systemic ANCA-associated Vasculitis (MAINRITSAN) trial with less relapse for PR3+ paitients when treated with Rituxan at 500mg IV at 6, 12, and 18months when compared to Imuran. Patient has tolerated RTX last 4/20/2022, she had to cancel 10/21/2022 for COVID. Did receive 500mg on 12/2022. Will be due again     Recent note from ENT with continued sinus crusting and debris - despite irrigation regularly, debridement in office.           t spot 5/2021 negative.    Hep B neg. 3/2023          CKD        Patient did have casts with hematuria 10/12/21 prior to start of Rituxan.  Improved following RTX       Continue omeprazole (prilosec)  40mg by mouth daily.          COVID vaccine #3 shot for immunosuppressed to be done March.       Patient hx of Osteopenia only, renal function not stable enough for oral bisphos. Recent t1-T12 intense pain: getting BMD. Likely we should just order Prolia.     she is having some spinous process tenderness today but pain not classic of vertebral fracture will check imaging anyway. She had rib fractures last year as well.    Completed. Prolia. Pending new DXA from OB/GYN     Continue prednisone to 7.5mg (1.5 tablets) daily.     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 This will be at 6,12,18 months- was my initial plan, but recent rise in her ESR, persistent sinus infections and drainage we administered Maintanance dose 4/20/2022. Which was     Tracking labs and serologies for next dose.     Labs reviewed today noted CRP continues to be adequate, and improved while the ESR rising. Chest xray continues to show improvement.        No follow-ups on file.      f/u 3 months  Thank you for allowing me to participate in the care of this very pleasant patient.       40 min consultation with greater than 50% spent in counseling, chart review and coordination of care. All questions answered.

## 2023-03-27 NOTE — PATIENT INSTRUCTIONS
Ok to have the Omicron COVID bivalent vaccine sometime now (3/27/23) to no later than May 2023 as we need time before next Rituxan.     Ok to call Urology about medication for bladder.     Ok for Vitamin D 1,000IU daily- do not have to take if your multivitamin has Vitamin D.   Ok for Vitamin C not going to harm yourself.

## 2023-04-10 ENCOUNTER — TELEPHONE (OUTPATIENT)
Dept: RHEUMATOLOGY | Facility: CLINIC | Age: 83
End: 2023-04-10
Payer: MEDICARE

## 2023-04-10 NOTE — TELEPHONE ENCOUNTER
----- Message from Scott Mead sent at 4/10/2023  2:27 PM CDT -----  Contact: Self  Type: Needs Medical Advice  Who Called:  Patient  Best Call Back Number: [phone]   Additional Information: States since taking lower dose of predniSONE (DELTASONE) 5 MG tablet, feet and legs have been swelling. Please advise.     Left voicemail that the doctor is out of the office until Wednesday. Advised to return  to dose that she was on before 5 mg for a few days and call us with update on her legs and feet. If swelling goes away this will be good information for the doctor. CG

## 2023-04-26 NOTE — TELEPHONE ENCOUNTER
----- Message from Jackie Alvarez sent at 4/26/2023  2:35 PM CDT -----  Contact: Self  Patient is calling in regards to the Infusion for her Rituxan injection. Stated they were supposed to get her set up and started again and she has not heard from anyone regarding this. Can we please check into this for pt and call her back at [phone]. Thank You.    Assured patient that a message was sent today to the infusion department. Patient has upcoming sinus surgery on 5-19-23. Also explained that the infusion department will reach out to Dr. Snyder in regards to the timing of the Rituxin in relation to surgery. CG

## 2023-04-27 ENCOUNTER — TELEPHONE (OUTPATIENT)
Dept: INFUSION THERAPY | Facility: HOSPITAL | Age: 83
End: 2023-04-27
Payer: MEDICARE

## 2023-04-27 ENCOUNTER — TELEPHONE (OUTPATIENT)
Dept: RHEUMATOLOGY | Facility: CLINIC | Age: 83
End: 2023-04-27

## 2023-04-27 RX ORDER — DIPHENHYDRAMINE HYDROCHLORIDE 50 MG/ML
50 INJECTION INTRAMUSCULAR; INTRAVENOUS ONCE AS NEEDED
Status: CANCELLED | OUTPATIENT
Start: 2023-05-22

## 2023-04-27 RX ORDER — SODIUM CHLORIDE 0.9 % (FLUSH) 0.9 %
10 SYRINGE (ML) INJECTION
Status: CANCELLED | OUTPATIENT
Start: 2023-05-22

## 2023-04-27 RX ORDER — ACETAMINOPHEN 325 MG/1
650 TABLET ORAL
Status: CANCELLED | OUTPATIENT
Start: 2023-05-22

## 2023-04-27 RX ORDER — EPINEPHRINE 0.3 MG/.3ML
0.3 INJECTION SUBCUTANEOUS ONCE AS NEEDED
Status: CANCELLED | OUTPATIENT
Start: 2023-05-22

## 2023-04-27 RX ORDER — METHYLPREDNISOLONE SOD SUCC 125 MG
100 VIAL (EA) INJECTION
Status: CANCELLED | OUTPATIENT
Start: 2023-05-22

## 2023-04-27 RX ORDER — FAMOTIDINE 20 MG/1
20 TABLET, FILM COATED ORAL
Status: CANCELLED | OUTPATIENT
Start: 2023-05-22

## 2023-04-27 RX ORDER — MEPERIDINE HYDROCHLORIDE 50 MG/ML
25 INJECTION INTRAMUSCULAR; INTRAVENOUS; SUBCUTANEOUS
Status: CANCELLED | OUTPATIENT
Start: 2023-05-22 | End: 2023-05-23

## 2023-04-27 NOTE — TELEPHONE ENCOUNTER
----- Message from Anali Del Rio LPN sent at 4/26/2023  6:08 PM CDT -----  Contact: Self  I believe a sent a note about this after her last office visit with Dr. Snyder? Thanks for any help. Anali / [id number]  ----- Message -----  From: Jackie Alvarez  Sent: 4/26/2023   2:39 PM CDT  To: Claudio CORBIN Staff    Patient is calling in regards to the Infusion for her Rituxan injection. Stated they were supposed to get her set up and started again and she has not heard from anyone regarding this. Can we please check into this for pt and call her back at [phone]. Thank You.

## 2023-04-27 NOTE — TELEPHONE ENCOUNTER
----- Message from Anastasia Summers sent at 4/27/2023  3:42 PM CDT -----  Type:  Patient Returning Call    Who Called:  Patient   Who Left Message for Patient:  Rain  Does the patient know what this is regarding?:  yes  Best Call Back Number:  [phone]  Additional Information:   Patient returning missed call

## 2023-04-28 ENCOUNTER — TELEPHONE (OUTPATIENT)
Dept: INFUSION THERAPY | Facility: HOSPITAL | Age: 83
End: 2023-04-28
Payer: MEDICARE

## 2023-04-28 NOTE — TELEPHONE ENCOUNTER
----- Message from Aydin Palacios sent at 4/27/2023  4:32 PM CDT -----  Type: Need Medical Advice   Who Called: Patient  Best callback number: [phone]  Additional Information: Patient returned call from Rain to schedule she said call anytime after 10am tomorrow  Please call to further assist, Thanks

## 2023-05-01 ENCOUNTER — TELEPHONE (OUTPATIENT)
Dept: RHEUMATOLOGY | Facility: CLINIC | Age: 83
End: 2023-05-01

## 2023-05-01 RX ORDER — PREDNISONE 5 MG/1
TABLET ORAL
Qty: 90 TABLET | Refills: 3 | Status: SHIPPED | OUTPATIENT
Start: 2023-05-01 | End: 2023-07-11

## 2023-05-01 NOTE — TELEPHONE ENCOUNTER
----- Message from Anali Del Rio LPN sent at 5/1/2023  9:05 AM CDT -----  Regarding: Venice Weber  This is a message from Friday taken by Denise. Please advise.   ----- Message -----  From: Denise Rios LPN  Sent: 4/28/2023  10:20 AM CDT  To: Anali Del Rio LPN      ----- Message -----  From: Rain Pascal  Sent: 4/28/2023  10:15 AM CDT  To: Claudio Gunderson    Good Morning. Patient have a few questions. She have surgery on 5/19/2023 and would like to know if 5 weeks is enough time to radha her Rituxan on 6/23? Also in the referral it say Rituxan 2/2. She would like to know if she should get a second Rituxan 2 weeks after her first one on 6/23/2023?

## 2023-05-01 NOTE — TELEPHONE ENCOUNTER
Yes I want her to receive 2 doses of Rituxan at 500mg each 15 days apart this time.     She has had some increased sinus activity want to be sure we are covering her disease adequately.     I think if no complications 5 weeks post sinus surgery should be adequate unless she develops a complication and is on antibiotics longer than expected following the surgery.     Dr. Snyder

## 2023-06-06 ENCOUNTER — TELEPHONE (OUTPATIENT)
Dept: UROLOGY | Facility: CLINIC | Age: 83
End: 2023-06-06
Payer: MEDICARE

## 2023-06-06 ENCOUNTER — TELEPHONE (OUTPATIENT)
Dept: RHEUMATOLOGY | Facility: CLINIC | Age: 83
End: 2023-06-06
Payer: MEDICARE

## 2023-06-06 DIAGNOSIS — N32.81 OAB (OVERACTIVE BLADDER): Primary | ICD-10-CM

## 2023-06-06 NOTE — TELEPHONE ENCOUNTER
----- Message from Sparkle Atkins sent at 6/6/2023 10:13 AM CDT -----  Contact: self  Type:  Needs Medical Advice    Who Called: Pt  Symptoms (please be specific): Pt had surgery on her sinuses on 5/31 and has an appt 6/23 for   Rituxan 1/2 / Gissel / Radha / Window / Prolia  Would the patient rather a call back or a response via [facility]?  call  Best Call Back Number: [phone]    Additional Information: Is that enough of a time lapse in between?    Please call pt to advise... Thank you...

## 2023-06-07 ENCOUNTER — LAB VISIT (OUTPATIENT)
Dept: LAB | Facility: HOSPITAL | Age: 83
End: 2023-06-07
Payer: MEDICARE

## 2023-06-07 DIAGNOSIS — N32.81 OAB (OVERACTIVE BLADDER): ICD-10-CM

## 2023-06-07 LAB
BACTERIA #/AREA URNS HPF: ABNORMAL /HPF
MICROSCOPIC COMMENT: ABNORMAL
WBC #/AREA URNS HPF: >100 /HPF (ref 0–5)

## 2023-06-07 PROCEDURE — 81000 URINALYSIS NONAUTO W/SCOPE: CPT | Mod: PO

## 2023-06-07 PROCEDURE — 87086 URINE CULTURE/COLONY COUNT: CPT

## 2023-06-07 PROCEDURE — 87077 CULTURE AEROBIC IDENTIFY: CPT

## 2023-06-07 PROCEDURE — 87186 SC STD MICRODIL/AGAR DIL: CPT

## 2023-06-07 PROCEDURE — 87088 URINE BACTERIA CULTURE: CPT

## 2023-06-07 NOTE — TELEPHONE ENCOUNTER
I am not in a rush to start the next Rituxan dose. Let's just hold a full month and start Rituxan after the 4th of July to give her plenty of time to heal.     Dr. Snyder

## 2023-06-07 NOTE — TELEPHONE ENCOUNTER
Attempted to reach out to patient regarding her questions about sinus surgery and infusion. Left a message informing will send a message via portal with Dr Claudio choi.

## 2023-06-08 ENCOUNTER — PATIENT MESSAGE (OUTPATIENT)
Dept: RHEUMATOLOGY | Facility: CLINIC | Age: 83
End: 2023-06-08
Payer: MEDICARE

## 2023-06-08 ENCOUNTER — TELEPHONE (OUTPATIENT)
Dept: UROLOGY | Facility: CLINIC | Age: 83
End: 2023-06-08
Payer: MEDICARE

## 2023-06-08 NOTE — TELEPHONE ENCOUNTER
----- Message from Vanessa Gabriel sent at 6/8/2023  3:30 PM CDT -----  Contact: pt  Type:  Needs Medical Advice    Who Called: Pt  Would the patient rather a call back or a response via [facility]? call  Best Call Back Number: [phone]  Additional Information: Pt states that she need a callback as soon as possible. States that she need her lab results explained to her. Please advise thank you

## 2023-06-09 LAB — BACTERIA UR CULT: ABNORMAL

## 2023-06-11 ENCOUNTER — PATIENT MESSAGE (OUTPATIENT)
Dept: UROLOGY | Facility: CLINIC | Age: 83
End: 2023-06-11
Payer: MEDICARE

## 2023-06-11 DIAGNOSIS — N30.00 ACUTE CYSTITIS WITHOUT HEMATURIA: Primary | ICD-10-CM

## 2023-06-11 RX ORDER — CIPROFLOXACIN 250 MG/1
250 TABLET, FILM COATED ORAL 2 TIMES DAILY
Qty: 20 TABLET | Refills: 0 | Status: SHIPPED | OUTPATIENT
Start: 2023-06-11 | End: 2023-06-21

## 2023-06-12 ENCOUNTER — PATIENT MESSAGE (OUTPATIENT)
Dept: UROLOGY | Facility: CLINIC | Age: 83
End: 2023-06-12
Payer: MEDICARE

## 2023-06-12 ENCOUNTER — TELEPHONE (OUTPATIENT)
Dept: UROLOGY | Facility: CLINIC | Age: 83
End: 2023-06-12
Payer: MEDICARE

## 2023-06-12 DIAGNOSIS — N30.01 ACUTE CYSTITIS WITH HEMATURIA: Primary | ICD-10-CM

## 2023-06-12 RX ORDER — GRANULES FOR ORAL 3 G/1
3 POWDER ORAL ONCE
Qty: 3 G | Refills: 0 | Status: SHIPPED | OUTPATIENT
Start: 2023-06-12 | End: 2023-06-12

## 2023-06-12 NOTE — TELEPHONE ENCOUNTER
L/M for the pt to call or message the office to see  what type of reaction she had when she had taken the Levaquin. Amanda had called in Cipro, low dose for her infection.     Spoke to her  and he will have her contact the office.  Will send a my chart message.

## 2023-06-12 NOTE — TELEPHONE ENCOUNTER
----- Message from Michelle Lopez, Patient Care Assistant sent at 6/12/2023  9:10 AM CDT -----  Regarding: returning call  Contact: pt  Type:  Patient Returning Call    Who Called:  pt     Who Left Message for Patient:  Noelle Jarvis     Does the patient know what this is regarding?:  yes     Best Call Back Number:  [phone] (home) [phone] (work)    Additional Information:  please call pt to advise. Thanks!

## 2023-06-14 ENCOUNTER — TELEPHONE (OUTPATIENT)
Dept: INFUSION THERAPY | Facility: HOSPITAL | Age: 83
End: 2023-06-14
Payer: MEDICARE

## 2023-06-14 ENCOUNTER — TELEPHONE (OUTPATIENT)
Dept: UROLOGY | Facility: CLINIC | Age: 83
End: 2023-06-14
Payer: MEDICARE

## 2023-06-14 DIAGNOSIS — N39.0 URINARY TRACT INFECTION WITHOUT HEMATURIA, SITE UNSPECIFIED: Primary | ICD-10-CM

## 2023-06-14 NOTE — TELEPHONE ENCOUNTER
----- Message from Anastasia Summers sent at 6/14/2023  2:38 PM CDT -----  Type: Needs Medical Advice  Who Called:  Patient   Symptoms (please be specific):    How long has patient had these symptoms:    Pharmacy name and phone #:    Best Call Back Number: [phone]  Additional Information: Patient is requesting a call back to reschedule her appt. on 6/23 at 10:30.

## 2023-06-14 NOTE — TELEPHONE ENCOUNTER
----- Message from Cecilia Ring MA sent at 6/14/2023  4:19 PM CDT -----  Contact: self  Type: Needs Medical Advice  Who Called:  pt  Symptoms (please be specific):  still have uti  How long has patient had these symptoms:  06/12/2023  Pharmacy name and phone #:    [facility] #[id number] - [place], [place] - [id number] [place] [address] AT [place] [address] & [place] [address]  [address] [place] [id number]  Phone: [phone] Fax: [phone]          Best Call Back Number: [phone]   Additional Information: pt wants to come in and give a urine test because she still having problems

## 2023-06-14 NOTE — TELEPHONE ENCOUNTER
----- Message from Cecilia Ring MA sent at 6/14/2023  4:19 PM CDT -----  Contact: self  Type: Needs Medical Advice  Who Called:  pt  Symptoms (please be specific):  still have uti  How long has patient had these symptoms:  06/12/2023  Pharmacy name and phone #:    [facility] #[id number] - [place], [place] - [address] [place] [address] AT [place] [address] & [place] [address]  [id number] [place] [id number]  Phone: [phone] Fax: [phone]          Best Call Back Number: [phone]   Additional Information: pt wants to come in and give a urine test because she still having problems

## 2023-06-15 ENCOUNTER — LAB VISIT (OUTPATIENT)
Dept: LAB | Facility: HOSPITAL | Age: 83
End: 2023-06-15
Payer: MEDICARE

## 2023-06-15 DIAGNOSIS — N39.0 URINARY TRACT INFECTION WITHOUT HEMATURIA, SITE UNSPECIFIED: ICD-10-CM

## 2023-06-15 LAB
BACTERIA #/AREA URNS HPF: NORMAL /HPF
MICROSCOPIC COMMENT: NORMAL
WBC #/AREA URNS HPF: 2 /HPF (ref 0–5)

## 2023-06-15 PROCEDURE — 81000 URINALYSIS NONAUTO W/SCOPE: CPT | Mod: PO

## 2023-06-15 PROCEDURE — 87086 URINE CULTURE/COLONY COUNT: CPT

## 2023-06-16 LAB — BACTERIA UR CULT: NORMAL

## 2023-06-19 ENCOUNTER — CLINICAL SUPPORT (OUTPATIENT)
Dept: UROLOGY | Facility: CLINIC | Age: 83
End: 2023-06-19
Payer: MEDICARE

## 2023-06-19 ENCOUNTER — TELEPHONE (OUTPATIENT)
Dept: UROLOGY | Facility: CLINIC | Age: 83
End: 2023-06-19

## 2023-06-19 DIAGNOSIS — N30.00 ACUTE CYSTITIS WITHOUT HEMATURIA: Primary | ICD-10-CM

## 2023-06-19 LAB
BILIRUBIN, UA POC OHS: NEGATIVE
BLOOD, UA POC OHS: NEGATIVE
CLARITY, UA POC OHS: CLEAR
COLOR, UA POC OHS: YELLOW
GLUCOSE, UA POC OHS: NEGATIVE
KETONES, UA POC OHS: NEGATIVE
LEUKOCYTES, UA POC OHS: NEGATIVE
NITRITE, UA POC OHS: NEGATIVE
PH, UA POC OHS: 6
PROTEIN, UA POC OHS: NEGATIVE
SPECIFIC GRAVITY, UA POC OHS: 1.01
UROBILINOGEN, UA POC OHS: 0.2

## 2023-06-19 PROCEDURE — 51701 INSERT BLADDER CATHETER: CPT | Mod: S$GLB,,,

## 2023-06-19 PROCEDURE — 81003 POCT URINALYSIS(INSTRUMENT): ICD-10-PCS | Mod: QW,S$GLB,,

## 2023-06-19 PROCEDURE — 87086 URINE CULTURE/COLONY COUNT: CPT

## 2023-06-19 PROCEDURE — 99999 PR PBB SHADOW E&M-EST. PATIENT-LVL III: ICD-10-PCS | Mod: PBBFAC,,,

## 2023-06-19 PROCEDURE — 99999 PR PBB SHADOW E&M-EST. PATIENT-LVL III: CPT | Mod: PBBFAC,,,

## 2023-06-19 PROCEDURE — 51701 PR INSERTION OF NON-INDWELLING BLADDER CATHETERIZATION FOR RESIDUAL UR: ICD-10-PCS | Mod: S$GLB,,,

## 2023-06-19 PROCEDURE — 81003 URINALYSIS AUTO W/O SCOPE: CPT | Mod: QW,S$GLB,,

## 2023-06-19 NOTE — PROGRESS NOTES
Patient to clinic for catheterized urine sample for possible uti. Patient catheterized using sterile technique , aprox 90 ml yellow cloudy urine drained from patient bladder, patient tolerated well. Urine will be sent for culture and u/a performed in our lab here.

## 2023-06-20 LAB — BACTERIA UR CULT: NO GROWTH

## 2023-06-23 ENCOUNTER — LAB VISIT (OUTPATIENT)
Dept: LAB | Facility: HOSPITAL | Age: 83
End: 2023-06-23
Attending: INTERNAL MEDICINE
Payer: MEDICARE

## 2023-06-23 DIAGNOSIS — D72.18: ICD-10-CM

## 2023-06-23 DIAGNOSIS — K21.9 ESOPHAGEAL REFLUX: ICD-10-CM

## 2023-06-23 DIAGNOSIS — E78.00 PURE HYPERCHOLESTEROLEMIA: ICD-10-CM

## 2023-06-23 DIAGNOSIS — H35.3220 EXUDATIVE AGE-RELATED MACULAR DEGENERATION OF LEFT EYE: Primary | ICD-10-CM

## 2023-06-23 DIAGNOSIS — I77.6 ARTERITIS, UNSPECIFIED: ICD-10-CM

## 2023-06-23 DIAGNOSIS — M35.3 POLYMYALGIA RHEUMATICA: ICD-10-CM

## 2023-06-23 DIAGNOSIS — M30.1: ICD-10-CM

## 2023-06-23 LAB
BASOPHILS # BLD AUTO: 0.07 K/UL (ref 0–0.2)
BASOPHILS NFR BLD: 0.9 % (ref 0–1.9)
CHOLEST SERPL-MCNC: 184 MG/DL (ref 120–199)
CHOLEST/HDLC SERPL: 2.6 {RATIO} (ref 2–5)
CRP SERPL-MCNC: 6.7 MG/L (ref 0–8.2)
DIFFERENTIAL METHOD: ABNORMAL
EOSINOPHIL # BLD AUTO: 0.4 K/UL (ref 0–0.5)
EOSINOPHIL NFR BLD: 5.2 % (ref 0–8)
ERYTHROCYTE [DISTWIDTH] IN BLOOD BY AUTOMATED COUNT: 14.1 % (ref 11.5–14.5)
HCT VFR BLD AUTO: 31 % (ref 37–48.5)
HDLC SERPL-MCNC: 72 MG/DL (ref 40–75)
HDLC SERPL: 39.1 % (ref 20–50)
HGB BLD-MCNC: 9.9 G/DL (ref 12–16)
IMM GRANULOCYTES # BLD AUTO: 0.06 K/UL (ref 0–0.04)
IMM GRANULOCYTES NFR BLD AUTO: 0.8 % (ref 0–0.5)
LDLC SERPL CALC-MCNC: 99 MG/DL (ref 63–159)
LYMPHOCYTES # BLD AUTO: 1.2 K/UL (ref 1–4.8)
LYMPHOCYTES NFR BLD: 15.1 % (ref 18–48)
MCH RBC QN AUTO: 29.8 PG (ref 27–31)
MCHC RBC AUTO-ENTMCNC: 31.9 G/DL (ref 32–36)
MCV RBC AUTO: 93 FL (ref 82–98)
MONOCYTES # BLD AUTO: 1.3 K/UL (ref 0.3–1)
MONOCYTES NFR BLD: 16.3 % (ref 4–15)
NEUTROPHILS # BLD AUTO: 4.7 K/UL (ref 1.8–7.7)
NEUTROPHILS NFR BLD: 61.7 % (ref 38–73)
NONHDLC SERPL-MCNC: 112 MG/DL
NRBC BLD-RTO: 0 /100 WBC
PLATELET # BLD AUTO: 346 K/UL (ref 150–450)
PMV BLD AUTO: 9.5 FL (ref 9.2–12.9)
RBC # BLD AUTO: 3.32 M/UL (ref 4–5.4)
TRIGL SERPL-MCNC: 65 MG/DL (ref 30–150)
WBC # BLD AUTO: 7.67 K/UL (ref 3.9–12.7)

## 2023-06-23 PROCEDURE — 85025 COMPLETE CBC W/AUTO DIFF WBC: CPT | Performed by: INTERNAL MEDICINE

## 2023-06-23 PROCEDURE — 80061 LIPID PANEL: CPT | Performed by: INTERNAL MEDICINE

## 2023-06-23 PROCEDURE — 36415 COLL VENOUS BLD VENIPUNCTURE: CPT | Mod: PO | Performed by: INTERNAL MEDICINE

## 2023-06-23 PROCEDURE — 86140 C-REACTIVE PROTEIN: CPT | Performed by: INTERNAL MEDICINE

## 2023-06-26 ENCOUNTER — OFFICE VISIT (OUTPATIENT)
Dept: RHEUMATOLOGY | Facility: CLINIC | Age: 83
End: 2023-06-26
Payer: MEDICARE

## 2023-06-26 VITALS
SYSTOLIC BLOOD PRESSURE: 166 MMHG | WEIGHT: 152 LBS | HEART RATE: 76 BPM | HEIGHT: 64 IN | BODY MASS INDEX: 25.95 KG/M2 | DIASTOLIC BLOOD PRESSURE: 53 MMHG

## 2023-06-26 DIAGNOSIS — M81.0 POSTMENOPAUSAL OSTEOPOROSIS: ICD-10-CM

## 2023-06-26 DIAGNOSIS — F19.20 STEROID DEPENDENCE: ICD-10-CM

## 2023-06-26 DIAGNOSIS — D84.9 IMMUNOCOMPROMISED: ICD-10-CM

## 2023-06-26 DIAGNOSIS — M31.31 GRANULOMATOSIS WITH POLYANGIITIS WITH RENAL INVOLVEMENT: Primary | ICD-10-CM

## 2023-06-26 PROCEDURE — 1125F AMNT PAIN NOTED PAIN PRSNT: CPT | Mod: CPTII,S$GLB,, | Performed by: INTERNAL MEDICINE

## 2023-06-26 PROCEDURE — 99999 PR PBB SHADOW E&M-EST. PATIENT-LVL II: ICD-10-PCS | Mod: PBBFAC,,, | Performed by: INTERNAL MEDICINE

## 2023-06-26 PROCEDURE — 99417 PROLNG OP E/M EACH 15 MIN: CPT | Mod: S$GLB,,, | Performed by: INTERNAL MEDICINE

## 2023-06-26 PROCEDURE — 99215 OFFICE O/P EST HI 40 MIN: CPT | Mod: S$GLB,,, | Performed by: INTERNAL MEDICINE

## 2023-06-26 PROCEDURE — 1159F PR MEDICATION LIST DOCUMENTED IN MEDICAL RECORD: ICD-10-PCS | Mod: CPTII,S$GLB,, | Performed by: INTERNAL MEDICINE

## 2023-06-26 PROCEDURE — 3288F FALL RISK ASSESSMENT DOCD: CPT | Mod: CPTII,S$GLB,, | Performed by: INTERNAL MEDICINE

## 2023-06-26 PROCEDURE — 1125F PR PAIN SEVERITY QUANTIFIED, PAIN PRESENT: ICD-10-PCS | Mod: CPTII,S$GLB,, | Performed by: INTERNAL MEDICINE

## 2023-06-26 PROCEDURE — 3078F PR MOST RECENT DIASTOLIC BLOOD PRESSURE < 80 MM HG: ICD-10-PCS | Mod: CPTII,S$GLB,, | Performed by: INTERNAL MEDICINE

## 2023-06-26 PROCEDURE — 3078F DIAST BP <80 MM HG: CPT | Mod: CPTII,S$GLB,, | Performed by: INTERNAL MEDICINE

## 2023-06-26 PROCEDURE — 3288F PR FALLS RISK ASSESSMENT DOCUMENTED: ICD-10-PCS | Mod: CPTII,S$GLB,, | Performed by: INTERNAL MEDICINE

## 2023-06-26 PROCEDURE — 99417 PR PROLONGED SVC, OUTPT, W/WO DIRECT PT CONTACT,  EA ADDTL 15 MIN: ICD-10-PCS | Mod: S$GLB,,, | Performed by: INTERNAL MEDICINE

## 2023-06-26 PROCEDURE — 99999 PR PBB SHADOW E&M-EST. PATIENT-LVL II: CPT | Mod: PBBFAC,,, | Performed by: INTERNAL MEDICINE

## 2023-06-26 PROCEDURE — 99215 PR OFFICE/OUTPT VISIT, EST, LEVL V, 40-54 MIN: ICD-10-PCS | Mod: S$GLB,,, | Performed by: INTERNAL MEDICINE

## 2023-06-26 PROCEDURE — 1101F PR PT FALLS ASSESS DOC 0-1 FALLS W/OUT INJ PAST YR: ICD-10-PCS | Mod: CPTII,S$GLB,, | Performed by: INTERNAL MEDICINE

## 2023-06-26 PROCEDURE — 1101F PT FALLS ASSESS-DOCD LE1/YR: CPT | Mod: CPTII,S$GLB,, | Performed by: INTERNAL MEDICINE

## 2023-06-26 PROCEDURE — 3077F SYST BP >= 140 MM HG: CPT | Mod: CPTII,S$GLB,, | Performed by: INTERNAL MEDICINE

## 2023-06-26 PROCEDURE — 3077F PR MOST RECENT SYSTOLIC BLOOD PRESSURE >= 140 MM HG: ICD-10-PCS | Mod: CPTII,S$GLB,, | Performed by: INTERNAL MEDICINE

## 2023-06-26 PROCEDURE — 1159F MED LIST DOCD IN RCRD: CPT | Mod: CPTII,S$GLB,, | Performed by: INTERNAL MEDICINE

## 2023-06-26 RX ORDER — HYDROCODONE BITARTRATE AND ACETAMINOPHEN 7.5; 325 MG/1; MG/1
1 TABLET ORAL EVERY 6 HOURS PRN
COMMUNITY
Start: 2023-05-22 | End: 2023-11-27

## 2023-06-26 RX ORDER — ONDANSETRON 8 MG/1
8 TABLET, ORALLY DISINTEGRATING ORAL 2 TIMES DAILY
COMMUNITY
Start: 2023-05-22

## 2023-06-26 ASSESSMENT — ROUTINE ASSESSMENT OF PATIENT INDEX DATA (RAPID3)
TOTAL RAPID3 SCORE: 3.5
MDHAQ FUNCTION SCORE: 0.6
FATIGUE SCORE: 0
PATIENT GLOBAL ASSESSMENT SCORE: 4
PAIN SCORE: 4.5
PSYCHOLOGICAL DISTRESS SCORE: 2.2

## 2023-06-26 NOTE — PROGRESS NOTES
"    Subjective:          Chief Complaint: Aracelsi Weber is a 82 y.o. female who had concerns including Disease Management.    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 Quinn.  We delayed June Rituxan until after the 4th of July to allow more healing. She was early optimistic the sinus congestion and mucous had improved but now return of copious mucous production. She was called about a +staph culture - pending new ABX nasal irrigation.   Not bleeding as badly,     No fever, no chills. Dry cough rare no other SOB.   +fatigue today, but feels pretty good most days.   Las infusion 12/2022 single 500mg IV Rituxan    Dr. Chow OAB dx. Working with possible med  Dr Villavicencio recommend she need to take lasix more often for edema (trying QOD)  Skin: from the prednisone     3/2023: patient recently seen with Dr. Allred for eval with excessive tearing. While there discussed her repeat debridement of her sinuses with crusting sinuses with Dr. Quinn. Patient labs 3/23- with normal ESR and normal CRP. She has constant wu with her sinuses and sinus plugging.     She was recently treated for eye infection with Dr. Butts.     Incontinence: having trouble with this, and unable to complete PT at clinic, but did home exercise not helping. Declined meds but would like to have this revisited.         11/14/2022:   COVID 11/1/2022 took her quite some time to recover from cough but overall did well.  Did not take PAXLOVID. We were late getting started on meds as initially thought it was influenza.     She is feeling markedly better: reviewed her CXR markedly improved with regard to her RUL cavitary lesion.   We will get her back on Rituxan.     8/8/22: peripheral neuropathy still problematic on gabapentin and working with Chiro for a plan to see if this will help.   Sinuses still congestion  +dry cough but no fevers, night time dry cough "tickle"   No SOB.     5/5/2022: "   Patient doing very well. Still rhinitis, using navage. No antibiotics from ENT since last visit. She is still clearing significant mucous faint with dried blood.   Dry cough, no fevers, no SOB  Mild HA only with elevated BP.   Completed Rituxan maintanence at 4 months 4/20/2022.   Prolia 3/25/22  Evusheld with catch up dose 2/10/22 and 3/25/2022.     Major complaints: pins, needles, stinging, burning in feet day and night is constant. Dr. Campbell: EB-N5 supplement is helping some, stopped for months and noted significant worsening. Dr. Campbell did increase from EB-N3.   Gabapentin 400mg TID  Hydrocodone only PRN  Prednisone 7.5mg       2/2022  Doing well no cough, no fevers. Recent COVID exposure but negative.   Seen with Nephro.   Given her successful response with Ritux. Agree to continue maintanance with Ritux Q 4-6 months    Needs to get #3 COVID vaccine timing with RTX. - will time this with me 2 weeks prior to   Discussed Prolia  Working on Evusheld for PreP with COVID> she is scheduled 2/10/22      12/9/2021  Patient's recent CXR 12/21/21 with marked improvement when compared to 10/21/21  Prednisone 10mg daily  S/p RTX x 4 infusions completion date. 11/5/21  Patient with rise in creatinine to 2.0 on 11/26 had been requiring lasix for marked edema since 11/1/21. Stopped 2 weeks ago. Edema is present but managed. Drinking about 40 oz water and 3 cups of coffee daily    Patient completed [facility] PT felt she was doing well home exercises about 2 weeks ago with acute pain in lumbar spine after exercising. Imaging lumbar few days ago without evidence of a fracture. She point to pain in the lumbosacral and radiating to buttock region. Ok to rise from seated. Pain more on left low back and buttock. No numbness no tingling but pain in the hamstring region. Comfortable sitting but not lying down, but she never sleeps in bed. Long hx of rather impressive scoliosis.       11/11/2021:   Inflammatory markers are markedly  improved.   Cough dry still at night. Patient noting she has lost sense of smell. Can still taste.     Patient recently hospitatlized for Bronch with negative cultures to date.  There was a concern for possible atypical infection she has had enlarging right upper lobe cavitary lesion measures still has a right middle lobe lesion with some cavitation now as well.  I have discussed this case extensively with both Dr. Schwartz as well as Dr. Joseph upon discharge we felt comfortable that this is likely Wegener's granulomatosis causing cavitary lesions.  Supporting sinus biopsy does note granuloma.    Patient was started on Imuran in March of 2021 but with the growth of her lesion we had rule out for any possible infection given her status of immunosuppression.  She is here today not in her usual state she appears fatigued pale she is not complaining of shortness of breath but does have a cough dry persistent.  She is noticing increased pedal edema.         9/13/21: patient with 2 falls 8 days apart. Spoke w/ pulm shortly following initial fall and we were scheduled for bronch when she sustained another fall.   Scheduled now for 9/17/21 bronch. RUL lesion.   Remains off Imuran  Prednisone 15mg daily.   Sinus congestion. Still productive     She is noting some productive cough at night no hemoptysis.   Patient denies SOB  Having more HA. -frontal and sinus, she is noting some drainage in right ear some mucous/blood.   No edema.   She notes fatigue, no fevers, no night sweats. No weight loss.   Skin easily bruising.     No personal hx of any malignancy.       7/20/2021:    Spoke with Pulm plan for bronch is able working on possible bx site vs BAL.   Need to r/o infectious/neoplastic and confirm for ANCA (GPA vasculitis)   Inflammatory markers markedly down .8  Now 14.   H/H improve from 7.4 to 8.2  Platelets normalized.   Renal stable over time    Patient did attend wedding with approx 300 people unmasked last  "weekend. Reviewed by recs for COVID precautions given    Current regimen:   pred 15mg (Na stable)  Holding Imuran    Interval events: PET with hypermetabolic lesions:   a. RUL cavitary/cystic lesion 1.9cm x 1.3cm  b. RUL spiculated 1.2cm x 1.4cm  c. RML 1.7cm x 2.0 cm  All with differential: infectious, inflammatory, neoplastic, grannulomatous (a) with favor of inflammation given the rapid change    Fungal immunodiffusion neg  Quant gold and T spot: neg  H/H improving     Sinus congestion, pressure, headache, x 3 weeks very productive with blowing nose, back on navage. :  started Cindamycin with DR Quinn x 10 days.   Patient denies any shortness breath.   Very fatigues.   No more fevers. Slight dry cough, no blood/ no mucous.   No SOB.       5/12/21 completed nasal irrigation with biopsy not definitive for GPA, but + inflammation and granulomas. Temporal artery bx negative. Patient with PCP same day as labs 6/2/21  dx with UTI on keflex.     Was seen apparently in ED in [place] for 104F she had altered mental status, dx with UTI, dehydration, treated with. Completed all 7 days of keflex and within 48 hours with another fever 104 F. She continues with congestion but no worse. Patient denies cough, hemoptysis, bloody nasal discharge. She has had no fever x 10 days. Checks twice daily.   while travelling told "congestion in chest on xray". Inflammatory markers very high again.   Patient with recent concern for wegeners needs CT chest. CXR with new airspace opacities right chest- need to r/o GGO vs infection. CT has been ordered pending scheduling.   Broad ABX coverage recommended for now will cc Dr. Natarajan.     I am limited here with her  severe edema from higher dose steroids. Max tolerated is 20mg daily pred  Started Imuran 5/5/2021. Very low dose 50mg BID (0.65mg/kg) tolerating VERY well.   H/H still low.         2/2021  Sinususitis, cx were negative.  Using nasal irrigation with mucoid disharge.   Patient continues " with significant nasal discharge and blood with scabs. We discussed Wegeners but pathology sinus no vasculitis, granuloma noted.   No HA, no blurred vision. Recent sinus infection with HA for 10 days. cx +, but also repeat labs demonstarting +PR3 and +ANCA       Patient with c/o right > left 3,4 finger numbness that was intermittent until approximately 2 weeks about having near constant numbness in hands, pins and needles and pain.   Patient not noting much improvement with change in position.     Off MTX 6  tabs weekly.   Doing well with Hydroxychloroquine.   Declined COVID vaccine    Gabapentin up to 300mg TID.   Now with Dr. Campbell doing PT for Plantar fasciitis. She is taking supplement and compound cream for joint and neuropathy. No response to tarsal tunnel injection per Dr. Campbell.   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 WBC ok.       11/2020  Patient with PMR   MTX at 4 tabs weekly new anemia. Thrombocytosis  Pred at 10mg   Complicated with peripheral edema rather severe , started MTX seeing trending CRP/ESR but persistent leg pain.   Seen with PCP for neuropathy s/sx not seeing much benefit with gabpentin  Seen with Cardiology- no concern for cardiac ECHO ok. dc'd lasix for hyponatremia.     8/2020  Pred 20mg with mild edema, pred 30 mg with severe edema.   Current Pred 15mg daily  Rising ESR again.   Lasix 20 mg daily with improved edema but having some pins and needles in feet/legs.   Having tight, burning stinging at the toes and feet. Heaviness. Prickly.   Noting sodium is falling, K 5.2-5.4 still using           7/2020:  Hands not painful, knees not painful. Shoulder/cervical and down the arms, markedly improved. She still has some pain him hips and groin with walking and some weakness to the legs with edema. Some pain with fixing own hair.   She takes in AM regarding hips ache, some  shoulder/arms, and again at night because hips/legs.   The clue is that the LDN new dose she had some ASE, previously tolerated 3mg daily fine.     Historical review of present Illness.   Patient with a recent chronic sinusitis that ultimately warranted a septoplasty, endoscopic sinus surgery and turbinate reduction 5/2020   Four weeks postop t increasingly worse she was noting pain in her throat and ears lasting several hours complaint of pdizziness she had symptoms of ear pain and decreased hearing in the left ear.  She underwent a debridement at this visit she continue with compound nasal irrigations.  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    Patient did have repeat procedure on with biopsies taken 07/03/2020 showing right maxillary sinus chronically inflamed fibrotic polypoid portions benign nasal mucosa left maxillary sinus similar polypoid fragments ulcerated markedly inflamed respiratory mucosa focal foreign body giant cell response noted suggested that this might have been from her Gelfoam packing as a possible cause of this reaction    I have spoken with Dr. Quinn prior to patient's visit today and was a concern that she could have triggered some inflammatory response with the Gelfoam packing    Patient states she no longer has a headache is not noticing much ear drainage continues with profound loss of hearing on the left and rather significant on the right both  sensorineural and some conductive changes.    Restarted on prednisone as of 7/20/20  10 mg patient has not noticed any change with her hearing in this time she did notice slight improvement with her joint aches and pains but is still relying on hydrocodone for the bulk of this.    She denies a persistent headache she does have some tenderness about the preauricular region particularly on the right more than the left.  She has denied jaw claudication changes in her voice or any amaurosis fugax.  She has no pre-existing history of joint aches and pains hip or otherwise.  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  She had a workup that involved a negative rheumatoid factor, age appropriate sedimentation rate,C reactive protein JANET was positive 1:160 in speckled pattern        Previous: Hx:   She is having pain in her hand with stiffness and right 2nd PIP joint now unable to flex. 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   Hx of bilateral TKA 3 and 5 years ago and those are doing well.   She notes intermittent edema. Some hammer toe deformity bilaterally making shoes problematic but not painful otherwise.   Long hx of GERD-severe.   Cannot tolerate NSAIDs at all w/o gastritis.   Can use Hydrocodone PRN   Added Naltrexone at 3mg and doing very well  Lost 30# with WW.   Patient denies weight loss, rashes, dry eye, dry mouth, nasal or palatal ulcerations,  lymphadenopathy, Raynaud's, hx of DVT/miscarriages, psoriasis or family hx of psoriasis, rashes, serositis, anemia or other constitutional symptoms.  Asking about naltrexone  Component      Latest Ref Rng & Units 8/8/2020 7/29/2020 7/20/2020 7/13/2020   Sodium      136 - 145 mmol/L  126 (L) 128 (L)    Potassium      3.5 - 5.1 mmol/L  5.4 (H) 5.2 (H)    Chloride      95 - 110 mmol/L  92 (L) 92 (L)    CO2      23 - 29 mmol/L  25 29    Glucose      70 - 110 mg/dL  109 109    BUN, Bld       8 - 23 mg/dL  13 11    Creatinine      0.5 - 1.4 mg/dL  0.8 0.8    Calcium      8.7 - 10.5 mg/dL  9.5 9.7    Anion Gap      8 - 16 mmol/L  9 7 (L)    eGFR if African American      >60 mL/min/1.73 m:2  >60.0 >60.0    eGFR if non African American      >60 mL/min/1.73 m:2  >60.0 >60.0    Anti Sm Antibody      0.00 - 0.99 Ratio    0.04   Anti-Sm Interpretation      Negative    Negative   Anti Sm/RNP Antibody      0.00 - 0.99 Ratio    0.09   Anti-Sm/RNP Interpretation      Negative    Negative   JANET Screen      None Detected       Rheumatoid Factor      0.0 - 15.0 IU/mL       Sed Rate      0 - 36 mm/Hr 57 (H)  54 (H) 75 (H)   CRP      0.0 - 8.2 mg/L 75.4 (H) 56.1 (H) 57.5 (H) 99.4 (H)   Anti-Histone Antibody      0.0 - 0.9 Units    0.4   ds DNA Ab      Negative 1:10    Negative 1:10     Component      Latest Ref Rng & Units 6/25/2020 11/10/2018   Sodium      136 - 145 mmol/L     Potassium      3.5 - 5.1 mmol/L     Chloride      95 - 110 mmol/L     CO2      23 - 29 mmol/L     Glucose      70 - 110 mg/dL     BUN, Bld      8 - 23 mg/dL     Creatinine      0.5 - 1.4 mg/dL     Calcium      8.7 - 10.5 mg/dL     Anion Gap      8 - 16 mmol/L     eGFR if African American      >60 mL/min/1.73 m:2     eGFR if non African American      >60 mL/min/1.73 m:2     Anti Sm Antibody      0.00 - 0.99 Ratio     Anti-Sm Interpretation      Negative     Anti Sm/RNP Antibody      0.00 - 0.99 Ratio     Anti-Sm/RNP Interpretation      Negative     JANET Screen      None Detected Detected (A) Detected (A)   Rheumatoid Factor      0.0 - 15.0 IU/mL 11.6 <8.6   Sed Rate      0 - 36 mm/Hr     CRP      0.0 - 8.2 mg/L     Anti-Histone Antibody      0.0 - 0.9 Units     ds DNA Ab      Negative 1:10         REVIEW OF SYSTEMS:    Review of Systems   Constitutional: Negative for fever, malaise/fatigue and weight loss.   HENT: Negative for sore throat.    Eyes: Negative for double vision, photophobia and redness.   Respiratory: Negative for cough, shortness  "of breath and wheezing.    Cardiovascular: Negative for chest pain, palpitations and orthopnea.   Gastrointestinal: Negative for abdominal pain, constipation and diarrhea.   Genitourinary: Negative for dysuria, hematuria and urgency.   Musculoskeletal: Positive for joint pain. Negative for back pain and myalgias.   Skin: Negative for rash.   Neurological: Negative for dizziness, tingling, focal weakness and headaches.   Endo/Heme/Allergies: Does not bruise/bleed easily.   Psychiatric/Behavioral: Negative for depression, hallucinations and suicidal ideas.               Objective:            Past Medical History:   Diagnosis Date    Anesthesia     'Mother stop breathing after anesthesia"    Chronic sinusitis     Depression     GERD (gastroesophageal reflux disease)     PVC (premature ventricular contraction)      Family History   Problem Relation Age of Onset    Heart disease Mother     Arthritis Mother     Cancer Sister     Arthritis Sister     Diabetes Sister     Hypertension Sister      Social History     Tobacco Use    Smoking status: Former     Types: Cigarettes     Start date: 2/3/1955     Quit date: 1/3/1960     Years since quittin.5    Smokeless tobacco: Never   Substance Use Topics    Alcohol use: Never     Alcohol/week: 0.0 standard drinks    Drug use: Never         Current Outpatient Medications on File Prior to Visit   Medication Sig Dispense Refill    acetaminophen (TYLENOL) 500 MG tablet Take 500 mg by mouth every 6 (six) hours as needed for Pain.      albuterol (PROVENTIL/VENTOLIN HFA) 90 mcg/actuation inhaler INHALE 2 PUFFS BY MOUTH EVERY 4 TO 6 HOURS AS NEEDED FOR SHORTNESS OF BREATH OR COUGH      ascorbate calcium (MAHSA-C ORAL) Take by mouth.      B2-B6 phos-levomef simran-mecobal (EB-N3 DR) 1.3-70-6-4 mg CpDR Take 1 capsule by mouth once daily.      cetirizine (ZYRTEC) 10 MG tablet TAKE 1 TABLET ONE TIME DAILY 90 tablet 0    cholecalciferol, vitamin D3, (VITAMIN D3) 125 mcg (5,000 unit) Tab Take " 1 tablet (5,000 Units total) by mouth once daily. 90 tablet 0    denosumab (PROLIA SUBQ) Inject into the skin. Two times a year      diclofenac sodium (VOLTAREN) 1 % Gel APPLY TO THE AFFECTED AREA(S) 2 GRAMS THREE TIMES DAILY 100 g 6    furosemide (LASIX) 20 MG tablet TAKE 1 TABLET EVERY DAY 90 tablet 3    gabapentin (NEURONTIN) 400 MG capsule TAKE 1 CAPSULE THREE TIMES DAILY 270 capsule 3    metoprolol succinate (TOPROL-XL) 50 MG 24 hr tablet TAKE 1 TABLET EVERY DAY 90 tablet 3    neomycin-polymyxin-dexamethasone (MAXITROL) 3.5mg/mL-10,000 unit/mL-0.1 % DrpS       omeprazole (PRILOSEC) 40 MG capsule TAKE 1 CAPSULE EVERY MORNING 90 capsule 0    peg 400-propylene glycol, PF, (SYSTANE, PF,) 0.4-0.3 % Dpet Apply to eye 2 (two) times a day.      prednisoLONE acetate (PRED FORTE) 1 % DrpS SHAKE LIQUID AND INSTILL 1 DROP IN RIGHT EYE FOUR TIMES DAILY FOR 1 WEEK THEN STOP      predniSONE (DELTASONE) 2.5 MG tablet Take 1 tablet (2.5 mg total) by mouth once daily. 90 tablet 3    predniSONE (DELTASONE) 5 MG tablet TAKE 1 TABLET ONE TIME DAILY 90 tablet 3    sodium chloride 0.9% SolP 500 mL with riTUXimab 10 mg/mL Conc 375 mg/m2 Inject 375 mg/m2 into the vein. 4 to 6 months      tobramycin-dexAMETHasone 0.3-0.1% (TOBRADEX) 0.3-0.1 % DrpS SHAKE LIQUID AND INSTILL 1 DROP IN RIGHT EYE FOUR TIMES DAILY FOR 2 WEEKS THEN STOP      vit C/E/Zn/coppr/lutein/zeaxan (PRESERVISION AREDS-2 ORAL) Take 1 capsule by mouth 2 (two) times a day.       HYDROcodone-acetaminophen (NORCO) 7.5-325 mg per tablet Take 1 tablet by mouth every 6 (six) hours as needed.      ondansetron (ZOFRAN-ODT) 8 MG TbDL Take 8 mg by mouth 2 (two) times daily.      [DISCONTINUED] naltrexone capsule Take 3 mg by mouth once daily.       No current facility-administered medications on file prior to visit.       Vitals:    06/26/23 1632   BP: (!) 166/53   Pulse: 76       Physical Exam:    Physical Exam   Constitutional: She is oriented to person, place, and time. She  appears well-developed and well-nourished.   HENT:   Head: Normocephalic and atraumatic.   Mouth/Throat: Oropharynx is clear and moist.   Eyes: Pupils are equal, round, and reactive to light. EOM are normal.   Neck: Normal range of motion.   Cardiovascular: Normal rate, regular rhythm and normal heart sounds.   Pulmonary/Chest: Effort normal and breath sounds normal.   Musculoskeletal:        Right shoulder: She exhibits normal range of motion, no tenderness and no swelling.        Left shoulder: She exhibits normal range of motion, no tenderness and no swelling.        Right elbow: She exhibits normal range of motion and no swelling. No tenderness found.        Left elbow: She exhibits normal range of motion and no swelling. No tenderness found.        Right wrist: She exhibits normal range of motion, no tenderness and no swelling.        Left wrist: She exhibits normal range of motion, no tenderness and no swelling.        Right knee: She exhibits normal range of motion and no swelling. No tenderness found.        Left knee: She exhibits normal range of motion and no swelling. No tenderness found.        Right hand: She exhibits decreased range of motion and tenderness. She exhibits no swelling.        Left hand: She exhibits decreased range of motion and tenderness. She exhibits no swelling.        Right foot: There is normal range of motion, no tenderness and no swelling.        Left foot: There is normal range of motion, no tenderness and no swelling.   Bony hypertrophy at the PIP and DIP joints. +squaring at the CMC joint. No active synovitis on 28 joint exam.    Neurological: She is alert and oriented to person, place, and time.   Skin: Skin is warm and dry.   Psychiatric: She has a zoltan        Narrative  Performed by: [facility]  Patient - ONIEL ROSARIO                   - 1940 Sex- F   Med Rec # - [id number]                        Bimal Mckeon M.D.   The following is an electronic copy of report  # [id number] from:   [facility]   [address] [place] [id number]                         Phone [phone]   DIAGNOSIS:   09/22/2021  LINSEY/shakeel     1, 2.  RIGHT UPPER LOBE MASS BRUSHING AND FINE NEEDLE ASPIRATE BIOPSIES:   - NEGATIVE FOR MALIGNANT CELLS.     - PURULENT EXUDATE.     - A FEW FRAGMENTS OF BRONCHIAL MUCOSA WITH FLORID CHRONIC BRONCHITIS    AND REACTIVE SQUAMOUS    ATYPICAL METAPLASIA.       Comment:   Special stains (AFB and GMS) are negative for organisms. While no    granulomas are seen here, the inflammatory reaction appears to be    similar to that seen in the sinus material over the past few years    (Delta [id number], [id number], [id number]). 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 Bronchoscopic material, particularly of the lower    respiratory tract, is of low yield for a definitive diagnosis of that    entity. The previous sinus material will be reviewed with    consideration for that diagnosis.   _______________________________________________________________________   SPECIMEN AND SOURCE:   1. RUL mass brushing   2. RUL mass needle     CLINICAL INFORMATION:   Clinical Information:-gt Right Upper Lobe Mass   Specific Site:-gt RUL mass brushing T Brush; RUL Mass; RUL mass-    microbiology; RUL BAL; RUL washing;   Other Requests:-gt N/A     GROSS DESCRIPTION:   1. Received 40 mL of fluid in formalin, 1 dry slides and 1 slides in    95% alcohol. Prepared one cell block.     2. Received 40 mL of fluid in formalin, 1 dry slides and 1 slides in    95% alcohol. Prepared one cell block.     CODE: 0     Cytotechnologist: ABDIFATAH Marvin (ASCP)   Pathologist: Scott Johnson MD (Electronic Signature) 09/22/2021 2:34 PM     [facility], [facility] * [address] [place] [address] * [place]    [id number]   Technical services performed  at: [facility], [place] * [address]    [facility] * [place] [address]   Screening Site: [facility], [facility] * [address] [facility] *    [place] [address]                         Post Rituxan image                                     Pre Rituxan image  Assessment:       Encounter Diagnoses   Name Primary?    Granulomatosis with polyangiitis with renal involvement Yes    Steroid dependence     Postmenopausal osteoporosis     Immunocompromised             Plan:        Granulomatosis with polyangiitis with renal involvement    Steroid dependence    Postmenopausal osteoporosis    Immunocompromised         Patient is a 82-year-old female who presented with robust inflammatory reaction within the sinus cavity as well as hearing deficit following a sinus surgery.  She has been repeated cultured postoperatively completed antibiotics and irrigations negative for any fungal infections or bacterial infections. 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    Completed RTX 11/5/21 Induction at 4 weekly doses of 375mg/m2. Our plans were for Rituxan at 500mg  on day 0 and 15 as per below, but due to scheduling with COVID,  vaccines/[employer], etc and auth, transportation,  we have adjusted her Rituxan dosage but continued the every 6 month protocol. This has proven to work very well and is well tolerated by the patient extrememly well and kept her with minimal infections.      The best data supporting the use of  as maintenance therapy come from the Maintenance of Remission using Rituximab in Systemic ANCA-associated Vasculitis (MAINRITSAN) trial with less relapse for PR3+ paitients when treated with Rituxan at 500mg IV  on days 0 and 15. at 6, 12, and 18months when compared to Imuran.    For July 2023change of therapy plan: I want to cancel the  7/7/23 infusion and she will receive a SINGLE Rituxan infusion 7/21/23 of 500mg . Recent FESS with staph + she will be starting nasal ABX soon.   Ok with decreasing Prednisone to 2.5mg daily. For bruising       t spot 5/2021 negative.    Hep neg.    covid completed #3 now.      +COVID 10/25/22 and recovered.        CKD advanced rather quickly , improved and stabilized with RTX.    F/u with Nephro.     Continue omeprazole (prilosec)  40mg by mouth daily.        Patient hx of Osteopenia only, renal function not stable enough for oral bisphos. Chronic steroids.    Continue Prolia every 6 months.     CRP WNL 10/2022  CXR during COVID showed continued resolution with previous RUL lesion.       Discussed skin on prednisone  Ok to take meds rec by urology will jsut have to balance dry mouth/eyes with continence  Discussed lasix per Dr. Villavicencio    She is not clear that her sinuses improve dramatically with each Rituxan will watch this more closely as her persistent mucous is curious.     No follow-ups on file.      f/u 3 months  Thank you for allowing me to participate in the care of this very pleasant patient.      75 min consultation with greater than 50% of that time included Preparing to see the patient (review records, tests), Obtaining and/or reviewing separately obtained historical data, Performing a medically appropriate examination and/or evaluation , Ordering medications, tests, and/or procedures, Referring and communicating with other healthcare professionals , Documenting clinical information in the electronic or other health record and Independently interpreting results  (as warranted) & communicating results to the patient/family/caregiver. All questions answered.                                                            Subjective:          Chief Complaint: Aracelis Weber is a 82 y.o. female who had concerns including Disease Management.    HPI:    +ANCA +PR3/ negative MPO   GPA (Wegeners) , chronic  steroids, osteporosis on Prolia    3/2023:   Patient current on RTX Q 6 month maintanance following induction for Wegeners.    12/2/22 (Delayed COVID infection) RTX was 500mg with solumedrol 80mg IM   Completed Rituxan 1000mg for 1st  maintenance at 4 months 4/20/2022. due to rise in symptoms, and ESR   Induction 10/2021.      Continued nasal congestion and drainage.   Recent significant nosebleeds. Endoscopy with recurrent crusting despite office debridement by ENT and continued irrigation.      Will repeat serologies and inflammatory markers and check CD 19 levels first week March and see her later that month.   Patient doing very well. Still rhinitis, using navage. No antibiotics from ENT since last visit. She is still clearing significant mucous faint with dried blood.   Dry cough, no fevers, no SOB\  Prolia: 10/17/2022       11/2022  Patient doing very well. Still rhinitis, using navage. No antibiotics from ENT since last visit. She is still clearing significant mucous faint with dried blood.   Dry cough, no fevers, no SOB  Mild HA only with elevated BP.   Completed Rituxan maintanence at 4 months 4/20/2022.   Prolia 3/25/22  Evusheld with catch up dose 2/10/22 and 3/25/2022    5/5/2022:   Patient doing very well. Still rhinitis, using navage. No antibiotics from ENT since last visit. She is still clearing significant mucous faint with dried blood.   Dry cough, no fevers, no SOB  Mild HA only with elevated BP.   Completed Rituxan maintanence at 4 months 4/20/2022.   Prolia 3/25/22  Evusheld with catch up dose 2/10/22 and 3/25/2022.     Major complaints: pins, needles, stinging, burning in feet day and night is constant. Dr. Campbell: EB-N5 supplement is helping some, stopped for months and noted significant worsening. Dr. Campbell did increase from EB-N3.   Gabapentin 400mg TID  Hydrocodone only PRN  Prednisone 7.5mg       2/2022  Doing well no cough, no fevers. Recent COVID exposure but negative.   Seen with  Nephro.   Given her successful response with Ritux. Agree to continue maintanance with Ritux Q 4-6 months    Needs to get #3 COVID vaccine timing with RTX. - will time this with me 2 weeks prior to   Discussed Prolia  Working on Sandi for PreP with COVID> she is scheduled 2/10/22      12/9/2021  Patient's recent CXR 12/21/21 with marked improvement when compared to 10/21/21  Prednisone 10mg daily  S/p RTX x 4 infusions completion date. 11/5/21  Patient with rise in creatinine to 2.0 on 11/26 had been requiring lasix for marked edema since 11/1/21. Stopped 2 weeks ago. Edema is present but managed. Drinking about 40 oz water and 3 cups of coffee daily    Patient completed [facility] PT felt she was doing well home exercises about 2 weeks ago with acute pain in lumbar spine after exercising. Imaging lumbar few days ago without evidence of a fracture. She point to pain in the lumbosacral and radiating to buttock region. Ok to rise from seated. Pain more on left low back and buttock. No numbness no tingling but pain in the hamstring region. Comfortable sitting but not lying down, but she never sleeps in bed. Long hx of rather impressive scoliosis.       11/11/2021:   Inflammatory markers are markedly improved.   Cough dry still at night. Patient noting she has lost sense of smell. Can still taste.     Patient recently hospitatlized for Bronch with negative cultures to date.  There was a concern for possible atypical infection she has had enlarging right upper lobe cavitary lesion measures still has a right middle lobe lesion with some cavitation now as well.  I have discussed this case extensively with both Dr. Schwartz as well as Dr. Joseph upon discharge we felt comfortable that this is likely Wegener's granulomatosis causing cavitary lesions.  Supporting sinus biopsy does note granuloma.    Patient was started on Imuran in March of 2021 but with the growth of her lesion we had rule out for any possible infection given her  status of immunosuppression.  She is here today not in her usual state she appears fatigued pale she is not complaining of shortness of breath but does have a cough dry persistent.  She is noticing increased pedal edema.         9/13/21: patient with 2 falls 8 days apart. Spoke w/ pulm shortly following initial fall and we were scheduled for bronch when she sustained another fall.   Scheduled now for 9/17/21 bronch. RUL lesion.   Remains off Imuran  Prednisone 15mg daily.   Sinus congestion. Still productive     She is noting some productive cough at night no hemoptysis.   Patient denies SOB  Having more HA. -frontal and sinus, she is noting some drainage in right ear some mucous/blood.   No edema.   She notes fatigue, no fevers, no night sweats. No weight loss.   Skin easily bruising.     No personal hx of any malignancy.       7/20/2021:    Spoke with Pulm plan for bronch is able working on possible bx site vs BAL.   Need to r/o infectious/neoplastic and confirm for ANCA (GPA vasculitis)   Inflammatory markers markedly down .8  Now 14.   H/H improve from 7.4 to 8.2  Platelets normalized.   Renal stable over time    Patient did attend wedding with approx 300 people unmasked last weekend. Reviewed by recs for COVID precautions given    Current regimen:   pred 15mg (Na stable)  Holding Imuran    Interval events: PET with hypermetabolic lesions:   a. RUL cavitary/cystic lesion 1.9cm x 1.3cm  b. RUL spiculated 1.2cm x 1.4cm  c. RML 1.7cm x 2.0 cm  All with differential: infectious, inflammatory, neoplastic, grannulomatous (a) with favor of inflammation given the rapid change    Fungal immunodiffusion neg  Quant gold and T spot: neg  H/H improving     Sinus congestion, pressure, headache, x 3 weeks very productive with blowing nose, back on navage. :  started Cindamycin with DR Quinn x 10 days.   Patient denies any shortness breath.   Very fatigues.   No more fevers. Slight dry cough, no blood/ no mucous.   No  "SOB.       5/12/21 completed nasal irrigation with biopsy not definitive for GPA, but + inflammation and granulomas. Temporal artery bx negative. Patient with PCP same day as labs 6/2/21  dx with UTI on keflex.     Was seen apparently in ED in [place] for 104F she had altered mental status, dx with UTI, dehydration, treated with. Completed all 7 days of keflex and within 48 hours with another fever 104 F. She continues with congestion but no worse. Patient denies cough, hemoptysis, bloody nasal discharge. She has had no fever x 10 days. Checks twice daily.   while travelling told "congestion in chest on xray". Inflammatory markers very high again.   Patient with recent concern for wegeners needs CT chest. CXR with new airspace opacities right chest- need to r/o GGO vs infection. CT has been ordered pending scheduling.   Broad ABX coverage recommended for now will cc Dr. Natarajan.     I am limited here with her  severe edema from higher dose steroids. Max tolerated is 20mg daily pred  Started Imuran 5/5/2021. Very low dose 50mg BID (0.65mg/kg) tolerating VERY well.   H/H still low.         2/2021  Sinususitis, cx were negative.  Using nasal irrigation with mucoid disharge.   Patient continues with significant nasal discharge and blood with scabs. We discussed Wegeners but pathology sinus no vasculitis, granuloma noted.   No HA, no blurred vision. Recent sinus infection with HA for 10 days. cx +, but also repeat labs demonstarting +PR3 and +ANCA       Patient with c/o right > left 3,4 finger numbness that was intermittent until approximately 2 weeks about having near constant numbness in hands, pins and needles and pain.   Patient not noting much improvement with change in position.     Off MTX 6  tabs weekly.   Doing well with Hydroxychloroquine.   Declined COVID vaccine    Gabapentin up to 300mg TID.   Now with Dr. Campbell doing PT for Plantar fasciitis. She is taking supplement and compound cream for joint and neuropathy. " No response to tarsal tunnel injection per Dr. Campbell.   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 WBC ok.       11/2020  Patient with PMR   MTX at 4 tabs weekly new anemia. Thrombocytosis  Pred at 10mg   Complicated with peripheral edema rather severe , started MTX seeing trending CRP/ESR but persistent leg pain.   Seen with PCP for neuropathy s/sx not seeing much benefit with gabpentin  Seen with Cardiology- no concern for cardiac ECHO ok. dc'd lasix for hyponatremia.     8/2020  Pred 20mg with mild edema, pred 30 mg with severe edema.   Current Pred 15mg daily  Rising ESR again.   Lasix 20 mg daily with improved edema but having some pins and needles in feet/legs.   Having tight, burning stinging at the toes and feet. Heaviness. Prickly.   Noting sodium is falling, K 5.2-5.4 still using           7/2020:  Hands not painful, knees not painful. Shoulder/cervical and down the arms, markedly improved. She still has some pain him hips and groin with walking and some weakness to the legs with edema. Some pain with fixing own hair.   She takes in AM regarding hips ache, some shoulder/arms, and again at night because hips/legs.   The clue is that the LDN new dose she had some ASE, previously tolerated 3mg daily fine.     Historical review of present Illness.   Patient with a recent chronic sinusitis that ultimately warranted a septoplasty, endoscopic sinus surgery and turbinate reduction 5/2020   Four weeks postop t increasingly worse she was noting pain in her throat and ears lasting several hours complaint of pdizziness she had symptoms of ear pain and decreased hearing in the left ear.  She underwent a debridement at this visit she continue with compound nasal irrigations.  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    Patient did have repeat procedure on with biopsies taken 07/03/2020 showing right maxillary sinus chronically inflamed fibrotic polypoid portions benign nasal mucosa left maxillary sinus similar polypoid fragments ulcerated markedly inflamed respiratory mucosa focal foreign body giant cell response noted suggested that this might have been from her Gelfoam packing as a possible cause of this reaction    I have spoken with Dr. Quinn prior to patient's visit today and was a concern that she could have triggered some inflammatory response with the Gelfoam packing    Patient states she no longer has a headache is not noticing much ear drainage continues with profound loss of hearing on the left and rather significant on the right both sensorineural and some conductive changes.    Restarted on prednisone as of 7/20/20  10 mg patient has not noticed any change with her hearing in this time she did notice slight improvement with her joint aches and pains but is still relying on hydrocodone for the bulk of this.    She denies a persistent headache she does have some tenderness about the preauricular region particularly on the right more than the left.  She has denied jaw claudication changes in her voice or any amaurosis fugax.  She has no pre-existing history of joint aches and pains hip or otherwise.  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  She had a workup that involved a negative rheumatoid factor, age appropriate  sedimentation rate,C reactive protein JANET was positive 1:160 in speckled pattern        Previous: Hx:   She is having pain in her hand with stiffness and right 2nd PIP joint now unable to flex. 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   Hx of bilateral TKA 3 and 5 years ago and those are doing well.   She notes intermittent edema. Some hammer toe deformity bilaterally making shoes problematic but not painful otherwise.   Long hx of GERD-severe.   Cannot tolerate NSAIDs at all w/o gastritis.   Can use Hydrocodone PRN   Added Naltrexone at 3mg and doing very well  Lost 30# with WW.   Patient denies weight loss, rashes, dry eye, dry mouth, nasal or palatal ulcerations,  lymphadenopathy, Raynaud's, hx of DVT/miscarriages, psoriasis or family hx of psoriasis, rashes, serositis, anemia or other constitutional symptoms.  Asking about naltrexone  Component      Latest Ref Rng & Units 8/8/2020 7/29/2020 7/20/2020 7/13/2020   Sodium      136 - 145 mmol/L  126 (L) 128 (L)    Potassium      3.5 - 5.1 mmol/L  5.4 (H) 5.2 (H)    Chloride      95 - 110 mmol/L  92 (L) 92 (L)    CO2      23 - 29 mmol/L  25 29    Glucose      70 - 110 mg/dL  109 109    BUN, Bld      8 - 23 mg/dL  13 11    Creatinine      0.5 - 1.4 mg/dL  0.8 0.8    Calcium      8.7 - 10.5 mg/dL  9.5 9.7    Anion Gap      8 - 16 mmol/L  9 7 (L)    eGFR if African American      >60 mL/min/1.73 m:2  >60.0 >60.0    eGFR if non African American      >60 mL/min/1.73 m:2  >60.0 >60.0    Anti Sm Antibody      0.00 - 0.99 Ratio    0.04   Anti-Sm Interpretation      Negative    Negative   Anti Sm/RNP Antibody      0.00 - 0.99 Ratio    0.09   Anti-Sm/RNP Interpretation      Negative    Negative   JANET Screen      None Detected       Rheumatoid Factor      0.0 - 15.0 IU/mL       Sed Rate      0 - 36 mm/Hr 57 (H)  54 (H) 75 (H)   CRP      0.0 - 8.2 mg/L 75.4 (H) 56.1 (H) 57.5 (H) 99.4 (H)   Anti-Histone Antibody      0.0 - 0.9 Units    0.4   ds DNA Ab      " Negative 1:10    Negative 1:10     Component      Latest Ref Rng & Units 6/25/2020 11/10/2018   Sodium      136 - 145 mmol/L     Potassium      3.5 - 5.1 mmol/L     Chloride      95 - 110 mmol/L     CO2      23 - 29 mmol/L     Glucose      70 - 110 mg/dL     BUN, Bld      8 - 23 mg/dL     Creatinine      0.5 - 1.4 mg/dL     Calcium      8.7 - 10.5 mg/dL     Anion Gap      8 - 16 mmol/L     eGFR if African American      >60 mL/min/1.73 m:2     eGFR if non African American      >60 mL/min/1.73 m:2     Anti Sm Antibody      0.00 - 0.99 Ratio     Anti-Sm Interpretation      Negative     Anti Sm/RNP Antibody      0.00 - 0.99 Ratio     Anti-Sm/RNP Interpretation      Negative     JANET Screen      None Detected Detected (A) Detected (A)   Rheumatoid Factor      0.0 - 15.0 IU/mL 11.6 <8.6   Sed Rate      0 - 36 mm/Hr     CRP      0.0 - 8.2 mg/L     Anti-Histone Antibody      0.0 - 0.9 Units     ds DNA Ab      Negative 1:10         REVIEW OF SYSTEMS:    Review of Systems   Constitutional: Negative for fever, malaise/fatigue and weight loss.   HENT: Negative for sore throat.    Eyes: Negative for double vision, photophobia and redness.   Respiratory: Negative for cough, shortness of breath and wheezing.    Cardiovascular: Negative for chest pain, palpitations and orthopnea.   Gastrointestinal: Negative for abdominal pain, constipation and diarrhea.   Genitourinary: Negative for dysuria, hematuria and urgency.   Musculoskeletal: Positive for joint pain. Negative for back pain and myalgias.   Skin: Negative for rash.   Neurological: Negative for dizziness, tingling, focal weakness and headaches.   Endo/Heme/Allergies: Does not bruise/bleed easily.   Psychiatric/Behavioral: Negative for depression, hallucinations and suicidal ideas.               Objective:            Past Medical History:   Diagnosis Date    Anesthesia     'Mother stop breathing after anesthesia"    Chronic sinusitis     Depression     GERD (gastroesophageal " reflux disease)     PVC (premature ventricular contraction)      Family History   Problem Relation Age of Onset    Heart disease Mother     Arthritis Mother     Cancer Sister     Arthritis Sister     Diabetes Sister     Hypertension Sister      Social History     Tobacco Use    Smoking status: Former     Types: Cigarettes     Start date: 2/3/1955     Quit date: 1/3/1960     Years since quittin.5    Smokeless tobacco: Never   Substance Use Topics    Alcohol use: Never     Alcohol/week: 0.0 standard drinks    Drug use: Never         Current Outpatient Medications on File Prior to Visit   Medication Sig Dispense Refill    acetaminophen (TYLENOL) 500 MG tablet Take 500 mg by mouth every 6 (six) hours as needed for Pain.      albuterol (PROVENTIL/VENTOLIN HFA) 90 mcg/actuation inhaler INHALE 2 PUFFS BY MOUTH EVERY 4 TO 6 HOURS AS NEEDED FOR SHORTNESS OF BREATH OR COUGH      ascorbate calcium (MAHSA-C ORAL) Take by mouth.      B2-B6 phos-levomef simran-mecobal (EB-N3 DR) 1.3-70-6-4 mg CpDR Take 1 capsule by mouth once daily.      cetirizine (ZYRTEC) 10 MG tablet TAKE 1 TABLET ONE TIME DAILY 90 tablet 0    cholecalciferol, vitamin D3, (VITAMIN D3) 125 mcg (5,000 unit) Tab Take 1 tablet (5,000 Units total) by mouth once daily. 90 tablet 0    denosumab (PROLIA SUBQ) Inject into the skin. Two times a year      diclofenac sodium (VOLTAREN) 1 % Gel APPLY TO THE AFFECTED AREA(S) 2 GRAMS THREE TIMES DAILY 100 g 6    furosemide (LASIX) 20 MG tablet TAKE 1 TABLET EVERY DAY 90 tablet 3    gabapentin (NEURONTIN) 400 MG capsule TAKE 1 CAPSULE THREE TIMES DAILY 270 capsule 3    metoprolol succinate (TOPROL-XL) 50 MG 24 hr tablet TAKE 1 TABLET EVERY DAY 90 tablet 3    neomycin-polymyxin-dexamethasone (MAXITROL) 3.5mg/mL-10,000 unit/mL-0.1 % DrpS       omeprazole (PRILOSEC) 40 MG capsule TAKE 1 CAPSULE EVERY MORNING 90 capsule 0    peg 400-propylene glycol, PF, (SYSTANE, PF,) 0.4-0.3 % Dpet Apply to eye 2 (two) times a day.       prednisoLONE acetate (PRED FORTE) 1 % DrpS SHAKE LIQUID AND INSTILL 1 DROP IN RIGHT EYE FOUR TIMES DAILY FOR 1 WEEK THEN STOP      predniSONE (DELTASONE) 2.5 MG tablet Take 1 tablet (2.5 mg total) by mouth once daily. 90 tablet 3    predniSONE (DELTASONE) 5 MG tablet TAKE 1 TABLET ONE TIME DAILY 90 tablet 3    sodium chloride 0.9% SolP 500 mL with riTUXimab 10 mg/mL Conc 375 mg/m2 Inject 375 mg/m2 into the vein. 4 to 6 months      tobramycin-dexAMETHasone 0.3-0.1% (TOBRADEX) 0.3-0.1 % DrpS SHAKE LIQUID AND INSTILL 1 DROP IN RIGHT EYE FOUR TIMES DAILY FOR 2 WEEKS THEN STOP      vit C/E/Zn/coppr/lutein/zeaxan (PRESERVISION AREDS-2 ORAL) Take 1 capsule by mouth 2 (two) times a day.       HYDROcodone-acetaminophen (NORCO) 7.5-325 mg per tablet Take 1 tablet by mouth every 6 (six) hours as needed.      ondansetron (ZOFRAN-ODT) 8 MG TbDL Take 8 mg by mouth 2 (two) times daily.      [DISCONTINUED] naltrexone capsule Take 3 mg by mouth once daily.       No current facility-administered medications on file prior to visit.       Vitals:    06/26/23 1632   BP: (!) 166/53   Pulse: 76       Physical Exam:    Physical Exam   Constitutional: She is oriented to person, place, and time. She appears well-developed and well-nourished.   HENT:   Head: Normocephalic and atraumatic.   Mouth/Throat: Oropharynx is clear and moist.   Eyes: Pupils are equal, round, and reactive to light. EOM are normal.   Neck: Normal range of motion.   Cardiovascular: Normal rate, regular rhythm and normal heart sounds.   Pulmonary/Chest: Effort normal and breath sounds normal.   Musculoskeletal:        Right shoulder: She exhibits normal range of motion, no tenderness and no swelling.        Left shoulder: She exhibits normal range of motion, no tenderness and no swelling.        Right elbow: She exhibits normal range of motion and no swelling. No tenderness found.        Left elbow: She exhibits normal range of motion and no swelling. No tenderness found.         Right wrist: She exhibits normal range of motion, no tenderness and no swelling.        Left wrist: She exhibits normal range of motion, no tenderness and no swelling.        Right knee: She exhibits normal range of motion and no swelling. No tenderness found.        Left knee: She exhibits normal range of motion and no swelling. No tenderness found.        Right hand: She exhibits decreased range of motion and tenderness. She exhibits no swelling.        Left hand: She exhibits decreased range of motion and tenderness. She exhibits no swelling.        Right foot: There is normal range of motion, no tenderness and no swelling.        Left foot: There is normal range of motion, no tenderness and no swelling.   Bony hypertrophy at the PIP and DIP joints. +squaring at the CMC joint. No active synovitis on 28 joint exam.    Neurological: She is alert and oriented to person, place, and time.   Skin: Skin is warm and dry.   Psychiatric: She has a normal mood and affect. Her behavior is normal.     Component      Latest Ref Rng & Units 9/17/2021 9/17/2021          11:03 AM 11:03 AM   Respiratory Culture        No growth   Gram Stain (Respiratory)       No organisms seen No WBC's   AFB Culture & Smear           AFB CULTURE STAIN           GENET Prep           Fungus (Mycology) Culture           Aerobic Bacterial Culture             Component      Latest Ref Rng & Units 9/17/2021          11:03 AM   Respiratory Culture       No Staph aureus, MRSA or Pseudomonas isolated.   Gram Stain (Respiratory)       No organisms seen   AFB Culture & Smear          AFB CULTURE STAIN          GENET Prep          Fungus (Mycology) Culture          Aerobic Bacterial Culture            Component      Latest Ref Rng & Units 9/17/2021 9/17/2021          11:03 AM 11:03 AM   Respiratory Culture       Normal respiratory nimesh    Gram Stain (Respiratory)       Moderate WBC's    AFB Culture & Smear        Culture in progress   AFB CULTURE STAIN         No acid fast bacilli seen.   KOH Prep        No yeast or fungal elements seen   Fungus (Mycology) Culture        Culture in progress   Aerobic Bacterial Culture                 Narrative  [facility]  Patient - ONIEL ROSARIO                   - 1940 Sex- F   Med Rec # - [id number]                        Bimal Mckeon M.D.   The following is an electronic copy of report # [id number] from:   [facility]   [address] [place] [id number]                         Phone [phone]   DIAGNOSIS:   2021  JL/sdc     1, 2.  RIGHT UPPER LOBE MASS BRUSHING AND FINE NEEDLE ASPIRATE BIOPSIES:   - NEGATIVE FOR MALIGNANT CELLS.     - PURULENT EXUDATE.     - A FEW FRAGMENTS OF BRONCHIAL MUCOSA WITH FLORID CHRONIC BRONCHITIS    AND REACTIVE SQUAMOUS    ATYPICAL METAPLASIA.       Comment:   Special stains (AFB and GMS) are negative for organisms. While no    granulomas are seen here, the inflammatory reaction appears to be    similar to that seen in the sinus material over the past few years    (Delta [id number], [id number], [id number]). 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 Bronchoscopic material, particularly of the lower    respiratory tract, is of low yield for a definitive diagnosis of that    entity. The previous sinus material will be reviewed with    consideration for that diagnosis.   _______________________________________________________________________   SPECIMEN AND SOURCE:   1. RUL mass brushing   2. RUL mass needle     CLINICAL INFORMATION:   Clinical Information:-gt Right Upper Lobe Mass   Specific Site:-gt RUL mass brushing T Brush; RUL Mass; RUL mass-    microbiology; RUL BAL; RUL washing;   Other Requests:-gt N/A     GROSS DESCRIPTION:   1. Received 40 mL of fluid in formalin, 1 dry slides and 1 slides  in    95% alcohol. Prepared one cell block.     2. Received 40 mL of fluid in formalin, 1 dry slides and 1 slides in    95% alcohol. Prepared one cell block.     CODE: 0     Cytotechnologist: ABDIFATAH Marvin (ASCP)   Pathologist: Scott Johnson MD (Electronic Signature) 09/22/2021 2:34 PM     [facility], [facility] * [address] [place] [address] * [place] [address]   Technical services performed at: [facility], [facility] * [address] [place] [address] * [place] [address]   Screening Site: [facility], [facility] * [address] [place] [address] *    [place] [address]                         Post Rituxan image                                     Pre Rituxan image  Assessment:       Encounter Diagnoses   Name Primary?    Granulomatosis with polyangiitis with renal involvement Yes    Steroid dependence     Postmenopausal osteoporosis     Immunocompromised           Plan:        Granulomatosis with polyangiitis with renal involvement    Steroid dependence    Postmenopausal osteoporosis    Immunocompromised       Patient is a 82-year-old female she has had a recent robust inflammatory reaction within the sinus cavity as well as hearing deficit following a sinus surgery.  She has been repeated cultured postoperatively completed antibiotics and irrigations negative for any fungal infections or bacterial infections.   Patient was doing well on MTX with regard to ESR and CRP, but hair loss.     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    Completed RTX 11/5/21 Induction    Review of data and feel : The best data supporting the use of  as maintenance therapy come from the Maintenance of Remission using Rituximab in Systemic ANCA-associated Vasculitis (MAINRITSAN) trial with less relapse for PR3+ paitients when treated with Rituxan at 500mg IV at 6, 12, and 18months when compared to Imuran. Patient has tolerated RTX last 4/20/2022, she had to cancel 10/21/2022 for COVID. Did  receive 500mg on 12/2022. Will be due again     Recent note from ENT with continued sinus crusting and debris - despite irrigation regularly, debridement in office.           t spot 5/2021 negative.    Hep B neg. 3/2023          CKD        Patient did have casts with hematuria 10/12/21 prior to start of Rituxan. Improved following RTX       Continue omeprazole (prilosec)  40mg by mouth daily.          COVID vaccine #3 shot for immunosuppressed to be done March.       Patient hx of Osteopenia only, renal function not stable enough for oral bisphos. Recent t1-T12 intense pain: getting BMD. Likely we should just order Prolia.     she is having some spinous process tenderness today but pain not classic of vertebral fracture will check imaging anyway. She had rib fractures last year as well.    Completed. Prolia. Pending new DXA from OB/GYN     Continue prednisone to 7.5mg (1.5 tablets) daily.     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 This will be at 6,12,18 months- was my initial plan, but recent rise in her ESR, persistent sinus infections and drainage we administered Maintanance dose 4/20/2022. Which was     Tracking labs and serologies for next dose.     Labs reviewed today noted CRP continues to be adequate, and improved while the ESR rising. Chest xray continues to show improvement.        No follow-ups on file.      f/u 3 months  Thank you for allowing me to participate in the care of this very pleasant patient.       40 min consultation with greater than 50% spent in counseling, chart review and coordination of care. All questions answered.

## 2023-06-26 NOTE — Clinical Note
Patient was scheduled for 2 Rituxan 500mg infusions on 7/7/23 and 7/21/23. Decided due to recent staph culture in sinuses to DC 7/7/23 infusion and ONLY do the 7/21/23 infusion (single Rituxan 500mg infusion). Can you assist with notifying infusion and re ordering Rituxan? Thanks Dr. CORBIN

## 2023-06-30 ENCOUNTER — TELEPHONE (OUTPATIENT)
Dept: RHEUMATOLOGY | Facility: CLINIC | Age: 83
End: 2023-06-30
Payer: MEDICARE

## 2023-06-30 DIAGNOSIS — D84.9 IMMUNOCOMPROMISED: ICD-10-CM

## 2023-06-30 DIAGNOSIS — I77.82 ANCA-ASSOCIATED VASCULITIS: ICD-10-CM

## 2023-06-30 DIAGNOSIS — M31.31 GRANULOMATOSIS WITH POLYANGIITIS WITH RENAL INVOLVEMENT: ICD-10-CM

## 2023-06-30 DIAGNOSIS — M31.30 GRANULOMATOSIS WITH POLYANGIITIS WITH PULMONARY INVOLVEMENT: ICD-10-CM

## 2023-06-30 DIAGNOSIS — Z79.899 HIGH RISK MEDICATIONS (NOT ANTICOAGULANTS) LONG-TERM USE: ICD-10-CM

## 2023-06-30 DIAGNOSIS — M31.31 WEGENER'S GRANULOMATOSIS WITH RENAL INVOLVEMENT: Primary | ICD-10-CM

## 2023-06-30 RX ORDER — METHYLPREDNISOLONE SOD SUCC 125 MG
100 VIAL (EA) INJECTION
Status: CANCELLED | OUTPATIENT
Start: 2023-06-30

## 2023-06-30 RX ORDER — FAMOTIDINE 20 MG/1
20 TABLET, FILM COATED ORAL
Status: CANCELLED | OUTPATIENT
Start: 2023-06-30

## 2023-06-30 RX ORDER — ACETAMINOPHEN 325 MG/1
650 TABLET ORAL
Status: CANCELLED | OUTPATIENT
Start: 2023-06-30

## 2023-06-30 RX ORDER — MEPERIDINE HYDROCHLORIDE 50 MG/ML
25 INJECTION INTRAMUSCULAR; INTRAVENOUS; SUBCUTANEOUS
Status: CANCELLED | OUTPATIENT
Start: 2023-06-30 | End: 2023-07-01

## 2023-06-30 NOTE — TELEPHONE ENCOUNTER
Updated rituxan therapy plan treatment number form 2 to 1. Routed to provider to review and sign rituxan order    Also, sent message to infusion center staff for them to cancel 7/7 appointment and notified them that patient will only receive 1 dose of 500 mg of rituxan on 7/21

## 2023-06-30 NOTE — TELEPHONE ENCOUNTER
----- Message from Eden Snyder DO sent at 6/26/2023  5:17 PM CDT -----  Patient was scheduled for 2 Rituxan 500mg infusions on 7/7/23 and 7/21/23. Decided due to recent staph culture in sinuses to DC 7/7/23 infusion and ONLY do the 7/21/23 infusion (single Rituxan 500mg infusion). Can you assist with notifying infusion and re ordering Rituxan? Thanks Dr. CORBIN

## 2023-07-03 ENCOUNTER — TELEPHONE (OUTPATIENT)
Dept: NEPHROLOGY | Facility: CLINIC | Age: 83
End: 2023-07-03
Payer: MEDICARE

## 2023-07-03 NOTE — TELEPHONE ENCOUNTER
Christine July 21.     Should she wait until after that infusion to get labs and follow up?  If so, how long?

## 2023-07-03 NOTE — TELEPHONE ENCOUNTER
----- Message from Brit Padgett sent at 7/3/2023  3:16 PM CDT -----  Type: Needs Medical Advice  Who Called:  Pt  Best Call Back Number: [phone]  Additional Information: Pt has an appt on 7/11 needs to reschedule that as well as the lab, pl call bk to advise thanks

## 2023-07-06 ENCOUNTER — TELEPHONE (OUTPATIENT)
Dept: NEPHROLOGY | Facility: CLINIC | Age: 83
End: 2023-07-06

## 2023-07-06 NOTE — TELEPHONE ENCOUNTER
----- Message from Dorothy Amos sent at 7/6/2023  3:46 PM CDT -----  Regarding: advice  Contact: patient  Type: Needs Medical Advice  Who Called:  patient  Symptoms (please be specific):    How long has patient had these symptoms:    Pharmacy name and phone #:    Best Call Back Number: [phone] (home)   Additional Information: Patient is calling regarding the conversation with Letty about getting the blood work and the appointment for doctor after the infusion. Patient is waiting to hear if she is going to rescheduled the doctor appointment and scheduled labs. Please call patient to advise.Thanks!

## 2023-07-10 ENCOUNTER — TELEPHONE (OUTPATIENT)
Dept: UROLOGY | Facility: CLINIC | Age: 83
End: 2023-07-10
Payer: MEDICARE

## 2023-07-10 DIAGNOSIS — R35.0 URINARY FREQUENCY: ICD-10-CM

## 2023-07-10 DIAGNOSIS — R39.15 URINARY URGENCY: Primary | ICD-10-CM

## 2023-07-10 DIAGNOSIS — N32.81 OAB (OVERACTIVE BLADDER): ICD-10-CM

## 2023-07-10 RX ORDER — MIRABEGRON 50 MG/1
50 TABLET, FILM COATED, EXTENDED RELEASE ORAL DAILY
Qty: 30 TABLET | Refills: 11 | Status: SHIPPED | OUTPATIENT
Start: 2023-07-10 | End: 2024-07-09

## 2023-07-10 NOTE — TELEPHONE ENCOUNTER
----- Message from Sparkle Atkins sent at 7/10/2023 11:33 AM CDT -----  Contact: self  Type:  Patient Returning Call    Who Called: Pt  Who Left Message for Patient: Letty  Does the patient know what this is regarding?: Appts & Labs  Would the patient rather a call back or a response via [facility]?  call  Best Call Back Number: [phone]    Additional Information: Pt states she will be out of town from 8/10 - 8/17     Please call pt to schedule w/Dr. Allen...     Thank you...

## 2023-07-10 NOTE — TELEPHONE ENCOUNTER
----- Message from Kuldeep Dent MA sent at 7/10/2023 11:48 AM CDT -----  Contact: self  Please Advise  ----- Message -----  From: Sparkle Atkins  Sent: 7/10/2023  11:43 AM CDT  To: Mackenzie TILLEY Staff    Type:  Patient Returning Call    Who Called: Pt  Who Left Message for Patient: Tammy   Does the patient know what this is regarding?:   Would the patient rather a call back or a response via [facility]? call  Best Call Back Number: [phone]    Additional Information:  Pt would like to get the medication for overactive bladder, pt would like to discuss which one she can get... myrbetriq or gemtesa....     Please call to advise... Thank you...

## 2023-07-11 ENCOUNTER — TELEPHONE (OUTPATIENT)
Dept: NEPHROLOGY | Facility: CLINIC | Age: 83
End: 2023-07-11

## 2023-07-11 ENCOUNTER — TELEPHONE (OUTPATIENT)
Dept: UROLOGY | Facility: CLINIC | Age: 83
End: 2023-07-11
Payer: MEDICARE

## 2023-07-11 RX ORDER — BUDESONIDE 1 MG/2ML
INHALANT ORAL
COMMUNITY
Start: 2023-07-01 | End: 2023-07-11

## 2023-07-11 RX ORDER — VANCOMYCIN HYDROCHLORIDE 1 G/20ML
INJECTION, POWDER, LYOPHILIZED, FOR SOLUTION INTRAVENOUS
COMMUNITY
Start: 2023-07-01 | End: 2023-10-16 | Stop reason: ALTCHOICE

## 2023-07-11 RX ORDER — TOBRAMYCIN 3 MG/ML
SOLUTION/ DROPS OPHTHALMIC
COMMUNITY
Start: 2023-07-06

## 2023-07-11 RX ORDER — DOXYCYCLINE 100 MG/1
100 CAPSULE ORAL 2 TIMES DAILY
COMMUNITY
Start: 2023-03-16 | End: 2023-07-11

## 2023-07-11 RX ORDER — GENTAMICIN SULFATE 40 MG/ML
INJECTION, SOLUTION INTRAMUSCULAR; INTRAVENOUS
COMMUNITY
Start: 2023-07-01 | End: 2023-07-11

## 2023-07-11 NOTE — TELEPHONE ENCOUNTER
We moved her labs to September to accommodate travel. Transportation  and combine with Dr Snyder. but if you feel she needs them done sooner let us know and we can schedule it sooner.  Her last rituxin is scheduled July 21.     Patient has been treated for UTI's for the past three months, then she had sinus surgery and she had bacteria in her sinus tract. Also being treated for macular degeneration.     She has had plenty of ab tx.  I attempted to reconcile from the outside records so it will be in her history.

## 2023-07-11 NOTE — TELEPHONE ENCOUNTER
----- Message from Ajay Gilbert sent at 7/11/2023 11:40 AM CDT -----  Regarding: rt call  Type:  Patient Returning Gianluca     Who Called: Pt  Who Left Message for Patient: Letty  Does the patient know what this is regarding?: Appts & Labs  Would the patient rather a call back or a response via [facility]?  call  Best Call Back Number: [phone]     Additional Information: Pt states she will be out of town from 8/10 - 8/17      Please call pt to schedule w/Dr. Allen...      Thank you...

## 2023-07-18 NOTE — TELEPHONE ENCOUNTER
Received message that patient has Rituxan infusion on 7/21 and needs annual TB and Hep C. Ordered. Hep B up to date and last completed 3/6/23     Dr. Snyder,  All orders signed except Rituxan order. Routing for review and signature of Rituxan    Staff,  Please attach needed labs to infusion appointment. Thanks!

## 2023-07-21 ENCOUNTER — INFUSION (OUTPATIENT)
Dept: INFUSION THERAPY | Facility: HOSPITAL | Age: 83
End: 2023-07-21
Attending: INTERNAL MEDICINE
Payer: MEDICARE

## 2023-07-21 VITALS
TEMPERATURE: 98 F | HEIGHT: 64 IN | RESPIRATION RATE: 16 BRPM | SYSTOLIC BLOOD PRESSURE: 134 MMHG | BODY MASS INDEX: 26 KG/M2 | DIASTOLIC BLOOD PRESSURE: 59 MMHG | HEART RATE: 66 BPM | WEIGHT: 152.31 LBS

## 2023-07-21 DIAGNOSIS — I77.6 VASCULITIS: ICD-10-CM

## 2023-07-21 DIAGNOSIS — M31.31 GRANULOMATOSIS WITH POLYANGIITIS WITH RENAL INVOLVEMENT: ICD-10-CM

## 2023-07-21 DIAGNOSIS — F19.20 STEROID DEPENDENCE: ICD-10-CM

## 2023-07-21 DIAGNOSIS — D84.9 IMMUNOCOMPROMISED: Primary | ICD-10-CM

## 2023-07-21 PROCEDURE — 96413 CHEMO IV INFUSION 1 HR: CPT | Mod: PN

## 2023-07-21 PROCEDURE — 25000003 PHARM REV CODE 250: Mod: PN | Performed by: INTERNAL MEDICINE

## 2023-07-21 PROCEDURE — 63600175 PHARM REV CODE 636 W HCPCS: Mod: PN | Performed by: INTERNAL MEDICINE

## 2023-07-21 PROCEDURE — 96367 TX/PROPH/DG ADDL SEQ IV INF: CPT | Mod: PN

## 2023-07-21 PROCEDURE — 96415 CHEMO IV INFUSION ADDL HR: CPT | Mod: PN

## 2023-07-21 PROCEDURE — 96375 TX/PRO/DX INJ NEW DRUG ADDON: CPT | Mod: PN

## 2023-07-21 RX ORDER — FAMOTIDINE 20 MG/1
20 TABLET, FILM COATED ORAL
Status: COMPLETED | OUTPATIENT
Start: 2023-07-21 | End: 2023-07-21

## 2023-07-21 RX ORDER — EPINEPHRINE 0.3 MG/.3ML
0.3 INJECTION SUBCUTANEOUS ONCE AS NEEDED
Status: CANCELLED | OUTPATIENT
Start: 2023-07-21

## 2023-07-21 RX ORDER — METHYLPREDNISOLONE SOD SUCC 125 MG
100 VIAL (EA) INJECTION
Status: CANCELLED | OUTPATIENT
Start: 2023-07-21

## 2023-07-21 RX ORDER — DIPHENHYDRAMINE HYDROCHLORIDE 50 MG/ML
50 INJECTION INTRAMUSCULAR; INTRAVENOUS ONCE AS NEEDED
Status: CANCELLED | OUTPATIENT
Start: 2023-07-21

## 2023-07-21 RX ORDER — ACETAMINOPHEN 325 MG/1
650 TABLET ORAL
Status: CANCELLED | OUTPATIENT
Start: 2023-07-21

## 2023-07-21 RX ORDER — MEPERIDINE HYDROCHLORIDE 25 MG/ML
25 INJECTION INTRAMUSCULAR; INTRAVENOUS; SUBCUTANEOUS
Status: DISCONTINUED | OUTPATIENT
Start: 2023-07-21 | End: 2023-07-21 | Stop reason: HOSPADM

## 2023-07-21 RX ORDER — SODIUM CHLORIDE 0.9 % (FLUSH) 0.9 %
10 SYRINGE (ML) INJECTION
Status: DISCONTINUED | OUTPATIENT
Start: 2023-07-21 | End: 2023-07-21 | Stop reason: HOSPADM

## 2023-07-21 RX ORDER — ACETAMINOPHEN 325 MG/1
650 TABLET ORAL
Status: COMPLETED | OUTPATIENT
Start: 2023-07-21 | End: 2023-07-21

## 2023-07-21 RX ORDER — METHYLPREDNISOLONE SOD SUCC 125 MG
100 VIAL (EA) INJECTION
Status: COMPLETED | OUTPATIENT
Start: 2023-07-21 | End: 2023-07-21

## 2023-07-21 RX ORDER — MEPERIDINE HYDROCHLORIDE 50 MG/ML
25 INJECTION INTRAMUSCULAR; INTRAVENOUS; SUBCUTANEOUS
Status: CANCELLED | OUTPATIENT
Start: 2023-07-21 | End: 2023-07-22

## 2023-07-21 RX ORDER — FAMOTIDINE 20 MG/1
20 TABLET, FILM COATED ORAL
Status: CANCELLED | OUTPATIENT
Start: 2023-07-21

## 2023-07-21 RX ORDER — SODIUM CHLORIDE 0.9 % (FLUSH) 0.9 %
10 SYRINGE (ML) INJECTION
Status: CANCELLED | OUTPATIENT
Start: 2023-07-21

## 2023-07-21 RX ADMIN — FAMOTIDINE 20 MG: 20 TABLET ORAL at 10:07

## 2023-07-21 RX ADMIN — RITUXIMAB 500 MG: 10 INJECTION, SOLUTION INTRAVENOUS at 11:07

## 2023-07-21 RX ADMIN — ACETAMINOPHEN 650 MG: 325 TABLET, FILM COATED ORAL at 10:07

## 2023-07-21 RX ADMIN — METHYLPREDNISOLONE SODIUM SUCCINATE 100 MG: 125 INJECTION, POWDER, FOR SOLUTION INTRAMUSCULAR; INTRAVENOUS at 10:07

## 2023-07-21 RX ADMIN — DIPHENHYDRAMINE HYDROCHLORIDE 50 MG: 50 INJECTION, SOLUTION INTRAMUSCULAR; INTRAVENOUS at 11:07

## 2023-07-21 RX ADMIN — SODIUM CHLORIDE: 9 INJECTION, SOLUTION INTRAVENOUS at 10:07

## 2023-07-21 NOTE — PLAN OF CARE
Problem: Adult Inpatient Plan of Care  Goal: Plan of Care Review  Outcome: Ongoing, Progressing  Flowsheets (Taken 7/21/2023 1113)  Plan of Care Reviewed With:   patient   spouse  Goal: Patient-Specific Goal (Individualized)  Outcome: Ongoing, Progressing  Flowsheets (Taken 7/21/2023 1113)  Anxieties, Fears or Concerns: None  Individualized Care Needs: Recliner, warm blanket,  at chairside, conversation, snacks     Problem: Fatigue  Goal: Improved Activity Tolerance  Outcome: Ongoing, Progressing    Patient to Infusion for Rituxan, accompanied by her . Treatment plan reviewed with patient. VSS. Tolerated infusion. Provided with copy of upcoming appointment schedule. Escorted to the front lobby in no distress for discharge to home.

## 2023-08-01 DIAGNOSIS — M31.30 GRANULOMATOSIS WITH POLYANGIITIS WITH PULMONARY INVOLVEMENT: ICD-10-CM

## 2023-08-02 RX ORDER — PREDNISONE 2.5 MG/1
2.5 TABLET ORAL DAILY
Qty: 90 TABLET | Refills: 3 | Status: SHIPPED | OUTPATIENT
Start: 2023-08-02 | End: 2023-09-25 | Stop reason: SDUPTHER

## 2023-08-02 RX ORDER — PREDNISONE 2.5 MG/1
2.5 TABLET ORAL DAILY
Qty: 90 TABLET | Refills: 3 | Status: SHIPPED | OUTPATIENT
Start: 2023-08-02 | End: 2023-08-02 | Stop reason: SDUPTHER

## 2023-08-02 RX ORDER — PREDNISONE 5 MG/1
5 TABLET ORAL DAILY
Qty: 30 TABLET | Refills: 3 | Status: SHIPPED | OUTPATIENT
Start: 2023-08-02 | End: 2023-09-25

## 2023-08-02 RX ORDER — DICLOFENAC SODIUM 10 MG/G
GEL TOPICAL
Qty: 600 G | Refills: 0 | Status: SHIPPED | OUTPATIENT
Start: 2023-08-02

## 2023-08-02 NOTE — TELEPHONE ENCOUNTER
Patient last visit was on prednison 7.5mg I sent in both the 5mg (was off list) and the 2.5 mg prednisone. Dr. CORBIN

## 2023-08-03 ENCOUNTER — PATIENT MESSAGE (OUTPATIENT)
Dept: ADMINISTRATIVE | Facility: OTHER | Age: 83
End: 2023-08-03
Payer: MEDICARE

## 2023-08-04 ENCOUNTER — TELEPHONE (OUTPATIENT)
Dept: RHEUMATOLOGY | Facility: CLINIC | Age: 83
End: 2023-08-04
Payer: MEDICARE

## 2023-08-04 NOTE — TELEPHONE ENCOUNTER
Spoke to pt- pt had contact with a person at [employer] who she kissed and hugged who tested positive for Covid. Patient's  was also sick with diarrhea and vomiting for a day,. Did not get tested, but iwas fine the next day. Patient was wanting advise on how to prevent covid. Pt asked about vit c and mentioned she takes a lot of vitamins and supplements. Encouraged patient to monitor her S/S and continue with her regular supplement regimen. Told her vit c wouldn't hurt.But that if she became symptomatic to call her primary or go to nearest walk in, urgent care to be tested. Pt just really wanted to inform Dr Askew. She is aware she is out of the office but wants her dr to be informed on Monday when she returns. I assured her I would pass this message to Dr askew for her review.  ALICE

## 2023-08-04 NOTE — TELEPHONE ENCOUNTER
----- Message from Bryanna Whelan sent at 8/4/2023  9:56 AM CDT -----  Type:  Patient Returning Call         Who Called: pt          Who Left Message for Patient: Corby Mcclure LPN       Does the patient know what this is regarding?         Best Call Back Number: [phone] (home)            Additional Information: pt stated she is sorry she missed your call  but please call back

## 2023-08-04 NOTE — TELEPHONE ENCOUNTER
Returned patient's call and lm on voice mail  MM-LPN    ----- Message from Jaida Coburn sent at 8/3/2023  4:22 PM CDT -----  Regarding: Expose to covid  Type:  Needs Medical Advice    Who Called: Pt    Pharmacy name and phone #:    [id number] - [facility] [id number] [place] [address] AT [facility] OF [address] [place] [address]  [id number] [place] [id number]  Phone: [phone] Fax: [phone]  Best Call Back Number: [phone]    Additional Information: Pt would like DR to know she is expose to Covid and would like to know the next step. Thank you.

## 2023-08-05 NOTE — TELEPHONE ENCOUNTER
Thanks for the update. Agree. We will monitor for symptoms. Highly recommend should she test + that she be offered Paxlovid/Molnupiravir based on her med list whichever is appropriate.     I am out on Monday or Tuesday but do not hesitate to text or call me regarding this patient and any illness.     Thanks   Dr. CORBIN

## 2023-08-07 NOTE — TELEPHONE ENCOUNTER
Called pt and left message on voice mail to check on her. Wanted to make sure she has not become symptomatic due to recent covid exposer.   Dr Snyder Highly recommends she test, and if test + that she be offered Paxlovid/Molnupiravir based on her med list whichever is appropriate.    Milo-LPN

## 2023-08-09 ENCOUNTER — TELEPHONE (OUTPATIENT)
Dept: NEPHROLOGY | Facility: CLINIC | Age: 83
End: 2023-08-09
Payer: MEDICARE

## 2023-08-09 NOTE — TELEPHONE ENCOUNTER
----- Message from Megan Lora, Patient Care Assistant sent at 8/9/2023 11:35 AM CDT -----  Contact: self  Pt is calling to speak w/ a nurse in regards to her seeing bubbles in her urine. Please call back to advise [phone]  thanks

## 2023-08-09 NOTE — TELEPHONE ENCOUNTER
Bubbles in urine or foamy urine can be seen when there is protein in the urine.  It also can be seen when there is a urine infection.    We can get her labs a month early to check them if she wishes.

## 2023-08-09 NOTE — TELEPHONE ENCOUNTER
I changed labs and urine to tomorrow at [address] She will be going out of town thru the 17 th but we can call patient regarding the results on her cell phone.

## 2023-08-10 ENCOUNTER — LAB VISIT (OUTPATIENT)
Dept: LAB | Facility: HOSPITAL | Age: 83
End: 2023-08-10
Attending: INTERNAL MEDICINE
Payer: MEDICARE

## 2023-08-10 DIAGNOSIS — M81.0 POSTMENOPAUSAL OSTEOPOROSIS: ICD-10-CM

## 2023-08-10 DIAGNOSIS — F19.20 STEROID DEPENDENCE: ICD-10-CM

## 2023-08-10 DIAGNOSIS — M31.31 GRANULOMATOSIS WITH POLYANGIITIS WITH RENAL INVOLVEMENT: ICD-10-CM

## 2023-08-10 DIAGNOSIS — D84.9 IMMUNOCOMPROMISED: ICD-10-CM

## 2023-08-10 DIAGNOSIS — N18.31 STAGE 3A CHRONIC KIDNEY DISEASE: ICD-10-CM

## 2023-08-10 LAB
ALBUMIN SERPL BCP-MCNC: 3.9 G/DL (ref 3.5–5.2)
ALBUMIN SERPL BCP-MCNC: 3.9 G/DL (ref 3.5–5.2)
ALP SERPL-CCNC: 64 U/L (ref 55–135)
ALT SERPL W/O P-5'-P-CCNC: 12 U/L (ref 10–44)
ANION GAP SERPL CALC-SCNC: 14 MMOL/L (ref 8–16)
ANION GAP SERPL CALC-SCNC: 14 MMOL/L (ref 8–16)
AST SERPL-CCNC: 23 U/L (ref 10–40)
BASOPHILS # BLD AUTO: 0.08 K/UL (ref 0–0.2)
BASOPHILS NFR BLD: 1 % (ref 0–1.9)
BILIRUB SERPL-MCNC: 0.4 MG/DL (ref 0.1–1)
BUN SERPL-MCNC: 23 MG/DL (ref 8–23)
BUN SERPL-MCNC: 23 MG/DL (ref 8–23)
CALCIUM SERPL-MCNC: 10 MG/DL (ref 8.7–10.5)
CALCIUM SERPL-MCNC: 10 MG/DL (ref 8.7–10.5)
CHLORIDE SERPL-SCNC: 100 MMOL/L (ref 95–110)
CHLORIDE SERPL-SCNC: 100 MMOL/L (ref 95–110)
CO2 SERPL-SCNC: 22 MMOL/L (ref 23–29)
CO2 SERPL-SCNC: 22 MMOL/L (ref 23–29)
CREAT SERPL-MCNC: 1.2 MG/DL (ref 0.5–1.4)
CREAT SERPL-MCNC: 1.2 MG/DL (ref 0.5–1.4)
CRP SERPL-MCNC: 2.7 MG/L (ref 0–8.2)
DIFFERENTIAL METHOD: ABNORMAL
EOSINOPHIL # BLD AUTO: 0.3 K/UL (ref 0–0.5)
EOSINOPHIL NFR BLD: 3.8 % (ref 0–8)
ERYTHROCYTE [DISTWIDTH] IN BLOOD BY AUTOMATED COUNT: 13.6 % (ref 11.5–14.5)
ERYTHROCYTE [SEDIMENTATION RATE] IN BLOOD BY PHOTOMETRIC METHOD: 33 MM/HR (ref 0–36)
EST. GFR  (NO RACE VARIABLE): 45.8 ML/MIN/1.73 M^2
EST. GFR  (NO RACE VARIABLE): 45.8 ML/MIN/1.73 M^2
GLUCOSE SERPL-MCNC: 89 MG/DL (ref 70–110)
GLUCOSE SERPL-MCNC: 89 MG/DL (ref 70–110)
HCT VFR BLD AUTO: 34.5 % (ref 37–48.5)
HGB BLD-MCNC: 11 G/DL (ref 12–16)
IMM GRANULOCYTES # BLD AUTO: 0.05 K/UL (ref 0–0.04)
IMM GRANULOCYTES NFR BLD AUTO: 0.6 % (ref 0–0.5)
LYMPHOCYTES # BLD AUTO: 0.9 K/UL (ref 1–4.8)
LYMPHOCYTES NFR BLD: 11.1 % (ref 18–48)
MCH RBC QN AUTO: 29.8 PG (ref 27–31)
MCHC RBC AUTO-ENTMCNC: 31.9 G/DL (ref 32–36)
MCV RBC AUTO: 94 FL (ref 82–98)
MONOCYTES # BLD AUTO: 1.1 K/UL (ref 0.3–1)
MONOCYTES NFR BLD: 13.1 % (ref 4–15)
NEUTROPHILS # BLD AUTO: 5.7 K/UL (ref 1.8–7.7)
NEUTROPHILS NFR BLD: 70.4 % (ref 38–73)
NRBC BLD-RTO: 0 /100 WBC
PHOSPHATE SERPL-MCNC: 3.9 MG/DL (ref 2.7–4.5)
PLATELET # BLD AUTO: 353 K/UL (ref 150–450)
PMV BLD AUTO: 9.7 FL (ref 9.2–12.9)
POTASSIUM SERPL-SCNC: 4.2 MMOL/L (ref 3.5–5.1)
POTASSIUM SERPL-SCNC: 4.2 MMOL/L (ref 3.5–5.1)
PROT SERPL-MCNC: 6.6 G/DL (ref 6–8.4)
PTH-INTACT SERPL-MCNC: 36.2 PG/ML (ref 9–77)
RBC # BLD AUTO: 3.69 M/UL (ref 4–5.4)
SODIUM SERPL-SCNC: 136 MMOL/L (ref 136–145)
SODIUM SERPL-SCNC: 136 MMOL/L (ref 136–145)
WBC # BLD AUTO: 8.09 K/UL (ref 3.9–12.7)

## 2023-08-10 PROCEDURE — 85025 COMPLETE CBC W/AUTO DIFF WBC: CPT | Performed by: INTERNAL MEDICINE

## 2023-08-10 PROCEDURE — 86140 C-REACTIVE PROTEIN: CPT | Performed by: INTERNAL MEDICINE

## 2023-08-10 PROCEDURE — 83970 ASSAY OF PARATHORMONE: CPT | Performed by: INTERNAL MEDICINE

## 2023-08-10 PROCEDURE — 84100 ASSAY OF PHOSPHORUS: CPT | Performed by: INTERNAL MEDICINE

## 2023-08-10 PROCEDURE — 36415 COLL VENOUS BLD VENIPUNCTURE: CPT | Mod: PO | Performed by: INTERNAL MEDICINE

## 2023-08-10 PROCEDURE — 80053 COMPREHEN METABOLIC PANEL: CPT | Performed by: INTERNAL MEDICINE

## 2023-08-10 PROCEDURE — 85652 RBC SED RATE AUTOMATED: CPT | Performed by: INTERNAL MEDICINE

## 2023-09-18 ENCOUNTER — TELEPHONE (OUTPATIENT)
Dept: RHEUMATOLOGY | Facility: CLINIC | Age: 83
End: 2023-09-18
Payer: MEDICARE

## 2023-09-18 NOTE — TELEPHONE ENCOUNTER
----- Message from Danieleroshan Spencerkiran Pollock sent at 9/18/2023 10:54 AM CDT -----  Type:  Sooner Appointment Request    Caller is requesting a sooner appointment.  Caller declined first available appointment listed below.  Caller will not accept being placed on the waitlist and is requesting a message be sent to doctor.    Name of Caller:  pt   When is the first available appointment?  NA   Symptoms:  needs labs/test orders   Best Call Back Number:  [phone]    Additional Information:  pt stated she needs to be advised in regards to radha appt to complete needed labs and test before current radha'd appt 09/25 please call back to advise and radha asap thanks! Pt stated Dr. Snyder mentioned previously CAT Scan of lungs as well please clal back asap thanks! Pt is avail only Friday in the morning 09/22

## 2023-09-22 ENCOUNTER — LAB VISIT (OUTPATIENT)
Dept: LAB | Facility: HOSPITAL | Age: 83
End: 2023-09-22
Attending: INTERNAL MEDICINE
Payer: MEDICARE

## 2023-09-22 DIAGNOSIS — M31.31 GRANULOMATOSIS WITH POLYANGIITIS WITH RENAL INVOLVEMENT: ICD-10-CM

## 2023-09-22 DIAGNOSIS — M81.0 POSTMENOPAUSAL OSTEOPOROSIS: ICD-10-CM

## 2023-09-22 DIAGNOSIS — D84.9 IMMUNOCOMPROMISED: ICD-10-CM

## 2023-09-22 DIAGNOSIS — F19.20 STEROID DEPENDENCE: ICD-10-CM

## 2023-09-22 LAB
ALBUMIN SERPL BCP-MCNC: 3.7 G/DL (ref 3.5–5.2)
ALP SERPL-CCNC: 65 U/L (ref 55–135)
ALT SERPL W/O P-5'-P-CCNC: 12 U/L (ref 10–44)
ANION GAP SERPL CALC-SCNC: 7 MMOL/L (ref 8–16)
AST SERPL-CCNC: 21 U/L (ref 10–40)
BASOPHILS # BLD AUTO: 0.06 K/UL (ref 0–0.2)
BASOPHILS NFR BLD: 1 % (ref 0–1.9)
BILIRUB SERPL-MCNC: 0.3 MG/DL (ref 0.1–1)
BUN SERPL-MCNC: 19 MG/DL (ref 8–23)
CALCIUM SERPL-MCNC: 9.3 MG/DL (ref 8.7–10.5)
CHLORIDE SERPL-SCNC: 102 MMOL/L (ref 95–110)
CO2 SERPL-SCNC: 26 MMOL/L (ref 23–29)
CREAT SERPL-MCNC: 1.4 MG/DL (ref 0.5–1.4)
CRP SERPL-MCNC: 0.9 MG/L (ref 0–8.2)
DIFFERENTIAL METHOD: ABNORMAL
EOSINOPHIL # BLD AUTO: 0.3 K/UL (ref 0–0.5)
EOSINOPHIL NFR BLD: 4.6 % (ref 0–8)
ERYTHROCYTE [DISTWIDTH] IN BLOOD BY AUTOMATED COUNT: 13.5 % (ref 11.5–14.5)
ERYTHROCYTE [SEDIMENTATION RATE] IN BLOOD BY PHOTOMETRIC METHOD: 34 MM/HR (ref 0–36)
EST. GFR  (NO RACE VARIABLE): 38 ML/MIN/1.73 M^2
GLUCOSE SERPL-MCNC: 89 MG/DL (ref 70–110)
HCT VFR BLD AUTO: 35.3 % (ref 37–48.5)
HGB BLD-MCNC: 11.1 G/DL (ref 12–16)
IMM GRANULOCYTES # BLD AUTO: 0.03 K/UL (ref 0–0.04)
IMM GRANULOCYTES NFR BLD AUTO: 0.5 % (ref 0–0.5)
LYMPHOCYTES # BLD AUTO: 1 K/UL (ref 1–4.8)
LYMPHOCYTES NFR BLD: 15.9 % (ref 18–48)
MCH RBC QN AUTO: 29.7 PG (ref 27–31)
MCHC RBC AUTO-ENTMCNC: 31.4 G/DL (ref 32–36)
MCV RBC AUTO: 94 FL (ref 82–98)
MONOCYTES # BLD AUTO: 0.9 K/UL (ref 0.3–1)
MONOCYTES NFR BLD: 14.6 % (ref 4–15)
NEUTROPHILS # BLD AUTO: 4 K/UL (ref 1.8–7.7)
NEUTROPHILS NFR BLD: 63.4 % (ref 38–73)
NRBC BLD-RTO: 0 /100 WBC
PLATELET # BLD AUTO: 296 K/UL (ref 150–450)
PMV BLD AUTO: 9.6 FL (ref 9.2–12.9)
POTASSIUM SERPL-SCNC: 4.1 MMOL/L (ref 3.5–5.1)
PROT SERPL-MCNC: 6.4 G/DL (ref 6–8.4)
RBC # BLD AUTO: 3.74 M/UL (ref 4–5.4)
SODIUM SERPL-SCNC: 135 MMOL/L (ref 136–145)
WBC # BLD AUTO: 6.29 K/UL (ref 3.9–12.7)

## 2023-09-22 PROCEDURE — 80053 COMPREHEN METABOLIC PANEL: CPT | Performed by: INTERNAL MEDICINE

## 2023-09-22 PROCEDURE — 86140 C-REACTIVE PROTEIN: CPT | Performed by: INTERNAL MEDICINE

## 2023-09-22 PROCEDURE — 36415 COLL VENOUS BLD VENIPUNCTURE: CPT | Mod: PO | Performed by: INTERNAL MEDICINE

## 2023-09-22 PROCEDURE — 85025 COMPLETE CBC W/AUTO DIFF WBC: CPT | Performed by: INTERNAL MEDICINE

## 2023-09-22 PROCEDURE — 85652 RBC SED RATE AUTOMATED: CPT | Performed by: INTERNAL MEDICINE

## 2023-09-25 ENCOUNTER — TELEPHONE (OUTPATIENT)
Dept: NEPHROLOGY | Facility: CLINIC | Age: 83
End: 2023-09-25

## 2023-09-25 ENCOUNTER — OFFICE VISIT (OUTPATIENT)
Dept: RHEUMATOLOGY | Facility: CLINIC | Age: 83
End: 2023-09-25
Payer: MEDICARE

## 2023-09-25 ENCOUNTER — OFFICE VISIT (OUTPATIENT)
Dept: NEPHROLOGY | Facility: CLINIC | Age: 83
End: 2023-09-25
Payer: MEDICARE

## 2023-09-25 VITALS
BODY MASS INDEX: 25.53 KG/M2 | DIASTOLIC BLOOD PRESSURE: 65 MMHG | SYSTOLIC BLOOD PRESSURE: 134 MMHG | WEIGHT: 149.56 LBS | HEIGHT: 64 IN | HEART RATE: 69 BPM

## 2023-09-25 VITALS — DIASTOLIC BLOOD PRESSURE: 50 MMHG | SYSTOLIC BLOOD PRESSURE: 108 MMHG

## 2023-09-25 DIAGNOSIS — G62.9 NEUROPATHY: ICD-10-CM

## 2023-09-25 DIAGNOSIS — E87.1 HYPONATREMIA: ICD-10-CM

## 2023-09-25 DIAGNOSIS — J32.4 CHRONIC PANSINUSITIS: ICD-10-CM

## 2023-09-25 DIAGNOSIS — N18.31 STAGE 3A CHRONIC KIDNEY DISEASE: Primary | ICD-10-CM

## 2023-09-25 DIAGNOSIS — M31.30 GRANULOMATOSIS WITH POLYANGIITIS WITH PULMONARY INVOLVEMENT: ICD-10-CM

## 2023-09-25 DIAGNOSIS — M31.31 WEGENER'S GRANULOMATOSIS WITH RENAL INVOLVEMENT: Primary | ICD-10-CM

## 2023-09-25 DIAGNOSIS — H91.90 HEARING LOSS, UNSPECIFIED HEARING LOSS TYPE, UNSPECIFIED LATERALITY: ICD-10-CM

## 2023-09-25 DIAGNOSIS — Z79.899 HIGH RISK MEDICATIONS (NOT ANTICOAGULANTS) LONG-TERM USE: ICD-10-CM

## 2023-09-25 DIAGNOSIS — G63 POLYNEUROPATHY ASSOCIATED WITH UNDERLYING DISEASE: ICD-10-CM

## 2023-09-25 DIAGNOSIS — D84.9 IMMUNOSUPPRESSION: ICD-10-CM

## 2023-09-25 DIAGNOSIS — E83.52 HYPERCALCEMIA: ICD-10-CM

## 2023-09-25 DIAGNOSIS — N06.9 ISOLATED PROTEINURIA WITH MORPHOLOGIC LESION: ICD-10-CM

## 2023-09-25 PROCEDURE — 3078F PR MOST RECENT DIASTOLIC BLOOD PRESSURE < 80 MM HG: ICD-10-PCS | Mod: CPTII,S$GLB,, | Performed by: INTERNAL MEDICINE

## 2023-09-25 PROCEDURE — 3074F SYST BP LT 130 MM HG: CPT | Mod: CPTII,S$GLB,, | Performed by: INTERNAL MEDICINE

## 2023-09-25 PROCEDURE — 99999 PR PBB SHADOW E&M-EST. PATIENT-LVL V: ICD-10-PCS | Mod: PBBFAC,,, | Performed by: INTERNAL MEDICINE

## 2023-09-25 PROCEDURE — 1160F PR REVIEW ALL MEDS BY PRESCRIBER/CLIN PHARMACIST DOCUMENTED: ICD-10-PCS | Mod: CPTII,S$GLB,, | Performed by: INTERNAL MEDICINE

## 2023-09-25 PROCEDURE — 99214 OFFICE O/P EST MOD 30 MIN: CPT | Mod: S$GLB,,, | Performed by: INTERNAL MEDICINE

## 2023-09-25 PROCEDURE — 99999 PR PBB SHADOW E&M-EST. PATIENT-LVL V: CPT | Mod: PBBFAC,,, | Performed by: INTERNAL MEDICINE

## 2023-09-25 PROCEDURE — 3074F PR MOST RECENT SYSTOLIC BLOOD PRESSURE < 130 MM HG: ICD-10-PCS | Mod: CPTII,S$GLB,, | Performed by: INTERNAL MEDICINE

## 2023-09-25 PROCEDURE — 1101F PR PT FALLS ASSESS DOC 0-1 FALLS W/OUT INJ PAST YR: ICD-10-PCS | Mod: CPTII,S$GLB,, | Performed by: INTERNAL MEDICINE

## 2023-09-25 PROCEDURE — 1125F AMNT PAIN NOTED PAIN PRSNT: CPT | Mod: CPTII,S$GLB,, | Performed by: INTERNAL MEDICINE

## 2023-09-25 PROCEDURE — 3288F PR FALLS RISK ASSESSMENT DOCUMENTED: ICD-10-PCS | Mod: CPTII,S$GLB,, | Performed by: INTERNAL MEDICINE

## 2023-09-25 PROCEDURE — 99214 PR OFFICE/OUTPT VISIT, EST, LEVL IV, 30-39 MIN: ICD-10-PCS | Mod: S$GLB,,, | Performed by: INTERNAL MEDICINE

## 2023-09-25 PROCEDURE — 1159F PR MEDICATION LIST DOCUMENTED IN MEDICAL RECORD: ICD-10-PCS | Mod: CPTII,S$GLB,, | Performed by: INTERNAL MEDICINE

## 2023-09-25 PROCEDURE — 99215 OFFICE O/P EST HI 40 MIN: CPT | Mod: S$GLB,,, | Performed by: INTERNAL MEDICINE

## 2023-09-25 PROCEDURE — 99215 PR OFFICE/OUTPT VISIT, EST, LEVL V, 40-54 MIN: ICD-10-PCS | Mod: S$GLB,,, | Performed by: INTERNAL MEDICINE

## 2023-09-25 PROCEDURE — 3075F SYST BP GE 130 - 139MM HG: CPT | Mod: CPTII,S$GLB,, | Performed by: INTERNAL MEDICINE

## 2023-09-25 PROCEDURE — 1159F MED LIST DOCD IN RCRD: CPT | Mod: CPTII,S$GLB,, | Performed by: INTERNAL MEDICINE

## 2023-09-25 PROCEDURE — 3075F PR MOST RECENT SYSTOLIC BLOOD PRESS GE 130-139MM HG: ICD-10-PCS | Mod: CPTII,S$GLB,, | Performed by: INTERNAL MEDICINE

## 2023-09-25 PROCEDURE — 1101F PT FALLS ASSESS-DOCD LE1/YR: CPT | Mod: CPTII,S$GLB,, | Performed by: INTERNAL MEDICINE

## 2023-09-25 PROCEDURE — 3288F FALL RISK ASSESSMENT DOCD: CPT | Mod: CPTII,S$GLB,, | Performed by: INTERNAL MEDICINE

## 2023-09-25 PROCEDURE — 99999 PR PBB SHADOW E&M-EST. PATIENT-LVL III: CPT | Mod: PBBFAC,,, | Performed by: INTERNAL MEDICINE

## 2023-09-25 PROCEDURE — 1160F RVW MEDS BY RX/DR IN RCRD: CPT | Mod: CPTII,S$GLB,, | Performed by: INTERNAL MEDICINE

## 2023-09-25 PROCEDURE — 3078F DIAST BP <80 MM HG: CPT | Mod: CPTII,S$GLB,, | Performed by: INTERNAL MEDICINE

## 2023-09-25 PROCEDURE — 99999 PR PBB SHADOW E&M-EST. PATIENT-LVL III: ICD-10-PCS | Mod: PBBFAC,,, | Performed by: INTERNAL MEDICINE

## 2023-09-25 PROCEDURE — 1125F PR PAIN SEVERITY QUANTIFIED, PAIN PRESENT: ICD-10-PCS | Mod: CPTII,S$GLB,, | Performed by: INTERNAL MEDICINE

## 2023-09-25 RX ORDER — GABAPENTIN 400 MG/1
400 CAPSULE ORAL 3 TIMES DAILY
Qty: 270 CAPSULE | Refills: 3 | Status: SHIPPED | OUTPATIENT
Start: 2023-09-25 | End: 2023-11-21

## 2023-09-25 RX ORDER — PREDNISONE 2.5 MG/1
2.5 TABLET ORAL DAILY
Qty: 90 TABLET | Refills: 3 | Status: SHIPPED | OUTPATIENT
Start: 2023-09-25 | End: 2023-11-21

## 2023-09-25 RX ORDER — GENTAMICIN SULFATE 40 MG/ML
40 INJECTION, SOLUTION INTRAMUSCULAR; INTRAVENOUS 2 TIMES DAILY
COMMUNITY
Start: 2023-07-31 | End: 2023-10-16 | Stop reason: ALTCHOICE

## 2023-09-25 ASSESSMENT — ROUTINE ASSESSMENT OF PATIENT INDEX DATA (RAPID3)
MDHAQ FUNCTION SCORE: 0.7
PATIENT GLOBAL ASSESSMENT SCORE: 4
TOTAL RAPID3 SCORE: 3.78
PSYCHOLOGICAL DISTRESS SCORE: 2.2
PAIN SCORE: 5
FATIGUE SCORE: 1.1

## 2023-09-25 NOTE — PROGRESS NOTES
Subjective:       Patient ID: Aracelis Weber is a 82 y.o. White female who presents for return patient evaluation for chronic renal failure.      She has been treated with rituxan and steroids for Wegener's q 4-6 months.  She had COVID in November 2022.  She has chronic neuropathy in her feet.  She had edema with myrbetriq.  She is having nocturia 3-4 times a night and has frequency along with UTIs recently.  She sees Urology.      Review of Systems   Constitutional:  Negative for appetite change, chills and fever.   HENT:  Positive for hearing loss. Negative for congestion.    Eyes:  Negative for visual disturbance.   Respiratory:  Positive for cough (dry cough). Negative for shortness of breath.    Cardiovascular:  Positive for leg swelling. Negative for chest pain.   Gastrointestinal:  Negative for abdominal pain, diarrhea, nausea and vomiting.   Genitourinary:  Positive for difficulty urinating (urge incontinence). Negative for dysuria and hematuria.   Musculoskeletal:  Negative for arthralgias, back pain and myalgias.   Skin:  Negative for rash.   Neurological:  Positive for numbness (feet B). Negative for headaches.   Psychiatric/Behavioral:  Negative for sleep disturbance.        The past medical, family and social histories were reviewed for this encounter.     BP (!) 108/50 (BP Location: Right arm, Patient Position: Sitting, BP Method: Medium (Manual))     Objective:      Physical Exam  Vitals reviewed.   Constitutional:       General: She is not in acute distress.     Appearance: She is well-developed.   HENT:      Head: Normocephalic and atraumatic.   Eyes:      General: No scleral icterus.     Conjunctiva/sclera: Conjunctivae normal.   Neck:      Vascular: No JVD.   Cardiovascular:      Rate and Rhythm: Normal rate and regular rhythm.      Heart sounds: Normal heart sounds. No murmur heard.     No friction rub. No gallop.   Pulmonary:      Effort: Pulmonary effort is normal. No respiratory  distress.      Breath sounds: Normal breath sounds. No wheezing or rales.   Abdominal:      General: Bowel sounds are normal. There is no distension.      Palpations: Abdomen is soft.      Tenderness: There is no abdominal tenderness.   Musculoskeletal:      Cervical back: Normal range of motion.      Right lower leg: Edema (trace) present.      Left lower leg: Edema (trace) present.   Skin:     General: Skin is warm and dry.      Findings: No rash.   Neurological:      Mental Status: She is alert and oriented to person, place, and time.   Psychiatric:         Mood and Affect: Mood normal.         Behavior: Behavior normal.         Assessment:       1. Stage 3a chronic kidney disease    2. Wegener's disease, pulmonary    3. Isolated proteinuria with morphologic lesion    4. Hyponatremia    5. Hypercalcemia        Lab Results   Component Value Date    CREATININE 1.4 09/22/2023    BUN 19 09/22/2023     (L) 09/22/2023    K 4.1 09/22/2023     09/22/2023    CO2 26 09/22/2023     Lab Results   Component Value Date    PTH 36.2 08/10/2023    CALCIUM 9.3 09/22/2023    PHOS 3.9 08/10/2023     Lab Results   Component Value Date    HCT 35.3 (L) 09/22/2023     Prot/Creat Ratio, Urine   Date Value Ref Range Status   09/22/2023 0.22 (H) 0.00 - 0.20 Final   01/06/2023 0.15 0.00 - 0.20 Final   06/17/2022 0.39 (H) 0.00 - 0.20 Final     Plan:   Return to clinic in 6 months.  Labs for next visit include rp upc pth q 3 mo per standing orders.  Baseline creatinine is 0.8-1.1 since 2016.  UPC is negative.  PTH is 36 with  calcium of 9.3.    Renal US shows R 9.6 cm L 12.4 cm.  She was treated for her disease and has responded well.   Blood pressure is controlled on the current regimen.  Continue current medications as prescribed and reviewed.

## 2023-09-25 NOTE — Clinical Note
Hello.  Please contact her with an appointment as she is having prominent bladder symptoms, frequent UTIS and did not tolerate myrbetriq due to side effects.  Thanks!

## 2023-09-25 NOTE — PROGRESS NOTES
She is doing much better with the congestion but nothing like it was before. She denies fevers, chills, some cough, no hemoptysis.   She is down to Prednisone 2.5 mg daily but overall her edema is much improved.   Renal function stable  ESR and CRP    She does not want to take MTX she lost significant amounts of hair.   Imuran was lost with flare and transition to Imuran.     She has completed Rituxan 18 months with sinuses under control, lungs clear , renal function stable. Hearing is stable she lost nearly 90% of hearing and has stabilized.   She has mac degeneration.   Neuropathy continues to be a problem but we never have petechial rashes.   Edema is much improved.

## 2023-09-25 NOTE — PROGRESS NOTES
"    Subjective:          Chief Complaint: Aracelis Weber is a 82 y.o. female who had concerns including Disease Management.    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 Quinn.  We delayed June Rituxan until after the 4th of July to allow more healing. She was early optimistic the sinus congestion and mucous had improved but now return of copious mucous production. She was called about a +staph culture - pending new ABX nasal irrigation.   Not bleeding as badly,     No fever, no chills. Dry cough rare no other SOB.   +fatigue today, but feels pretty good most days.   Las infusion 12/2022 single 500mg IV Rituxan    Dr. Chow OAB dx. Working with possible med  Dr Villavicencio recommend she need to take lasix more often for edema (trying QOD)  Skin: from the prednisone     3/2023: patient recently seen with Dr. Allred for eval with excessive tearing. While there discussed her repeat debridement of her sinuses with crusting sinuses with Dr. Quinn. Patient labs 3/23- with normal ESR and normal CRP. She has constant wu with her sinuses and sinus plugging.     She was recently treated for eye infection with Dr. Butts.     Incontinence: having trouble with this, and unable to complete PT at clinic, but did home exercise not helping. Declined meds but would like to have this revisited.         11/14/2022:   COVID 11/1/2022 took her quite some time to recover from cough but overall did well.  Did not take PAXLOVID. We were late getting started on meds as initially thought it was influenza.     She is feeling markedly better: reviewed her CXR markedly improved with regard to her RUL cavitary lesion.   We will get her back on Rituxan.     8/8/22: peripheral neuropathy still problematic on gabapentin and working with Chiro for a plan to see if this will help.   Sinuses still congestion  +dry cough but no fevers, night time dry cough "tickle"   No SOB.     5/5/2022: "   Patient doing very well. Still rhinitis, using navage. No antibiotics from ENT since last visit. She is still clearing significant mucous faint with dried blood.   Dry cough, no fevers, no SOB  Mild HA only with elevated BP.   Completed Rituxan maintanence at 4 months 4/20/2022.   Prolia 3/25/22  Evusheld with catch up dose 2/10/22 and 3/25/2022.     Major complaints: pins, needles, stinging, burning in feet day and night is constant. Dr. Campbell: EB-N5 supplement is helping some, stopped for months and noted significant worsening. Dr. Campbell did increase from EB-N3.   Gabapentin 400mg TID  Hydrocodone only PRN  Prednisone 7.5mg       2/2022  Doing well no cough, no fevers. Recent COVID exposure but negative.   Seen with Nephro.   Given her successful response with Ritux. Agree to continue maintanance with Ritux Q 4-6 months    Needs to get #3 COVID vaccine timing with RTX. - will time this with me 2 weeks prior to   Discussed Prolia  Working on Evusheld for PreP with COVID> she is scheduled 2/10/22      12/9/2021  Patient's recent CXR 12/21/21 with marked improvement when compared to 10/21/21  Prednisone 10mg daily  S/p RTX x 4 infusions completion date. 11/5/21  Patient with rise in creatinine to 2.0 on 11/26 had been requiring lasix for marked edema since 11/1/21. Stopped 2 weeks ago. Edema is present but managed. Drinking about 40 oz water and 3 cups of coffee daily    Patient completed [facility] PT felt she was doing well home exercises about 2 weeks ago with acute pain in lumbar spine after exercising. Imaging lumbar few days ago without evidence of a fracture. She point to pain in the lumbosacral and radiating to buttock region. Ok to rise from seated. Pain more on left low back and buttock. No numbness no tingling but pain in the hamstring region. Comfortable sitting but not lying down, but she never sleeps in bed. Long hx of rather impressive scoliosis.       11/11/2021:   Inflammatory markers are markedly  improved.   Cough dry still at night. Patient noting she has lost sense of smell. Can still taste.     Patient recently hospitatlized for Bronch with negative cultures to date.  There was a concern for possible atypical infection she has had enlarging right upper lobe cavitary lesion measures still has a right middle lobe lesion with some cavitation now as well.  I have discussed this case extensively with both Dr. Schwartz as well as Dr. Joseph upon discharge we felt comfortable that this is likely Wegener's granulomatosis causing cavitary lesions.  Supporting sinus biopsy does note granuloma.    Patient was started on Imuran in March of 2021 but with the growth of her lesion we had rule out for any possible infection given her status of immunosuppression.  She is here today not in her usual state she appears fatigued pale she is not complaining of shortness of breath but does have a cough dry persistent.  She is noticing increased pedal edema.         9/13/21: patient with 2 falls 8 days apart. Spoke w/ pulm shortly following initial fall and we were scheduled for bronch when she sustained another fall.   Scheduled now for 9/17/21 bronch. RUL lesion.   Remains off Imuran  Prednisone 15mg daily.   Sinus congestion. Still productive     She is noting some productive cough at night no hemoptysis.   Patient denies SOB  Having more HA. -frontal and sinus, she is noting some drainage in right ear some mucous/blood.   No edema.   She notes fatigue, no fevers, no night sweats. No weight loss.   Skin easily bruising.     No personal hx of any malignancy.       7/20/2021:    Spoke with Pulm plan for bronch is able working on possible bx site vs BAL.   Need to r/o infectious/neoplastic and confirm for ANCA (GPA vasculitis)   Inflammatory markers markedly down .8  Now 14.   H/H improve from 7.4 to 8.2  Platelets normalized.   Renal stable over time    Patient did attend wedding with approx 300 people unmasked last  "weekend. Reviewed by recs for COVID precautions given    Current regimen:   pred 15mg (Na stable)  Holding Imuran    Interval events: PET with hypermetabolic lesions:   a. RUL cavitary/cystic lesion 1.9cm x 1.3cm  b. RUL spiculated 1.2cm x 1.4cm  c. RML 1.7cm x 2.0 cm  All with differential: infectious, inflammatory, neoplastic, grannulomatous (a) with favor of inflammation given the rapid change    Fungal immunodiffusion neg  Quant gold and T spot: neg  H/H improving     Sinus congestion, pressure, headache, x 3 weeks very productive with blowing nose, back on navage. :  started Cindamycin with DR Quinn x 10 days.   Patient denies any shortness breath.   Very fatigues.   No more fevers. Slight dry cough, no blood/ no mucous.   No SOB.       5/12/21 completed nasal irrigation with biopsy not definitive for GPA, but + inflammation and granulomas. Temporal artery bx negative. Patient with PCP same day as labs 6/2/21  dx with UTI on keflex.     Was seen apparently in ED in [place] for 104F she had altered mental status, dx with UTI, dehydration, treated with. Completed all 7 days of keflex and within 48 hours with another fever 104 F. She continues with congestion but no worse. Patient denies cough, hemoptysis, bloody nasal discharge. She has had no fever x 10 days. Checks twice daily.   while travelling told "congestion in chest on xray". Inflammatory markers very high again.   Patient with recent concern for wegeners needs CT chest. CXR with new airspace opacities right chest- need to r/o GGO vs infection. CT has been ordered pending scheduling.   Broad ABX coverage recommended for now will cc Dr. Natarajan.     I am limited here with her  severe edema from higher dose steroids. Max tolerated is 20mg daily pred  Started Imuran 5/5/2021. Very low dose 50mg BID (0.65mg/kg) tolerating VERY well.   H/H still low.         2/2021  Sinususitis, cx were negative.  Using nasal irrigation with mucoid disharge.   Patient continues " with significant nasal discharge and blood with scabs. We discussed Wegeners but pathology sinus no vasculitis, granuloma noted.   No HA, no blurred vision. Recent sinus infection with HA for 10 days. cx +, but also repeat labs demonstarting +PR3 and +ANCA       Patient with c/o right > left 3,4 finger numbness that was intermittent until approximately 2 weeks about having near constant numbness in hands, pins and needles and pain.   Patient not noting much improvement with change in position.     Off MTX 6  tabs weekly.   Doing well with Hydroxychloroquine.   Declined COVID vaccine    Gabapentin up to 300mg TID.   Now with Dr. Campbell doing PT for Plantar fasciitis. She is taking supplement and compound cream for joint and neuropathy. No response to tarsal tunnel injection per Dr. Campbell.   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 WBC ok.       11/2020  Patient with PMR   MTX at 4 tabs weekly new anemia. Thrombocytosis  Pred at 10mg   Complicated with peripheral edema rather severe , started MTX seeing trending CRP/ESR but persistent leg pain.   Seen with PCP for neuropathy s/sx not seeing much benefit with gabpentin  Seen with Cardiology- no concern for cardiac ECHO ok. dc'd lasix for hyponatremia.     8/2020  Pred 20mg with mild edema, pred 30 mg with severe edema.   Current Pred 15mg daily  Rising ESR again.   Lasix 20 mg daily with improved edema but having some pins and needles in feet/legs.   Having tight, burning stinging at the toes and feet. Heaviness. Prickly.   Noting sodium is falling, K 5.2-5.4 still using           7/2020:  Hands not painful, knees not painful. Shoulder/cervical and down the arms, markedly improved. She still has some pain him hips and groin with walking and some weakness to the legs with edema. Some pain with fixing own hair.   She takes in AM regarding hips ache, some  shoulder/arms, and again at night because hips/legs.   The clue is that the LDN new dose she had some ASE, previously tolerated 3mg daily fine.     Historical review of present Illness.   Patient with a recent chronic sinusitis that ultimately warranted a septoplasty, endoscopic sinus surgery and turbinate reduction 5/2020   Four weeks postop t increasingly worse she was noting pain in her throat and ears lasting several hours complaint of pdizziness she had symptoms of ear pain and decreased hearing in the left ear.  She underwent a debridement at this visit she continue with compound nasal irrigations.  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    Patient did have repeat procedure on with biopsies taken 07/03/2020 showing right maxillary sinus chronically inflamed fibrotic polypoid portions benign nasal mucosa left maxillary sinus similar polypoid fragments ulcerated markedly inflamed respiratory mucosa focal foreign body giant cell response noted suggested that this might have been from her Gelfoam packing as a possible cause of this reaction    I have spoken with Dr. Quinn prior to patient's visit today and was a concern that she could have triggered some inflammatory response with the Gelfoam packing    Patient states she no longer has a headache is not noticing much ear drainage continues with profound loss of hearing on the left and rather significant on the right both  sensorineural and some conductive changes.    Restarted on prednisone as of 7/20/20  10 mg patient has not noticed any change with her hearing in this time she did notice slight improvement with her joint aches and pains but is still relying on hydrocodone for the bulk of this.    She denies a persistent headache she does have some tenderness about the preauricular region particularly on the right more than the left.  She has denied jaw claudication changes in her voice or any amaurosis fugax.  She has no pre-existing history of joint aches and pains hip or otherwise.  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  She had a workup that involved a negative rheumatoid factor, age appropriate sedimentation rate,C reactive protein JANET was positive 1:160 in speckled pattern        Previous: Hx:   She is having pain in her hand with stiffness and right 2nd PIP joint now unable to flex. 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   Hx of bilateral TKA 3 and 5 years ago and those are doing well.   She notes intermittent edema. Some hammer toe deformity bilaterally making shoes problematic but not painful otherwise.   Long hx of GERD-severe.   Cannot tolerate NSAIDs at all w/o gastritis.   Can use Hydrocodone PRN   Added Naltrexone at 3mg and doing very well  Lost 30# with WW.   Patient denies weight loss, rashes, dry eye, dry mouth, nasal or palatal ulcerations,  lymphadenopathy, Raynaud's, hx of DVT/miscarriages, psoriasis or family hx of psoriasis, rashes, serositis, anemia or other constitutional symptoms.  Asking about naltrexone  Component      Latest Ref Rng & Units 8/8/2020 7/29/2020 7/20/2020 7/13/2020   Sodium      136 - 145 mmol/L  126 (L) 128 (L)    Potassium      3.5 - 5.1 mmol/L  5.4 (H) 5.2 (H)    Chloride      95 - 110 mmol/L  92 (L) 92 (L)    CO2      23 - 29 mmol/L  25 29    Glucose      70 - 110 mg/dL  109 109    BUN, Bld       8 - 23 mg/dL  13 11    Creatinine      0.5 - 1.4 mg/dL  0.8 0.8    Calcium      8.7 - 10.5 mg/dL  9.5 9.7    Anion Gap      8 - 16 mmol/L  9 7 (L)    eGFR if African American      >60 mL/min/1.73 m:2  >60.0 >60.0    eGFR if non African American      >60 mL/min/1.73 m:2  >60.0 >60.0    Anti Sm Antibody      0.00 - 0.99 Ratio    0.04   Anti-Sm Interpretation      Negative    Negative   Anti Sm/RNP Antibody      0.00 - 0.99 Ratio    0.09   Anti-Sm/RNP Interpretation      Negative    Negative   JANET Screen      None Detected       Rheumatoid Factor      0.0 - 15.0 IU/mL       Sed Rate      0 - 36 mm/Hr 57 (H)  54 (H) 75 (H)   CRP      0.0 - 8.2 mg/L 75.4 (H) 56.1 (H) 57.5 (H) 99.4 (H)   Anti-Histone Antibody      0.0 - 0.9 Units    0.4   ds DNA Ab      Negative 1:10    Negative 1:10     Component      Latest Ref Rng & Units 6/25/2020 11/10/2018   Sodium      136 - 145 mmol/L     Potassium      3.5 - 5.1 mmol/L     Chloride      95 - 110 mmol/L     CO2      23 - 29 mmol/L     Glucose      70 - 110 mg/dL     BUN, Bld      8 - 23 mg/dL     Creatinine      0.5 - 1.4 mg/dL     Calcium      8.7 - 10.5 mg/dL     Anion Gap      8 - 16 mmol/L     eGFR if African American      >60 mL/min/1.73 m:2     eGFR if non African American      >60 mL/min/1.73 m:2     Anti Sm Antibody      0.00 - 0.99 Ratio     Anti-Sm Interpretation      Negative     Anti Sm/RNP Antibody      0.00 - 0.99 Ratio     Anti-Sm/RNP Interpretation      Negative     JANET Screen      None Detected Detected (A) Detected (A)   Rheumatoid Factor      0.0 - 15.0 IU/mL 11.6 <8.6   Sed Rate      0 - 36 mm/Hr     CRP      0.0 - 8.2 mg/L     Anti-Histone Antibody      0.0 - 0.9 Units     ds DNA Ab      Negative 1:10         REVIEW OF SYSTEMS:    Review of Systems   Constitutional: Negative for fever, malaise/fatigue and weight loss.   HENT: Negative for sore throat.    Eyes: Negative for double vision, photophobia and redness.   Respiratory: Negative for cough, shortness  "of breath and wheezing.    Cardiovascular: Negative for chest pain, palpitations and orthopnea.   Gastrointestinal: Negative for abdominal pain, constipation and diarrhea.   Genitourinary: Negative for dysuria, hematuria and urgency.   Musculoskeletal: Positive for joint pain. Negative for back pain and myalgias.   Skin: Negative for rash.   Neurological: Negative for dizziness, tingling, focal weakness and headaches.   Endo/Heme/Allergies: Does not bruise/bleed easily.   Psychiatric/Behavioral: Negative for depression, hallucinations and suicidal ideas.               Objective:            Past Medical History:   Diagnosis Date    Anesthesia     'Mother stop breathing after anesthesia"    Chronic sinusitis     Depression     GERD (gastroesophageal reflux disease)     PVC (premature ventricular contraction)      Family History   Problem Relation Age of Onset    Heart disease Mother     Arthritis Mother     Cancer Sister     Arthritis Sister     Diabetes Sister     Hypertension Sister      Social History     Tobacco Use    Smoking status: Former     Current packs/day: 0.00     Types: Cigarettes     Start date: 2/3/1955     Quit date: 1/3/1960     Years since quittin.7    Smokeless tobacco: Never   Substance Use Topics    Alcohol use: Never     Alcohol/week: 0.0 standard drinks of alcohol    Drug use: Never         Current Outpatient Medications on File Prior to Visit   Medication Sig Dispense Refill    acetaminophen (TYLENOL) 500 MG tablet Take 500 mg by mouth every 6 (six) hours as needed for Pain.      albuterol (PROVENTIL/VENTOLIN HFA) 90 mcg/actuation inhaler INHALE 2 PUFFS BY MOUTH EVERY 4 TO 6 HOURS AS NEEDED FOR SHORTNESS OF BREATH OR COUGH      ascorbate calcium (MAHSA-C ORAL) Take by mouth.      B2-B6 phos-levomef simran-mecobal (EB-N3 DR) 1.3-70-6-4 mg CpDR Take 1 capsule by mouth once daily.      cetirizine (ZYRTEC) 10 MG tablet TAKE 1 TABLET ONE TIME DAILY 90 tablet 0    denosumab (PROLIA SUBQ) Inject " into the skin. Two times a year      diclofenac sodium (VOLTAREN) 1 % Gel APPLY TO THE AFFECTED AREA(S) 2 GRAMS THREE TIMES DAILY 600 g 0    furosemide (LASIX) 20 MG tablet TAKE 1 TABLET EVERY DAY 90 tablet 3    gabapentin (NEURONTIN) 400 MG capsule TAKE 1 CAPSULE THREE TIMES DAILY 270 capsule 3    HYDROcodone-acetaminophen (NORCO) 7.5-325 mg per tablet Take 1 tablet by mouth every 6 (six) hours as needed.      ITRACONAZOLE, BULK, MISC EMPTY CONTENTS OF 1 CAPSULE INTO NASAL IRRIGATION SYSTEM, ADD DISTILLED WATER, SALT PACK, MIX & IRRIGATE. PERFORM 2 TIMES DAILY      LACTOBACILLUS ACIDOPHILUS ORAL Take by mouth.      metoprolol succinate (TOPROL-XL) 50 MG 24 hr tablet TAKE 1 TABLET EVERY DAY 90 tablet 3    mirabegron (MYRBETRIQ) 50 mg Tb24 Take 1 tablet (50 mg total) by mouth once daily. 30 tablet 11    neomycin-polymyxin-dexamethasone (MAXITROL) 3.5mg/mL-10,000 unit/mL-0.1 % DrpS       omeprazole (PRILOSEC) 40 MG capsule TAKE 1 CAPSULE EVERY MORNING 90 capsule 0    ondansetron (ZOFRAN-ODT) 8 MG TbDL Take 8 mg by mouth 2 (two) times daily.      peg 400-propylene glycol, PF, (SYSTANE, PF,) 0.4-0.3 % Dpet Apply to eye 2 (two) times a day.      predniSONE (DELTASONE) 2.5 MG tablet Take 1 tablet (2.5 mg total) by mouth once daily. 90 tablet 3    predniSONE (DELTASONE) 5 MG tablet Take 1 tablet (5 mg total) by mouth once daily. 30 tablet 3    sodium chloride 0.9% SolP 500 mL with riTUXimab 10 mg/mL Conc 375 mg/m2 Inject 375 mg/m2 into the vein. 4 to 6 months      tobramycin sulfate 0.3% (TOBREX) 0.3 % ophthalmic solution Place into both eyes.      vancomycin (VANCOCIN) 1,000 mg injection SMARTSI Vial(s) Both Nares Twice Daily      vit C/E/Zn/coppr/lutein/zeaxan (PRESERVISION AREDS-2 ORAL) Take 1 capsule by mouth 2 (two) times a day.       [DISCONTINUED] naltrexone capsule Take 3 mg by mouth once daily.       No current facility-administered medications on file prior to visit.       Vitals:    23 1306   BP: 134/65    Pulse: 69       Physical Exam:    Physical Exam   Constitutional: She is oriented to person, place, and time. She appears well-developed and well-nourished.   HENT:   Head: Normocephalic and atraumatic.   Mouth/Throat: Oropharynx is clear and moist.   Eyes: Pupils are equal, round, and reactive to light. EOM are normal.   Neck: Normal range of motion.   Cardiovascular: Normal rate, regular rhythm and normal heart sounds.   Pulmonary/Chest: Effort normal and breath sounds normal.   Musculoskeletal:        Right shoulder: She exhibits normal range of motion, no tenderness and no swelling.        Left shoulder: She exhibits normal range of motion, no tenderness and no swelling.        Right elbow: She exhibits normal range of motion and no swelling. No tenderness found.        Left elbow: She exhibits normal range of motion and no swelling. No tenderness found.        Right wrist: She exhibits normal range of motion, no tenderness and no swelling.        Left wrist: She exhibits normal range of motion, no tenderness and no swelling.        Right knee: She exhibits normal range of motion and no swelling. No tenderness found.        Left knee: She exhibits normal range of motion and no swelling. No tenderness found.        Right hand: She exhibits decreased range of motion and tenderness. She exhibits no swelling.        Left hand: She exhibits decreased range of motion and tenderness. She exhibits no swelling.        Right foot: There is normal range of motion, no tenderness and no swelling.        Left foot: There is normal range of motion, no tenderness and no swelling.   Bony hypertrophy at the PIP and DIP joints. +squaring at the CMC joint. No active synovitis on 28 joint exam.    Neurological: She is alert and oriented to person, place, and time.   Skin: Skin is warm and dry.   Psychiatric: She has a zoltan        Narrative  Performed by: [facility]  Patient - MADAN ROSARIOLYN                   - 1940 Sex- F    Med Rec # - [id number]                        Bimal Mckeon M.D.   The following is an electronic copy of report # [id number] from:   [facility]   [facility] [place] [address]                         Phone [phone]   DIAGNOSIS:   09/22/2021  /shakeel     1, 2.  RIGHT UPPER LOBE MASS BRUSHING AND FINE NEEDLE ASPIRATE BIOPSIES:   - NEGATIVE FOR MALIGNANT CELLS.     - PURULENT EXUDATE.     - A FEW FRAGMENTS OF BRONCHIAL MUCOSA WITH FLORID CHRONIC BRONCHITIS    AND REACTIVE SQUAMOUS    ATYPICAL METAPLASIA.       Comment:   Special stains (AFB and GMS) are negative for organisms. While no    granulomas are seen here, the inflammatory reaction appears to be    similar to that seen in the sinus material over the past few years    (Delta [id number], [id number], [id number]). 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 Bronchoscopic material, particularly of the lower    respiratory tract, is of low yield for a definitive diagnosis of that    entity. The previous sinus material will be reviewed with    consideration for that diagnosis.   _______________________________________________________________________   SPECIMEN AND SOURCE:   1. RUL mass brushing   2. RUL mass needle     CLINICAL INFORMATION:   Clinical Information:-gt Right Upper Lobe Mass   Specific Site:-gt RUL mass brushing T Brush; RUL Mass; RUL mass-    microbiology; RUL BAL; RUL washing;   Other Requests:-gt N/A     GROSS DESCRIPTION:   1. Received 40 mL of fluid in formalin, 1 dry slides and 1 slides in    95% alcohol. Prepared one cell block.     2. Received 40 mL of fluid in formalin, 1 dry slides and 1 slides in    95% alcohol. Prepared one cell block.     CODE: 0     Cytotechnologist: ABDIFATAH Marvin (ASCP)   Pathologist: Scott Johnson MD (Electronic Signature) 09/22/2021  2:34 PM     [facility] * [address] [facility] * [place]    [address]   Technical services performed at: [facility], [facility] * [address]    [facility] * [place] [address]   Screening Site: [facility], [facility] * [address] [facility] *    [place] [address]                         Post Rituxan image                                     Pre Rituxan image  Assessment:       No diagnosis found.           Plan:        There are no diagnoses linked to this encounter.       Patient is a 82-year-old female who presented with robust inflammatory reaction within the sinus cavity as well as hearing deficit following a sinus surgery.  She has been repeated cultured postoperatively completed antibiotics and irrigations negative for any fungal infections or bacterial infections. 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    Completed RTX 11/5/21 Induction at 4 weekly doses of 375mg/m2. Our plans were for Rituxan at 500mg  on day 0 and 15 as per below, but due to scheduling with COVID,  vaccines/[email or web address] etc and auth, transportation,  we have adjusted her Rituxan dosage but continued the every 6 month protocol. This has proven to work very well and is well tolerated by the patient extrememly well and kept her with minimal infections.      The best data supporting the use of  as maintenance therapy come from the Maintenance of Remission using Rituximab in Systemic ANCA-associated Vasculitis (MAINRITSAN) trial with less relapse for PR3+ paitients when treated with Rituxan at 500mg IV  on days 0 and 15. at 6, 12, and 18months when compared to Imuran.    For July 2023change of therapy plan: I want to cancel the 7/7/23 infusion and she will receive a SINGLE Rituxan infusion 7/21/23 of 500mg . Recent FESS with reggie + she will be  starting nasal ABX soon.   Ok with decreasing Prednisone to 2.5mg daily. For bruising       t spot 5/2021 negative.    Hep neg.    covid completed #3 now.      +COVID 10/25/22 and recovered.        CKD advanced rather quickly , improved and stabilized with RTX.    F/u with Nephro.     Continue omeprazole (prilosec)  40mg by mouth daily.        Patient hx of Osteopenia only, renal function not stable enough for oral bisphos. Chronic steroids.    Continue Prolia every 6 months.     CRP WNL 10/2022  CXR during COVID showed continued resolution with previous RUL lesion.       Discussed skin on prednisone  Ok to take meds rec by urology will jsut have to balance dry mouth/eyes with continence  Discussed lasix per Dr. Villavicencio    She is not clear that her sinuses improve dramatically with each Rituxan will watch this more closely as her persistent mucous is curious.     No follow-ups on file.      f/u 3 months  Thank you for allowing me to participate in the care of this very pleasant patient.      75 min consultation with greater than 50% of that time included Preparing to see the patient (review records, tests), Obtaining and/or reviewing separately obtained historical data, Performing a medically appropriate examination and/or evaluation , Ordering medications, tests, and/or procedures, Referring and communicating with other healthcare professionals , Documenting clinical information in the electronic or other health record and Independently interpreting results  (as warranted) & communicating results to the patient/family/caregiver. All questions answered.                                                            Subjective:          Chief Complaint: Aracelis Weber is a 82 y.o. female who had concerns including Disease Management.    HPI:    +ANCA +PR3/ negative MPO   GPA (Wegeners) , chronic steroids, osteporosis on Prolia      9/2023: Patient was seen 6/2023 with anticipation of repeat FESS Sinus surgery and  pending compound nasal irrigation. She did have surgery with Dr. Quinn end of June 2023, by July visit noted improvement with compound irrigation from prior. Still with chronic sinusitis.   Last Rituxan 500mg single dose 7/2023.     3/2023:   Patient current on RTX Q 6 month maintanance following induction for Wegeners.    12/2/22 (Delayed COVID infection) RTX was 500mg with solumedrol 80mg IM   Completed Rituxan 1000mg for 1st  maintenance at 4 months 4/20/2022. due to rise in symptoms, and ESR   Induction 10/2021.      Continued nasal congestion and drainage.   Recent significant nosebleeds. Endoscopy with recurrent crusting despite office debridement by ENT and continued irrigation.      Will repeat serologies and inflammatory markers and check CD 19 levels first week March and see her later that month.   Patient doing very well. Still rhinitis, using navage. No antibiotics from ENT since last visit. She is still clearing significant mucous faint with dried blood.   Dry cough, no fevers, no SOB\  Prolia: 10/17/2022       11/2022  Patient doing very well. Still rhinitis, using navage. No antibiotics from ENT since last visit. She is still clearing significant mucous faint with dried blood.   Dry cough, no fevers, no SOB  Mild HA only with elevated BP.   Completed Rituxan maintanence at 4 months 4/20/2022.   Prolia 3/25/22  Evusheld with catch up dose 2/10/22 and 3/25/2022    5/5/2022:   Patient doing very well. Still rhinitis, using navage. No antibiotics from ENT since last visit. She is still clearing significant mucous faint with dried blood.   Dry cough, no fevers, no SOB  Mild HA only with elevated BP.   Completed Rituxan maintanence at 4 months 4/20/2022.   Prolia 3/25/22  Evusheld with catch up dose 2/10/22 and 3/25/2022.     Major complaints: pins, needles, stinging, burning in feet day and night is constant. Dr. Campbell: EB-N5 supplement is helping some, stopped for months and noted significant  worsening. Dr. Campbell did increase from EB-N3.   Gabapentin 400mg TID  Hydrocodone only PRN  Prednisone 7.5mg       2/2022  Doing well no cough, no fevers. Recent COVID exposure but negative.   Seen with Nephro.   Given her successful response with Ritux. Agree to continue maintanance with Ritux Q 4-6 months    Needs to get #3 COVID vaccine timing with RTX. - will time this with me 2 weeks prior to   Discussed Prolia  Working on [employer] for PreP with COVID> she is scheduled 2/10/22      12/9/2021  Patient's recent CXR 12/21/21 with marked improvement when compared to 10/21/21  Prednisone 10mg daily  S/p RTX x 4 infusions completion date. 11/5/21  Patient with rise in creatinine to 2.0 on 11/26 had been requiring lasix for marked edema since 11/1/21. Stopped 2 weeks ago. Edema is present but managed. Drinking about 40 oz water and 3 cups of coffee daily    Patient completed C PT felt she was doing well home exercises about 2 weeks ago with acute pain in lumbar spine after exercising. Imaging lumbar few days ago without evidence of a fracture. She point to pain in the lumbosacral and radiating to buttock region. Ok to rise from seated. Pain more on left low back and buttock. No numbness no tingling but pain in the hamstring region. Comfortable sitting but not lying down, but she never sleeps in bed. Long hx of rather impressive scoliosis.       11/11/2021:   Inflammatory markers are markedly improved.   Cough dry still at night. Patient noting she has lost sense of smell. Can still taste.     Patient recently hospitatlized for Bronch with negative cultures to date.  There was a concern for possible atypical infection she has had enlarging right upper lobe cavitary lesion measures still has a right middle lobe lesion with some cavitation now as well.  I have discussed this case extensively with both Dr. Schwartz as well as Dr. Joseph upon discharge we felt comfortable that this is likely Wegener's granulomatosis  causing cavitary lesions.  Supporting sinus biopsy does note granuloma.    Patient was started on Imuran in March of 2021 but with the growth of her lesion we had rule out for any possible infection given her status of immunosuppression.  She is here today not in her usual state she appears fatigued pale she is not complaining of shortness of breath but does have a cough dry persistent.  She is noticing increased pedal edema.         9/13/21: patient with 2 falls 8 days apart. Spoke w/ pulm shortly following initial fall and we were scheduled for bronch when she sustained another fall.   Scheduled now for 9/17/21 bronch. RUL lesion.   Remains off Imuran  Prednisone 15mg daily.   Sinus congestion. Still productive     She is noting some productive cough at night no hemoptysis.   Patient denies SOB  Having more HA. -frontal and sinus, she is noting some drainage in right ear some mucous/blood.   No edema.   She notes fatigue, no fevers, no night sweats. No weight loss.   Skin easily bruising.     No personal hx of any malignancy.       7/20/2021:    Spoke with Pulm plan for bronch is able working on possible bx site vs BAL.   Need to r/o infectious/neoplastic and confirm for ANCA (GPA vasculitis)   Inflammatory markers markedly down .8  Now 14.   H/H improve from 7.4 to 8.2  Platelets normalized.   Renal stable over time    Patient did attend wedding with approx 300 people unmasked last weekend. Reviewed by recs for COVID precautions given    Current regimen:   pred 15mg (Na stable)  Holding Imuran    Interval events: PET with hypermetabolic lesions:   a. RUL cavitary/cystic lesion 1.9cm x 1.3cm  b. RUL spiculated 1.2cm x 1.4cm  c. RML 1.7cm x 2.0 cm  All with differential: infectious, inflammatory, neoplastic, grannulomatous (a) with favor of inflammation given the rapid change    Fungal immunodiffusion neg  Quant gold and T spot: neg  H/H improving     Sinus congestion, pressure, headache, x 3 weeks very  "productive with blowing nose, back on navage. :  started Cindamycin with DR Quinn x 10 days.   Patient denies any shortness breath.   Very fatigues.   No more fevers. Slight dry cough, no blood/ no mucous.   No SOB.       5/12/21 completed nasal irrigation with biopsy not definitive for GPA, but + inflammation and granulomas. Temporal artery bx negative. Patient with PCP same day as labs 6/2/21  dx with UTI on keflex.     Was seen apparently in ED in [place] for 104F she had altered mental status, dx with UTI, dehydration, treated with. Completed all 7 days of keflex and within 48 hours with another fever 104 F. She continues with congestion but no worse. Patient denies cough, hemoptysis, bloody nasal discharge. She has had no fever x 10 days. Checks twice daily.   while travelling told "congestion in chest on xray". Inflammatory markers very high again.   Patient with recent concern for wegeners needs CT chest. CXR with new airspace opacities right chest- need to r/o GGO vs infection. CT has been ordered pending scheduling.   Broad ABX coverage recommended for now will cc Dr. Natarajan.     I am limited here with her  severe edema from higher dose steroids. Max tolerated is 20mg daily pred  Started Imuran 5/5/2021. Very low dose 50mg BID (0.65mg/kg) tolerating VERY well.   H/H still low.         2/2021  Sinususitis, cx were negative.  Using nasal irrigation with mucoid disharge.   Patient continues with significant nasal discharge and blood with scabs. We discussed Wegeners but pathology sinus no vasculitis, granuloma noted.   No HA, no blurred vision. Recent sinus infection with HA for 10 days. cx +, but also repeat labs demonstarting +PR3 and +ANCA       Patient with c/o right > left 3,4 finger numbness that was intermittent until approximately 2 weeks about having near constant numbness in hands, pins and needles and pain.   Patient not noting much improvement with change in position.     Off MTX 6  tabs weekly. "   Doing well with Hydroxychloroquine.   Declined COVID vaccine    Gabapentin up to 300mg TID.   Now with Dr. Campbell doing PT for Plantar fasciitis. She is taking supplement and compound cream for joint and neuropathy. No response to tarsal tunnel injection per Dr. Campbell.   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 WBC ok.       11/2020  Patient with PMR   MTX at 4 tabs weekly new anemia. Thrombocytosis  Pred at 10mg   Complicated with peripheral edema rather severe , started MTX seeing trending CRP/ESR but persistent leg pain.   Seen with PCP for neuropathy s/sx not seeing much benefit with gabpentin  Seen with Cardiology- no concern for cardiac ECHO ok. dc'd lasix for hyponatremia.     8/2020  Pred 20mg with mild edema, pred 30 mg with severe edema.   Current Pred 15mg daily  Rising ESR again.   Lasix 20 mg daily with improved edema but having some pins and needles in feet/legs.   Having tight, burning stinging at the toes and feet. Heaviness. Prickly.   Noting sodium is falling, K 5.2-5.4 still using           7/2020:  Hands not painful, knees not painful. Shoulder/cervical and down the arms, markedly improved. She still has some pain him hips and groin with walking and some weakness to the legs with edema. Some pain with fixing own hair.   She takes in AM regarding hips ache, some shoulder/arms, and again at night because hips/legs.   The clue is that the LDN new dose she had some ASE, previously tolerated 3mg daily fine.     Historical review of present Illness.   Patient with a recent chronic sinusitis that ultimately warranted a septoplasty, endoscopic sinus surgery and turbinate reduction 5/2020   Four weeks postop t increasingly worse she was noting pain in her throat and ears lasting several hours complaint of pdizziness she had symptoms of ear pain and decreased hearing in the left ear.  She underwent a  debridement at this visit she continue with compound nasal irrigations.  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    Patient did have repeat procedure on with biopsies taken 07/03/2020 showing right maxillary sinus chronically inflamed fibrotic polypoid portions benign nasal mucosa left maxillary sinus similar polypoid fragments ulcerated markedly inflamed respiratory mucosa focal foreign body giant cell response noted suggested that this might have been from her Gelfoam packing as a possible cause of this reaction    I have spoken with Dr. Quinn prior to patient's visit today and was a concern that she could have triggered some inflammatory response with the Gelfoam packing    Patient states she no longer has a headache is not noticing much ear drainage continues with profound loss of hearing on the left and rather significant on the right both sensorineural and some conductive changes.    Restarted on prednisone as of 7/20/20  10 mg patient has not noticed any change with her hearing in this time she did notice slight improvement with her joint aches and pains but is still relying on hydrocodone for the bulk of this.    She denies a persistent headache she does have some tenderness about the preauricular region particularly on the right more than the left.  She has denied jaw claudication changes in her voice or any amaurosis fugax.  She has no pre-existing history of joint aches  and pains hip or otherwise.  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  She had a workup that involved a negative rheumatoid factor, age appropriate sedimentation rate,C reactive protein JANET was positive 1:160 in speckled pattern        Previous: Hx:   She is having pain in her hand with stiffness and right 2nd PIP joint now unable to flex. 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   Hx of bilateral TKA 3 and 5 years ago and those are doing well.   She notes intermittent edema. Some hammer toe deformity bilaterally making shoes problematic but not painful otherwise.   Long hx of GERD-severe.   Cannot tolerate NSAIDs at all w/o gastritis.   Can use Hydrocodone PRN   Added Naltrexone at 3mg and doing very well  Lost 30# with WW.   Patient denies weight loss, rashes, dry eye, dry mouth, nasal or palatal ulcerations,  lymphadenopathy, Raynaud's, hx of DVT/miscarriages, psoriasis or family hx of psoriasis, rashes, serositis, anemia or other constitutional symptoms.  Asking about naltrexone  Component      Latest Ref Rng & Units 8/8/2020 7/29/2020 7/20/2020 7/13/2020   Sodium      136 - 145 mmol/L  126 (L) 128 (L)    Potassium      3.5 - 5.1 mmol/L  5.4 (H) 5.2 (H)    Chloride      95 - 110 mmol/L  92 (L) 92 (L)    CO2      23 - 29 mmol/L  25 29    Glucose      70 - 110 mg/dL  109 109    BUN, Bld      8 - 23 mg/dL  13 11    Creatinine      0.5 - 1.4 mg/dL  0.8 0.8    Calcium      8.7 - 10.5 mg/dL  9.5 9.7    Anion Gap      8 - 16 mmol/L  9 7 (L)    eGFR if African American      >60 mL/min/1.73 m:2  >60.0 >60.0    eGFR if non African American      >60 mL/min/1.73 m:2  >60.0 >60.0    Anti Sm Antibody      0.00 - 0.99 Ratio    0.04   Anti-Sm Interpretation      Negative    Negative   Anti Sm/RNP Antibody      0.00 - 0.99 Ratio    0.09   Anti-Sm/RNP Interpretation      Negative    Negative   JANET Screen      None Detected       Rheumatoid  Factor      0.0 - 15.0 IU/mL       Sed Rate      0 - 36 mm/Hr 57 (H)  54 (H) 75 (H)   CRP      0.0 - 8.2 mg/L 75.4 (H) 56.1 (H) 57.5 (H) 99.4 (H)   Anti-Histone Antibody      0.0 - 0.9 Units    0.4   ds DNA Ab      Negative 1:10    Negative 1:10     Component      Latest Ref Rng & Units 6/25/2020 11/10/2018   Sodium      136 - 145 mmol/L     Potassium      3.5 - 5.1 mmol/L     Chloride      95 - 110 mmol/L     CO2      23 - 29 mmol/L     Glucose      70 - 110 mg/dL     BUN, Bld      8 - 23 mg/dL     Creatinine      0.5 - 1.4 mg/dL     Calcium      8.7 - 10.5 mg/dL     Anion Gap      8 - 16 mmol/L     eGFR if African American      >60 mL/min/1.73 m:2     eGFR if non African American      >60 mL/min/1.73 m:2     Anti Sm Antibody      0.00 - 0.99 Ratio     Anti-Sm Interpretation      Negative     Anti Sm/RNP Antibody      0.00 - 0.99 Ratio     Anti-Sm/RNP Interpretation      Negative     JANET Screen      None Detected Detected (A) Detected (A)   Rheumatoid Factor      0.0 - 15.0 IU/mL 11.6 <8.6   Sed Rate      0 - 36 mm/Hr     CRP      0.0 - 8.2 mg/L     Anti-Histone Antibody      0.0 - 0.9 Units     ds DNA Ab      Negative 1:10         REVIEW OF SYSTEMS:    Review of Systems   Constitutional: Negative for fever, malaise/fatigue and weight loss.   HENT: Negative for sore throat.    Eyes: Negative for double vision, photophobia and redness.   Respiratory: Negative for cough, shortness of breath and wheezing.    Cardiovascular: Negative for chest pain, palpitations and orthopnea.   Gastrointestinal: Negative for abdominal pain, constipation and diarrhea.   Genitourinary: Negative for dysuria, hematuria and urgency.   Musculoskeletal: Positive for joint pain. Negative for back pain and myalgias.   Skin: Negative for rash.   Neurological: Negative for dizziness, tingling, focal weakness and headaches.   Endo/Heme/Allergies: Does not bruise/bleed easily.   Psychiatric/Behavioral: Negative for depression, hallucinations and  "suicidal ideas.               Objective:            Past Medical History:   Diagnosis Date    Anesthesia     'Mother stop breathing after anesthesia"    Chronic sinusitis     Depression     GERD (gastroesophageal reflux disease)     PVC (premature ventricular contraction)      Family History   Problem Relation Age of Onset    Heart disease Mother     Arthritis Mother     Cancer Sister     Arthritis Sister     Diabetes Sister     Hypertension Sister      Social History     Tobacco Use    Smoking status: Former     Current packs/day: 0.00     Types: Cigarettes     Start date: 2/3/1955     Quit date: 1/3/1960     Years since quittin.7    Smokeless tobacco: Never   Substance Use Topics    Alcohol use: Never     Alcohol/week: 0.0 standard drinks of alcohol    Drug use: Never         Current Outpatient Medications on File Prior to Visit   Medication Sig Dispense Refill    acetaminophen (TYLENOL) 500 MG tablet Take 500 mg by mouth every 6 (six) hours as needed for Pain.      albuterol (PROVENTIL/VENTOLIN HFA) 90 mcg/actuation inhaler INHALE 2 PUFFS BY MOUTH EVERY 4 TO 6 HOURS AS NEEDED FOR SHORTNESS OF BREATH OR COUGH      ascorbate calcium (MAHSA-C ORAL) Take by mouth.      B2-B6 phos-levomef simran-mecobal (EB-N3 DR) 1.3-70-6-4 mg CpDR Take 1 capsule by mouth once daily.      cetirizine (ZYRTEC) 10 MG tablet TAKE 1 TABLET ONE TIME DAILY 90 tablet 0    denosumab (PROLIA SUBQ) Inject into the skin. Two times a year      diclofenac sodium (VOLTAREN) 1 % Gel APPLY TO THE AFFECTED AREA(S) 2 GRAMS THREE TIMES DAILY 600 g 0    furosemide (LASIX) 20 MG tablet TAKE 1 TABLET EVERY DAY 90 tablet 3    gabapentin (NEURONTIN) 400 MG capsule TAKE 1 CAPSULE THREE TIMES DAILY 270 capsule 3    HYDROcodone-acetaminophen (NORCO) 7.5-325 mg per tablet Take 1 tablet by mouth every 6 (six) hours as needed.      ITRACONAZOLE, BULK, MISC EMPTY CONTENTS OF 1 CAPSULE INTO NASAL IRRIGATION SYSTEM, ADD DISTILLED WATER, SALT PACK, MIX & IRRIGATE. " PERFORM 2 TIMES DAILY      LACTOBACILLUS ACIDOPHILUS ORAL Take by mouth.      metoprolol succinate (TOPROL-XL) 50 MG 24 hr tablet TAKE 1 TABLET EVERY DAY 90 tablet 3    mirabegron (MYRBETRIQ) 50 mg Tb24 Take 1 tablet (50 mg total) by mouth once daily. 30 tablet 11    neomycin-polymyxin-dexamethasone (MAXITROL) 3.5mg/mL-10,000 unit/mL-0.1 % DrpS       omeprazole (PRILOSEC) 40 MG capsule TAKE 1 CAPSULE EVERY MORNING 90 capsule 0    ondansetron (ZOFRAN-ODT) 8 MG TbDL Take 8 mg by mouth 2 (two) times daily.      peg 400-propylene glycol, PF, (SYSTANE, PF,) 0.4-0.3 % Dpet Apply to eye 2 (two) times a day.      predniSONE (DELTASONE) 2.5 MG tablet Take 1 tablet (2.5 mg total) by mouth once daily. 90 tablet 3    predniSONE (DELTASONE) 5 MG tablet Take 1 tablet (5 mg total) by mouth once daily. 30 tablet 3    sodium chloride 0.9% SolP 500 mL with riTUXimab 10 mg/mL Conc 375 mg/m2 Inject 375 mg/m2 into the vein. 4 to 6 months      tobramycin sulfate 0.3% (TOBREX) 0.3 % ophthalmic solution Place into both eyes.      vancomycin (VANCOCIN) 1,000 mg injection SMARTSI Vial(s) Both Nares Twice Daily      vit C/E/Zn/coppr/lutein/zeaxan (PRESERVISION AREDS-2 ORAL) Take 1 capsule by mouth 2 (two) times a day.       [DISCONTINUED] naltrexone capsule Take 3 mg by mouth once daily.       No current facility-administered medications on file prior to visit.       Vitals:    23 1306   BP: 134/65   Pulse: 69       Physical Exam:    Physical Exam   Constitutional: She is oriented to person, place, and time. She appears well-developed and well-nourished.   HENT:   Head: Normocephalic and atraumatic.   Mouth/Throat: Oropharynx is clear and moist.   Eyes: Pupils are equal, round, and reactive to light. EOM are normal.   Neck: Normal range of motion.   Cardiovascular: Normal rate, regular rhythm and normal heart sounds.   Pulmonary/Chest: Effort normal and breath sounds normal.   Musculoskeletal:        Right shoulder: She exhibits  normal range of motion, no tenderness and no swelling.        Left shoulder: She exhibits normal range of motion, no tenderness and no swelling.        Right elbow: She exhibits normal range of motion and no swelling. No tenderness found.        Left elbow: She exhibits normal range of motion and no swelling. No tenderness found.        Right wrist: She exhibits normal range of motion, no tenderness and no swelling.        Left wrist: She exhibits normal range of motion, no tenderness and no swelling.        Right knee: She exhibits normal range of motion and no swelling. No tenderness found.        Left knee: She exhibits normal range of motion and no swelling. No tenderness found.        Right hand: She exhibits decreased range of motion and tenderness. She exhibits no swelling.        Left hand: She exhibits decreased range of motion and tenderness. She exhibits no swelling.        Right foot: There is normal range of motion, no tenderness and no swelling.        Left foot: There is normal range of motion, no tenderness and no swelling.   Bony hypertrophy at the PIP and DIP joints. +squaring at the CMC joint. No active synovitis on 28 joint exam.    Neurological: She is alert and oriented to person, place, and time.   Skin: Skin is warm and dry.   Psychiatric: She has a normal mood and affect. Her behavior is normal.     Component      Latest Ref Rng & Units 9/17/2021 9/17/2021          11:03 AM 11:03 AM   Respiratory Culture        No growth   Gram Stain (Respiratory)       No organisms seen No WBC's   AFB Culture & Smear           AFB CULTURE STAIN           GENET Prep           Fungus (Mycology) Culture           Aerobic Bacterial Culture             Component      Latest Ref Rng & Units 9/17/2021          11:03 AM   Respiratory Culture       No Staph aureus, MRSA or Pseudomonas isolated.   Gram Stain (Respiratory)       No organisms seen   AFB Culture & Smear          AFB CULTURE STAIN          GENET Prep           Fungus (Mycology) Culture          Aerobic Bacterial Culture            Component      Latest Ref Rng & Units 2021          11:03 AM 11:03 AM   Respiratory Culture       Normal respiratory nimesh    Gram Stain (Respiratory)       Moderate WBC's    AFB Culture & Smear        Culture in progress   AFB CULTURE STAIN        No acid fast bacilli seen.   KOH Prep        No yeast or fungal elements seen   Fungus (Mycology) Culture        Culture in progress   Aerobic Bacterial Culture                 Narrative  Performed by: [facility]  Patient - ONIEL ROSARIO                   - 1940 Sex- F   Med Rec # - [id number]                        Bimal Mckeon M.D.   The following is an electronic copy of report # [id number] from:   [facility]   [address] [place] [id number]                         Phone [phone]   DIAGNOSIS:   2021  JL/sdc     1, 2.  RIGHT UPPER LOBE MASS BRUSHING AND FINE NEEDLE ASPIRATE BIOPSIES:   - NEGATIVE FOR MALIGNANT CELLS.     - PURULENT EXUDATE.     - A FEW FRAGMENTS OF BRONCHIAL MUCOSA WITH FLORID CHRONIC BRONCHITIS    AND REACTIVE SQUAMOUS    ATYPICAL METAPLASIA.       Comment:   Special stains (AFB and GMS) are negative for organisms. While no    granulomas are seen here, the inflammatory reaction appears to be    similar to that seen in the sinus material over the past few years    (Delta [id number], [id number], [id number]). 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 Bronchoscopic material, particularly of the lower    respiratory tract, is of low yield for a definitive diagnosis of that    entity. The previous sinus material will be reviewed with    consideration for that diagnosis.   _______________________________________________________________________   SPECIMEN AND SOURCE:   1. [facility]  mass brushing   2. RUL mass needle     CLINICAL INFORMATION:   Clinical Information:-gt Right Upper Lobe Mass   Specific Site:-gt RUL mass brushing T Brush; RUL Mass; RUL mass-    microbiology; RUL BAL; RUL washing;   Other Requests:-gt N/A     GROSS DESCRIPTION:   1. Received 40 mL of fluid in formalin, 1 dry slides and 1 slides in    95% alcohol. Prepared one cell block.     2. Received 40 mL of fluid in formalin, 1 dry slides and 1 slides in    95% alcohol. Prepared one cell block.     CODE: 0     Cytotechnologist: ABDIFATAH Marvin (ASCP)   Pathologist: Scott Johnson MD (Electronic Signature) 09/22/2021 2:34 PM     [facility], [facility] * [address] [place] [address] * [place] [address]   Technical services performed at: [facility], [facility] * [address] [place] [address] * [place] [address]   Screening Site: [facility], [facility] * [address] [place] [address] *    [place] [address]                         Post Rituxan image                                     Pre Rituxan image  Assessment:       No diagnosis found.         Plan:        There are no diagnoses linked to this encounter.     Patient is a 82-year-old female she has had a recent robust inflammatory reaction within the sinus cavity as well as hearing deficit following a sinus surgery.  She has been repeated cultured postoperatively completed antibiotics and irrigations negative for any fungal infections or bacterial infections.   Patient was doing well on MTX with regard to ESR and CRP, but hair loss.     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    Completed RTX 11/5/21 Induction    Review of data and feel : The best data supporting the use of  as maintenance therapy come from the Maintenance of Remission using Rituximab in Systemic ANCA-associated Vasculitis (MAINRITSAN) trial with less relapse for PR3+ paitients when treated with Rituxan at 500mg IV at 6, 12, and 18months when compared to  Imuran. Patient has tolerated RTX last 4/20/2022, she had to cancel 10/21/2022 for COVID. Did receive 500mg on 12/2022. Received again 7/2023. Patient has completed 18 months of Rituxan therapy.      - would consider transferring her over to MTX given sinuses are doing well, completed 2 years of Rituxan and     Chronic Sinusitis: continue with irrigation from ENT for now and we will monitor.     CKD  Patient did have casts with hematuria 10/12/21 prior to start of Rituxan. Improved following RTX       Continue omeprazole (prilosec)  40mg by mouth daily.          COVID vaccine #3 shot for immunosuppressed to be done March 2022. She is only 3 months out from Rituxan but I want her to get the new COVID variant vaccine anyway. There is still some T cell benefit. FLU and RSV this fall as well.       Patient hx of Osteopenia only, renal function not stable enough for oral bisphos.     she is having some spinous process tenderness today but pain not classic of vertebral fracture will check imaging anyway. She had rib fractures last year as well.    Completed. Prolia 2022. Off with most recent DXA Osteopenia. Continues with Calcium and Vitamin D.            No follow-ups on file.      f/u 3 months  Thank you for allowing me to participate in the care of this very pleasant patient.       40 min consultation with greater than 50% spent in counseling, chart review and coordination of care. All questions answered.

## 2023-09-27 ENCOUNTER — PATIENT MESSAGE (OUTPATIENT)
Dept: RHEUMATOLOGY | Facility: CLINIC | Age: 83
End: 2023-09-27
Payer: MEDICARE

## 2023-09-27 ENCOUNTER — TELEPHONE (OUTPATIENT)
Dept: UROLOGY | Facility: CLINIC | Age: 83
End: 2023-09-27
Payer: MEDICARE

## 2023-09-27 NOTE — TELEPHONE ENCOUNTER
----- Message from Janak Allen MD sent at 9/25/2023 11:23 AM CDT -----  Hello.  Please contact her with an appointment as she is having prominent bladder symptoms, frequent UTIS and did not tolerate myrbetriq due to side effects.  Thanks!

## 2023-10-02 NOTE — PROGRESS NOTES
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 She did have surgery with Dr. Quinn end of June 2023, by July visit noted improvement with compound irrigation from prior. Still with chronic sinusitis.   Last Rituxan 500mg single dose 7/2023.    She is doing much better with the congestion but nothing like it was before. She denies fevers, chills, some cough, no hemoptysis.   She is down to Prednisone 2.5 mg daily but overall her edema is much improved.   Renal function stable  ESR and CRP    She does not want to take MTX she lost significant amounts of hair.   Imuran was lost with flare and transition to Imuran.     She has completed Rituxan 18 months with sinuses under control, lungs clear , renal function stable. Hearing is stable she lost nearly 90% of hearing and has stabilized.   She has mac degeneration.   Neuropathy continues to be a problem but we never have petechial rashes.   Edema is much improved.     3/2023:   Patient current on RTX Q 6 month maintanance following induction for Wegeners.    12/2/22 (Delayed COVID infection) RTX was 500mg with solumedrol 80mg IM   Completed Rituxan 1000mg for 1st  maintenance at 4 months 4/20/2022. due to rise in symptoms, and ESR   Induction 10/2021.      Continued nasal congestion and drainage.   Recent significant nosebleeds. Endoscopy with recurrent crusting despite office debridement by ENT and continued irrigation.      Will repeat serologies and inflammatory markers and check CD 19 levels first week March and see her later that month.   Patient doing very well. Still rhinitis, using navage. No antibiotics from ENT since last visit. She is still clearing significant mucous faint with dried blood.   Dry cough, no fevers, no SOB\  Prolia: 10/17/2022         11/2022  Patient doing very well.  Still rhinitis, using navage. No antibiotics from ENT since last visit. She is still clearing significant mucous faint with dried blood.   Dry cough, no fevers, no SOB  Mild HA only with elevated BP.   Completed Rituxan maintanence at 4 months 4/20/2022.   Prolia 3/25/22  Evusheld with catch up dose 2/10/22 and 3/25/2022     5/5/2022:   Patient doing very well. Still rhinitis, using navage. No antibiotics from ENT since last visit. She is still clearing significant mucous faint with dried blood.   Dry cough, no fevers, no SOB  Mild HA only with elevated BP.   Completed Rituxan maintanence at 4 months 4/20/2022.   Prolia 3/25/22  Evusheld with catch up dose 2/10/22 and 3/25/2022.      Major complaints: pins, needles, stinging, burning in feet day and night is constant. Dr. Campbell: EB-N5 supplement is helping some, stopped for months and noted significant worsening. Dr. Campbell did increase from EB-N3.   Gabapentin 400mg TID  Hydrocodone only PRN  Prednisone 7.5mg         2/2022  Doing well no cough, no fevers. Recent COVID exposure but negative.   Seen with Nephro.   Given her successful response with Ritux. Agree to continue maintanance with Ritux Q 4-6 months     Needs to get #3 COVID vaccine timing with RTX. - will time this with me 2 weeks prior to   Discussed Prolia  Working on Evusheld for PreP with COVID> she is scheduled 2/10/22        12/9/2021  Patient's recent CXR 12/21/21 with marked improvement when compared to 10/21/21  Prednisone 10mg daily  S/p RTX x 4 infusions completion date. 11/5/21  Patient with rise in creatinine to 2.0 on 11/26 had been requiring lasix for marked edema since 11/1/21. Stopped 2 weeks ago. Edema is present but managed. Drinking about 40 oz water and 3 cups of coffee daily     Patient completed [facility] PT felt she was doing well home exercises about 2 weeks ago with acute pain in lumbar spine after exercising. Imaging lumbar few days ago without evidence of a fracture. She point to  pain in the lumbosacral and radiating to buttock region. Ok to rise from seated. Pain more on left low back and buttock. No numbness no tingling but pain in the hamstring region. Comfortable sitting but not lying down, but she never sleeps in bed. Long hx of rather impressive scoliosis.         11/11/2021:   Inflammatory markers are markedly improved.   Cough dry still at night. Patient noting she has lost sense of smell. Can still taste.      Patient recently hospitatlized for Bronch with negative cultures to date.  There was a concern for possible atypical infection she has had enlarging right upper lobe cavitary lesion measures still has a right middle lobe lesion with some cavitation now as well.  I have discussed this case extensively with both Dr. Schwartz as well as Dr. Joseph upon discharge we felt comfortable that this is likely Wegener's granulomatosis causing cavitary lesions.  Supporting sinus biopsy does note granuloma.    Patient was started on Imuran in March of 2021 but with the growth of her lesion we had rule out for any possible infection given her status of immunosuppression.  She is here today not in her usual state she appears fatigued pale she is not complaining of shortness of breath but does have a cough dry persistent.  She is noticing increased pedal edema.           9/13/21: patient with 2 falls 8 days apart. Spoke w/ pulm shortly following initial fall and we were scheduled for bronch when she sustained another fall.   Scheduled now for 9/17/21 bronch. RUL lesion.   Remains off Imuran  Prednisone 15mg daily.   Sinus congestion. Still productive      She is noting some productive cough at night no hemoptysis.   Patient denies SOB  Having more HA. -frontal and sinus, she is noting some drainage in right ear some mucous/blood.   No edema.   She notes fatigue, no fevers, no night sweats. No weight loss.   Skin easily bruising.      No personal hx of any malignancy.         7/20/2021:    Spoke  "with Pulm plan for bronch is able working on possible bx site vs BAL.   Need to r/o infectious/neoplastic and confirm for ANCA (GPA vasculitis)   Inflammatory markers markedly down .8  Now 14.   H/H improve from 7.4 to 8.2  Platelets normalized.   Renal stable over time     Patient did attend wedding with approx 300 people unmasked last weekend. Reviewed by recs for COVID precautions given     Current regimen:   pred 15mg (Na stable)  Holding Imuran     Interval events: PET with hypermetabolic lesions:   a. RUL cavitary/cystic lesion 1.9cm x 1.3cm  b. RUL spiculated 1.2cm x 1.4cm  c. RML 1.7cm x 2.0 cm  All with differential: infectious, inflammatory, neoplastic, grannulomatous (a) with favor of inflammation given the rapid change     Fungal immunodiffusion neg  Quant gold and T spot: neg  H/H improving      Sinus congestion, pressure, headache, x 3 weeks very productive with blowing nose, back on navage. :  started Cindamycin with DR Quinn x 10 days.   Patient denies any shortness breath.   Very fatigues.   No more fevers. Slight dry cough, no blood/ no mucous.   No SOB.         5/12/21 completed nasal irrigation with biopsy not definitive for GPA, but + inflammation and granulomas. Temporal artery bx negative. Patient with PCP same day as labs 6/2/21  dx with UTI on keflex.      Was seen apparently in ED in [place] for 104F she had altered mental status, dx with UTI, dehydration, treated with. Completed all 7 days of keflex and within 48 hours with another fever 104 F. She continues with congestion but no worse. Patient denies cough, hemoptysis, bloody nasal discharge. She has had no fever x 10 days. Checks twice daily.   while travelling told "congestion in chest on xray". Inflammatory markers very high again.   Patient with recent concern for wegeners needs CT chest. CXR with new airspace opacities right chest- need to r/o GGO vs infection. CT has been ordered pending scheduling.   Broad ABX coverage " recommended for now will cc Dr. Natarajan.      I am limited here with her  severe edema from higher dose steroids. Max tolerated is 20mg daily pred  Started Imuran 5/5/2021. Very low dose 50mg BID (0.65mg/kg) tolerating VERY well.   H/H still low.            2/2021  Sinususitis, cx were negative.  Using nasal irrigation with mucoid disharge.   Patient continues with significant nasal discharge and blood with scabs. We discussed Wegeners but pathology sinus no vasculitis, granuloma noted.   No HA, no blurred vision. Recent sinus infection with HA for 10 days. cx +, but also repeat labs demonstarting +PR3 and +ANCA         Patient with c/o right > left 3,4 finger numbness that was intermittent until approximately 2 weeks about having near constant numbness in hands, pins and needles and pain.   Patient not noting much improvement with change in position.      Off MTX 6  tabs weekly.   Doing well with Hydroxychloroquine.   Declined COVID vaccine     Gabapentin up to 300mg TID.   Now with Dr. Campbell doing PT for Plantar fasciitis. She is taking supplement and compound cream for joint and neuropathy. No response to tarsal tunnel injection per Dr. Campbell.   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 WBC ok.         11/2020  Patient with PMR   MTX at 4 tabs weekly new anemia. Thrombocytosis  Pred at 10mg   Complicated with peripheral edema rather severe , started MTX seeing trending CRP/ESR but persistent leg pain.   Seen with PCP for neuropathy s/sx not seeing much benefit with gabpentin  Seen with Cardiology- no concern for cardiac ECHO ok. dc'd lasix for hyponatremia.      8/2020  Pred 20mg with mild edema, pred 30 mg with severe edema.   Current Pred 15mg daily  Rising ESR again.   Lasix 20 mg daily with improved edema but having some pins and needles in feet/legs.   Having tight, burning stinging at the toes and feet.  Heaviness. Prickly.   Noting sodium is falling, K 5.2-5.4 still using               7/2020:  Hands not painful, knees not painful. Shoulder/cervical and down the arms, markedly improved. She still has some pain him hips and groin with walking and some weakness to the legs with edema. Some pain with fixing own hair.   She takes in AM regarding hips ache, some shoulder/arms, and again at night because hips/legs.   The clue is that the LDN new dose she had some ASE, previously tolerated 3mg daily fine.      Historical review of present Illness.   Patient with a recent chronic sinusitis that ultimately warranted a septoplasty, endoscopic sinus surgery and turbinate reduction 5/2020   Four weeks postop t increasingly worse she was noting pain in her throat and ears lasting several hours complaint of pdizziness she had symptoms of ear pain and decreased hearing in the left ear.  She underwent a debridement at this visit she continue with compound nasal irrigations.  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     Patient did have repeat procedure on with biopsies taken 07/03/2020 showing right maxillary sinus chronically inflamed fibrotic polypoid portions benign nasal mucosa left maxillary sinus similar polypoid fragments ulcerated markedly inflamed respiratory mucosa focal foreign body giant cell response noted suggested that this might have been from her Gelfoam packing as a possible  cause of this reaction     I have spoken with Dr. Quinn prior to patient's visit today and was a concern that she could have triggered some inflammatory response with the Gelfoam packing     Patient states she no longer has a headache is not noticing much ear drainage continues with profound loss of hearing on the left and rather significant on the right both sensorineural and some conductive changes.     Restarted on prednisone as of 7/20/20  10 mg patient has not noticed any change with her hearing in this time she did notice slight improvement with her joint aches and pains but is still relying on hydrocodone for the bulk of this.     She denies a persistent headache she does have some tenderness about the preauricular region particularly on the right more than the left.  She has denied jaw claudication changes in her voice or any amaurosis fugax.  She has no pre-existing history of joint aches and pains hip or otherwise.  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  She had a workup that involved a negative rheumatoid factor, age appropriate sedimentation rate,C reactive protein JANET was positive 1:160 in speckled pattern           Previous: Hx:   She is having pain in her hand with stiffness and right 2nd PIP joint now unable to flex. 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   Hx of bilateral TKA 3 and 5 years ago and those are doing well.   She notes intermittent edema. Some hammer toe deformity bilaterally making shoes problematic but not painful otherwise.   Long hx of GERD-severe.   Cannot tolerate NSAIDs at all w/o gastritis.   Can use Hydrocodone PRN   Added Naltrexone at 3mg and doing very well  Lost 30# with WW.   Patient denies weight loss, rashes, dry eye, dry mouth, nasal or palatal ulcerations,  lymphadenopathy, Raynaud's, hx of DVT/miscarriages, psoriasis or family hx of psoriasis, rashes, serositis, anemia or  other constitutional symptoms.  Asking about naltrexone  Component      Latest Ref Rng & Units 8/8/2020 7/29/2020 7/20/2020 7/13/2020   Sodium      136 - 145 mmol/L   126 (L) 128 (L)     Potassium      3.5 - 5.1 mmol/L   5.4 (H) 5.2 (H)     Chloride      95 - 110 mmol/L   92 (L) 92 (L)     CO2      23 - 29 mmol/L   25 29     Glucose      70 - 110 mg/dL   109 109     BUN, Bld      8 - 23 mg/dL   13 11     Creatinine      0.5 - 1.4 mg/dL   0.8 0.8     Calcium      8.7 - 10.5 mg/dL   9.5 9.7     Anion Gap      8 - 16 mmol/L   9 7 (L)     eGFR if African American      >60 mL/min/1.73 m:2   >60.0 >60.0     eGFR if non African American      >60 mL/min/1.73 m:2   >60.0 >60.0     Anti Sm Antibody      0.00 - 0.99 Ratio       0.04   Anti-Sm Interpretation      Negative       Negative   Anti Sm/RNP Antibody      0.00 - 0.99 Ratio       0.09   Anti-Sm/RNP Interpretation      Negative       Negative   JANET Screen      None Detected           Rheumatoid Factor      0.0 - 15.0 IU/mL           Sed Rate      0 - 36 mm/Hr 57 (H)   54 (H) 75 (H)   CRP      0.0 - 8.2 mg/L 75.4 (H) 56.1 (H) 57.5 (H) 99.4 (H)   Anti-Histone Antibody      0.0 - 0.9 Units       0.4   ds DNA Ab      Negative 1:10       Negative 1:10      Component      Latest Ref Rng & Units 6/25/2020 11/10/2018   Sodium      136 - 145 mmol/L       Potassium      3.5 - 5.1 mmol/L       Chloride      95 - 110 mmol/L       CO2      23 - 29 mmol/L       Glucose      70 - 110 mg/dL       BUN, Bld      8 - 23 mg/dL       Creatinine      0.5 - 1.4 mg/dL       Calcium      8.7 - 10.5 mg/dL       Anion Gap      8 - 16 mmol/L       eGFR if African American      >60 mL/min/1.73 m:2       eGFR if non African American      >60 mL/min/1.73 m:2       Anti Sm Antibody      0.00 - 0.99 Ratio       Anti-Sm Interpretation      Negative       Anti Sm/RNP Antibody      0.00 - 0.99 Ratio       Anti-Sm/RNP Interpretation      Negative       JANET Screen      None Detected Detected (A) Detected (A)  "  Rheumatoid Factor      0.0 - 15.0 IU/mL 11.6 <8.6   Sed Rate      0 - 36 mm/Hr       CRP      0.0 - 8.2 mg/L       Anti-Histone Antibody      0.0 - 0.9 Units       ds DNA Ab      Negative 1:10             REVIEW OF SYSTEMS:     Review of Systems   Constitutional: Negative for fever, malaise/fatigue and weight loss.   HENT: Negative for sore throat.    Eyes: Negative for double vision, photophobia and redness.   Respiratory: Negative for cough, shortness of breath and wheezing.    Cardiovascular: Negative for chest pain, palpitations and orthopnea.   Gastrointestinal: Negative for abdominal pain, constipation and diarrhea.   Genitourinary: Negative for dysuria, hematuria and urgency.   Musculoskeletal: Positive for joint pain. Negative for back pain and myalgias.   Skin: Negative for rash.   Neurological: Negative for dizziness, tingling, focal weakness and headaches.   Endo/Heme/Allergies: Does not bruise/bleed easily.   Psychiatric/Behavioral: Negative for depression, hallucinations and suicidal ideas.                     Objective:      Objective        Past Medical History:   Diagnosis Date    Anesthesia       'Mother stop breathing after anesthesia"    Chronic sinusitis      Depression      GERD (gastroesophageal reflux disease)      PVC (premature ventricular contraction)              Family History   Problem Relation Age of Onset    Heart disease Mother      Arthritis Mother      Cancer Sister      Arthritis Sister      Diabetes Sister      Hypertension Sister        Social History            Tobacco Use    Smoking status: Former       Current packs/day: 0.00       Types: Cigarettes       Start date: 2/3/1955       Quit date: 1/3/1960       Years since quittin.7    Smokeless tobacco: Never   Substance Use Topics    Alcohol use: Never       Alcohol/week: 0.0 standard drinks of alcohol    Drug use: Never                   Current Outpatient Medications on File Prior to Visit   Medication Sig Dispense " Refill    acetaminophen (TYLENOL) 500 MG tablet Take 500 mg by mouth every 6 (six) hours as needed for Pain.        albuterol (PROVENTIL/VENTOLIN HFA) 90 mcg/actuation inhaler INHALE 2 PUFFS BY MOUTH EVERY 4 TO 6 HOURS AS NEEDED FOR SHORTNESS OF BREATH OR COUGH        ascorbate calcium (MAHSA-C ORAL) Take by mouth.        B2-B6 phos-levomef simran-mecobal (EB-N3 DR) 1.3-70-6-4 mg CpDR Take 1 capsule by mouth once daily.        cetirizine (ZYRTEC) 10 MG tablet TAKE 1 TABLET ONE TIME DAILY 90 tablet 0    denosumab (PROLIA SUBQ) Inject into the skin. Two times a year        diclofenac sodium (VOLTAREN) 1 % Gel APPLY TO THE AFFECTED AREA(S) 2 GRAMS THREE TIMES DAILY 600 g 0    furosemide (LASIX) 20 MG tablet TAKE 1 TABLET EVERY DAY 90 tablet 3    gabapentin (NEURONTIN) 400 MG capsule TAKE 1 CAPSULE THREE TIMES DAILY 270 capsule 3    HYDROcodone-acetaminophen (NORCO) 7.5-325 mg per tablet Take 1 tablet by mouth every 6 (six) hours as needed.        ITRACONAZOLE, BULK, MISC EMPTY CONTENTS OF 1 CAPSULE INTO NASAL IRRIGATION SYSTEM, ADD DISTILLED WATER, SALT PACK, MIX & IRRIGATE. PERFORM 2 TIMES DAILY        LACTOBACILLUS ACIDOPHILUS ORAL Take by mouth.        metoprolol succinate (TOPROL-XL) 50 MG 24 hr tablet TAKE 1 TABLET EVERY DAY 90 tablet 3    mirabegron (MYRBETRIQ) 50 mg Tb24 Take 1 tablet (50 mg total) by mouth once daily. 30 tablet 11    neomycin-polymyxin-dexamethasone (MAXITROL) 3.5mg/mL-10,000 unit/mL-0.1 % DrpS          omeprazole (PRILOSEC) 40 MG capsule TAKE 1 CAPSULE EVERY MORNING 90 capsule 0    ondansetron (ZOFRAN-ODT) 8 MG TbDL Take 8 mg by mouth 2 (two) times daily.        peg 400-propylene glycol, PF, (SYSTANE, PF,) 0.4-0.3 % Dpet Apply to eye 2 (two) times a day.        predniSONE (DELTASONE) 2.5 MG tablet Take 1 tablet (2.5 mg total) by mouth once daily. 90 tablet 3    predniSONE (DELTASONE) 5 MG tablet Take 1 tablet (5 mg total) by mouth once daily. 30 tablet 3    sodium chloride 0.9% SolP 500 mL with  riTUXimab 10 mg/mL Conc 375 mg/m2 Inject 375 mg/m2 into the vein. 4 to 6 months        tobramycin sulfate 0.3% (TOBREX) 0.3 % ophthalmic solution Place into both eyes.        vancomycin (VANCOCIN) 1,000 mg injection SMARTSI Vial(s) Both Nares Twice Daily        vit C/E/Zn/coppr/lutein/zeaxan (PRESERVISION AREDS-2 ORAL) Take 1 capsule by mouth 2 (two) times a day.         [DISCONTINUED] naltrexone capsule Take 3 mg by mouth once daily.          No current facility-administered medications on file prior to visit.             Vitals:     23 1306   BP: 134/65   Pulse: 69         Physical Exam:     Physical Exam   Constitutional: She is oriented to person, place, and time. She appears well-developed and well-nourished.   HENT:   Head: Normocephalic and atraumatic.   Mouth/Throat: Oropharynx is clear and moist.   Eyes: Pupils are equal, round, and reactive to light. EOM are normal.   Neck: Normal range of motion.   Cardiovascular: Normal rate, regular rhythm and normal heart sounds.   Pulmonary/Chest: Effort normal and breath sounds normal.   Musculoskeletal:        Right shoulder: She exhibits normal range of motion, no tenderness and no swelling.        Left shoulder: She exhibits normal range of motion, no tenderness and no swelling.        Right elbow: She exhibits normal range of motion and no swelling. No tenderness found.        Left elbow: She exhibits normal range of motion and no swelling. No tenderness found.        Right wrist: She exhibits normal range of motion, no tenderness and no swelling.        Left wrist: She exhibits normal range of motion, no tenderness and no swelling.        Right knee: She exhibits normal range of motion and no swelling. No tenderness found.        Left knee: She exhibits normal range of motion and no swelling. No tenderness found.        Right hand: She exhibits decreased range of motion and tenderness. She exhibits no swelling.        Left hand: She exhibits decreased  range of motion and tenderness. She exhibits no swelling.        Right foot: There is normal range of motion, no tenderness and no swelling.        Left foot: There is normal range of motion, no tenderness and no swelling.   Bony hypertrophy at the PIP and DIP joints. +squaring at the CMC joint. No active synovitis on 28 joint exam.    Neurological: She is alert and oriented to person, place, and time.   Skin: Skin is warm and dry.   Psychiatric: She has a normal mood and affect. Her behavior is normal.      Component      Latest Ref Rng & Units 2021          11:03 AM 11:03 AM   Respiratory Culture         No growth   Gram Stain (Respiratory)       No organisms seen No WBC's   AFB Culture & Smear             AFB CULTURE STAIN             GENET Prep             Fungus (Mycology) Culture             Aerobic Bacterial Culture                Component      Latest Ref Rng & Units 2021          11:03 AM   Respiratory Culture       No Staph aureus, MRSA or Pseudomonas isolated.   Gram Stain (Respiratory)       No organisms seen   AFB Culture & Smear           AFB CULTURE STAIN           GENET Prep           Fungus (Mycology) Culture           Aerobic Bacterial Culture              Component      Latest Ref Rng & Units 2021          11:03 AM 11:03 AM   Respiratory Culture       Normal respiratory nimesh     Gram Stain (Respiratory)       Moderate WBC's     AFB Culture & Smear         Culture in progress   AFB CULTURE STAIN         No acid fast bacilli seen.   KOH Prep         No yeast or fungal elements seen   Fungus (Mycology) Culture         Culture in progress   Aerobic Bacterial Culture                      Narrative  Performed by: [facility]  Patient - ONIEL ROSARIO                   - 1940 Sex- F   Med Rec # - [id number]                        Bimal Mckeon M.D.   The following is an electronic copy of report # [id number] from:   [facility]   [address] [place] [address]                        [place] [address]                         Phone [phone]   DIAGNOSIS:   09/22/2021  /sdc     1, 2.  RIGHT UPPER LOBE MASS BRUSHING AND FINE NEEDLE ASPIRATE BIOPSIES:   - NEGATIVE FOR MALIGNANT CELLS.     - PURULENT EXUDATE.     - A FEW FRAGMENTS OF BRONCHIAL MUCOSA WITH FLORID CHRONIC BRONCHITIS    AND REACTIVE SQUAMOUS    ATYPICAL METAPLASIA.       Comment:   Special stains (AFB and GMS) are negative for organisms. While no    granulomas are seen here, the inflammatory reaction appears to be    similar to that seen in the sinus material over the past few years    (Delta [id number], [id number], [id number]). 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 Bronchoscopic material, particularly of the lower    respiratory tract, is of low yield for a definitive diagnosis of that    entity. The previous sinus material will be reviewed with    consideration for that diagnosis.   _______________________________________________________________________   SPECIMEN AND SOURCE:   1. RUL mass brushing   2. RUL mass needle     CLINICAL INFORMATION:   Clinical Information:-gt Right Upper Lobe Mass   Specific Site:-gt RUL mass brushing T Brush; RUL Mass; RUL mass-    microbiology; RUL BAL; RUL washing;   Other Requests:-gt N/A     GROSS DESCRIPTION:   1. Received 40 mL of fluid in formalin, 1 dry slides and 1 slides in    95% alcohol. Prepared one cell block.     2. Received 40 mL of fluid in formalin, 1 dry slides and 1 slides in    95% alcohol. Prepared one cell block.     CODE: 0     Cytotechnologist: ABDIFATAH Marvin (ASCP)   Pathologist: Scott Johnson MD (Electronic Signature) 09/22/2021 2:34 PM     [email or web address], Shortlist * [address] [place] [address] * [place]    [address]   Technical services performed at: [facility], Shortlist * [address] [place] [address] * [facility] [place] [address]   Screening Site: [facility], [facility] * [address] [facility] *    [place] [address]                            Post Rituxan image                                     Pre Rituxan image  Assessment and Plan      Wegener's granulomatosis with renal involvement  -     CBC Auto Differential; Standing; Expected date: 09/25/2023  -     Comprehensive Metabolic Panel; Standing; Expected date: 09/25/2023  -     Sedimentation rate; Standing; Expected date: 09/25/2023  -     C-Reactive Protein; Standing; Expected date: 09/25/2023  -     predniSONE (DELTASONE) 2.5 MG tablet; Take 1 tablet (2.5 mg total) by mouth once daily.  Dispense: 90 tablet; Refill: 3    Chronic pansinusitis  -     CBC Auto Differential; Standing; Expected date: 09/25/2023  -     Comprehensive Metabolic Panel; Standing; Expected date: 09/25/2023  -     Sedimentation rate; Standing; Expected date: 09/25/2023  -     C-Reactive Protein; Standing; Expected date: 09/25/2023    Hearing loss, unspecified hearing loss type, unspecified laterality    Polyneuropathy associated with underlying disease    Immunosuppression  -     CBC Auto Differential; Standing; Expected date: 09/25/2023  -     Comprehensive Metabolic Panel; Standing; Expected date: 09/25/2023  -     Sedimentation rate; Standing; Expected date: 09/25/2023  -     C-Reactive Protein; Standing; Expected date: 09/25/2023    High risk medications (not anticoagulants) long-term use  -     CBC Auto Differential; Standing; Expected date: 09/25/2023  -     Comprehensive Metabolic Panel; Standing; Expected date: 09/25/2023  -     Sedimentation rate; Standing; Expected date: 09/25/2023  -     C-Reactive Protein; Standing; Expected date: 09/25/2023    Wegener's disease, pulmonary  -     predniSONE (DELTASONE) 2.5 MG tablet; Take 1 tablet (2.5 mg total) by mouth once daily.  Dispense: 90 tablet; Refill: 3    Neuropathy  -     gabapentin (NEURONTIN) 400 MG capsule; Take 1 capsule (400 mg  total) by mouth 3 (three) times daily.  Dispense: 270 capsule; Refill: 3          Patient is a 82-year-old female she has had a recent robust inflammatory reaction within the sinus cavity as well as hearing deficit following a sinus surgery.  She has been repeated cultured postoperatively completed antibiotics and irrigations negative for any fungal infections or bacterial infections.   Patient was doing well on MTX with regard to ESR and CRP, but hair loss.      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               Completed RTX 11/5/21 Induction               Review of data and feel : The best data supporting the use of  as maintenance therapy come from the Maintenance of Remission using Rituximab in Systemic ANCA-associated Vasculitis (MAINRITSAN) trial with less relapse for PR3+ paitients when treated with Rituxan at 500mg IV at 6, 12, and 18months when compared to Imuran. Patient has tolerated RTX last 4/20/2022, she had to cancel 10/21/2022 for COVID. Did receive 500mg on 12/2022. Received again 7/2023. Patient has completed 18 months of Rituxan therapy.     -holding further Rituxan for now and will monitor her over time.               - would consider transferring her over to MTX given sinuses are doing well, completed 2 years of Rituxan and      Chronic Sinusitis: continue with irrigation from ENT for now and we will monitor.      CKD  Patient did have casts with hematuria 10/12/21 prior to start of Rituxan. Improved following RTX                   Continue omeprazole (prilosec)  40mg by mouth daily.             COVID vaccine #3 shot for immunosuppressed to be done March 2022. She is only 3 months out from Rituxan but I want her to get the new COVID variant vaccine anyway. There is still some T cell benefit. FLU and RSV this fall as well.         Patient hx of Osteopenia only, renal function not stable enough for oral bisphos.                 she is having some spinous process tenderness today but pain not classic of vertebral fracture will check imaging anyway. She had rib fractures last year as well.               Completed. Prolia 2022. Off with most recent DXA Osteopenia. Continues with Calcium and Vitamin D.      Epic complete malfunction during note and I have attempted to recompose my work to the best of my ability.

## 2023-10-11 ENCOUNTER — DOCUMENTATION ONLY (OUTPATIENT)
Dept: REHABILITATION | Facility: HOSPITAL | Age: 83
End: 2023-10-11
Payer: MEDICARE

## 2023-10-11 PROBLEM — N81.89 PELVIC FLOOR WEAKNESS IN FEMALE: Status: RESOLVED | Noted: 2023-01-13 | Resolved: 2023-10-11

## 2023-10-11 NOTE — PROGRESS NOTES
PHYSICAL THERAPY DISCHARGE:    This patient was originally evaluated at our facility on: 1/13/2023         Pt attended PT for a total of 1 Visits receiving:Evaluation,Therex Home Exercise Instruction     This patient's last visit occurred on: 1/13/23      Short term goals were achieved: no    Long term goals were achieved: no    Pt was DC'd from our care secondary to: did not return       Therapist: Court Cardenas, PT  10/11/2023

## 2023-10-12 ENCOUNTER — TELEPHONE (OUTPATIENT)
Dept: RHEUMATOLOGY | Facility: CLINIC | Age: 83
End: 2023-10-12
Payer: MEDICARE

## 2023-10-12 ENCOUNTER — PATIENT MESSAGE (OUTPATIENT)
Dept: RHEUMATOLOGY | Facility: CLINIC | Age: 83
End: 2023-10-12
Payer: MEDICARE

## 2023-10-13 NOTE — TELEPHONE ENCOUNTER
----- Message from Nish Gutierrez sent at 10/12/2023 10:12 AM CDT -----  Regarding: CV19 exposure  Contact: jewel at [phone]  Type: Needs Medical Advice    Who Called:  Jewel    Symptoms (please be specific):  CV19 exposure.     Best Call Back Number: [phone]    Additional Information: pt has not tested POS or NEG b/c does not have any home tests. Pt saw son who flew in from [place] 3x 10/4, 10/7 and 10/9. Son called mom to let know was CV19 POS as of yesterday.    PT would like advice on how to proceed.        
Dr. Snyder   Please see pt's message below.  She has been exposed to Covid and is asking how she should proceed.    [facility]  10/12/2023    
Patient states she has been exposed to Covid but not having any symptoms. Denies, fever, cough, or congestion. Denies any shortness of breath. Per Dr Snyder recommendations patient will test if she starts having symptoms. Will reach out to Dr Reddy, urgent care, or ER. Will notify Dr Snyder if symptoms start and she test positive.   
We will just wait for any symptoms and then can test - I don't have a means to test her here but Dr. Barry mtz and the [facility] urgent [facility] can test her if she cannot do at home.     Dr. CORBIN  
Patent

## 2023-11-06 NOTE — TELEPHONE ENCOUNTER
----- Message from Oswaldo Rahman sent at 7/9/2020 10:51 AM CDT -----  Contact: Rosita/Dr. Bautista Becker called in regarding patient and stated she has no other information except that Dr. Quinn would like to speak to Dr. Snyder regarding patient.  Call back is personal cell number at [phone]     normal/well-groomed/no distress

## 2023-11-18 DIAGNOSIS — M31.30 GRANULOMATOSIS WITH POLYANGIITIS WITH PULMONARY INVOLVEMENT: ICD-10-CM

## 2023-11-18 DIAGNOSIS — M31.31 WEGENER'S GRANULOMATOSIS WITH RENAL INVOLVEMENT: ICD-10-CM

## 2023-11-18 DIAGNOSIS — G62.9 NEUROPATHY: ICD-10-CM

## 2023-11-21 RX ORDER — GABAPENTIN 400 MG/1
400 CAPSULE ORAL 3 TIMES DAILY
Qty: 270 CAPSULE | Refills: 2 | Status: SHIPPED | OUTPATIENT
Start: 2023-11-21

## 2023-11-21 RX ORDER — PREDNISONE 2.5 MG/1
2.5 TABLET ORAL DAILY
Qty: 90 TABLET | Refills: 2 | Status: SHIPPED | OUTPATIENT
Start: 2023-11-21

## 2023-11-21 NOTE — TELEPHONE ENCOUNTER
----- Message from Mariza Paige sent at 11/20/2023  9:12 AM CST -----  Regarding: Needs refills  Type: Needs Medical Advice  Who Called:  Mallory    Pharmacy name and phone #:        [facility] - [place], [place] - [address]  [address] [facility] [address]  Phone: [phone] Fax: [phone]        Best Call Back Number: [phone]    Additional Information: The Pharmacy needs the office to reach to them to get patient her refills, please call     Prednisone 2.5 and Gabapentin, please advsie

## 2023-12-08 ENCOUNTER — TELEPHONE (OUTPATIENT)
Dept: PAIN MEDICINE | Facility: CLINIC | Age: 83
End: 2023-12-08
Payer: MEDICARE

## 2023-12-18 ENCOUNTER — OFFICE VISIT (OUTPATIENT)
Dept: PAIN MEDICINE | Facility: CLINIC | Age: 83
End: 2023-12-18
Payer: MEDICARE

## 2023-12-18 VITALS
DIASTOLIC BLOOD PRESSURE: 61 MMHG | WEIGHT: 147.19 LBS | BODY MASS INDEX: 25.26 KG/M2 | HEART RATE: 77 BPM | SYSTOLIC BLOOD PRESSURE: 134 MMHG

## 2023-12-18 DIAGNOSIS — D18.09 HEMANGIOMA OF BONE: ICD-10-CM

## 2023-12-18 DIAGNOSIS — M54.2 CERVICALGIA: ICD-10-CM

## 2023-12-18 DIAGNOSIS — M41.35 THORACOGENIC SCOLIOSIS OF THORACOLUMBAR REGION: ICD-10-CM

## 2023-12-18 DIAGNOSIS — M54.9 MID BACK PAIN ON LEFT SIDE: Primary | ICD-10-CM

## 2023-12-18 DIAGNOSIS — M41.9 SCOLIOSIS, UNSPECIFIED SCOLIOSIS TYPE, UNSPECIFIED SPINAL REGION: ICD-10-CM

## 2023-12-18 PROCEDURE — 1159F PR MEDICATION LIST DOCUMENTED IN MEDICAL RECORD: ICD-10-PCS | Mod: CPTII,S$GLB,, | Performed by: PHYSICIAN ASSISTANT

## 2023-12-18 PROCEDURE — 3288F FALL RISK ASSESSMENT DOCD: CPT | Mod: CPTII,S$GLB,, | Performed by: PHYSICIAN ASSISTANT

## 2023-12-18 PROCEDURE — 3078F PR MOST RECENT DIASTOLIC BLOOD PRESSURE < 80 MM HG: ICD-10-PCS | Mod: CPTII,S$GLB,, | Performed by: PHYSICIAN ASSISTANT

## 2023-12-18 PROCEDURE — 99999 PR PBB SHADOW E&M-EST. PATIENT-LVL IV: CPT | Mod: PBBFAC,,, | Performed by: PHYSICIAN ASSISTANT

## 2023-12-18 PROCEDURE — 1159F MED LIST DOCD IN RCRD: CPT | Mod: CPTII,S$GLB,, | Performed by: PHYSICIAN ASSISTANT

## 2023-12-18 PROCEDURE — 99203 PR OFFICE/OUTPT VISIT, NEW, LEVL III, 30-44 MIN: ICD-10-PCS | Mod: S$GLB,,, | Performed by: PHYSICIAN ASSISTANT

## 2023-12-18 PROCEDURE — 99203 OFFICE O/P NEW LOW 30 MIN: CPT | Mod: S$GLB,,, | Performed by: PHYSICIAN ASSISTANT

## 2023-12-18 PROCEDURE — 1125F AMNT PAIN NOTED PAIN PRSNT: CPT | Mod: CPTII,S$GLB,, | Performed by: PHYSICIAN ASSISTANT

## 2023-12-18 PROCEDURE — 99999 PR PBB SHADOW E&M-EST. PATIENT-LVL IV: ICD-10-PCS | Mod: PBBFAC,,, | Performed by: PHYSICIAN ASSISTANT

## 2023-12-18 PROCEDURE — 1101F PT FALLS ASSESS-DOCD LE1/YR: CPT | Mod: CPTII,S$GLB,, | Performed by: PHYSICIAN ASSISTANT

## 2023-12-18 PROCEDURE — 3075F PR MOST RECENT SYSTOLIC BLOOD PRESS GE 130-139MM HG: ICD-10-PCS | Mod: CPTII,S$GLB,, | Performed by: PHYSICIAN ASSISTANT

## 2023-12-18 PROCEDURE — 1101F PR PT FALLS ASSESS DOC 0-1 FALLS W/OUT INJ PAST YR: ICD-10-PCS | Mod: CPTII,S$GLB,, | Performed by: PHYSICIAN ASSISTANT

## 2023-12-18 PROCEDURE — 1125F PR PAIN SEVERITY QUANTIFIED, PAIN PRESENT: ICD-10-PCS | Mod: CPTII,S$GLB,, | Performed by: PHYSICIAN ASSISTANT

## 2023-12-18 PROCEDURE — 3075F SYST BP GE 130 - 139MM HG: CPT | Mod: CPTII,S$GLB,, | Performed by: PHYSICIAN ASSISTANT

## 2023-12-18 PROCEDURE — 3078F DIAST BP <80 MM HG: CPT | Mod: CPTII,S$GLB,, | Performed by: PHYSICIAN ASSISTANT

## 2023-12-18 PROCEDURE — 1160F PR REVIEW ALL MEDS BY PRESCRIBER/CLIN PHARMACIST DOCUMENTED: ICD-10-PCS | Mod: CPTII,S$GLB,, | Performed by: PHYSICIAN ASSISTANT

## 2023-12-18 PROCEDURE — 3288F PR FALLS RISK ASSESSMENT DOCUMENTED: ICD-10-PCS | Mod: CPTII,S$GLB,, | Performed by: PHYSICIAN ASSISTANT

## 2023-12-18 PROCEDURE — 1160F RVW MEDS BY RX/DR IN RCRD: CPT | Mod: CPTII,S$GLB,, | Performed by: PHYSICIAN ASSISTANT

## 2023-12-18 RX ORDER — TIZANIDINE 4 MG/1
4 TABLET ORAL NIGHTLY PRN
Qty: 40 TABLET | Refills: 0 | Status: SHIPPED | OUTPATIENT
Start: 2023-12-18 | End: 2024-02-05 | Stop reason: SDUPTHER

## 2023-12-18 NOTE — PROGRESS NOTES
[facility]      Referred by: Lashondareferral Self    PCP:   Ryan Reddy MD    CC:   Chief Complaint   Patient presents with    Back Pain          HPI:   Aracelis Weber is a 83 y.o. year old female patient who has a past medical history of Anesthesia, Chronic sinusitis, Depression, GERD (gastroesophageal reflux disease), and PVC (premature ventricular contraction). She presents in self referral/ Merari Chapa NP for back pain.  She is known to have thoracolumbar scoliosis.  She has chronic intermittent back pain since childhood bilaterally.  About 6 weeks ago left sided back pain really increased, but it has since returned to baseline.  Pain is felt on the right and left mid thoracic to lower thoracic/ upper lumbar region bilaterally.  Some times it radiates to the side of the abdomen and anterior chest wall.  No radicular leg pain, numbness or tingling, but she does have chronic neuropathy in the feet/ ankles.  Pain improves with sitting and worsens with walking a lot.  She is taking tylenol, voltaren gel (feet/ legs), gabapentin, 2.5mg prednisone daily (Wegeners granulomatosis), hydrocodone (sparingly as needed).  She has not hand PT or any other treatments. MRI thoracic spine ordered, but not completed.    She also describes chronic pain in the posterior neck to the shoulders and less often the arms. It is associated with numbness/ tingling in the bilateral hands.     Denies bowel/ bladder incontinence.    Past and current medications:  Antineuropathics:  gabapentin  NSAIDs:  voltaren gel for feet/ ankles  Antidepressants:  Muscle relaxers:  Opioids: norco  Antiplatelets/Anticoagulants:  Others:  tylenol, prednisone    Physical Therapy/ Chiropractic care:  none    Pain Intervention History:  none    Past Spine Surgical History:  none        History:    Current Outpatient Medications:     acetaminophen (TYLENOL) 500 MG tablet, Take 500 mg by mouth every 6 (six) hours as  needed for Pain., Disp: , Rfl:     B2-B6 phos-levomef simran-mecobal (EB-N3 DR) 1.3-70-6-4 mg CpDR, Take 1 capsule by mouth once daily., Disp: , Rfl:     budesonide 1 mg/2 mL NbSp, 2 vials by Each Nostril route 2 (two) times a day. EMPTY CONTENTS INTO NASAL IRRIGATION SYSTEM, ADD DISTILLED WATER, SALT PACK, MIX & IRRIGATE. PERFORM 2 TIMES DAILY, Disp: , Rfl:     cetirizine (ZYRTEC) 10 MG tablet, TAKE 1 TABLET ONE TIME DAILY, Disp: 90 tablet, Rfl: 0    diclofenac sodium (VOLTAREN) 1 % Gel, APPLY TO THE AFFECTED AREA(S) 2 GRAMS THREE TIMES DAILY, Disp: 600 g, Rfl: 0    furosemide (LASIX) 20 MG tablet, TAKE 1 TABLET EVERY DAY, Disp: 90 tablet, Rfl: 3    gabapentin (NEURONTIN) 400 MG capsule, Take 1 capsule (400 mg total) by mouth 3 (three) times daily., Disp: 270 capsule, Rfl: 2    gentamicin sulfate (GENTAMICIN, BULK, MISC), by Misc.(Non-Drug; Combo Route) route. 40mg/mL 2mL per vial  EMPTY CONTENTS OF 1 VIAL INTO NASAL IRRIGATION SYSTEM, ADD DISTILLED WATER, SALT PACK, MIX & IRRIGATE. PERFORM 2 TIMES DAILY, Disp: , Rfl:     ITRACONAZOLE, BULK, MISC, EMPTY CONTENTS OF 1 CAPSULE INTO NASAL IRRIGATION SYSTEM, ADD DISTILLED WATER, SALT PACK, MIX & IRRIGATE. PERFORM 2 TIMES DAILY, Disp: , Rfl:     itraconazole, bulk, Powd, 2 (two) times a day. EMPTY CONTENTS OF 1 CAPSULE INTO NASAL IRRIGATION SYSTEM, ADD DISTILLED WATER, SALT PACK, MIX & IRRIGATE. PERFORM 2 TIMES DAILY, Disp: , Rfl:     LACTOBACILLUS ACIDOPHILUS ORAL, Take by mouth., Disp: , Rfl:     metoprolol succinate (TOPROL-XL) 50 MG 24 hr tablet, TAKE 1 TABLET EVERY DAY, Disp: 90 tablet, Rfl: 3    mirabegron (MYRBETRIQ) 50 mg Tb24, Take 1 tablet (50 mg total) by mouth once daily., Disp: 30 tablet, Rfl: 11    omeprazole (PRILOSEC) 40 MG capsule, TAKE 1 CAPSULE EVERY MORNING, Disp: 90 capsule, Rfl: 0    ondansetron (ZOFRAN-ODT) 8 MG TbDL, Take 8 mg by mouth 2 (two) times daily., Disp: , Rfl:     peg 400-propylene glycol, PF, (SYSTANE, PF,) 0.4-0.3 % Dpet, Apply to eye 2  "(two) times a day., Disp: , Rfl:     predniSONE (DELTASONE) 2.5 MG tablet, Take 1 tablet (2.5 mg total) by mouth once daily., Disp: 90 tablet, Rfl: 2    tobramycin sulfate 0.3% (TOBREX) 0.3 % ophthalmic solution, Place into both eyes., Disp: , Rfl:     vancomycin HCl (VANCOMYCIN, BULK, MISC), by Misc.(Non-Drug; Combo Route) route. 1gram/Vial EMPTY CONTENTS OF 1 VIAL INTO NASAL IRRIGATION SYSTEM, ADD DISTILLED WATER, SALT PACK, MIX & IRRIGATE. PERFORM 2 TIMES DAILY, Disp: , Rfl:     vit C/E/Zn/coppr/lutein/zeaxan (PRESERVISION AREDS-2 ORAL), Take 1 capsule by mouth 2 (two) times a day. , Disp: , Rfl:     tiZANidine (ZANAFLEX) 4 MG tablet, Take 1 tablet (4 mg total) by mouth nightly as needed (muscle spasms/ pain)., Disp: 40 tablet, Rfl: 0    Past Medical History:   Diagnosis Date    Anesthesia     'Mother stop breathing after anesthesia"    Chronic sinusitis     Depression     GERD (gastroesophageal reflux disease)     PVC (premature ventricular contraction)        Past Surgical History:   Procedure Laterality Date    ADENOIDECTOMY      APPENDECTOMY      BREAST BIOPSY      HYSTERECTOMY      JOINT REPLACEMENT Bilateral     KNEE    NAVIGATIONAL BRONCHOSCOPY Right 2021    Procedure: BRONCHOSCOPY, NAVIGATIONAL WITH BODY VISION;  Surgeon: Bimal Schwartz MD;  Location: [facility];  Service: Pulmonary;  Laterality: Right;    THUMB SURGERY      TONSILLECTOMY         Family History   Problem Relation Age of Onset    Heart disease Mother     Arthritis Mother     Cancer Sister     Arthritis Sister     Diabetes Sister     Hypertension Sister        Social History     Socioeconomic History    Marital status:    Tobacco Use    Smoking status: Former     Current packs/day: 0.00     Types: Cigarettes     Start date: 2/3/1955     Quit date: 1/3/1960     Years since quittin.0    Smokeless tobacco: Never   Substance and Sexual Activity    Alcohol use: Never     Alcohol/week: 0.0 standard drinks of alcohol    Drug use: " "Never    Sexual activity: Never       Review of patient's allergies indicates:   Allergen Reactions    Plaquenil [hydroxychloroquine]      Macular degeneration    Iodinated contrast media     Levofloxacin Other (See Comments)    Penicillins     Cefdinir Other (See Comments)     Itching       Labs:  No results found for: "LABA1C", "HGBA1C"    Lab Results   Component Value Date    WBC 9.10 11/22/2023    HGB 12.4 11/22/2023    HCT 37.8 11/22/2023    MCV 90 11/22/2023     11/22/2023           Review of Systems:  Thoracolumbar backpain..  Balance of review of systems is negative.    Physical Exam:  Vitals:    12/18/23 1413   BP: 134/61   Pulse: 77   Weight: 66.7 kg (147 lb 2.5 oz)   PainSc:   6   PainLoc: Back     Body mass index is 25.26 kg/m².    Pain disability index:      12/18/2023     2:15 PM   Last 3 PDI Scores   Pain Disability Index (PDI) 31       Gen: NAD  Psych: mood appropriate for given condition  HEENT: eyes anicteric   CV: RRR, 2+ radial pulse  Respiratory: non-labored, no signs of respiratory distress  Abd: non-distended  Skin: warm, dry and intact.  Gait: Able to heel walk, toe walk. No antalgic gait.     Coordination:   Tandem walking coordination: normal    Cervical spine: ROM is full in flexion, extension and lateral rotation without increased pain.  Spurling's maneuver causes no neck pain to either side.  Myofascial exam: No Tenderness to palpation across cervical paraspinous region bilaterally.    Lumbar spine:  Lumbar spine: ROM is full with flexion extension and oblique extension with no increased pain.    Aris's test causes no increased pain on either side.    Supine straight leg raise is negative bilaterally.    Internal and external rotation of the hip causes no increased pain on either side.  Myofascial exam: No tenderness to palpation across lumbar paraspinous muscles. No tenderness to palpation over the bilateral greater trochanters and bilateral SI joint    Sensory:  Intact and " symmetrical to light touch in C4-T1 dermatomes bilaterally. Intact and symmetrical to light touch in L1-S1 dermatomes bilaterally.    Motor:    Right Left   C4 Shoulder Abduction  5  5   C5 Elbow Flexion    5  5   C6 Wrist Extension  5  5   C7 Elbow Extension   5  5   C8/T1 Hand Intrinsics   5  5        Right Left   L2/3 Iliacus Hip flexion  5  5   L3/4 Qudratus Femoris Knee Extension  5  5   L4/5 Tib Anterior Ankle Dorsiflexion   5  5   L5/S1 Extensor Hallicus Longus Great toe extension  5  5   S1/S2 Gastroc/Soleus Plantar Flexion  5  5      Right Left   Triceps DTR 2+ 2+   Biceps DTR 2+ 2+   Brachioradialis DTR 2+ 2+   Patellar DTR 2+ 2+   Achilles DTR 2+ 2+   Landon Absent  Absent   Clonus Absent Absent   Babinski Absent Absent       Imaging:    CT renal stone study 11-28-23:  regarding osseous structures reviewed:  No acute fracture or aggressive-appearing lytic or blastic lesion.  Moderate-marked S-shaped scoliosis.  Sharply marginated lucent lesion in the T10 vertebral body, possibly a hemangioma.  Mild osseous demineralization.     Xray thoracic spine 12-1-23:  Unchanged when compared to thoracic spine xray from  01/07/2022.  There is no spondylolisthesis.  There is multilevel degenerative disc space narrowing.  The included lungs are clear.      Assessment:   Aracelis Weber is a 83 y.o. year old female patient who has a past medical history of Anesthesia, Chronic sinusitis, Depression, GERD (gastroesophageal reflux disease), and PVC (premature ventricular contraction). She presents in self referral/ referral from Merari Balbuena NP for thoracolumbar back pain with possible thoracic radicuopathy from degenerative changes, scoliosis in the thoracolumbar region. No neurological deficits.     Plan:  - discussed medciations, PT, and interventional injections for pain.  She is not interested in any surgical correction.  - will continue with current medications.  = add zanaflex to help with pain.  - if no  benefit, she may consider PT, but hesitant ad it did not help with other issues in the past.   - mri thoracic spine with and without contrast to further assess degenerative changes and confirm hemangioma at T10; rule out tumor or other lesion.  - mri cervical spine to further assess neck pain and determine if neural comrpession is causeing hand numbness.    Problem List Items Addressed This Visit    None  Visit Diagnoses       Mid back pain on left side    -  Primary    Relevant Medications    tiZANidine (ZANAFLEX) 4 MG tablet    Thoracogenic scoliosis of thoracolumbar region        Relevant Medications    tiZANidine (ZANAFLEX) 4 MG tablet    Other Relevant Orders    MRI Thoracic Spine W WO Cont    Scoliosis, unspecified scoliosis type, unspecified spinal region        Relevant Orders    MRI Thoracic Spine W WO Cont    Cervicalgia        Relevant Orders    MRI Cervical Spine Without Contrast    Hemangioma of bone        Relevant Orders    MRI Thoracic Spine W WO Cont            Follow Up: RTC as needed    : Reviewed and consistent with medication use as prescribed.    Thank you for referring this interesting patient, and I look forward to continuing to collaborate in her care.        Rosita Alonzo PA-C  [facility]

## 2023-12-20 ENCOUNTER — TELEPHONE (OUTPATIENT)
Dept: NEPHROLOGY | Facility: CLINIC | Age: 83
End: 2023-12-20
Payer: MEDICARE

## 2023-12-20 ENCOUNTER — TELEPHONE (OUTPATIENT)
Dept: PAIN MEDICINE | Facility: CLINIC | Age: 83
End: 2023-12-20
Payer: MEDICARE

## 2023-12-20 DIAGNOSIS — D18.09 HEMANGIOMA OF BONE: Primary | ICD-10-CM

## 2023-12-20 DIAGNOSIS — M54.9 MID BACK PAIN ON LEFT SIDE: ICD-10-CM

## 2023-12-20 DIAGNOSIS — M41.9 SCOLIOSIS, UNSPECIFIED SCOLIOSIS TYPE, UNSPECIFIED SPINAL REGION: ICD-10-CM

## 2023-12-20 RX ORDER — DIAZEPAM 10 MG/1
10 TABLET ORAL ONCE
Qty: 1 TABLET | Refills: 0 | Status: SHIPPED | OUTPATIENT
Start: 2023-12-20 | End: 2023-12-20

## 2023-12-20 NOTE — TELEPHONE ENCOUNTER
----- Message from Cortney Schwab MA sent at 12/20/2023  4:18 PM CST -----  Contact: self  medication  ----- Message -----  From: Sparkle Atkins  Sent: 12/20/2023   2:50 PM CST  To: Clinton STEPHENSON Staff    Type:  Needs Medical Advice    Who Called: Pt   Symptoms (please be specific): Has an MRI scheduled for 1/12/23, pt states she would need to have something for her to take to ease her nerves, pt has a hard time w/closed in areas...   Pharmacy name and phone #:    [id number] - [facility] [address] [place] [address] AT [place] [address] & [place] [address]  [address] [place] [address]  Phone: [phone] Fax: [phone]  Would the patient rather a call back or a response via [facility]?  Call   Best Call Back Number:  [phone] or [phone]   Please call to advise... Thank you...

## 2023-12-20 NOTE — TELEPHONE ENCOUNTER
----- Message from Codi Burk sent at 12/20/2023  9:16 AM CST -----  Regarding: appt access  Type:  Sooner Appointment Request    Caller is requesting a sooner appointment.  Caller declined first available appointment listed below.  Caller will not accept being placed on the waitlist and is requesting a message be sent to doctor.    Name of Caller:  pt   When is the first available appointment?  unk  Symptoms:  6 m check up   Best Call Back Number:  [phone]   Additional Information:  requesting a appt and call back asap requesting appt before 4/2 sts she can come on Mondays and Fridays  please advise thank you

## 2023-12-20 NOTE — TELEPHONE ENCOUNTER
----- Message from Rosita Alonzo PA-C sent at 12/20/2023  1:12 PM CST -----  Please call patient to schedule MRI cervical and thoracic spine.  I just placed orders associated with her visit Monday.  Follow up in clinic after imaging as well.    Thanks  Rosita

## 2024-01-05 ENCOUNTER — LAB VISIT (OUTPATIENT)
Dept: LAB | Facility: HOSPITAL | Age: 84
End: 2024-01-05
Attending: INTERNAL MEDICINE
Payer: MEDICARE

## 2024-01-05 DIAGNOSIS — J32.4 CHRONIC PANSINUSITIS: ICD-10-CM

## 2024-01-05 DIAGNOSIS — Z79.899 HIGH RISK MEDICATIONS (NOT ANTICOAGULANTS) LONG-TERM USE: ICD-10-CM

## 2024-01-05 DIAGNOSIS — D84.9 IMMUNOSUPPRESSION: ICD-10-CM

## 2024-01-05 DIAGNOSIS — M31.31 WEGENER'S GRANULOMATOSIS WITH RENAL INVOLVEMENT: ICD-10-CM

## 2024-01-05 LAB
ALBUMIN SERPL BCP-MCNC: 3.7 G/DL (ref 3.5–5.2)
ALP SERPL-CCNC: 95 U/L (ref 55–135)
ALT SERPL W/O P-5'-P-CCNC: 13 U/L (ref 10–44)
ANION GAP SERPL CALC-SCNC: 11 MMOL/L (ref 8–16)
AST SERPL-CCNC: 20 U/L (ref 10–40)
BASOPHILS # BLD AUTO: 0.06 K/UL (ref 0–0.2)
BASOPHILS NFR BLD: 0.8 % (ref 0–1.9)
BILIRUB SERPL-MCNC: 0.4 MG/DL (ref 0.1–1)
BUN SERPL-MCNC: 17 MG/DL (ref 8–23)
CALCIUM SERPL-MCNC: 9.7 MG/DL (ref 8.7–10.5)
CHLORIDE SERPL-SCNC: 102 MMOL/L (ref 95–110)
CO2 SERPL-SCNC: 27 MMOL/L (ref 23–29)
CREAT SERPL-MCNC: 1.2 MG/DL (ref 0.5–1.4)
CRP SERPL-MCNC: 10 MG/L (ref 0–8.2)
DIFFERENTIAL METHOD BLD: ABNORMAL
EOSINOPHIL # BLD AUTO: 0.3 K/UL (ref 0–0.5)
EOSINOPHIL NFR BLD: 3.5 % (ref 0–8)
ERYTHROCYTE [DISTWIDTH] IN BLOOD BY AUTOMATED COUNT: 14.7 % (ref 11.5–14.5)
ERYTHROCYTE [SEDIMENTATION RATE] IN BLOOD BY PHOTOMETRIC METHOD: 38 MM/HR (ref 0–36)
EST. GFR  (NO RACE VARIABLE): 45.5 ML/MIN/1.73 M^2
GLUCOSE SERPL-MCNC: 85 MG/DL (ref 70–110)
HCT VFR BLD AUTO: 33.3 % (ref 37–48.5)
HGB BLD-MCNC: 10.6 G/DL (ref 12–16)
IMM GRANULOCYTES # BLD AUTO: 0.07 K/UL (ref 0–0.04)
IMM GRANULOCYTES NFR BLD AUTO: 1 % (ref 0–0.5)
LYMPHOCYTES # BLD AUTO: 1 K/UL (ref 1–4.8)
LYMPHOCYTES NFR BLD: 13.4 % (ref 18–48)
MCH RBC QN AUTO: 30 PG (ref 27–31)
MCHC RBC AUTO-ENTMCNC: 31.8 G/DL (ref 32–36)
MCV RBC AUTO: 94 FL (ref 82–98)
MONOCYTES # BLD AUTO: 1 K/UL (ref 0.3–1)
MONOCYTES NFR BLD: 14.1 % (ref 4–15)
NEUTROPHILS # BLD AUTO: 4.8 K/UL (ref 1.8–7.7)
NEUTROPHILS NFR BLD: 67.2 % (ref 38–73)
NRBC BLD-RTO: 0 /100 WBC
PLATELET # BLD AUTO: 355 K/UL (ref 150–450)
PMV BLD AUTO: 9.9 FL (ref 9.2–12.9)
POTASSIUM SERPL-SCNC: 4.8 MMOL/L (ref 3.5–5.1)
PROT SERPL-MCNC: 6.8 G/DL (ref 6–8.4)
RBC # BLD AUTO: 3.53 M/UL (ref 4–5.4)
SODIUM SERPL-SCNC: 140 MMOL/L (ref 136–145)
WBC # BLD AUTO: 7.11 K/UL (ref 3.9–12.7)

## 2024-01-05 PROCEDURE — 86140 C-REACTIVE PROTEIN: CPT | Performed by: INTERNAL MEDICINE

## 2024-01-05 PROCEDURE — 36415 COLL VENOUS BLD VENIPUNCTURE: CPT | Mod: PO | Performed by: INTERNAL MEDICINE

## 2024-01-05 PROCEDURE — 85025 COMPLETE CBC W/AUTO DIFF WBC: CPT | Performed by: INTERNAL MEDICINE

## 2024-01-05 PROCEDURE — 80053 COMPREHEN METABOLIC PANEL: CPT | Performed by: INTERNAL MEDICINE

## 2024-01-05 PROCEDURE — 85652 RBC SED RATE AUTOMATED: CPT | Performed by: INTERNAL MEDICINE

## 2024-01-09 ENCOUNTER — TELEPHONE (OUTPATIENT)
Dept: RHEUMATOLOGY | Facility: CLINIC | Age: 84
End: 2024-01-09
Payer: MEDICARE

## 2024-01-09 ENCOUNTER — TELEPHONE (OUTPATIENT)
Dept: PAIN MEDICINE | Facility: CLINIC | Age: 84
End: 2024-01-09
Payer: MEDICARE

## 2024-01-09 NOTE — TELEPHONE ENCOUNTER
----- Message from Brit Padgett sent at 1/8/2024  2:47 PM CST -----  Type: Needs Medical Advice  Who Called:  pt  Best Call Back Number:  [phone]  Additional Information: pt has a MRI scheduled for 1/12, would like to speak with the nurse about it, pl call bk to advise thanks

## 2024-01-09 NOTE — TELEPHONE ENCOUNTER
Patient c/o sinus drip and cough for one week. Denies shortness of breath, denies fever. States  has had a cold also.  Patient asked that Dr Snyder be notified due to patients Wegeners diagnosis.   Nurse advised patient to seek care with PCP or urgent care if symptoms worsen or starts having shortness of breath. Ms Weber voiced understanding and also rescheduled 1/12/24 appointment to 1/19/24.

## 2024-01-09 NOTE — TELEPHONE ENCOUNTER
----- Message from Debby Daly sent at 1/9/2024  2:02 PM CST -----  Type:  Sooner Appointment Request    Caller is requesting a sooner appointment.  Caller declined first available appointment listed below.  Caller will not accept being placed on the waitlist and is requesting a message be sent to doctor.    Name of Caller:  pt  When is the first available appointment?  N/A  Symptoms:  follow up  Would the patient rather a call back or a response via [facility]? Call back, portal  Best Call Back Number:  [phone]    Additional Information:  pt states that she needs to reschedule her appt for 01/12 at 1pm. Pt is sick and needs to have the appt moved but want to be seen sooner than next month as this is her 4 week follow up and she says that she needs to see the Dr. Pt is asking that the dr call her as soon as possible.Please call back and advise. Thanks!

## 2024-01-09 NOTE — TELEPHONE ENCOUNTER
As long as she improves over the next 5 days with no fever and no productive cough we are ok to just monitor If she develops sob, cough, fever we must start antibiotic.     Dr. CORBIN

## 2024-01-09 NOTE — TELEPHONE ENCOUNTER
Spoke with pt and she canceled her follow up appointment with Ms. Alonzo cause she has to move her MRI appointment. She will call to reschedule her appointment

## 2024-01-10 NOTE — TELEPHONE ENCOUNTER
Pt will monitor symptoms and update as needed. She will be going to a walk-in clinic this afternoon 1/10/24 to be tested.

## 2024-01-19 ENCOUNTER — OFFICE VISIT (OUTPATIENT)
Dept: RHEUMATOLOGY | Facility: CLINIC | Age: 84
End: 2024-01-19
Payer: MEDICARE

## 2024-01-19 VITALS
WEIGHT: 144.31 LBS | HEIGHT: 64 IN | BODY MASS INDEX: 24.64 KG/M2 | DIASTOLIC BLOOD PRESSURE: 57 MMHG | SYSTOLIC BLOOD PRESSURE: 136 MMHG | HEART RATE: 73 BPM

## 2024-01-19 DIAGNOSIS — J32.4 CHRONIC PANSINUSITIS: ICD-10-CM

## 2024-01-19 DIAGNOSIS — M31.31 WEGENER'S GRANULOMATOSIS WITH RENAL INVOLVEMENT: Primary | ICD-10-CM

## 2024-01-19 DIAGNOSIS — R53.83 MALAISE AND FATIGUE: ICD-10-CM

## 2024-01-19 DIAGNOSIS — R53.81 MALAISE AND FATIGUE: ICD-10-CM

## 2024-01-19 DIAGNOSIS — D84.9 IMMUNOSUPPRESSION: ICD-10-CM

## 2024-01-19 PROCEDURE — 99215 OFFICE O/P EST HI 40 MIN: CPT | Mod: S$GLB,,, | Performed by: INTERNAL MEDICINE

## 2024-01-19 PROCEDURE — 3288F FALL RISK ASSESSMENT DOCD: CPT | Mod: CPTII,S$GLB,, | Performed by: INTERNAL MEDICINE

## 2024-01-19 PROCEDURE — 1125F AMNT PAIN NOTED PAIN PRSNT: CPT | Mod: CPTII,S$GLB,, | Performed by: INTERNAL MEDICINE

## 2024-01-19 PROCEDURE — 3075F SYST BP GE 130 - 139MM HG: CPT | Mod: CPTII,S$GLB,, | Performed by: INTERNAL MEDICINE

## 2024-01-19 PROCEDURE — 1160F RVW MEDS BY RX/DR IN RCRD: CPT | Mod: CPTII,S$GLB,, | Performed by: INTERNAL MEDICINE

## 2024-01-19 PROCEDURE — 1101F PT FALLS ASSESS-DOCD LE1/YR: CPT | Mod: CPTII,S$GLB,, | Performed by: INTERNAL MEDICINE

## 2024-01-19 PROCEDURE — 3078F DIAST BP <80 MM HG: CPT | Mod: CPTII,S$GLB,, | Performed by: INTERNAL MEDICINE

## 2024-01-19 PROCEDURE — 1159F MED LIST DOCD IN RCRD: CPT | Mod: CPTII,S$GLB,, | Performed by: INTERNAL MEDICINE

## 2024-01-19 PROCEDURE — 99999 PR PBB SHADOW E&M-EST. PATIENT-LVL IV: CPT | Mod: PBBFAC,,, | Performed by: INTERNAL MEDICINE

## 2024-01-19 ASSESSMENT — ROUTINE ASSESSMENT OF PATIENT INDEX DATA (RAPID3)
PATIENT GLOBAL ASSESSMENT SCORE: 4
TOTAL RAPID3 SCORE: 3.39
PAIN SCORE: 3.5
FATIGUE SCORE: 0
MDHAQ FUNCTION SCORE: 0.8
PSYCHOLOGICAL DISTRESS SCORE: 2.2

## 2024-01-19 NOTE — PROGRESS NOTES
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  Her renal function is stable and she has a bit of anemia that is new.   We had been holding her Rituxan last infusion 07/20/2023 500mg x 1 , but I want to make sure we are not missing any underlying Wegener's activity.    She did have significant cough that took some time to improve, her sinuses feel continued inflammation, just started Z-pack.  Sharply marginated lucent lesion in the T10 vertebral body, possibly a hemangioma.      9/2023: Patient was seen 6/2023 with anticipation of repeat FESS Sinus surgery and pending compound nasal irrigation. She did have surgery with Dr. Quinn end of June 2023, by July visit noted improvement with compound irrigation from prior. Still with chronic sinusitis.   Last Rituxan 500mg single dose 7/2023.    She is doing much better with the congestion but nothing like it was before. She denies fevers, chills, some cough, no hemoptysis.   She is down to Prednisone 2.5 mg daily but overall her edema is much improved.   Renal function stable  ESR and CRP    She does not want to take MTX she lost significant amounts of hair.   Imuran was lost with flare and transition to Imuran.     She has completed Rituxan 18 months with sinuses under control, lungs clear , renal function stable. Hearing is stable she lost nearly 90% of hearing and has stabilized.   She has mac degeneration.   Neuropathy continues to be a problem but we never have petechial rashes.   Edema is much improved.     3/2023:   Patient current on RTX Q 6 month maintanance following  induction for Wegeners.    12/2/22 (Delayed COVID infection) RTX was 500mg with solumedrol 80mg IM   Completed Rituxan 1000mg for 1st  maintenance at 4 months 4/20/2022. due to rise in symptoms, and ESR   Induction 10/2021.      Continued nasal congestion and drainage.   Recent significant nosebleeds. Endoscopy with recurrent crusting despite office debridement by ENT and continued irrigation.      Will repeat serologies and inflammatory markers and check CD 19 levels first week March and see her later that month.   Patient doing very well. Still rhinitis, using navage. No antibiotics from ENT since last visit. She is still clearing significant mucous faint with dried blood.   Dry cough, no fevers, no SOB\  Prolia: 10/17/2022         11/2022  Patient doing very well. Still rhinitis, using navage. No antibiotics from ENT since last visit. She is still clearing significant mucous faint with dried blood.   Dry cough, no fevers, no SOB  Mild HA only with elevated BP.   Completed Rituxan maintanence at 4 months 4/20/2022.   Prolia 3/25/22  Evusheld with catch up dose 2/10/22 and 3/25/2022     5/5/2022:   Patient doing very well. Still rhinitis, using navage. No antibiotics from ENT since last visit. She is still clearing significant mucous faint with dried blood.   Dry cough, no fevers, no SOB  Mild HA only with elevated BP.   Completed Rituxan maintanence at 4 months 4/20/2022.   Prolia 3/25/22  Evusheld with catch up dose 2/10/22 and 3/25/2022.      Major complaints: pins, needles, stinging, burning in feet day and night is constant. Dr. Campbell: EB-N5 supplement is helping some, stopped for months and noted significant worsening. Dr. Campbell did increase from EB-N3.   Gabapentin 400mg TID  Hydrocodone only PRN  Prednisone 7.5mg         2/2022  Doing well no cough, no fevers. Recent COVID exposure but negative.   Seen with Nephro.   Given her successful response with Ritux. Agree to continue maintanance with Ritux Q 4-6  months     Needs to get #3 COVID vaccine timing with RTX. - will time this with me 2 weeks prior to   Discussed Prolia  Working on [place] for PreP with COVID> she is scheduled 2/10/22        12/9/2021  Patient's recent CXR 12/21/21 with marked improvement when compared to 10/21/21  Prednisone 10mg daily  S/p RTX x 4 infusions completion date. 11/5/21  Patient with rise in creatinine to 2.0 on 11/26 had been requiring lasix for marked edema since 11/1/21. Stopped 2 weeks ago. Edema is present but managed. Drinking about 40 oz water and 3 cups of coffee daily     Patient completed [facility] PT felt she was doing well home exercises about 2 weeks ago with acute pain in lumbar spine after exercising. Imaging lumbar few days ago without evidence of a fracture. She point to pain in the lumbosacral and radiating to buttock region. Ok to rise from seated. Pain more on left low back and buttock. No numbness no tingling but pain in the hamstring region. Comfortable sitting but not lying down, but she never sleeps in bed. Long hx of rather impressive scoliosis.         11/11/2021:   Inflammatory markers are markedly improved.   Cough dry still at night. Patient noting she has lost sense of smell. Can still taste.      Patient recently hospitatlized for Bronch with negative cultures to date.  There was a concern for possible atypical infection she has had enlarging right upper lobe cavitary lesion measures still has a right middle lobe lesion with some cavitation now as well.  I have discussed this case extensively with both Dr. Schwartz as well as Dr. Joseph upon discharge we felt comfortable that this is likely Wegener's granulomatosis causing cavitary lesions.  Supporting sinus biopsy does note granuloma.    Patient was started on Imuran in March of 2021 but with the growth of her lesion we had rule out for any possible infection given her status of immunosuppression.  She is here today not in her usual state she appears fatigued  pale she is not complaining of shortness of breath but does have a cough dry persistent.  She is noticing increased pedal edema.           9/13/21: patient with 2 falls 8 days apart. Spoke w/ pulm shortly following initial fall and we were scheduled for bronch when she sustained another fall.   Scheduled now for 9/17/21 bronch. RUL lesion.   Remains off Imuran  Prednisone 15mg daily.   Sinus congestion. Still productive      She is noting some productive cough at night no hemoptysis.   Patient denies SOB  Having more HA. -frontal and sinus, she is noting some drainage in right ear some mucous/blood.   No edema.   She notes fatigue, no fevers, no night sweats. No weight loss.   Skin easily bruising.      No personal hx of any malignancy.         7/20/2021:    Spoke with Pulm plan for bronch is able working on possible bx site vs BAL.   Need to r/o infectious/neoplastic and confirm for ANCA (GPA vasculitis)   Inflammatory markers markedly down .8  Now 14.   H/H improve from 7.4 to 8.2  Platelets normalized.   Renal stable over time     Patient did attend wedding with approx 300 people unmasked last weekend. Reviewed by recs for COVID precautions given     Current regimen:   pred 15mg (Na stable)  Holding Imuran     Interval events: PET with hypermetabolic lesions:   a. RUL cavitary/cystic lesion 1.9cm x 1.3cm  b. RUL spiculated 1.2cm x 1.4cm  c. RML 1.7cm x 2.0 cm  All with differential: infectious, inflammatory, neoplastic, grannulomatous (a) with favor of inflammation given the rapid change     Fungal immunodiffusion neg  Quant gold and T spot: neg  H/H improving      Sinus congestion, pressure, headache, x 3 weeks very productive with blowing nose, back on navage. :  started Cindamycin with DR Quinn x 10 days.   Patient denies any shortness breath.   Very fatigues.   No more fevers. Slight dry cough, no blood/ no mucous.   No SOB.         5/12/21 completed nasal irrigation with biopsy not definitive for  "GPA, but + inflammation and granulomas. Temporal artery bx negative. Patient with PCP same day as labs 6/2/21  dx with UTI on keflex.      Was seen apparently in ED in [place] for 104F she had altered mental status, dx with UTI, dehydration, treated with. Completed all 7 days of keflex and within 48 hours with another fever 104 F. She continues with congestion but no worse. Patient denies cough, hemoptysis, bloody nasal discharge. She has had no fever x 10 days. Checks twice daily.   while travelling told "congestion in chest on xray". Inflammatory markers very high again.   Patient with recent concern for wegeners needs CT chest. CXR with new airspace opacities right chest- need to r/o GGO vs infection. CT has been ordered pending scheduling.   Broad ABX coverage recommended for now will cc Dr. Natarajan.      I am limited here with her  severe edema from higher dose steroids. Max tolerated is 20mg daily pred  Started Imuran 5/5/2021. Very low dose 50mg BID (0.65mg/kg) tolerating VERY well.   H/H still low.            2/2021  Sinususitis, cx were negative.  Using nasal irrigation with mucoid disharge.   Patient continues with significant nasal discharge and blood with scabs. We discussed Wegeners but pathology sinus no vasculitis, granuloma noted.   No HA, no blurred vision. Recent sinus infection with HA for 10 days. cx +, but also repeat labs demonstarting +PR3 and +ANCA         Patient with c/o right > left 3,4 finger numbness that was intermittent until approximately 2 weeks about having near constant numbness in hands, pins and needles and pain.   Patient not noting much improvement with change in position.      Off MTX 6  tabs weekly.   Doing well with Hydroxychloroquine.   Declined COVID vaccine     Gabapentin up to 300mg TID.   Now with Dr. Campbell doing PT for Plantar fasciitis. She is taking supplement and compound cream for joint and neuropathy. No response to tarsal tunnel injection per Dr. Campbell.   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 WBC ok.         11/2020  Patient with PMR   MTX at 4 tabs weekly new anemia. Thrombocytosis  Pred at 10mg   Complicated with peripheral edema rather severe , started MTX seeing trending CRP/ESR but persistent leg pain.   Seen with PCP for neuropathy s/sx not seeing much benefit with gabpentin  Seen with Cardiology- no concern for cardiac ECHO ok. dc'd lasix for hyponatremia.      8/2020  Pred 20mg with mild edema, pred 30 mg with severe edema.   Current Pred 15mg daily  Rising ESR again.   Lasix 20 mg daily with improved edema but having some pins and needles in feet/legs.   Having tight, burning stinging at the toes and feet. Heaviness. Prickly.   Noting sodium is falling, K 5.2-5.4 still using               7/2020:  Hands not painful, knees not painful. Shoulder/cervical and down the arms, markedly improved. She still has some pain him hips and groin with walking and some weakness to the legs with edema. Some pain with fixing own hair.   She takes in AM regarding hips ache, some shoulder/arms, and again at night because hips/legs.   The clue is that the LDN new dose she had some ASE, previously tolerated 3mg daily fine.      Historical review of present Illness.   Patient with a recent chronic sinusitis that ultimately warranted a septoplasty, endoscopic sinus surgery and turbinate reduction 5/2020   Four weeks postop t increasingly worse she was noting pain in her throat and ears lasting several hours complaint of pdizziness she had symptoms of ear pain and decreased hearing in the left ear.  She underwent a debridement at this visit she continue with compound nasal irrigations.  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     Patient did have repeat procedure on with biopsies taken 07/03/2020 showing right maxillary sinus chronically inflamed fibrotic polypoid portions benign nasal mucosa left maxillary sinus similar polypoid fragments ulcerated markedly inflamed respiratory mucosa focal foreign body giant cell response noted suggested that this might have been from her Gelfoam packing as a possible cause of this reaction     I have spoken with Dr. Quinn prior to patient's visit today and was a concern that she could have triggered some inflammatory response with the Gelfoam packing     Patient states she no longer has a headache is not noticing much ear drainage continues with profound loss of hearing on the left and rather significant on the right both sensorineural and some conductive changes.     Restarted on prednisone as of 7/20/20  10 mg patient has not noticed any change with her hearing in this time she did notice slight improvement with her joint aches and pains but is still relying on hydrocodone for the bulk of this.     She denies a persistent headache she does have some tenderness about the preauricular region particularly on the right more than the left.  She has denied jaw claudication changes in her voice or any amaurosis fugax.  She has no pre-existing history of joint aches and pains hip or otherwise.  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  She had a workup that involved a negative rheumatoid factor, age appropriate sedimentation rate,C reactive protein JANET was  positive 1:160 in speckled pattern           Previous: Hx:   She is having pain in her hand with stiffness and right 2nd PIP joint now unable to flex. 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   Hx of bilateral TKA 3 and 5 years ago and those are doing well.   She notes intermittent edema. Some hammer toe deformity bilaterally making shoes problematic but not painful otherwise.   Long hx of GERD-severe.   Cannot tolerate NSAIDs at all w/o gastritis.   Can use Hydrocodone PRN   Added Naltrexone at 3mg and doing very well  Lost 30# with WW.   Patient denies weight loss, rashes, dry eye, dry mouth, nasal or palatal ulcerations,  lymphadenopathy, Raynaud's, hx of DVT/miscarriages, psoriasis or family hx of psoriasis, rashes, serositis, anemia or other constitutional symptoms.  Asking about naltrexone  Component      Latest Ref Rng & Units 8/8/2020 7/29/2020 7/20/2020 7/13/2020   Sodium      136 - 145 mmol/L   126 (L) 128 (L)     Potassium      3.5 - 5.1 mmol/L   5.4 (H) 5.2 (H)     Chloride      95 - 110 mmol/L   92 (L) 92 (L)     CO2      23 - 29 mmol/L   25 29     Glucose      70 - 110 mg/dL   109 109     BUN, Bld      8 - 23 mg/dL   13 11     Creatinine      0.5 - 1.4 mg/dL   0.8 0.8     Calcium      8.7 - 10.5 mg/dL   9.5 9.7     Anion Gap      8 - 16 mmol/L   9 7 (L)     eGFR if African American      >60 mL/min/1.73 m:2   >60.0 >60.0     eGFR if non African American      >60 mL/min/1.73 m:2   >60.0 >60.0     Anti Sm Antibody      0.00 - 0.99 Ratio       0.04   Anti-Sm Interpretation      Negative       Negative   Anti Sm/RNP Antibody      0.00 - 0.99 Ratio       0.09   Anti-Sm/RNP Interpretation      Negative       Negative   JANET Screen      None Detected           Rheumatoid Factor      0.0 - 15.0 IU/mL           Sed Rate      0 - 36 mm/Hr 57 (H)   54 (H) 75 (H)   CRP      0.0 - 8.2 mg/L 75.4 (H) 56.1 (H) 57.5 (H) 99.4 (H)   Anti-Histone Antibody      0.0 - 0.9 Units       0.4   ds DNA  Ab      Negative 1:10       Negative 1:10      Component      Latest Ref Rng & Units 6/25/2020 11/10/2018   Sodium      136 - 145 mmol/L       Potassium      3.5 - 5.1 mmol/L       Chloride      95 - 110 mmol/L       CO2      23 - 29 mmol/L       Glucose      70 - 110 mg/dL       BUN, Bld      8 - 23 mg/dL       Creatinine      0.5 - 1.4 mg/dL       Calcium      8.7 - 10.5 mg/dL       Anion Gap      8 - 16 mmol/L       eGFR if African American      >60 mL/min/1.73 m:2       eGFR if non African American      >60 mL/min/1.73 m:2       Anti Sm Antibody      0.00 - 0.99 Ratio       Anti-Sm Interpretation      Negative       Anti Sm/RNP Antibody      0.00 - 0.99 Ratio       Anti-Sm/RNP Interpretation      Negative       JANET Screen      None Detected Detected (A) Detected (A)   Rheumatoid Factor      0.0 - 15.0 IU/mL 11.6 <8.6   Sed Rate      0 - 36 mm/Hr       CRP      0.0 - 8.2 mg/L       Anti-Histone Antibody      0.0 - 0.9 Units       ds DNA Ab      Negative 1:10             REVIEW OF SYSTEMS:     Review of Systems   Constitutional: Negative for fever, malaise/fatigue and weight loss.   HENT: Negative for sore throat.    Eyes: Negative for double vision, photophobia and redness.   Respiratory: Negative for cough, shortness of breath and wheezing.    Cardiovascular: Negative for chest pain, palpitations and orthopnea.   Gastrointestinal: Negative for abdominal pain, constipation and diarrhea.   Genitourinary: Negative for dysuria, hematuria and urgency.   Musculoskeletal: Positive for joint pain. Negative for back pain and myalgias.   Skin: Negative for rash.   Neurological: Negative for dizziness, tingling, focal weakness and headaches.   Endo/Heme/Allergies: Does not bruise/bleed easily.   Psychiatric/Behavioral: Negative for depression, hallucinations and suicidal ideas.                     Objective:      Objective        Past Medical History:   Diagnosis Date    Anesthesia       'Mother stop breathing after  "anesthesia"    Chronic sinusitis      Depression      GERD (gastroesophageal reflux disease)      PVC (premature ventricular contraction)              Family History   Problem Relation Age of Onset    Heart disease Mother      Arthritis Mother      Cancer Sister      Arthritis Sister      Diabetes Sister      Hypertension Sister        Social History            Tobacco Use    Smoking status: Former       Current packs/day: 0.00       Types: Cigarettes       Start date: 2/3/1955       Quit date: 1/3/1960       Years since quittin.7    Smokeless tobacco: Never   Substance Use Topics    Alcohol use: Never       Alcohol/week: 0.0 standard drinks of alcohol    Drug use: Never                   Current Outpatient Medications on File Prior to Visit   Medication Sig Dispense Refill    acetaminophen (TYLENOL) 500 MG tablet Take 500 mg by mouth every 6 (six) hours as needed for Pain.        albuterol (PROVENTIL/VENTOLIN HFA) 90 mcg/actuation inhaler INHALE 2 PUFFS BY MOUTH EVERY 4 TO 6 HOURS AS NEEDED FOR SHORTNESS OF BREATH OR COUGH        ascorbate calcium (MAHSA-C ORAL) Take by mouth.        B2-B6 phos-levomef simran-mecobal (EB-N3 DR) 1.3-70-6-4 mg CpDR Take 1 capsule by mouth once daily.        cetirizine (ZYRTEC) 10 MG tablet TAKE 1 TABLET ONE TIME DAILY 90 tablet 0    denosumab (PROLIA SUBQ) Inject into the skin. Two times a year        diclofenac sodium (VOLTAREN) 1 % Gel APPLY TO THE AFFECTED AREA(S) 2 GRAMS THREE TIMES DAILY 600 g 0    furosemide (LASIX) 20 MG tablet TAKE 1 TABLET EVERY DAY 90 tablet 3    gabapentin (NEURONTIN) 400 MG capsule TAKE 1 CAPSULE THREE TIMES DAILY 270 capsule 3    HYDROcodone-acetaminophen (NORCO) 7.5-325 mg per tablet Take 1 tablet by mouth every 6 (six) hours as needed.        ITRACONAZOLE, BULK, MISC EMPTY CONTENTS OF 1 CAPSULE INTO NASAL IRRIGATION SYSTEM, ADD DISTILLED WATER, SALT PACK, MIX & IRRIGATE. PERFORM 2 TIMES DAILY        LACTOBACILLUS ACIDOPHILUS ORAL Take by mouth.     "    metoprolol succinate (TOPROL-XL) 50 MG 24 hr tablet TAKE 1 TABLET EVERY DAY 90 tablet 3    mirabegron (MYRBETRIQ) 50 mg Tb24 Take 1 tablet (50 mg total) by mouth once daily. 30 tablet 11    neomycin-polymyxin-dexamethasone (MAXITROL) 3.5mg/mL-10,000 unit/mL-0.1 % DrpS          omeprazole (PRILOSEC) 40 MG capsule TAKE 1 CAPSULE EVERY MORNING 90 capsule 0    ondansetron (ZOFRAN-ODT) 8 MG TbDL Take 8 mg by mouth 2 (two) times daily.        peg 400-propylene glycol, PF, (SYSTANE, PF,) 0.4-0.3 % Dpet Apply to eye 2 (two) times a day.        predniSONE (DELTASONE) 2.5 MG tablet Take 1 tablet (2.5 mg total) by mouth once daily. 90 tablet 3    predniSONE (DELTASONE) 5 MG tablet Take 1 tablet (5 mg total) by mouth once daily. 30 tablet 3    sodium chloride 0.9% SolP 500 mL with riTUXimab 10 mg/mL Conc 375 mg/m2 Inject 375 mg/m2 into the vein. 4 to 6 months        tobramycin sulfate 0.3% (TOBREX) 0.3 % ophthalmic solution Place into both eyes.        vancomycin (VANCOCIN) 1,000 mg injection SMARTSI Vial(s) Both Nares Twice Daily        vit C/E/Zn/coppr/lutein/zeaxan (PRESERVISION AREDS-2 ORAL) Take 1 capsule by mouth 2 (two) times a day.         [DISCONTINUED] naltrexone capsule Take 3 mg by mouth once daily.          No current facility-administered medications on file prior to visit.             Vitals:     23 1306   BP: 134/65   Pulse: 69         Physical Exam:     Physical Exam   Constitutional: She is oriented to person, place, and time. She appears well-developed and well-nourished.   HENT:   Head: Normocephalic and atraumatic.   Mouth/Throat: Oropharynx is clear and moist.   Eyes: Pupils are equal, round, and reactive to light. EOM are normal.   Neck: Normal range of motion.   Cardiovascular: Normal rate, regular rhythm and normal heart sounds.   Pulmonary/Chest: Effort normal and breath sounds normal.   Musculoskeletal:        Right shoulder: She exhibits normal range of motion, no tenderness and no  swelling.        Left shoulder: She exhibits normal range of motion, no tenderness and no swelling.        Right elbow: She exhibits normal range of motion and no swelling. No tenderness found.        Left elbow: She exhibits normal range of motion and no swelling. No tenderness found.        Right wrist: She exhibits normal range of motion, no tenderness and no swelling.        Left wrist: She exhibits normal range of motion, no tenderness and no swelling.        Right knee: She exhibits normal range of motion and no swelling. No tenderness found.        Left knee: She exhibits normal range of motion and no swelling. No tenderness found.        Right hand: She exhibits decreased range of motion and tenderness. She exhibits no swelling.        Left hand: She exhibits decreased range of motion and tenderness. She exhibits no swelling.        Right foot: There is normal range of motion, no tenderness and no swelling.        Left foot: There is normal range of motion, no tenderness and no swelling.   Bony hypertrophy at the PIP and DIP joints. +squaring at the CMC joint. No active synovitis on 28 joint exam.    Neurological: She is alert and oriented to person, place, and time.   Skin: Skin is warm and dry.   Psychiatric: She has a normal mood and affect. Her behavior is normal.      Component      Latest Ref Rng & Units 9/17/2021 9/17/2021          11:03 AM 11:03 AM   Respiratory Culture         No growth   Gram Stain (Respiratory)       No organisms seen No WBC's   AFB Culture & Smear             AFB CULTURE STAIN             GENET Prep             Fungus (Mycology) Culture             Aerobic Bacterial Culture                Component      Latest Ref Rng & Units 9/17/2021          11:03 AM   Respiratory Culture       No Staph aureus, MRSA or Pseudomonas isolated.   Gram Stain (Respiratory)       No organisms seen   AFB Culture & Smear           AFB CULTURE STAIN           GENET Prep           Fungus (Mycology) Culture            Aerobic Bacterial Culture              Component      Latest Ref Rng & Units 2021          11:03 AM 11:03 AM   Respiratory Culture       Normal respiratory nimesh     Gram Stain (Respiratory)       Moderate WBC's     AFB Culture & Smear         Culture in progress   AFB CULTURE STAIN         No acid fast bacilli seen.   KOH Prep         No yeast or fungal elements seen   Fungus (Mycology) Culture         Culture in progress   Aerobic Bacterial Culture                      Narrative  Performed by: DPAT  Patient - ONIEL ROSARIO                   - 1940 Sex- F   Med Rec # - [id number]                        Bimal Mckeon M.D.   The following is an electronic copy of report # [id number] from:   [facility]   [address] [place] [id number]                         Phone [phone]   DIAGNOSIS:   2021  JL/shakeel     1, 2.  RIGHT UPPER LOBE MASS BRUSHING AND FINE NEEDLE ASPIRATE BIOPSIES:   - NEGATIVE FOR MALIGNANT CELLS.     - PURULENT EXUDATE.     - A FEW FRAGMENTS OF BRONCHIAL MUCOSA WITH FLORID CHRONIC BRONCHITIS    AND REACTIVE SQUAMOUS    ATYPICAL METAPLASIA.       Comment:   Special stains (AFB and GMS) are negative for organisms. While no    granulomas are seen here, the inflammatory reaction appears to be    similar to that seen in the sinus material over the past few years    (Delta [id number], [id number], [id number]). 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 Bronchoscopic material, particularly of the lower    respiratory tract, is of low yield for a definitive diagnosis of that    entity. The previous sinus material will be reviewed with    consideration for that diagnosis.   _______________________________________________________________________   SPECIMEN AND SOURCE:   1. RUL mass brushing    2. RUL mass needle     CLINICAL INFORMATION:   Clinical Information:-gt Right Upper Lobe Mass   Specific Site:-gt RUL mass brushing T Brush; RUL Mass; RUL mass-    microbiology; RUL BAL; RUL washing;   Other Requests:-gt N/A     GROSS DESCRIPTION:   1. Received 40 mL of fluid in formalin, 1 dry slides and 1 slides in    95% alcohol. Prepared one cell block.     2. Received 40 mL of fluid in formalin, 1 dry slides and 1 slides in    95% alcohol. Prepared one cell block.     CODE: 0     Cytotechnologist: ABDIFATAH Marvin (ASCP)   Pathologist: Scott [place] MD (Electronic Signature) 09/22/2021 2:34 PM     [facility], [facility] * [address] [place] [address] * [place] [address]   Technical services performed at: [facility], [employer] * [address] [place] [address] * [place] [address]   Screening Site: [facility], [facility] * [address] [place] [address] *    [place] [address]                            Post Rituxan image                                     Pre Rituxan image  Assessment and Plan      Wegener's granulomatosis with renal involvement  -     ANTI-NEUTROPHILIC CYTOPLASMIC ANTIBODY; Future; Expected date: 01/19/2024  -     Myeloperoxidase Antibody (MPO); Future; Expected date: 01/19/2024  -     Proteinase 3 Autoantibodies; Future; Expected date: 01/19/2024  -     Sedimentation rate; Future; Expected date: 01/19/2024  -     C-Reactive Protein; Future; Expected date: 01/19/2024  -     Hepatitis B Surface Antigen; Future; Expected date: 01/19/2024  -     Hepatitis B Surface Ab, Qualitative; Future; Expected date: 01/19/2024  -     Hepatitis B Core Antibody, Total; Future; Expected date: 01/19/2024    Chronic pansinusitis  -     ANTI-NEUTROPHILIC CYTOPLASMIC ANTIBODY; Future; Expected date: 01/19/2024  -     Myeloperoxidase Antibody (MPO); Future; Expected date: 01/19/2024  -     Proteinase 3 Autoantibodies; Future; Expected date: 01/19/2024  -     Sedimentation rate; Future; Expected date:  01/19/2024  -     C-Reactive Protein; Future; Expected date: 01/19/2024  -     Hepatitis B Surface Antigen; Future; Expected date: 01/19/2024  -     Hepatitis B Surface Ab, Qualitative; Future; Expected date: 01/19/2024  -     Hepatitis B Core Antibody, Total; Future; Expected date: 01/19/2024    Immunosuppression  -     ANTI-NEUTROPHILIC CYTOPLASMIC ANTIBODY; Future; Expected date: 01/19/2024  -     Myeloperoxidase Antibody (MPO); Future; Expected date: 01/19/2024  -     Proteinase 3 Autoantibodies; Future; Expected date: 01/19/2024  -     Sedimentation rate; Future; Expected date: 01/19/2024  -     C-Reactive Protein; Future; Expected date: 01/19/2024  -     Hepatitis B Surface Antigen; Future; Expected date: 01/19/2024  -     Hepatitis B Surface Ab, Qualitative; Future; Expected date: 01/19/2024  -     Hepatitis B Core Antibody, Total; Future; Expected date: 01/19/2024    Malaise and fatigue  -     Hepatitis B Surface Antigen; Future; Expected date: 01/19/2024  -     Hepatitis B Surface Ab, Qualitative; Future; Expected date: 01/19/2024  -     Hepatitis B Core Antibody, Total; Future; Expected date: 01/19/2024            Patient is a 83-year-old female she has had a recent robust inflammatory reaction within the sinus cavity as well as hearing deficit following a sinus surgery.  She has been repeated cultured postoperatively completed antibiotics and irrigations negative for any fungal infections or bacterial infections.   Patient was doing well on MTX with regard to ESR and CRP, but hair loss.      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               Completed RTX 11/5/21 Induction               Review of data and feel : The best data supporting the use of  as maintenance therapy come from the Maintenance of Remission using Rituximab in Systemic ANCA-associated Vasculitis (MAINRITSAN) trial with less relapse for PR3+ paitients when treated with  Rituxan at 500mg IV at 6, 12, and 18months when compared to Imuran. Patient has tolerated RTX last 4/20/2022, she had to cancel 10/21/2022 for COVID. Did receive 500mg on 12/2022. Received again 7/2023. Patient has completed 18 months of Rituxan therapy.     -With a recent URI her labs drawn 01/05/2024 this with the elevated sed rate and C-reactive protein she was negative for flu, RSV, COVID but given that she is having symptoms of some nosebleeds and while her baseline sinusitis is chronic seems to be an uptake in the amount of nasal discharge we are going to repeat her sed rate next month.  Her renal function is stable and she has a bit of anemia that is new.   We had been holding her Rituxan last infusion 07/20/2023 500mg x 1 , but I want to make sure we are not missing any underlying Wegener's activity.              - would consider transferring her over to MTX given sinuses are doing well, completed 2 years of Rituxan and      Chronic Sinusitis: continue with irrigation from ENT for now and we will monitor.      CKD  Patient did have casts with hematuria 10/12/21 prior to start of Rituxan. Improved following RTX                Continue omeprazole (prilosec)  40mg by mouth daily.       Patient hx of Osteopenia only, renal function not stable enough for oral bisphos.                she is having some spinous process tenderness today but pain not classic of vertebral fracture will check imaging anyway. She had rib fractures last year as well.               Completed. Prolia 2022. Off with most recent DXA Osteopenia. Continues with Calcium and Vitamin D.      40min consultation with greater than 50% of that time included Preparing to see the patient (review records, tests), Obtaining and/or reviewing separately obtained historical data, Performing a medically appropriate examination and/or evaluation , Ordering medications, tests, and/or procedures, Referring and communicating with other healthcare professionals ,  Documenting clinical information in the electronic or other health record and Independently interpreting results  (as warranted) & communicating results to the patient/family/caregiver. All questions answered.

## 2024-02-01 ENCOUNTER — TELEPHONE (OUTPATIENT)
Dept: PAIN MEDICINE | Facility: CLINIC | Age: 84
End: 2024-02-01
Payer: MEDICARE

## 2024-02-01 NOTE — TELEPHONE ENCOUNTER
----- Message from Monicajalyn Cristino sent at 2/1/2024  8:10 AM CST -----  Regarding: Pt Advice  Needs Medical Advice      Who Called: Pt         Would the patient rather a call back or a response via [facility]? Call back    Best Call Back Number:  [phone]      Additional Information: Sts she had to cancel her MRI's due to being sick and was told she would have to be still, but with her cough is unable to.    Please Advise - Thank you

## 2024-02-05 DIAGNOSIS — M54.9 MID BACK PAIN ON LEFT SIDE: ICD-10-CM

## 2024-02-05 DIAGNOSIS — M41.35 THORACOGENIC SCOLIOSIS OF THORACOLUMBAR REGION: ICD-10-CM

## 2024-02-05 RX ORDER — TIZANIDINE 4 MG/1
4 TABLET ORAL NIGHTLY PRN
Qty: 40 TABLET | Refills: 0 | Status: SHIPPED | OUTPATIENT
Start: 2024-02-05

## 2024-02-05 NOTE — TELEPHONE ENCOUNTER
----- Message from Charline Scott sent at 2/5/2024  2:21 PM CST -----  Contact: pt [phone]  Type:  RX Refill Request    Who Called:  Pt   Refill or New Rx:  refill  RX Name and Strength:  tiZANidine (ZANAFLEX) 4 MG tablet   How is the patient currently taking it? (ex. 1XDay):  1xday   Is this a 30 day or 90 day RX:  30  Preferred Pharmacy with phone number:    [id number] [id number] [place] [id number]  Phone: [phone] Fax: [phone]    Local or Mail Order:  Local   Ordering Provider:  Clinton Rowland Call Back Number:  [phone]    Additional Information:  Pt requesting refill

## 2024-02-09 ENCOUNTER — LAB VISIT (OUTPATIENT)
Dept: LAB | Facility: HOSPITAL | Age: 84
End: 2024-02-09
Attending: INTERNAL MEDICINE
Payer: MEDICARE

## 2024-02-09 DIAGNOSIS — R53.83 MALAISE AND FATIGUE: ICD-10-CM

## 2024-02-09 DIAGNOSIS — J32.4 CHRONIC PANSINUSITIS: ICD-10-CM

## 2024-02-09 DIAGNOSIS — M31.31 WEGENER'S GRANULOMATOSIS WITH RENAL INVOLVEMENT: ICD-10-CM

## 2024-02-09 DIAGNOSIS — D84.9 IMMUNOSUPPRESSION: ICD-10-CM

## 2024-02-09 DIAGNOSIS — R53.81 MALAISE AND FATIGUE: ICD-10-CM

## 2024-02-09 LAB
CRP SERPL-MCNC: 2.3 MG/L (ref 0–8.2)
ERYTHROCYTE [SEDIMENTATION RATE] IN BLOOD BY PHOTOMETRIC METHOD: 47 MM/HR (ref 0–36)
HBV CORE AB SERPL QL IA: NORMAL
HBV SURFACE AG SERPL QL IA: NORMAL

## 2024-02-09 PROCEDURE — 83516 IMMUNOASSAY NONANTIBODY: CPT | Mod: 59 | Performed by: INTERNAL MEDICINE

## 2024-02-09 PROCEDURE — 87340 HEPATITIS B SURFACE AG IA: CPT | Performed by: INTERNAL MEDICINE

## 2024-02-09 PROCEDURE — 86036 ANCA SCREEN EACH ANTIBODY: CPT | Performed by: INTERNAL MEDICINE

## 2024-02-09 PROCEDURE — 86704 HEP B CORE ANTIBODY TOTAL: CPT | Performed by: INTERNAL MEDICINE

## 2024-02-09 PROCEDURE — 83516 IMMUNOASSAY NONANTIBODY: CPT | Performed by: INTERNAL MEDICINE

## 2024-02-09 PROCEDURE — 85652 RBC SED RATE AUTOMATED: CPT | Performed by: INTERNAL MEDICINE

## 2024-02-09 PROCEDURE — 36415 COLL VENOUS BLD VENIPUNCTURE: CPT | Mod: PO | Performed by: INTERNAL MEDICINE

## 2024-02-09 PROCEDURE — 86140 C-REACTIVE PROTEIN: CPT | Performed by: INTERNAL MEDICINE

## 2024-02-09 PROCEDURE — 86706 HEP B SURFACE ANTIBODY: CPT | Performed by: INTERNAL MEDICINE

## 2024-02-10 LAB
HBV SURFACE AB SER-ACNC: <3 MIU/ML
HBV SURFACE AB SER-ACNC: NORMAL M[IU]/ML

## 2024-02-12 LAB
ANCA AB TITR SER IF: NORMAL TITER
MYELOPEROXIDASE AB SER-ACNC: <0.2 U/ML
P-ANCA TITR SER IF: NORMAL TITER
PROTEINASE3 IGG SER-ACNC: 0.2 U

## 2024-02-20 ENCOUNTER — TELEPHONE (OUTPATIENT)
Dept: RHEUMATOLOGY | Facility: CLINIC | Age: 84
End: 2024-02-20

## 2024-02-23 ENCOUNTER — HOSPITAL ENCOUNTER (OUTPATIENT)
Dept: RADIOLOGY | Facility: HOSPITAL | Age: 84
Discharge: HOME | End: 2024-02-23
Attending: PHYSICIAN ASSISTANT
Payer: MEDICARE

## 2024-02-23 DIAGNOSIS — M41.9 SCOLIOSIS, UNSPECIFIED SCOLIOSIS TYPE, UNSPECIFIED SPINAL REGION: ICD-10-CM

## 2024-02-23 DIAGNOSIS — M54.2 CERVICALGIA: ICD-10-CM

## 2024-02-23 DIAGNOSIS — D18.09 HEMANGIOMA OF BONE: ICD-10-CM

## 2024-02-23 DIAGNOSIS — M41.35 THORACOGENIC SCOLIOSIS OF THORACOLUMBAR REGION: ICD-10-CM

## 2024-02-23 PROCEDURE — 72141 MRI NECK SPINE W/O DYE: CPT | Mod: TC,PO

## 2024-02-23 PROCEDURE — 72141 MRI NECK SPINE W/O DYE: CPT | Mod: 26,,, | Performed by: RADIOLOGY

## 2024-02-23 PROCEDURE — 25500020 PHARM REV CODE 255: Mod: PO | Performed by: PHYSICIAN ASSISTANT

## 2024-02-23 PROCEDURE — 72157 MRI CHEST SPINE W/O & W/DYE: CPT | Mod: 26,,, | Performed by: RADIOLOGY

## 2024-02-23 PROCEDURE — A9585 GADOBUTROL INJECTION: HCPCS | Mod: PO | Performed by: PHYSICIAN ASSISTANT

## 2024-02-23 PROCEDURE — 72157 MRI CHEST SPINE W/O & W/DYE: CPT | Mod: TC,PO

## 2024-02-23 RX ORDER — GADOBUTROL 604.72 MG/ML
6 INJECTION INTRAVENOUS
Status: COMPLETED | OUTPATIENT
Start: 2024-02-23 | End: 2024-02-23

## 2024-02-23 RX ADMIN — GADOBUTROL 6 ML: 604.72 INJECTION INTRAVENOUS at 11:02

## 2024-02-27 ENCOUNTER — TELEPHONE (OUTPATIENT)
Dept: RHEUMATOLOGY | Facility: CLINIC | Age: 84
End: 2024-02-27
Payer: MEDICARE

## 2024-02-27 NOTE — TELEPHONE ENCOUNTER
Ms Deleon had lab work done on 2/9/24 and 2/23/24. Pt states that after her PCP reviewed the lab results he advised her to have Dr Snyder review the results. Pt is very concerned with results.  Pt also wants to update rheumatology that she had mouth ulcers/possibly shingles that were treated with Valtrex for two days. She still has peeling, dry lips.  Will route to Dr Snyder to review and advise.

## 2024-02-27 NOTE — TELEPHONE ENCOUNTER
----- Message from Renetta Mock sent at 2/27/2024 11:57 AM CST -----  Contact: patient  Type:  Needs Medical Advice    Who Called: patient     Would the patient rather a call back or a response via [facility]? Call    Best Call Back Number: [phone] (home) [phone] (work)     Additional Information: Patient would like to speak with the nurse in regards to her health and about her lab work test results.     Please call to advise

## 2024-02-27 NOTE — TELEPHONE ENCOUNTER
Patient with telephone call to Dr. Reddy was offered acyclovir not likely for shingles but for fever blisters I suspect. No exam done but shingles on lip would be extremely painful.     The sed rate was moderately elevated with my labs 2/9 and the antibodies for ANCA vasculitis were still low which was encouraging.   I see the more recent sed rate of 89mm/hr, but if she has fever blisters this will drive the sed rate up.     What I am most concerned: does she have any fevers, chills, cough or shortness of breath, any worsening of her sinuses in the last week??    I will likely want to repeat her Rituxan but the lip blisters will need to be cleared up which should only take 1 week.     Is she on any oral antibiotic for her sinuses right now?    Dr. Snyder

## 2024-02-28 NOTE — TELEPHONE ENCOUNTER
Pt denies fever, chills, shortness of breath.  Coughing yes  Sinus issues worsening a little in last week  The blisters have healed almost. At present they are open and a little sore.  Pt not on antibiotics at present but feels that might be a good idea.  Will route patients response to provider to review and advise.

## 2024-03-01 ENCOUNTER — TELEPHONE (OUTPATIENT)
Dept: RHEUMATOLOGY | Facility: CLINIC | Age: 84
End: 2024-03-01
Payer: MEDICARE

## 2024-03-01 NOTE — TELEPHONE ENCOUNTER
----- Message from Laura Yi sent at 3/1/2024 10:31 AM CST -----  Contact: pt  Type: Needs Medical Advice  Who Called:  pt  Best Call Back Number: [phone]    Additional Information: Pt is calling the office asking to speak with rodríguez to see what  thinks pt needs to do.Please call back and advise.

## 2024-03-01 NOTE — TELEPHONE ENCOUNTER
Patient calling to give update. Mouth blisters are almost completely healed. She is only using chap stick now. Informed her again that Dr Snyder wants to wait until the mouth blisters completely heal which will be about a week.  Asking did Dr Snyder want her taking antibiotics  Asking if she will be getting Rutuxan.  Will route to provider to review and advise.   0

## 2024-03-11 ENCOUNTER — OFFICE VISIT (OUTPATIENT)
Dept: PAIN MEDICINE | Facility: CLINIC | Age: 84
End: 2024-03-11
Payer: MEDICARE

## 2024-03-11 VITALS
HEART RATE: 53 BPM | DIASTOLIC BLOOD PRESSURE: 69 MMHG | WEIGHT: 144.38 LBS | HEIGHT: 64 IN | BODY MASS INDEX: 24.65 KG/M2 | SYSTOLIC BLOOD PRESSURE: 140 MMHG

## 2024-03-11 DIAGNOSIS — M47.812 CERVICAL SPONDYLOSIS: ICD-10-CM

## 2024-03-11 DIAGNOSIS — M54.2 CERVICALGIA: ICD-10-CM

## 2024-03-11 DIAGNOSIS — D18.09 HEMANGIOMA OF BONE: Primary | ICD-10-CM

## 2024-03-11 DIAGNOSIS — M41.9 SCOLIOSIS, UNSPECIFIED SCOLIOSIS TYPE, UNSPECIFIED SPINAL REGION: ICD-10-CM

## 2024-03-11 DIAGNOSIS — M54.9 MID BACK PAIN ON LEFT SIDE: ICD-10-CM

## 2024-03-11 PROCEDURE — 3078F DIAST BP <80 MM HG: CPT | Mod: CPTII,S$GLB,, | Performed by: PHYSICIAN ASSISTANT

## 2024-03-11 PROCEDURE — 99214 OFFICE O/P EST MOD 30 MIN: CPT | Mod: S$GLB,,, | Performed by: PHYSICIAN ASSISTANT

## 2024-03-11 PROCEDURE — 1160F RVW MEDS BY RX/DR IN RCRD: CPT | Mod: CPTII,S$GLB,, | Performed by: PHYSICIAN ASSISTANT

## 2024-03-11 PROCEDURE — 1125F AMNT PAIN NOTED PAIN PRSNT: CPT | Mod: CPTII,S$GLB,, | Performed by: PHYSICIAN ASSISTANT

## 2024-03-11 PROCEDURE — 3077F SYST BP >= 140 MM HG: CPT | Mod: CPTII,S$GLB,, | Performed by: PHYSICIAN ASSISTANT

## 2024-03-11 PROCEDURE — 99999 PR PBB SHADOW E&M-EST. PATIENT-LVL IV: CPT | Mod: PBBFAC,,, | Performed by: PHYSICIAN ASSISTANT

## 2024-03-11 PROCEDURE — 1159F MED LIST DOCD IN RCRD: CPT | Mod: CPTII,S$GLB,, | Performed by: PHYSICIAN ASSISTANT

## 2024-03-11 PROCEDURE — 3288F FALL RISK ASSESSMENT DOCD: CPT | Mod: CPTII,S$GLB,, | Performed by: PHYSICIAN ASSISTANT

## 2024-03-11 PROCEDURE — 1101F PT FALLS ASSESS-DOCD LE1/YR: CPT | Mod: CPTII,S$GLB,, | Performed by: PHYSICIAN ASSISTANT

## 2024-03-11 NOTE — PROGRESS NOTES
[facility]      PCP:   Ryan Reddy MD    CC:   Chief Complaint   Patient presents with    Mid-back Pain    Neck Pain          HPI:   Aracelis Weber is a 83 y.o. year old female patient who has a past medical history of Anesthesia, Chronic sinusitis, Depression, GERD (gastroesophageal reflux disease), and PVC (premature ventricular contraction). She presents for follow up of neck and back pain after undergoing imaging.  She has been doing fairly well.  She has intermittent neck/ shoulder pain and hand tingling.  She has intermittent mid back pain. Overall she is doing well.    Initial HPI:  She presents in self referral/ Merari Chapa NP for back pain.  She is known to have thoracolumbar scoliosis.  She has chronic intermittent back pain since childhood bilaterally.  About 6 weeks ago left sided back pain really increased, but it has since returned to baseline.  Pain is felt on the right and left mid thoracic to lower thoracic/ upper lumbar region bilaterally.  Some times it radiates to the side of the abdomen and anterior chest wall.  No radicular leg pain, numbness or tingling, but she does have chronic neuropathy in the feet/ ankles.  Pain improves with sitting and worsens with walking a lot.  She is taking tylenol, voltaren gel (feet/ legs), gabapentin, 2.5mg prednisone daily (Wegeners granulomatosis), hydrocodone (sparingly as needed).  She has not hand PT or any other treatments. MRI thoracic spine ordered, but not completed.    She also describes chronic pain in the posterior neck to the shoulders and less often the arms. It is associated with numbness/ tingling in the bilateral hands.     Denies bowel/ bladder incontinence.    Past and current medications:  Antineuropathics:  gabapentin  NSAIDs:  voltaren gel for feet/ ankles  Antidepressants:  Muscle relaxers:  zanaflex  Opioids: norco  Antiplatelets/Anticoagulants:  Others:  tylenol, prednisone    Physical Therapy/  Chiropractic care:  none    Pain Intervention History:  none    Past Spine Surgical History:  none        History:    Current Outpatient Medications:     acetaminophen (TYLENOL) 500 MG tablet, Take 500 mg by mouth every 6 (six) hours as needed for Pain., Disp: , Rfl:     azelastine (ASTELIN) 137 mcg (0.1 %) nasal spray, 1 spray 2 (two) times daily., Disp: , Rfl:     B2-B6 phos-levomef simran-mecobal (EB-N3 DR) 1.3-70-6-4 mg CpDR, Take 1 capsule by mouth once daily., Disp: , Rfl:     budesonide 1 mg/2 mL NbSp, 2 vials by Each Nostril route 2 (two) times a day. EMPTY CONTENTS INTO NASAL IRRIGATION SYSTEM, ADD DISTILLED WATER, SALT PACK, MIX & IRRIGATE. PERFORM 2 TIMES DAILY, Disp: , Rfl:     cetirizine (ZYRTEC) 10 MG tablet, TAKE 1 TABLET ONE TIME DAILY, Disp: 90 tablet, Rfl: 0    diclofenac sodium (VOLTAREN) 1 % Gel, APPLY TO THE AFFECTED AREA(S) 2 GRAMS THREE TIMES DAILY, Disp: 600 g, Rfl: 0    furosemide (LASIX) 20 MG tablet, TAKE 1 TABLET EVERY DAY, Disp: 90 tablet, Rfl: 3    gabapentin (NEURONTIN) 400 MG capsule, Take 1 capsule (400 mg total) by mouth 3 (three) times daily., Disp: 270 capsule, Rfl: 2    gentamicin sulfate (GENTAMICIN, BULK, MISC), by Misc.(Non-Drug; Combo Route) route. 40mg/mL 2mL per vial  EMPTY CONTENTS OF 1 VIAL INTO NASAL IRRIGATION SYSTEM, ADD DISTILLED WATER, SALT PACK, MIX & IRRIGATE. PERFORM 2 TIMES DAILY, Disp: , Rfl:     ITRACONAZOLE, BULK, MISC, EMPTY CONTENTS OF 1 CAPSULE INTO NASAL IRRIGATION SYSTEM, ADD DISTILLED WATER, SALT PACK, MIX & IRRIGATE. PERFORM 2 TIMES DAILY, Disp: , Rfl:     itraconazole, bulk, Powd, 2 (two) times a day. EMPTY CONTENTS OF 1 CAPSULE INTO NASAL IRRIGATION SYSTEM, ADD DISTILLED WATER, SALT PACK, MIX & IRRIGATE. PERFORM 2 TIMES DAILY, Disp: , Rfl:     LACTOBACILLUS ACIDOPHILUS ORAL, Take by mouth., Disp: , Rfl:     metoprolol succinate (TOPROL-XL) 50 MG 24 hr tablet, TAKE 1 TABLET EVERY DAY, Disp: 90 tablet, Rfl: 3    mirabegron (MYRBETRIQ) 50 mg Tb24, Take 1  "tablet (50 mg total) by mouth once daily., Disp: 30 tablet, Rfl: 11    omeprazole (PRILOSEC) 40 MG capsule, TAKE 1 CAPSULE EVERY MORNING, Disp: 90 capsule, Rfl: 0    ondansetron (ZOFRAN-ODT) 8 MG TbDL, Take 8 mg by mouth 2 (two) times daily., Disp: , Rfl:     peg 400-propylene glycol, PF, (SYSTANE, PF,) 0.4-0.3 % Dpet, Apply to eye 2 (two) times a day., Disp: , Rfl:     predniSONE (DELTASONE) 2.5 MG tablet, Take 1 tablet (2.5 mg total) by mouth once daily., Disp: 90 tablet, Rfl: 2    tiZANidine (ZANAFLEX) 4 MG tablet, Take 1 tablet (4 mg total) by mouth nightly as needed (muscle spasms/ pain)., Disp: 40 tablet, Rfl: 0    tobramycin sulfate 0.3% (TOBREX) 0.3 % ophthalmic solution, Place into both eyes., Disp: , Rfl:     valACYclovir (VALTREX) 1000 MG tablet, Take 1 tablet (1,000 mg total) by mouth 2 (two) times daily., Disp: 4 tablet, Rfl: 0    vancomycin HCl (VANCOMYCIN, BULK, MISC), by Misc.(Non-Drug; Combo Route) route. 1gram/Vial EMPTY CONTENTS OF 1 VIAL INTO NASAL IRRIGATION SYSTEM, ADD DISTILLED WATER, SALT PACK, MIX & IRRIGATE. PERFORM 2 TIMES DAILY, Disp: , Rfl:     vit C/E/Zn/coppr/lutein/zeaxan (PRESERVISION AREDS-2 ORAL), Take 1 capsule by mouth 2 (two) times a day. , Disp: , Rfl:     diazePAM (VALIUM) 10 MG Tab, Take 1 tablet (10 mg total) by mouth once. Take 30 minutes prior to MRI.  Must have someone drive you to and from the facility. for 1 dose, Disp: 1 tablet, Rfl: 0    Past Medical History:   Diagnosis Date    Anesthesia     'Mother stop breathing after anesthesia"    Chronic sinusitis     Depression     GERD (gastroesophageal reflux disease)     PVC (premature ventricular contraction)        Past Surgical History:   Procedure Laterality Date    ADENOIDECTOMY      APPENDECTOMY      BREAST BIOPSY      HYSTERECTOMY      JOINT REPLACEMENT Bilateral     KNEE    NAVIGATIONAL BRONCHOSCOPY Right 9/17/2021    Procedure: BRONCHOSCOPY, NAVIGATIONAL WITH BODY VISION;  Surgeon: Bimal Schwartz MD;  Location: " "[facility];  Service: Pulmonary;  Laterality: Right;    THUMB SURGERY      TONSILLECTOMY         Family History   Problem Relation Age of Onset    Heart disease Mother     Arthritis Mother     Cancer Sister     Arthritis Sister     Diabetes Sister     Hypertension Sister        Social History     Socioeconomic History    Marital status:    Tobacco Use    Smoking status: Former     Current packs/day: 0.00     Types: Cigarettes     Start date: 2/3/1955     Quit date: 1/3/1960     Years since quittin.2    Smokeless tobacco: Never   Substance and Sexual Activity    Alcohol use: Never     Alcohol/week: 0.0 standard drinks of alcohol    Drug use: Never    Sexual activity: Never       Review of patient's allergies indicates:   Allergen Reactions    Plaquenil [hydroxychloroquine]      Macular degeneration    Iodinated contrast media     Levofloxacin Other (See Comments)    Penicillins     Cefdinir Other (See Comments)     Itching       Labs:  No results found for: "LABA1C", "HGBA1C"    Lab Results   Component Value Date    WBC 8.53 2024    HGB 11.0 (L) 2024    HCT 35.2 (L) 2024    MCV 92 2024     2024           Review of Systems:  Thoracolumbar backpain..  Balance of review of systems is negative.    Physical Exam:  Vitals:    24 1027   BP: (!) 140/69   Pulse: (!) 53   Weight: 65.5 kg (144 lb 6.4 oz)   Height: 5' 4" (1.626 m)   PainSc:   3   PainLoc: Back     Body mass index is 24.79 kg/m².    Pain disability index:      2023     2:15 PM   Last 3 PDI Scores   Pain Disability Index (PDI) 31       Gen: NAD  Psych: mood appropriate for given condition  HEENT: eyes anicteric   CV: RRR, 2+ radial pulse  Respiratory: non-labored, no signs of respiratory distress  Abd: non-distended  Skin: warm, dry and intact.  Gait: Able to heel walk, toe walk. No antalgic gait.     Coordination:   Tandem walking coordination: normal    Cervical spine: ROM is full in flexion, extension " and lateral rotation without increased pain.  Spurling's maneuver causes no neck pain to either side.  Myofascial exam: No Tenderness to palpation across cervical paraspinous region bilaterally.    Lumbar spine:  Lumbar spine: ROM is full with flexion extension and oblique extension with no increased pain.    Aris's test causes no increased pain on either side.    Supine straight leg raise is negative bilaterally.    Internal and external rotation of the hip causes no increased pain on either side.  Myofascial exam: No tenderness to palpation across lumbar paraspinous muscles. No tenderness to palpation over the bilateral greater trochanters and bilateral SI joint    Sensory:  Intact and symmetrical to light touch in C4-T1 dermatomes bilaterally. Intact and symmetrical to light touch in L1-S1 dermatomes bilaterally.    Motor:    Right Left   C4 Shoulder Abduction  5  5   C5 Elbow Flexion    5  5   C6 Wrist Extension  5  5   C7 Elbow Extension   5  5   C8/T1 Hand Intrinsics   5  5        Right Left   L2/3 Iliacus Hip flexion  5  5   L3/4 Qudratus Femoris Knee Extension  5  5   L4/5 Tib Anterior Ankle Dorsiflexion   5  5   L5/S1 Extensor Hallicus Longus Great toe extension  5  5   S1/S2 Gastroc/Soleus Plantar Flexion  5  5      Right Left   Triceps DTR 2+ 2+   Biceps DTR 2+ 2+   Brachioradialis DTR 2+ 2+   Patellar DTR 2+ 2+   Achilles DTR 2+ 2+   Landon Absent  Absent   Clonus Absent Absent   Babinski Absent Absent       Imaging:    CT renal stone study 11-28-23:  regarding osseous structures reviewed:  No acute fracture or aggressive-appearing lytic or blastic lesion.  Moderate-marked S-shaped scoliosis.  Sharply marginated lucent lesion in the T10 vertebral body, possibly a hemangioma.  Mild osseous demineralization.     Xray thoracic spine 12-1-23:  Unchanged when compared to thoracic spine xray from  01/07/2022.  There is no spondylolisthesis.  There is multilevel degenerative disc space narrowing.  The  included lungs are clear.    MRI cervical spine 2/23/24:  C3/4 facet arthropathy, degenerative disk dessication with moderate to severe bilateral foraminal narrowing.  C4/5 facet arthropathy, degenerative disk dessication with moderate to severe bilateral foraminal narrowing.  C5/6 osseous fusion with mild foramial narrowing.  C6/7 disk height loss, facet arthropathy with moderate right foraminal narrowing and mild left foraminal narrowing.     MRI thoracic spine 2/23/24:  Chronic T6 compression fracture.  S shaped scoliosis.  Degenerative changes.  No disk hernia, central canal narrowing or foraminal narrowing.  T10 and T11 hemangioma.  No other osseous lesions.    Assessment:   Aracelis Weber is a 83 y.o. year old female patient who has a past medical history of Anesthesia, Chronic sinusitis, Depression, GERD (gastroesophageal reflux disease), and PVC (premature ventricular contraction). She presents for thoracolumbar back pain due to scoliosis and degenerative chagnes.  Neck and shoulder pain from cervical spondylosis particularly at C3/4, C4/5.    Plan:  - discussed medciations, PT, and interventional injections for pain.  She is not interested in any surgical correction.  - continue with zanaflex.    - if pain worsnes can consider PT and /or interventional procedures in the future as needed.     Problem List Items Addressed This Visit    None  Visit Diagnoses       Hemangioma of bone    -  Primary    Scoliosis, unspecified scoliosis type, unspecified spinal region        Cervicalgia        Mid back pain on left side        Cervical spondylosis                  Follow Up: RTC as needed    : Reviewed and consistent with medication use as prescribed.    Thank you for referring this interesting patient, and I look forward to continuing to collaborate in her care.        Rosita Alonzo PA-C  [facility]

## 2024-03-21 ENCOUNTER — OFFICE VISIT (OUTPATIENT)
Dept: RHEUMATOLOGY | Facility: CLINIC | Age: 84
End: 2024-03-21
Payer: MEDICARE

## 2024-03-21 VITALS
BODY MASS INDEX: 24.18 KG/M2 | HEIGHT: 64 IN | SYSTOLIC BLOOD PRESSURE: 136 MMHG | DIASTOLIC BLOOD PRESSURE: 62 MMHG | WEIGHT: 141.63 LBS | HEART RATE: 71 BPM

## 2024-03-21 DIAGNOSIS — M31.31 WEGENER'S GRANULOMATOSIS WITH RENAL INVOLVEMENT: Primary | ICD-10-CM

## 2024-03-21 DIAGNOSIS — R05.3 CHRONIC COUGH: ICD-10-CM

## 2024-03-21 PROCEDURE — 1125F AMNT PAIN NOTED PAIN PRSNT: CPT | Mod: CPTII,S$GLB,, | Performed by: INTERNAL MEDICINE

## 2024-03-21 PROCEDURE — 3075F SYST BP GE 130 - 139MM HG: CPT | Mod: CPTII,S$GLB,, | Performed by: INTERNAL MEDICINE

## 2024-03-21 PROCEDURE — 3078F DIAST BP <80 MM HG: CPT | Mod: CPTII,S$GLB,, | Performed by: INTERNAL MEDICINE

## 2024-03-21 PROCEDURE — 1159F MED LIST DOCD IN RCRD: CPT | Mod: CPTII,S$GLB,, | Performed by: INTERNAL MEDICINE

## 2024-03-21 PROCEDURE — 1101F PT FALLS ASSESS-DOCD LE1/YR: CPT | Mod: CPTII,S$GLB,, | Performed by: INTERNAL MEDICINE

## 2024-03-21 PROCEDURE — 3288F FALL RISK ASSESSMENT DOCD: CPT | Mod: CPTII,S$GLB,, | Performed by: INTERNAL MEDICINE

## 2024-03-21 PROCEDURE — 99215 OFFICE O/P EST HI 40 MIN: CPT | Mod: S$GLB,,, | Performed by: INTERNAL MEDICINE

## 2024-03-21 PROCEDURE — 99999 PR PBB SHADOW E&M-EST. PATIENT-LVL V: CPT | Mod: PBBFAC,,, | Performed by: INTERNAL MEDICINE

## 2024-03-21 ASSESSMENT — ROUTINE ASSESSMENT OF PATIENT INDEX DATA (RAPID3)
FATIGUE SCORE: 1.1
PATIENT GLOBAL ASSESSMENT SCORE: 2
PSYCHOLOGICAL DISTRESS SCORE: 2.2
MDHAQ FUNCTION SCORE: 0.6
PAIN SCORE: 4
TOTAL RAPID3 SCORE: 2.67

## 2024-03-21 NOTE — PATIENT INSTRUCTIONS
Labs and chest xray today 3/21/24   Plan for Rituxan soon 500mg IV x 2 15 days apart ( modified dosage based on age and maintenance)  We can hold covid fall given timing and need for Rituxan  After Rituxan consider adding new drug Tavneos

## 2024-03-21 NOTE — PROGRESS NOTES
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  No fever, chills, weight loss or increase in fatigue.    Wet AMD-O'Varghese injections monthly now  Hearing a bit worse as of recent.     1/2024:  With a recent URI her labs drawn 01/05/2024 this with the elevated sed rate and C-reactive protein she was negative for flu, RSV, COVID but given that she is having symptoms of some nosebleeds and while her baseline sinusitis is chronic seems to be an uptake in the amount of nasal discharge we are going to repeat her sed rate next month.  Her renal function is stable and she has a bit of anemia that is new.   We had been holding her Rituxan last infusion 07/20/2023 500mg x 1 , but I want to make sure we are not missing any underlying Wegener's activity.    She did have significant cough that took some time to improve, her sinuses feel continued inflammation, just started Z-pack.  Sharply marginated lucent lesion in the T10 vertebral body, possibly a hemangioma.             MRI completed with Ms Alonzo will just monitor for increase cervical stenosis or radicular [id number] at this time.   Did have vertebral fx.      9/2023: Patient was seen 6/2023 with anticipation of repeat FESS Sinus surgery and pending compound nasal irrigation. She did have surgery with Dr. Quinn end of June 2023, by July visit noted improvement with compound irrigation from prior. Still with chronic sinusitis.   Last Rituxan 500mg single dose 7/2023.    She is doing much better with the congestion but nothing like it was before. She denies fevers, chills, some cough, no hemoptysis.   She is down to Prednisone 2.5 mg daily but overall her edema is much improved.   Renal function stable  ESR and CRP    She does not want to take MTX she lost  significant amounts of hair.   Imuran was lost with flare and transition to Imuran.     She has completed Rituxan 18 months with sinuses under control, lungs clear , renal function stable. Hearing is stable she lost nearly 90% of hearing and has stabilized.   She has mac degeneration.   Neuropathy continues to be a problem but we never have petechial rashes.   Edema is much improved.     3/2023:   Patient current on RTX Q 6 month maintanance following induction for Wegeners.    12/2/22 (Delayed COVID infection) RTX was 500mg with solumedrol 80mg IM   Completed Rituxan 1000mg for 1st  maintenance at 4 months 4/20/2022. due to rise in symptoms, and ESR   Induction 10/2021.      Continued nasal congestion and drainage.   Recent significant nosebleeds. Endoscopy with recurrent crusting despite office debridement by ENT and continued irrigation.      Will repeat serologies and inflammatory markers and check CD 19 levels first week March and see her later that month.   Patient doing very well. Still rhinitis, using navage. No antibiotics from ENT since last visit. She is still clearing significant mucous faint with dried blood.   Dry cough, no fevers, no SOB\  Prolia: 10/17/2022         11/2022  Patient doing very well. Still rhinitis, using navage. No antibiotics from ENT since last visit. She is still clearing significant mucous faint with dried blood.   Dry cough, no fevers, no SOB  Mild HA only with elevated BP.   Completed Rituxan maintanence at 4 months 4/20/2022.   Prolia 3/25/22  Evusheld with catch up dose 2/10/22 and 3/25/2022     5/5/2022:   Patient doing very well. Still rhinitis, using navage. No antibiotics from ENT since last visit. She is still clearing significant mucous faint with dried blood.   Dry cough, no fevers, no SOB  Mild HA only with elevated BP.   Completed Rituxan maintanence at 4 months 4/20/2022.   Prolia 3/25/22  Evusheld with catch up dose 2/10/22 and 3/25/2022.      Major complaints:  pins, needles, stinging, burning in feet day and night is constant. Dr. Campbell: EB-N5 supplement is helping some, stopped for months and noted significant worsening. Dr. Campbell did increase from EB-N3.   Gabapentin 400mg TID  Hydrocodone only PRN  Prednisone 7.5mg         2/2022  Doing well no cough, no fevers. Recent COVID exposure but negative.   Seen with Nephro.   Given her successful response with Ritux. Agree to continue maintanance with Ritux Q 4-6 months     Needs to get #3 COVID vaccine timing with RTX. - will time this with me 2 weeks prior to   Discussed Prolia  Working on Evusheld for PreP with COVID> she is scheduled 2/10/22        12/9/2021  Patient's recent CXR 12/21/21 with marked improvement when compared to 10/21/21  Prednisone 10mg daily  S/p RTX x 4 infusions completion date. 11/5/21  Patient with rise in creatinine to 2.0 on 11/26 had been requiring lasix for marked edema since 11/1/21. Stopped 2 weeks ago. Edema is present but managed. Drinking about 40 oz water and 3 cups of coffee daily     Patient completed [facility] PT felt she was doing well home exercises about 2 weeks ago with acute pain in lumbar spine after exercising. Imaging lumbar few days ago without evidence of a fracture. She point to pain in the lumbosacral and radiating to buttock region. Ok to rise from seated. Pain more on left low back and buttock. No numbness no tingling but pain in the hamstring region. Comfortable sitting but not lying down, but she never sleeps in bed. Long hx of rather impressive scoliosis.         11/11/2021:   Inflammatory markers are markedly improved.   Cough dry still at night. Patient noting she has lost sense of smell. Can still taste.      Patient recently hospitatlized for Bronch with negative cultures to date.  There was a concern for possible atypical infection she has had enlarging right upper lobe cavitary lesion measures still has a right middle lobe lesion with some cavitation now as well.  I  have discussed this case extensively with both Dr. Schwartz as well as Dr. Joseph upon discharge we felt comfortable that this is likely Wegener's granulomatosis causing cavitary lesions.  Supporting sinus biopsy does note granuloma.    Patient was started on Imuran in March of 2021 but with the growth of her lesion we had rule out for any possible infection given her status of immunosuppression.  She is here today not in her usual state she appears fatigued pale she is not complaining of shortness of breath but does have a cough dry persistent.  She is noticing increased pedal edema.           9/13/21: patient with 2 falls 8 days apart. Spoke w/ pulm shortly following initial fall and we were scheduled for bronch when she sustained another fall.   Scheduled now for 9/17/21 bronch. RUL lesion.   Remains off Imuran  Prednisone 15mg daily.   Sinus congestion. Still productive      She is noting some productive cough at night no hemoptysis.   Patient denies SOB  Having more HA. -frontal and sinus, she is noting some drainage in right ear some mucous/blood.   No edema.   She notes fatigue, no fevers, no night sweats. No weight loss.   Skin easily bruising.      No personal hx of any malignancy.         7/20/2021:    Spoke with Pulm plan for bronch is able working on possible bx site vs BAL.   Need to r/o infectious/neoplastic and confirm for ANCA (GPA vasculitis)   Inflammatory markers markedly down .8  Now 14.   H/H improve from 7.4 to 8.2  Platelets normalized.   Renal stable over time     Patient did attend wedding with approx 300 people unmasked last weekend. Reviewed by recs for COVID precautions given     Current regimen:   pred 15mg (Na stable)  Holding Imuran     Interval events: PET with hypermetabolic lesions:   a. RUL cavitary/cystic lesion 1.9cm x 1.3cm  b. RUL spiculated 1.2cm x 1.4cm  c. RML 1.7cm x 2.0 cm  All with differential: infectious, inflammatory, neoplastic, grannulomatous (a) with favor of  "inflammation given the rapid change     Fungal immunodiffusion neg  Quant gold and T spot: neg  H/H improving      Sinus congestion, pressure, headache, x 3 weeks very productive with blowing nose, back on navage. :  started Cindamycin with DR Quinn x 10 days.   Patient denies any shortness breath.   Very fatigues.   No more fevers. Slight dry cough, no blood/ no mucous.   No SOB.         5/12/21 completed nasal irrigation with biopsy not definitive for GPA, but + inflammation and granulomas. Temporal artery bx negative. Patient with PCP same day as labs 6/2/21  dx with UTI on keflex.      Was seen apparently in ED in [place] for 104F she had altered mental status, dx with UTI, dehydration, treated with. Completed all 7 days of keflex and within 48 hours with another fever 104 F. She continues with congestion but no worse. Patient denies cough, hemoptysis, bloody nasal discharge. She has had no fever x 10 days. Checks twice daily.   while travelling told "congestion in chest on xray". Inflammatory markers very high again.   Patient with recent concern for wegeners needs CT chest. CXR with new airspace opacities right chest- need to r/o GGO vs infection. CT has been ordered pending scheduling.   Broad ABX coverage recommended for now will cc Dr. Natarajan.      I am limited here with her  severe edema from higher dose steroids. Max tolerated is 20mg daily pred  Started Imuran 5/5/2021. Very low dose 50mg BID (0.65mg/kg) tolerating VERY well.   H/H still low.            2/2021  Sinususitis, cx were negative.  Using nasal irrigation with mucoid disharge.   Patient continues with significant nasal discharge and blood with scabs. We discussed Wegeners but pathology sinus no vasculitis, granuloma noted.   No HA, no blurred vision. Recent sinus infection with HA for 10 days. cx +, but also repeat labs demonstarting +PR3 and +ANCA         Patient with c/o right > left 3,4 finger numbness that was intermittent until approximately " 2 weeks about having near constant numbness in hands, pins and needles and pain.   Patient not noting much improvement with change in position.      Off MTX 6  tabs weekly.   Doing well with Hydroxychloroquine.   Declined COVID vaccine     Gabapentin up to 300mg TID.   Now with Dr. Campbell doing PT for Plantar fasciitis. She is taking supplement and compound cream for joint and neuropathy. No response to tarsal tunnel injection per Dr. Campbell.   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 WBC ok.         11/2020  Patient with PMR   MTX at 4 tabs weekly new anemia. Thrombocytosis  Pred at 10mg   Complicated with peripheral edema rather severe , started MTX seeing trending CRP/ESR but persistent leg pain.   Seen with PCP for neuropathy s/sx not seeing much benefit with gabpentin  Seen with Cardiology- no concern for cardiac ECHO ok. dc'd lasix for hyponatremia.      8/2020  Pred 20mg with mild edema, pred 30 mg with severe edema.   Current Pred 15mg daily  Rising ESR again.   Lasix 20 mg daily with improved edema but having some pins and needles in feet/legs.   Having tight, burning stinging at the toes and feet. Heaviness. Prickly.   Noting sodium is falling, K 5.2-5.4 still using               7/2020:  Hands not painful, knees not painful. Shoulder/cervical and down the arms, markedly improved. She still has some pain him hips and groin with walking and some weakness to the legs with edema. Some pain with fixing own hair.   She takes in AM regarding hips ache, some shoulder/arms, and again at night because hips/legs.   The clue is that the LDN new dose she had some ASE, previously tolerated 3mg daily fine.      Historical review of present Illness.   Patient with a recent chronic sinusitis that ultimately warranted a septoplasty, endoscopic sinus surgery and turbinate reduction 5/2020   Four weeks postop t  increasingly worse she was noting pain in her throat and ears lasting several hours complaint of pdizziness she had symptoms of ear pain and decreased hearing in the left ear.  She underwent a debridement at this visit she continue with compound nasal irrigations.  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     Patient did have repeat procedure on with biopsies taken 07/03/2020 showing right maxillary sinus chronically inflamed fibrotic polypoid portions benign nasal mucosa left maxillary sinus similar polypoid fragments ulcerated markedly inflamed respiratory mucosa focal foreign body giant cell response noted suggested that this might have been from her Gelfoam packing as a possible cause of this reaction     I have spoken with Dr. Quinn prior to patient's visit today and was a concern that she could have triggered some inflammatory response with the Gelfoam packing     Patient states she no longer has a headache is not noticing much ear drainage continues with profound loss of hearing on the left and rather significant on the right both sensorineural and some conductive changes.     Restarted on prednisone as of 7/20/20  10 mg patient has not noticed any change with her hearing in this time she did notice slight improvement with her joint aches and pains but is still relying on hydrocodone for the bulk of this.     She denies a persistent headache she does have some tenderness  about the preauricular region particularly on the right more than the left.  She has denied jaw claudication changes in her voice or any amaurosis fugax.  She has no pre-existing history of joint aches and pains hip or otherwise.  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  She had a workup that involved a negative rheumatoid factor, age appropriate sedimentation rate,C reactive protein JANET was positive 1:160 in speckled pattern           Previous: Hx:   She is having pain in her hand with stiffness and right 2nd PIP joint now unable to flex. 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   Hx of bilateral TKA 3 and 5 years ago and those are doing well.   She notes intermittent edema. Some hammer toe deformity bilaterally making shoes problematic but not painful otherwise.   Long hx of GERD-severe.   Cannot tolerate NSAIDs at all w/o gastritis.   Can use Hydrocodone PRN   Added Naltrexone at 3mg and doing very well  Lost 30# with WW.   Patient denies weight loss, rashes, dry eye, dry mouth, nasal or palatal ulcerations,  lymphadenopathy, Raynaud's, hx of DVT/miscarriages, psoriasis or family hx of psoriasis, rashes, serositis, anemia or other constitutional symptoms.  Asking about naltrexone  Component      Latest Ref Rng & Units 8/8/2020 7/29/2020 7/20/2020 7/13/2020   Sodium      136 - 145 mmol/L   126 (L) 128 (L)     Potassium      3.5 - 5.1 mmol/L   5.4 (H) 5.2 (H)     Chloride      95 - 110 mmol/L   92 (L) 92 (L)     CO2      23 - 29 mmol/L   25 29     Glucose      70 - 110 mg/dL   109 109     BUN, Bld      8 - 23 mg/dL   13 11     Creatinine      0.5 - 1.4 mg/dL   0.8 0.8     Calcium      8.7 - 10.5 mg/dL   9.5 9.7     Anion Gap      8 - 16 mmol/L   9 7 (L)     eGFR if African American      >60 mL/min/1.73 m:2   >60.0 >60.0     eGFR if non African American      >60 mL/min/1.73 m:2   >60.0 >60.0     Anti Sm Antibody      0.00 -  0.99 Ratio       0.04   Anti-Sm Interpretation      Negative       Negative   Anti Sm/RNP Antibody      0.00 - 0.99 Ratio       0.09   Anti-Sm/RNP Interpretation      Negative       Negative   JANET Screen      None Detected           Rheumatoid Factor      0.0 - 15.0 IU/mL           Sed Rate      0 - 36 mm/Hr 57 (H)   54 (H) 75 (H)   CRP      0.0 - 8.2 mg/L 75.4 (H) 56.1 (H) 57.5 (H) 99.4 (H)   Anti-Histone Antibody      0.0 - 0.9 Units       0.4   ds DNA Ab      Negative 1:10       Negative 1:10      Component      Latest Ref Rng & Units 6/25/2020 11/10/2018   Sodium      136 - 145 mmol/L       Potassium      3.5 - 5.1 mmol/L       Chloride      95 - 110 mmol/L       CO2      23 - 29 mmol/L       Glucose      70 - 110 mg/dL       BUN, Bld      8 - 23 mg/dL       Creatinine      0.5 - 1.4 mg/dL       Calcium      8.7 - 10.5 mg/dL       Anion Gap      8 - 16 mmol/L       eGFR if African American      >60 mL/min/1.73 m:2       eGFR if non African American      >60 mL/min/1.73 m:2       Anti Sm Antibody      0.00 - 0.99 Ratio       Anti-Sm Interpretation      Negative       Anti Sm/RNP Antibody      0.00 - 0.99 Ratio       Anti-Sm/RNP Interpretation      Negative       JANET Screen      None Detected Detected (A) Detected (A)   Rheumatoid Factor      0.0 - 15.0 IU/mL 11.6 <8.6   Sed Rate      0 - 36 mm/Hr       CRP      0.0 - 8.2 mg/L       Anti-Histone Antibody      0.0 - 0.9 Units       ds DNA Ab      Negative 1:10             REVIEW OF SYSTEMS:     Review of Systems   Constitutional: Negative for fever, malaise/fatigue and weight loss.   HENT: Negative for sore throat.    Eyes: Negative for double vision, photophobia and redness.   Respiratory: Negative for cough, shortness of breath and wheezing.    Cardiovascular: Negative for chest pain, palpitations and orthopnea.   Gastrointestinal: Negative for abdominal pain, constipation and diarrhea.   Genitourinary: Negative for dysuria, hematuria and urgency.  "  Musculoskeletal: Positive for joint pain. Negative for back pain and myalgias.   Skin: Negative for rash.   Neurological: Negative for dizziness, tingling, focal weakness and headaches.   Endo/Heme/Allergies: Does not bruise/bleed easily.   Psychiatric/Behavioral: Negative for depression, hallucinations and suicidal ideas.                     Objective:      Objective        Past Medical History:   Diagnosis Date    Anesthesia       'Mother stop breathing after anesthesia"    Chronic sinusitis      Depression      GERD (gastroesophageal reflux disease)      PVC (premature ventricular contraction)              Family History   Problem Relation Age of Onset    Heart disease Mother      Arthritis Mother      Cancer Sister      Arthritis Sister      Diabetes Sister      Hypertension Sister        Social History            Tobacco Use    Smoking status: Former       Current packs/day: 0.00       Types: Cigarettes       Start date: 2/3/1955       Quit date: 1/3/1960       Years since quittin.7    Smokeless tobacco: Never   Substance Use Topics    Alcohol use: Never       Alcohol/week: 0.0 standard drinks of alcohol    Drug use: Never                   Current Outpatient Medications on File Prior to Visit   Medication Sig Dispense Refill    acetaminophen (TYLENOL) 500 MG tablet Take 500 mg by mouth every 6 (six) hours as needed for Pain.        albuterol (PROVENTIL/VENTOLIN HFA) 90 mcg/actuation inhaler INHALE 2 PUFFS BY MOUTH EVERY 4 TO 6 HOURS AS NEEDED FOR SHORTNESS OF BREATH OR COUGH        ascorbate calcium (MAHSA-C ORAL) Take by mouth.        B2-B6 phos-levomef simran-mecobal (EB-N3 DR) 1.3-70-6-4 mg CpDR Take 1 capsule by mouth once daily.        cetirizine (ZYRTEC) 10 MG tablet TAKE 1 TABLET ONE TIME DAILY 90 tablet 0    denosumab (PROLIA SUBQ) Inject into the skin. Two times a year        diclofenac sodium (VOLTAREN) 1 % Gel APPLY TO THE AFFECTED AREA(S) 2 GRAMS THREE TIMES DAILY 600 g 0    furosemide (LASIX) " 20 MG tablet TAKE 1 TABLET EVERY DAY 90 tablet 3    gabapentin (NEURONTIN) 400 MG capsule TAKE 1 CAPSULE THREE TIMES DAILY 270 capsule 3    HYDROcodone-acetaminophen (NORCO) 7.5-325 mg per tablet Take 1 tablet by mouth every 6 (six) hours as needed.        ITRACONAZOLE, BULK, MISC EMPTY CONTENTS OF 1 CAPSULE INTO NASAL IRRIGATION SYSTEM, ADD DISTILLED WATER, SALT PACK, MIX & IRRIGATE. PERFORM 2 TIMES DAILY        LACTOBACILLUS ACIDOPHILUS ORAL Take by mouth.        metoprolol succinate (TOPROL-XL) 50 MG 24 hr tablet TAKE 1 TABLET EVERY DAY 90 tablet 3    mirabegron (MYRBETRIQ) 50 mg Tb24 Take 1 tablet (50 mg total) by mouth once daily. 30 tablet 11    neomycin-polymyxin-dexamethasone (MAXITROL) 3.5mg/mL-10,000 unit/mL-0.1 % DrpS          omeprazole (PRILOSEC) 40 MG capsule TAKE 1 CAPSULE EVERY MORNING 90 capsule 0    ondansetron (ZOFRAN-ODT) 8 MG TbDL Take 8 mg by mouth 2 (two) times daily.        peg 400-propylene glycol, PF, (SYSTANE, PF,) 0.4-0.3 % Dpet Apply to eye 2 (two) times a day.        predniSONE (DELTASONE) 2.5 MG tablet Take 1 tablet (2.5 mg total) by mouth once daily. 90 tablet 3    predniSONE (DELTASONE) 5 MG tablet Take 1 tablet (5 mg total) by mouth once daily. 30 tablet 3    sodium chloride 0.9% SolP 500 mL with riTUXimab 10 mg/mL Conc 375 mg/m2 Inject 375 mg/m2 into the vein. 4 to 6 months        tobramycin sulfate 0.3% (TOBREX) 0.3 % ophthalmic solution Place into both eyes.        vancomycin (VANCOCIN) 1,000 mg injection SMARTSI Vial(s) Both Nares Twice Daily        vit C/E/Zn/coppr/lutein/zeaxan (PRESERVISION AREDS-2 ORAL) Take 1 capsule by mouth 2 (two) times a day.         [DISCONTINUED] naltrexone capsule Take 3 mg by mouth once daily.          No current facility-administered medications on file prior to visit.             Vitals:     23 1306   BP: 134/65   Pulse: 69         Physical Exam:     Physical Exam   Constitutional: She is oriented to person, place, and time. She appears  well-developed and well-nourished.   HENT:   Head: Normocephalic and atraumatic.   Mouth/Throat: Oropharynx is clear and moist.   Eyes: Pupils are equal, round, and reactive to light. EOM are normal.   Neck: Normal range of motion.   Cardiovascular: Normal rate, regular rhythm and normal heart sounds.   Pulmonary/Chest: Effort normal and breath sounds normal.   Musculoskeletal:        Right shoulder: She exhibits normal range of motion, no tenderness and no swelling.        Left shoulder: She exhibits normal range of motion, no tenderness and no swelling.        Right elbow: She exhibits normal range of motion and no swelling. No tenderness found.        Left elbow: She exhibits normal range of motion and no swelling. No tenderness found.        Right wrist: She exhibits normal range of motion, no tenderness and no swelling.        Left wrist: She exhibits normal range of motion, no tenderness and no swelling.        Right knee: She exhibits normal range of motion and no swelling. No tenderness found.        Left knee: She exhibits normal range of motion and no swelling. No tenderness found.        Right hand: She exhibits decreased range of motion and tenderness. She exhibits no swelling.        Left hand: She exhibits decreased range of motion and tenderness. She exhibits no swelling.        Right foot: There is normal range of motion, no tenderness and no swelling.        Left foot: There is normal range of motion, no tenderness and no swelling.   Bony hypertrophy at the PIP and DIP joints. +squaring at the CMC joint. No active synovitis on 28 joint exam.    Neurological: She is alert and oriented to person, place, and time.   Skin: Skin is warm and dry.   Psychiatric: She has a normal mood and affect. Her behavior is normal.      Component      Latest Ref Rng & Units 9/17/2021 9/17/2021          11:03 AM 11:03 AM   Respiratory Culture         No growth   Gram Stain (Respiratory)       No organisms seen No WBC's    AFB Culture & Smear             AFB CULTURE STAIN             GENET Prep             Fungus (Mycology) Culture             Aerobic Bacterial Culture                Component      Latest Ref Rng & Units 2021          11:03 AM   Respiratory Culture       No Staph aureus, MRSA or Pseudomonas isolated.   Gram Stain (Respiratory)       No organisms seen   AFB Culture & Smear           AFB CULTURE STAIN           GENET Prep           Fungus (Mycology) Culture           Aerobic Bacterial Culture              Component      Latest Ref Rng & Units 2021          11:03 AM 11:03 AM   Respiratory Culture       Normal respiratory nimesh     Gram Stain (Respiratory)       Moderate WBC's     AFB Culture & Smear         Culture in progress   AFB CULTURE STAIN         No acid fast bacilli seen.   KOH Prep         No yeast or fungal elements seen   Fungus (Mycology) Culture         Culture in progress   Aerobic Bacterial Culture                      Narrative  Performed by: DPAOBIE  Patient - ONIEL ROSARIO                   - 1940 Sex- F   Med Rec # - [id number]                        Bimal Mckeon M.D.   The following is an electronic copy of report # [id number] from:   [facility]   [address] [place] [address]                         Phone [phone]   DIAGNOSIS:   2021  JL/sdc     1, 2.  RIGHT UPPER LOBE MASS BRUSHING AND FINE NEEDLE ASPIRATE BIOPSIES:   - NEGATIVE FOR MALIGNANT CELLS.     - PURULENT EXUDATE.     - A FEW FRAGMENTS OF BRONCHIAL MUCOSA WITH FLORID CHRONIC BRONCHITIS    AND REACTIVE SQUAMOUS    ATYPICAL METAPLASIA.       Comment:   Special stains (AFB and GMS) are negative for organisms. While no    granulomas are seen here, the inflammatory reaction appears to be    similar to that seen in the sinus material over the past few years    (Delta [id number], [id number], [id number]). This suggests a    continuation of an underlying  systemic disease with the clinical    working diagnosis of Wegener's Granulomatosis currently being under    consideration. Bronchoscopic material, particularly of the lower    respiratory tract, is of low yield for a definitive diagnosis of that    entity. The previous sinus material will be reviewed with    consideration for that diagnosis.   _______________________________________________________________________   SPECIMEN AND SOURCE:   1. RUL mass brushing   2. RUL mass needle     CLINICAL INFORMATION:   Clinical Information:-gt Right Upper Lobe Mass   Specific Site:-gt RUL mass brushing T Brush; RUL Mass; RUL mass-    microbiology; RUL BAL; RUL washing;   Other Requests:-gt N/A     GROSS DESCRIPTION:   1. Received 40 mL of fluid in formalin, 1 dry slides and 1 slides in    95% alcohol. Prepared one cell block.     2. Received 40 mL of fluid in formalin, 1 dry slides and 1 slides in    95% alcohol. Prepared one cell block.     CODE: 0     Cytotechnologist: ABDIFATAH Marvin (ASCP)   Pathologist: Scott Johnson MD (Electronic Signature) 09/22/2021 2:34 PM     [facility], [facility] * [address] [place] [address] * [place] [address]   Technical services performed at: [facility], [facility] * [address] [place] [address] * [place] [address]   Screening Site: [facility], [facility] * [address] [place] [address] *    [place] [address]                            Post Rituxan image                                     Pre Rituxan image  Assessment and Plan      There are no diagnoses linked to this encounter.          Patient is a 83-year-old female she has had a recent robust inflammatory reaction within the sinus cavity as well as hearing deficit following a sinus surgery.  She has been repeated cultured postoperatively completed antibiotics and irrigations negative for any fungal infections or bacterial infections.   Patient was doing well on MTX with regard to ESR and CRP, but hair loss.      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               Completed RTX 11/5/21 Induction               Review of data and feel : The best data supporting the use of  as maintenance therapy come from the Maintenance of Remission using Rituximab in Systemic ANCA-associated Vasculitis (MAINRITSAN) trial with less relapse for PR3+ paitients when treated with Rituxan at 500mg IV at 6, 12, and 18months when compared to Imuran. Patient has tolerated RTX last 4/20/2022, she had to cancel 10/21/2022 for COVID. Did receive 500mg on 12/2022. Received again 7/2023. Patient has completed 18 months of Rituxan therapy.     -With a recent URI her labs drawn 01/05/2024 this with the elevated sed rate and C-reactive protein she was negative for flu, RSV, COVID but given that she is having symptoms of some nosebleeds and while her baseline sinusitis is chronic seems to be an uptake in the amount of nasal discharge we are going to repeat her sed rate next month.  Her renal function is stable and she has a bit of anemia that is new.   We had been holding her Rituxan last infusion 07/20/2023 500mg x 1.  I want to restart Rituxan 500mg x 2 doses this time 15 days apart and discussed with her possibility of Tavneos for persistent sinus symptoms.   Discussed COVID vaccine she would like to wait until fall.                      Continue omeprazole (prilosec)  40mg by mouth daily.       Patient hx of Osteopenia only, renal function not stable enough for oral bisphos.                she is having some spinous process tenderness today but pain not classic of vertebral fracture will check imaging anyway. She had rib fractures last year as well.               Completed. Prolia 2022. Off with most recent DXA Osteopenia. Continues with Calcium and Vitamin D.      40min consultation with greater than 50% of that time included Preparing to see the patient (review records, tests), Obtaining and/or  reviewing separately obtained historical data, Performing a medically appropriate examination and/or evaluation , Ordering medications, tests, and/or procedures, Referring and communicating with other healthcare professionals , Documenting clinical information in the electronic or other health record and Independently interpreting results  (as warranted) & communicating results to the patient/family/caregiver. All questions answered.

## 2024-04-05 ENCOUNTER — HOSPITAL ENCOUNTER (OUTPATIENT)
Dept: RADIOLOGY | Facility: HOSPITAL | Age: 84
Discharge: HOME | End: 2024-04-05
Attending: INTERNAL MEDICINE
Payer: MEDICARE

## 2024-04-05 DIAGNOSIS — M31.31 WEGENER'S GRANULOMATOSIS WITH RENAL INVOLVEMENT: ICD-10-CM

## 2024-04-05 DIAGNOSIS — R05.3 CHRONIC COUGH: ICD-10-CM

## 2024-04-05 PROCEDURE — 71046 X-RAY EXAM CHEST 2 VIEWS: CPT | Mod: TC,FY,PO

## 2024-04-05 PROCEDURE — 71046 X-RAY EXAM CHEST 2 VIEWS: CPT | Mod: 26,,, | Performed by: RADIOLOGY

## 2024-04-10 ENCOUNTER — TELEPHONE (OUTPATIENT)
Dept: RHEUMATOLOGY | Facility: CLINIC | Age: 84
End: 2024-04-10
Payer: MEDICARE

## 2024-04-10 NOTE — TELEPHONE ENCOUNTER
----- Message from Eden Snyder DO sent at 4/1/2024  8:49 AM CDT -----  Plan for Rituxan soon 500mg IV x 2 15 days apart ( modified dosage based on age and maintenance)

## 2024-04-10 NOTE — TELEPHONE ENCOUNTER
----- Message from Laura Yi sent at 4/9/2024  4:45 PM CDT -----  Contact: pt  Type: Needs Medical Advice  Who Called:  pt  Best Call Back Number: [phone]    Additional Information: Pt is calling the office to see if lab work and xray have been reviewed and trying to get results.Please call back and advise.

## 2024-04-10 NOTE — TELEPHONE ENCOUNTER
Entered therapy plan for  mg on day 1 and day 15    Hep B up to date. Last completed 2/9/2024. Hep C and TB also up to date. Last completed 7/21/23    Routing to provider to review and sign

## 2024-04-10 NOTE — TELEPHONE ENCOUNTER
"Went over the below note with patient.  "  Please call patient labs did improve, so the sed rate that was quite high seemed to fit with the fever blisters. The number is not normal and I think our plan is good for repeat Rituxan     Chest xray looked good as well.   Dr. CORBIN"   Pt voiced understanding.  "

## 2024-04-10 NOTE — TELEPHONE ENCOUNTER
Please call patient labs did improve, so the sed rate that was quite high seemed to fit with the fever blisters. The number is not normal and I think our plan is good for repeat Rituxan    Chest xray looked good as well.   Dr. CORBIN

## 2024-04-15 ENCOUNTER — TELEPHONE (OUTPATIENT)
Dept: RHEUMATOLOGY | Facility: CLINIC | Age: 84
End: 2024-04-15
Payer: MEDICARE

## 2024-04-15 ENCOUNTER — TELEPHONE (OUTPATIENT)
Dept: NEPHROLOGY | Facility: CLINIC | Age: 84
End: 2024-04-15
Payer: MEDICARE

## 2024-04-15 RX ORDER — FAMOTIDINE 10 MG/ML
20 INJECTION INTRAVENOUS
OUTPATIENT
Start: 2024-04-15

## 2024-04-15 RX ORDER — METHYLPREDNISOLONE SOD SUCC 125 MG
100 VIAL (EA) INJECTION
OUTPATIENT
Start: 2024-04-15

## 2024-04-15 RX ORDER — METHYLPREDNISOLONE SOD SUCC 125 MG
40 VIAL (EA) INJECTION
OUTPATIENT
Start: 2024-04-15

## 2024-04-15 RX ORDER — EPINEPHRINE 0.3 MG/.3ML
0.3 INJECTION SUBCUTANEOUS ONCE AS NEEDED
OUTPATIENT
Start: 2024-04-15

## 2024-04-15 RX ORDER — SODIUM CHLORIDE 0.9 % (FLUSH) 0.9 %
10 SYRINGE (ML) INJECTION
OUTPATIENT
Start: 2024-04-15

## 2024-04-15 RX ORDER — ACETAMINOPHEN 325 MG/1
650 TABLET ORAL
OUTPATIENT
Start: 2024-04-15

## 2024-04-15 RX ORDER — DIPHENHYDRAMINE HYDROCHLORIDE 50 MG/ML
50 INJECTION INTRAMUSCULAR; INTRAVENOUS ONCE AS NEEDED
OUTPATIENT
Start: 2024-04-15

## 2024-04-15 RX ORDER — HEPARIN 100 UNIT/ML
500 SYRINGE INTRAVENOUS
OUTPATIENT
Start: 2024-04-15

## 2024-04-15 NOTE — TELEPHONE ENCOUNTER
----- Message from Yadira Hillman sent at 4/15/2024  9:38 AM CDT -----  Regarding: r/s  Contact: pt  Type: Needs Medical Advice  Who Called:  patient   Symptoms (please be specific):    How long has patient had these symptoms:    Pharmacy name and phone #:    Best Call Back Number: [phone]    Additional Information: call back seeking an apt in may

## 2024-04-15 NOTE — TELEPHONE ENCOUNTER
Patient needs to be scheduled on a Monday or Friday in May. Letty is working on finding her a sooner appt.

## 2024-04-15 NOTE — TELEPHONE ENCOUNTER
----- Message from Janice Stapleton sent at 4/15/2024 11:01 AM CDT -----  Regarding: advice  Contact: patient  Type: Needs Medical Advice  Who Called:  pateint  Symptoms (please be specific):    How long has patient had these symptoms:    Pharmacy name and phone #:    Best Call Back Number: [phone] (home) [phone] (work)    Additional Information: Patient states she needs to speak to Ann again.  Thanks!

## 2024-04-15 NOTE — TELEPHONE ENCOUNTER
----- Message from Janice Stapleton sent at 4/15/2024 11:02 AM CDT -----  Regarding: advice  Contact: patient  Type: Needs Medical Advice  Who Called:  patient  Symptoms (please be specific):  treatment  How long has patient had these symptoms:    Pharmacy name and phone #:    Best Call Back Number: [phone] (home) [phone] (work)    Additional Information: Patient states she needs to speak to Rain.  Thanks!

## 2024-04-15 NOTE — TELEPHONE ENCOUNTER
Patient c/o cough and congestion since Friday 4/12/24. Patient wanted to report to Dr Snyder as she was concerned about the Rituxan infusion. Patient does have a call into her PCP and is expecting that call today around 4:00. Pt denies fever. States mucus is not dark.   She is instructed to follow her PCPs orders. Will route message to provider to review.

## 2024-04-24 NOTE — TELEPHONE ENCOUNTER
Changed not contacted.     Due for in person after September 2024.      Cannot do audio only must do video through [employer].

## 2024-04-29 PROBLEM — G63 POLYNEUROPATHY ASSOCIATED WITH UNDERLYING DISEASE: Status: ACTIVE | Noted: 2024-04-29

## 2024-04-29 PROBLEM — E46 PROTEIN MALNUTRITION: Status: RESOLVED | Noted: 2021-11-01 | Resolved: 2024-04-29

## 2024-04-29 PROBLEM — D69.2 OTHER NONTHROMBOCYTOPENIC PURPURA: Status: ACTIVE | Noted: 2024-04-29

## 2024-06-10 ENCOUNTER — OFFICE VISIT (OUTPATIENT)
Dept: NEPHROLOGY | Facility: CLINIC | Age: 84
End: 2024-06-10
Payer: MEDICARE

## 2024-06-10 ENCOUNTER — TELEPHONE (OUTPATIENT)
Dept: UROLOGY | Facility: CLINIC | Age: 84
End: 2024-06-10
Payer: MEDICARE

## 2024-06-10 DIAGNOSIS — E87.1 HYPONATREMIA: ICD-10-CM

## 2024-06-10 DIAGNOSIS — M31.30 GRANULOMATOSIS WITH POLYANGIITIS WITH PULMONARY INVOLVEMENT: ICD-10-CM

## 2024-06-10 DIAGNOSIS — E83.52 HYPERCALCEMIA: ICD-10-CM

## 2024-06-10 DIAGNOSIS — N06.9 ISOLATED PROTEINURIA WITH MORPHOLOGIC LESION: ICD-10-CM

## 2024-06-10 DIAGNOSIS — N18.31 STAGE 3A CHRONIC KIDNEY DISEASE: Primary | ICD-10-CM

## 2024-06-10 PROCEDURE — 99214 OFFICE O/P EST MOD 30 MIN: CPT | Mod: 95,,, | Performed by: INTERNAL MEDICINE

## 2024-06-10 PROCEDURE — 1160F RVW MEDS BY RX/DR IN RCRD: CPT | Mod: CPTII,95,, | Performed by: INTERNAL MEDICINE

## 2024-06-10 PROCEDURE — 1159F MED LIST DOCD IN RCRD: CPT | Mod: CPTII,95,, | Performed by: INTERNAL MEDICINE

## 2024-06-10 NOTE — TELEPHONE ENCOUNTER
----- Message from Janak Allen MD sent at 6/10/2024  4:16 PM CDT -----  Please contact her.  She has nocturia 3-4 times a night and is wearing pads as well.

## 2024-06-10 NOTE — PROGRESS NOTES
Subjective:       Patient ID: Aracelis Weber is a 83 y.o. White female who presents for return patient evaluation for chronic renal failure.    The patient location is:  Patient Home   The chief complaint leading to consultation is: CKD  Visit type: Virtual visit with synchronous audio and video  Total time spent with patient: 12 minutes  Each patient to whom he or she provides medical services by telemedicine is:  (1) informed of the relationship between the physician and patient and the respective role of any other health care provider with respect to management of the patient; and (2) notified that he or she may decline to receive medical services by telemedicine and may withdraw from such care at any time.       She has been treated with rituxan and steroids for Wegener's q 4-6 months.  She had COVID in November 2022.  She has chronic neuropathy in her feet.  She had edema with myrbetriq.  She is having nocturia and has frequency but she sees Urology.      Review of Systems   Constitutional:  Negative for appetite change, chills and fever.   HENT:  Positive for hearing loss. Negative for congestion.    Eyes:  Negative for visual disturbance.   Respiratory:  Negative for cough and shortness of breath.    Cardiovascular:  Positive for leg swelling (rare). Negative for chest pain.   Gastrointestinal:  Negative for abdominal pain, diarrhea, nausea and vomiting.   Genitourinary:  Positive for difficulty urinating (urge incontinence). Negative for dysuria and hematuria.   Musculoskeletal:  Negative for arthralgias, back pain and myalgias.   Skin:  Negative for rash.   Neurological:  Positive for numbness (feet B). Negative for headaches.   Psychiatric/Behavioral:  Negative for sleep disturbance.        The past medical, family and social histories were reviewed for this encounter.     There were no vitals taken for this visit.    Objective:      Physical Exam  Vitals reviewed.   Constitutional:       General:  She is not in acute distress.     Appearance: She is well-developed.   HENT:      Head: Normocephalic and atraumatic.   Eyes:      General: No scleral icterus.  Pulmonary:      Effort: Pulmonary effort is normal.   Neurological:      Mental Status: She is alert and oriented to person, place, and time.   Psychiatric:         Mood and Affect: Mood normal.         Behavior: Behavior normal.         Assessment:       1. Stage 3a chronic kidney disease    2. Wegener's disease, pulmonary    3. Isolated proteinuria with morphologic lesion    4. Hyponatremia    5. Hypercalcemia        Lab Results   Component Value Date    CREATININE 1.1 04/05/2024    BUN 20 04/05/2024     (L) 04/05/2024    K 4.7 04/05/2024     04/05/2024    CO2 26 04/05/2024     Lab Results   Component Value Date    PTH 28.6 04/05/2024    CALCIUM 9.7 04/05/2024    PHOS 3.8 04/05/2024     Lab Results   Component Value Date    HCT 35.2 (L) 02/23/2024     Prot/Creat Ratio, Urine   Date Value Ref Range Status   04/05/2024 Unable to calculate 0.00 - 0.20 Final   01/05/2024 0.26 (H) 0.00 - 0.20 Final   09/22/2023 0.22 (H) 0.00 - 0.20 Final     Plan:   Return to clinic in 6 months.  Labs for next visit include rp upc pth per standing orders.  Baseline creatinine is 0.8-1.1 since 2016.  UPC is negative.  PTH is 29 with  calcium of 9.7.    Renal US shows R 9.6 cm L 12.4 cm.  She was treated for her disease and has responded well.   Blood pressure is controlled on the current regimen.  Continue current medications as prescribed and reviewed.

## 2024-06-14 PROBLEM — K08.89: Status: ACTIVE | Noted: 2024-06-14

## 2024-06-14 PROBLEM — Z01.818 PRE-OP EXAM: Status: ACTIVE | Noted: 2024-06-14

## 2024-06-17 ENCOUNTER — OFFICE VISIT (OUTPATIENT)
Dept: RHEUMATOLOGY | Facility: CLINIC | Age: 84
End: 2024-06-17
Payer: MEDICARE

## 2024-06-17 ENCOUNTER — TELEPHONE (OUTPATIENT)
Dept: INFUSION THERAPY | Facility: HOSPITAL | Age: 84
End: 2024-06-17
Payer: MEDICARE

## 2024-06-17 VITALS
SYSTOLIC BLOOD PRESSURE: 166 MMHG | WEIGHT: 147.81 LBS | BODY MASS INDEX: 25.24 KG/M2 | DIASTOLIC BLOOD PRESSURE: 76 MMHG | HEART RATE: 66 BPM | HEIGHT: 64 IN

## 2024-06-17 DIAGNOSIS — Z79.899 HIGH RISK MEDICATIONS (NOT ANTICOAGULANTS) LONG-TERM USE: ICD-10-CM

## 2024-06-17 DIAGNOSIS — D84.9 IMMUNOSUPPRESSION: ICD-10-CM

## 2024-06-17 DIAGNOSIS — J32.4 CHRONIC PANSINUSITIS: ICD-10-CM

## 2024-06-17 DIAGNOSIS — M31.31 WEGENER'S GRANULOMATOSIS WITH RENAL INVOLVEMENT: Primary | ICD-10-CM

## 2024-06-17 PROCEDURE — 99215 OFFICE O/P EST HI 40 MIN: CPT | Mod: S$GLB,,, | Performed by: INTERNAL MEDICINE

## 2024-06-17 PROCEDURE — 99999 PR PBB SHADOW E&M-EST. PATIENT-LVL IV: CPT | Mod: PBBFAC,,, | Performed by: INTERNAL MEDICINE

## 2024-06-17 PROCEDURE — 3077F SYST BP >= 140 MM HG: CPT | Mod: CPTII,S$GLB,, | Performed by: INTERNAL MEDICINE

## 2024-06-17 PROCEDURE — 1101F PT FALLS ASSESS-DOCD LE1/YR: CPT | Mod: CPTII,S$GLB,, | Performed by: INTERNAL MEDICINE

## 2024-06-17 PROCEDURE — 3288F FALL RISK ASSESSMENT DOCD: CPT | Mod: CPTII,S$GLB,, | Performed by: INTERNAL MEDICINE

## 2024-06-17 PROCEDURE — 1159F MED LIST DOCD IN RCRD: CPT | Mod: CPTII,S$GLB,, | Performed by: INTERNAL MEDICINE

## 2024-06-17 PROCEDURE — 3078F DIAST BP <80 MM HG: CPT | Mod: CPTII,S$GLB,, | Performed by: INTERNAL MEDICINE

## 2024-06-17 PROCEDURE — 1125F AMNT PAIN NOTED PAIN PRSNT: CPT | Mod: CPTII,S$GLB,, | Performed by: INTERNAL MEDICINE

## 2024-06-17 ASSESSMENT — ROUTINE ASSESSMENT OF PATIENT INDEX DATA (RAPID3)
PATIENT GLOBAL ASSESSMENT SCORE: 2.5
TOTAL RAPID3 SCORE: 3.28
PAIN SCORE: 5
PSYCHOLOGICAL DISTRESS SCORE: 1.1
MDHAQ FUNCTION SCORE: 0.7
FATIGUE SCORE: 0

## 2024-06-17 NOTE — PROGRESS NOTES
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 She was called to schedule and medication is approved but she is having a myriad of complaints with bleeding in the nose over a few months with sinus congestion. She had a debridement with Dr. Quinn  and this most recent one resolved nose bleeds, she states her sinuses are better than they have been in nearly 3 years. Mucous has stopped being so productive, she is avoiding navage and no worse.     Hearing stable one hearing aid is not working.     No new illnesses she had URI 4/2024 none since  Renal function stable.   No SOB or cough no fevers.   She will have left lower tooth extracted tomorrow: Dr. Huang treated with ABX.     3/2024:  3/2024: Patient HSV did finally clear some residula angular chelitis/  Patient with dry cough typic this time of year no SOB  Sinuses have been pretty good.  No fever, chills, weight loss or increase in fatigue.    Wet AMD-O'Varghese injections monthly now  Hearing a bit worse as of recent.     1/2024:  With a recent URI her labs drawn 01/05/2024 this with the elevated sed rate and C-reactive protein she was negative for flu, RSV, COVID but given that she is having symptoms of some nosebleeds and while her baseline sinusitis is chronic seems to be an uptake in the amount of nasal discharge we are going to repeat her sed rate next month.  Her renal function is stable and she has a bit of anemia that is new.   We had been holding her Rituxan last infusion 07/20/2023 500mg x 1 , but I want to make sure we are not missing any underlying Wegener's activity.    She did have significant cough that took some time to improve, her sinuses feel continued inflammation, just started Z-pack.  Sharply marginated lucent lesion in the T10 vertebral body, possibly a hemangioma.             MRI completed with Ms Alonzo will just monitor for  increase cervical stenosis or radicular [id number] at this time.   Did have vertebral fx.      9/2023: Patient was seen 6/2023 with anticipation of repeat FESS Sinus surgery and pending compound nasal irrigation. She did have surgery with Dr. Quinn end of June 2023, by July visit noted improvement with compound irrigation from prior. Still with chronic sinusitis.   Last Rituxan 500mg single dose 7/2023.    She is doing much better with the congestion but nothing like it was before. She denies fevers, chills, some cough, no hemoptysis.   She is down to Prednisone 2.5 mg daily but overall her edema is much improved.   Renal function stable  ESR and CRP    She does not want to take MTX she lost significant amounts of hair.   Imuran was lost with flare and transition to Imuran.     She has completed Rituxan 18 months with sinuses under control, lungs clear , renal function stable. Hearing is stable she lost nearly 90% of hearing and has stabilized.   She has mac degeneration.   Neuropathy continues to be a problem but we never have petechial rashes.   Edema is much improved.     3/2023:   Patient current on RTX Q 6 month maintanance following induction for Wegeners.    12/2/22 (Delayed COVID infection) RTX was 500mg with solumedrol 80mg IM   Completed Rituxan 1000mg for 1st  maintenance at 4 months 4/20/2022. due to rise in symptoms, and ESR   Induction 10/2021.      Continued nasal congestion and drainage.   Recent significant nosebleeds. Endoscopy with recurrent crusting despite office debridement by ENT and continued irrigation.      Will repeat serologies and inflammatory markers and check CD 19 levels first week March and see her later that month.   Patient doing very well. Still rhinitis, using navage. No antibiotics from ENT since last visit. She is still clearing significant mucous faint with dried blood.   Dry cough, no fevers, no SOB\  Prolia: 10/17/2022         11/2022  Patient doing very well. Still  rhinitis, using navage. No antibiotics from ENT since last visit. She is still clearing significant mucous faint with dried blood.   Dry cough, no fevers, no SOB  Mild HA only with elevated BP.   Completed Rituxan maintanence at 4 months 4/20/2022.   Prolia 3/25/22  Evusheld with catch up dose 2/10/22 and 3/25/2022     5/5/2022:   Patient doing very well. Still rhinitis, using navage. No antibiotics from ENT since last visit. She is still clearing significant mucous faint with dried blood.   Dry cough, no fevers, no SOB  Mild HA only with elevated BP.   Completed Rituxan maintanence at 4 months 4/20/2022.   Prolia 3/25/22  Evusheld with catch up dose 2/10/22 and 3/25/2022.      Major complaints: pins, needles, stinging, burning in feet day and night is constant. Dr. Campbell: EB-N5 supplement is helping some, stopped for months and noted significant worsening. Dr. Campbell did increase from EB-N3.   Gabapentin 400mg TID  Hydrocodone only PRN  Prednisone 7.5mg         2/2022  Doing well no cough, no fevers. Recent COVID exposure but negative.   Seen with Nephro.   Given her successful response with Ritux. Agree to continue maintanance with Ritux Q 4-6 months     Needs to get #3 COVID vaccine timing with RTX. - will time this with me 2 weeks prior to   Discussed Prolia  Working on Evusheld for PreP with COVID> she is scheduled 2/10/22        12/9/2021  Patient's recent CXR 12/21/21 with marked improvement when compared to 10/21/21  Prednisone 10mg daily  S/p RTX x 4 infusions completion date. 11/5/21  Patient with rise in creatinine to 2.0 on 11/26 had been requiring lasix for marked edema since 11/1/21. Stopped 2 weeks ago. Edema is present but managed. Drinking about 40 oz water and 3 cups of coffee daily     Patient completed [facility] PT felt she was doing well home exercises about 2 weeks ago with acute pain in lumbar spine after exercising. Imaging lumbar few days ago without evidence of a fracture. She point to pain in  the lumbosacral and radiating to buttock region. Ok to rise from seated. Pain more on left low back and buttock. No numbness no tingling but pain in the hamstring region. Comfortable sitting but not lying down, but she never sleeps in bed. Long hx of rather impressive scoliosis.         11/11/2021:   Inflammatory markers are markedly improved.   Cough dry still at night. Patient noting she has lost sense of smell. Can still taste.      Patient recently hospitatlized for Bronch with negative cultures to date.  There was a concern for possible atypical infection she has had enlarging right upper lobe cavitary lesion measures still has a right middle lobe lesion with some cavitation now as well.  I have discussed this case extensively with both Dr. Schwartz as well as Dr. Joseph upon discharge we felt comfortable that this is likely Wegener's granulomatosis causing cavitary lesions.  Supporting sinus biopsy does note granuloma.    Patient was started on Imuran in March of 2021 but with the growth of her lesion we had rule out for any possible infection given her status of immunosuppression.  She is here today not in her usual state she appears fatigued pale she is not complaining of shortness of breath but does have a cough dry persistent.  She is noticing increased pedal edema.           9/13/21: patient with 2 falls 8 days apart. Spoke w/ pulm shortly following initial fall and we were scheduled for bronch when she sustained another fall.   Scheduled now for 9/17/21 bronch. RUL lesion.   Remains off Imuran  Prednisone 15mg daily.   Sinus congestion. Still productive      She is noting some productive cough at night no hemoptysis.   Patient denies SOB  Having more HA. -frontal and sinus, she is noting some drainage in right ear some mucous/blood.   No edema.   She notes fatigue, no fevers, no night sweats. No weight loss.   Skin easily bruising.      No personal hx of any malignancy.         7/20/2021:    Spoke with  "Pulm plan for bronch is able working on possible bx site vs BAL.   Need to r/o infectious/neoplastic and confirm for ANCA (GPA vasculitis)   Inflammatory markers markedly down .8  Now 14.   H/H improve from 7.4 to 8.2  Platelets normalized.   Renal stable over time     Patient did attend wedding with approx 300 people unmasked last weekend. Reviewed by recs for COVID precautions given     Current regimen:   pred 15mg (Na stable)  Holding Imuran     Interval events: PET with hypermetabolic lesions:   a. RUL cavitary/cystic lesion 1.9cm x 1.3cm  b. RUL spiculated 1.2cm x 1.4cm  c. RML 1.7cm x 2.0 cm  All with differential: infectious, inflammatory, neoplastic, grannulomatous (a) with favor of inflammation given the rapid change     Fungal immunodiffusion neg  Quant gold and T spot: neg  H/H improving      Sinus congestion, pressure, headache, x 3 weeks very productive with blowing nose, back on navage. :  started Cindamycin with DR Quinn x 10 days.   Patient denies any shortness breath.   Very fatigues.   No more fevers. Slight dry cough, no blood/ no mucous.   No SOB.         5/12/21 completed nasal irrigation with biopsy not definitive for GPA, but + inflammation and granulomas. Temporal artery bx negative. Patient with PCP same day as labs 6/2/21  dx with UTI on keflex.      Was seen apparently in ED in [place] for 104F she had altered mental status, dx with UTI, dehydration, treated with. Completed all 7 days of keflex and within 48 hours with another fever 104 F. She continues with congestion but no worse. Patient denies cough, hemoptysis, bloody nasal discharge. She has had no fever x 10 days. Checks twice daily.   while travelling told "congestion in chest on xray". Inflammatory markers very high again.   Patient with recent concern for wegeners needs CT chest. CXR with new airspace opacities right chest- need to r/o GGO vs infection. CT has been ordered pending scheduling.   Broad ABX coverage recommended " for now will cc Dr. Natarajan.      I am limited here with her  severe edema from higher dose steroids. Max tolerated is 20mg daily pred  Started Imuran 5/5/2021. Very low dose 50mg BID (0.65mg/kg) tolerating VERY well.   H/H still low.            2/2021  Sinususitis, cx were negative.  Using nasal irrigation with mucoid disharge.   Patient continues with significant nasal discharge and blood with scabs. We discussed Wegeners but pathology sinus no vasculitis, granuloma noted.   No HA, no blurred vision. Recent sinus infection with HA for 10 days. cx +, but also repeat labs demonstarting +PR3 and +ANCA         Patient with c/o right > left 3,4 finger numbness that was intermittent until approximately 2 weeks about having near constant numbness in hands, pins and needles and pain.   Patient not noting much improvement with change in position.      Off MTX 6  tabs weekly.   Doing well with Hydroxychloroquine.   Declined COVID vaccine     Gabapentin up to 300mg TID.   Now with Dr. Campbell doing PT for Plantar fasciitis. She is taking supplement and compound cream for joint and neuropathy. No response to tarsal tunnel injection per Dr. Campbell.   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 WBC ok.         11/2020  Patient with PMR   MTX at 4 tabs weekly new anemia. Thrombocytosis  Pred at 10mg   Complicated with peripheral edema rather severe , started MTX seeing trending CRP/ESR but persistent leg pain.   Seen with PCP for neuropathy s/sx not seeing much benefit with gabpentin  Seen with Cardiology- no concern for cardiac ECHO ok. dc'd lasix for hyponatremia.      8/2020  Pred 20mg with mild edema, pred 30 mg with severe edema.   Current Pred 15mg daily  Rising ESR again.   Lasix 20 mg daily with improved edema but having some pins and needles in feet/legs.   Having tight, burning stinging at the toes and feet. Heaviness.  Prickly.   Noting sodium is falling, K 5.2-5.4 still using               7/2020:  Hands not painful, knees not painful. Shoulder/cervical and down the arms, markedly improved. She still has some pain him hips and groin with walking and some weakness to the legs with edema. Some pain with fixing own hair.   She takes in AM regarding hips ache, some shoulder/arms, and again at night because hips/legs.   The clue is that the LDN new dose she had some ASE, previously tolerated 3mg daily fine.      Historical review of present Illness.   Patient with a recent chronic sinusitis that ultimately warranted a septoplasty, endoscopic sinus surgery and turbinate reduction 5/2020   Four weeks postop t increasingly worse she was noting pain in her throat and ears lasting several hours complaint of pdizziness she had symptoms of ear pain and decreased hearing in the left ear.  She underwent a debridement at this visit she continue with compound nasal irrigations.  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     Patient did have repeat procedure on with biopsies taken 07/03/2020 showing right maxillary sinus chronically inflamed fibrotic polypoid portions benign nasal mucosa left maxillary sinus similar polypoid fragments ulcerated markedly inflamed respiratory mucosa focal foreign body giant cell response noted suggested that this might have been from her Gelfoam packing as a possible cause of this  reaction     I have spoken with Dr. Quinn prior to patient's visit today and was a concern that she could have triggered some inflammatory response with the Gelfoam packing     Patient states she no longer has a headache is not noticing much ear drainage continues with profound loss of hearing on the left and rather significant on the right both sensorineural and some conductive changes.     Restarted on prednisone as of 7/20/20  10 mg patient has not noticed any change with her hearing in this time she did notice slight improvement with her joint aches and pains but is still relying on hydrocodone for the bulk of this.     She denies a persistent headache she does have some tenderness about the preauricular region particularly on the right more than the left.  She has denied jaw claudication changes in her voice or any amaurosis fugax.  She has no pre-existing history of joint aches and pains hip or otherwise.  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  She had a workup that involved a negative rheumatoid factor, age appropriate sedimentation rate,C reactive protein JANET was positive 1:160 in speckled pattern           Previous: Hx:   She is having pain in her hand with stiffness and right 2nd PIP joint now unable to flex. 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   Hx of bilateral TKA 3 and 5 years ago and those are doing well.   She notes intermittent edema. Some hammer toe deformity bilaterally making shoes problematic but not painful otherwise.   Long hx of GERD-severe.   Cannot tolerate NSAIDs at all w/o gastritis.   Can use Hydrocodone PRN   Added Naltrexone at 3mg and doing very well  Lost 30# with WW.   Patient denies weight loss, rashes, dry eye, dry mouth, nasal or palatal ulcerations,  lymphadenopathy, Raynaud's, hx of DVT/miscarriages, psoriasis or family hx of psoriasis, rashes, serositis, anemia or other  constitutional symptoms.  Asking about naltrexone  Component      Latest Ref Rng & Units 8/8/2020 7/29/2020 7/20/2020 7/13/2020   Sodium      136 - 145 mmol/L   126 (L) 128 (L)     Potassium      3.5 - 5.1 mmol/L   5.4 (H) 5.2 (H)     Chloride      95 - 110 mmol/L   92 (L) 92 (L)     CO2      23 - 29 mmol/L   25 29     Glucose      70 - 110 mg/dL   109 109     BUN, Bld      8 - 23 mg/dL   13 11     Creatinine      0.5 - 1.4 mg/dL   0.8 0.8     Calcium      8.7 - 10.5 mg/dL   9.5 9.7     Anion Gap      8 - 16 mmol/L   9 7 (L)     eGFR if African American      >60 mL/min/1.73 m:2   >60.0 >60.0     eGFR if non African American      >60 mL/min/1.73 m:2   >60.0 >60.0     Anti Sm Antibody      0.00 - 0.99 Ratio       0.04   Anti-Sm Interpretation      Negative       Negative   Anti Sm/RNP Antibody      0.00 - 0.99 Ratio       0.09   Anti-Sm/RNP Interpretation      Negative       Negative   JANET Screen      None Detected           Rheumatoid Factor      0.0 - 15.0 IU/mL           Sed Rate      0 - 36 mm/Hr 57 (H)   54 (H) 75 (H)   CRP      0.0 - 8.2 mg/L 75.4 (H) 56.1 (H) 57.5 (H) 99.4 (H)   Anti-Histone Antibody      0.0 - 0.9 Units       0.4   ds DNA Ab      Negative 1:10       Negative 1:10      Component      Latest Ref Rng & Units 6/25/2020 11/10/2018   Sodium      136 - 145 mmol/L       Potassium      3.5 - 5.1 mmol/L       Chloride      95 - 110 mmol/L       CO2      23 - 29 mmol/L       Glucose      70 - 110 mg/dL       BUN, Bld      8 - 23 mg/dL       Creatinine      0.5 - 1.4 mg/dL       Calcium      8.7 - 10.5 mg/dL       Anion Gap      8 - 16 mmol/L       eGFR if African American      >60 mL/min/1.73 m:2       eGFR if non African American      >60 mL/min/1.73 m:2       Anti Sm Antibody      0.00 - 0.99 Ratio       Anti-Sm Interpretation      Negative       Anti Sm/RNP Antibody      0.00 - 0.99 Ratio       Anti-Sm/RNP Interpretation      Negative       JANET Screen      None Detected Detected (A) Detected (A)  "  Rheumatoid Factor      0.0 - 15.0 IU/mL 11.6 <8.6   Sed Rate      0 - 36 mm/Hr       CRP      0.0 - 8.2 mg/L       Anti-Histone Antibody      0.0 - 0.9 Units       ds DNA Ab      Negative 1:10             REVIEW OF SYSTEMS:     Review of Systems   Constitutional: Negative for fever, malaise/fatigue and weight loss.   HENT: Negative for sore throat.    Eyes: Negative for double vision, photophobia and redness.   Respiratory: Negative for cough, shortness of breath and wheezing.    Cardiovascular: Negative for chest pain, palpitations and orthopnea.   Gastrointestinal: Negative for abdominal pain, constipation and diarrhea.   Genitourinary: Negative for dysuria, hematuria and urgency.   Musculoskeletal: Positive for joint pain. Negative for back pain and myalgias.   Skin: Negative for rash.   Neurological: Negative for dizziness, tingling, focal weakness and headaches.   Endo/Heme/Allergies: Does not bruise/bleed easily.   Psychiatric/Behavioral: Negative for depression, hallucinations and suicidal ideas.                     Objective:      Objective        Past Medical History:   Diagnosis Date    Anesthesia       'Mother stop breathing after anesthesia"    Chronic sinusitis      Depression      GERD (gastroesophageal reflux disease)      PVC (premature ventricular contraction)              Family History   Problem Relation Age of Onset    Heart disease Mother      Arthritis Mother      Cancer Sister      Arthritis Sister      Diabetes Sister      Hypertension Sister        Social History            Tobacco Use    Smoking status: Former       Current packs/day: 0.00       Types: Cigarettes       Start date: 2/3/1955       Quit date: 1/3/1960       Years since quittin.7    Smokeless tobacco: Never   Substance Use Topics    Alcohol use: Never       Alcohol/week: 0.0 standard drinks of alcohol    Drug use: Never                   Current Outpatient Medications on File Prior to Visit   Medication Sig Dispense " Refill    acetaminophen (TYLENOL) 500 MG tablet Take 500 mg by mouth every 6 (six) hours as needed for Pain.        albuterol (PROVENTIL/VENTOLIN HFA) 90 mcg/actuation inhaler INHALE 2 PUFFS BY MOUTH EVERY 4 TO 6 HOURS AS NEEDED FOR SHORTNESS OF BREATH OR COUGH        ascorbate calcium (MAHSA-C ORAL) Take by mouth.        B2-B6 phos-levomef simran-mecobal (EB-N3 DR) 1.3-70-6-4 mg CpDR Take 1 capsule by mouth once daily.        cetirizine (ZYRTEC) 10 MG tablet TAKE 1 TABLET ONE TIME DAILY 90 tablet 0    denosumab (PROLIA SUBQ) Inject into the skin. Two times a year        diclofenac sodium (VOLTAREN) 1 % Gel APPLY TO THE AFFECTED AREA(S) 2 GRAMS THREE TIMES DAILY 600 g 0    furosemide (LASIX) 20 MG tablet TAKE 1 TABLET EVERY DAY 90 tablet 3    gabapentin (NEURONTIN) 400 MG capsule TAKE 1 CAPSULE THREE TIMES DAILY 270 capsule 3    HYDROcodone-acetaminophen (NORCO) 7.5-325 mg per tablet Take 1 tablet by mouth every 6 (six) hours as needed.        ITRACONAZOLE, BULK, MISC EMPTY CONTENTS OF 1 CAPSULE INTO NASAL IRRIGATION SYSTEM, ADD DISTILLED WATER, SALT PACK, MIX & IRRIGATE. PERFORM 2 TIMES DAILY        LACTOBACILLUS ACIDOPHILUS ORAL Take by mouth.        metoprolol succinate (TOPROL-XL) 50 MG 24 hr tablet TAKE 1 TABLET EVERY DAY 90 tablet 3    mirabegron (MYRBETRIQ) 50 mg Tb24 Take 1 tablet (50 mg total) by mouth once daily. 30 tablet 11    neomycin-polymyxin-dexamethasone (MAXITROL) 3.5mg/mL-10,000 unit/mL-0.1 % DrpS          omeprazole (PRILOSEC) 40 MG capsule TAKE 1 CAPSULE EVERY MORNING 90 capsule 0    ondansetron (ZOFRAN-ODT) 8 MG TbDL Take 8 mg by mouth 2 (two) times daily.        peg 400-propylene glycol, PF, (SYSTANE, PF,) 0.4-0.3 % Dpet Apply to eye 2 (two) times a day.        predniSONE (DELTASONE) 2.5 MG tablet Take 1 tablet (2.5 mg total) by mouth once daily. 90 tablet 3    predniSONE (DELTASONE) 5 MG tablet Take 1 tablet (5 mg total) by mouth once daily. 30 tablet 3    sodium chloride 0.9% SolP 500 mL with  riTUXimab 10 mg/mL Conc 375 mg/m2 Inject 375 mg/m2 into the vein. 4 to 6 months        tobramycin sulfate 0.3% (TOBREX) 0.3 % ophthalmic solution Place into both eyes.        vancomycin (VANCOCIN) 1,000 mg injection SMARTSI Vial(s) Both Nares Twice Daily        vit C/E/Zn/coppr/lutein/zeaxan (PRESERVISION AREDS-2 ORAL) Take 1 capsule by mouth 2 (two) times a day.         [DISCONTINUED] naltrexone capsule Take 3 mg by mouth once daily.          No current facility-administered medications on file prior to visit.             Vitals:     23 1306   BP: 134/65   Pulse: 69         Physical Exam:     Physical Exam   Constitutional: She is oriented to person, place, and time. She appears well-developed and well-nourished.   HENT:   Head: Normocephalic and atraumatic.   Mouth/Throat: Oropharynx is clear and moist.   Eyes: Pupils are equal, round, and reactive to light. EOM are normal.   Neck: Normal range of motion.   Cardiovascular: Normal rate, regular rhythm and normal heart sounds.   Pulmonary/Chest: Effort normal and breath sounds normal.   Musculoskeletal:        Right shoulder: She exhibits normal range of motion, no tenderness and no swelling.        Left shoulder: She exhibits normal range of motion, no tenderness and no swelling.        Right elbow: She exhibits normal range of motion and no swelling. No tenderness found.        Left elbow: She exhibits normal range of motion and no swelling. No tenderness found.        Right wrist: She exhibits normal range of motion, no tenderness and no swelling.        Left wrist: She exhibits normal range of motion, no tenderness and no swelling.        Right knee: She exhibits normal range of motion and no swelling. No tenderness found.        Left knee: She exhibits normal range of motion and no swelling. No tenderness found.        Right hand: She exhibits decreased range of motion and tenderness. She exhibits no swelling.        Left hand: She exhibits decreased  range of motion and tenderness. She exhibits no swelling.        Right foot: There is normal range of motion, no tenderness and no swelling.        Left foot: There is normal range of motion, no tenderness and no swelling.   Bony hypertrophy at the PIP and DIP joints. +squaring at the CMC joint. No active synovitis on 28 joint exam.    Neurological: She is alert and oriented to person, place, and time.   Skin: Skin is warm and dry.   Psychiatric: She has a normal mood and affect. Her behavior is normal.      Component      Latest Ref Rng & Units 2021          11:03 AM 11:03 AM   Respiratory Culture         No growth   Gram Stain (Respiratory)       No organisms seen No WBC's   AFB Culture & Smear             AFB CULTURE STAIN             GENET Prep             Fungus (Mycology) Culture             Aerobic Bacterial Culture                Component      Latest Ref Rng & Units 2021          11:03 AM   Respiratory Culture       No Staph aureus, MRSA or Pseudomonas isolated.   Gram Stain (Respiratory)       No organisms seen   AFB Culture & Smear           AFB CULTURE STAIN           GENET Prep           Fungus (Mycology) Culture           Aerobic Bacterial Culture              Component      Latest Ref Rng & Units 2021          11:03 AM 11:03 AM   Respiratory Culture       Normal respiratory nimesh     Gram Stain (Respiratory)       Moderate WBC's     AFB Culture & Smear         Culture in progress   AFB CULTURE STAIN         No acid fast bacilli seen.   KOH Prep         No yeast or fungal elements seen   Fungus (Mycology) Culture         Culture in progress   Aerobic Bacterial Culture                      Narrative  Performed by: SHAWN  Patient - ONIEL ROSARIO                   - 1940 Sex- F   Med Rec # - [id number]                        Bimal Mckeon M.D.   The following is an electronic copy of report # [id number] from:   [facility]   [address] [place] [address]                        SUGEY Humphrey [address]                         Phone [phone]   DIAGNOSIS:   09/22/2021  /sdc     1, 2.  RIGHT UPPER LOBE MASS BRUSHING AND FINE NEEDLE ASPIRATE BIOPSIES:   - NEGATIVE FOR MALIGNANT CELLS.     - PURULENT EXUDATE.     - A FEW FRAGMENTS OF BRONCHIAL MUCOSA WITH FLORID CHRONIC BRONCHITIS    AND REACTIVE SQUAMOUS    ATYPICAL METAPLASIA.       Comment:   Special stains (AFB and GMS) are negative for organisms. While no    granulomas are seen here, the inflammatory reaction appears to be    similar to that seen in the sinus material over the past few years    (Delta [id number], [id number], [id number]). 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 Bronchoscopic material, particularly of the lower    respiratory tract, is of low yield for a definitive diagnosis of that    entity. The previous sinus material will be reviewed with    consideration for that diagnosis.   _______________________________________________________________________   SPECIMEN AND SOURCE:   1. RUL mass brushing   2. RUL mass needle     CLINICAL INFORMATION:   Clinical Information:-gt Right Upper Lobe Mass   Specific Site:-gt RUL mass brushing T Brush; RUL Mass; RUL mass-    microbiology; RUL BAL; RUL washing;   Other Requests:-gt N/A     GROSS DESCRIPTION:   1. Received 40 mL of fluid in formalin, 1 dry slides and 1 slides in    95% alcohol. Prepared one cell block.     2. Received 40 mL of fluid in formalin, 1 dry slides and 1 slides in    95% alcohol. Prepared one cell block.     CODE: 0     Cytotechnologist: ABDIFATAH Marvin (ASCP)   Pathologist: Scott Johnson MD (Electronic Signature) 09/22/2021 2:34 PM     [facility], [facility] * [address] [place] [address] * [place]    [id number]   Technical services performed at: [facility], [facility] * [address] [place] [address] * [facility] [place] [address]   Screening Site: [facility], [facility] * [address] [facility] *    [place] [address]                            Post Rituxan image                                     Pre Rituxan image  Assessment and Plan      Wegener's granulomatosis with renal involvement  -     ALT (SGPT); Future; Expected date: 06/17/2024  -     AST (SGOT); Future; Expected date: 06/17/2024  -     CBC Auto Differential; Future; Expected date: 06/17/2024  -     C-Reactive Protein; Future; Expected date: 06/17/2024  -     Creatinine, Serum; Future; Expected date: 06/17/2024  -     Sedimentation rate; Future; Expected date: 06/17/2024    Immunosuppression  -     ALT (SGPT); Future; Expected date: 06/17/2024  -     AST (SGOT); Future; Expected date: 06/17/2024  -     CBC Auto Differential; Future; Expected date: 06/17/2024  -     C-Reactive Protein; Future; Expected date: 06/17/2024  -     Creatinine, Serum; Future; Expected date: 06/17/2024  -     Sedimentation rate; Future; Expected date: 06/17/2024    Chronic pansinusitis  -     ALT (SGPT); Future; Expected date: 06/17/2024  -     AST (SGOT); Future; Expected date: 06/17/2024  -     CBC Auto Differential; Future; Expected date: 06/17/2024  -     C-Reactive Protein; Future; Expected date: 06/17/2024  -     Creatinine, Serum; Future; Expected date: 06/17/2024  -     Sedimentation rate; Future; Expected date: 06/17/2024    High risk medications (not anticoagulants) long-term use  -     ALT (SGPT); Future; Expected date: 06/17/2024  -     AST (SGOT); Future; Expected date: 06/17/2024  -     CBC Auto Differential; Future; Expected date: 06/17/2024  -     C-Reactive Protein; Future; Expected date: 06/17/2024  -     Creatinine, Serum; Future; Expected date: 06/17/2024  -     Sedimentation rate; Future; Expected date: 06/17/2024      Patient is a 83-year-old female she has had a recent robust inflammatory reaction within the sinus cavity as well as hearing deficit following  a sinus surgery.  She has been repeated cultured postoperatively completed antibiotics and irrigations negative for any fungal infections or bacterial infections.   Patient was doing well on MTX with regard to ESR and CRP, but hair loss.      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               Completed RTX 11/5/21 Induction               Review of data and feel : The best data supporting the use of  as maintenance therapy come from the Maintenance of Remission using Rituximab in Systemic ANCA-associated Vasculitis (MAINRITSAN) trial with less relapse for PR3+ paitients when treated with Rituxan at 500mg IV at 6, 12, and 18months when compared to Imuran. Patient has tolerated RTX last 4/20/2022, she had to cancel 10/21/2022 for COVID. Did receive 500mg on 12/2022. Received again 7/2023. Patient has completed 18 months of Rituxan therapy.     -With a recent URI her labs drawn 01/05/2024 this with the elevated sed rate and C-reactive protein she was negative for flu, RSV, COVID but given that she is having symptoms of some nosebleeds and while her baseline sinusitis is chronic seems to be an uptake in the amount of nasal discharge we are going to repeat her sed rate next month.  Her renal function is stable and she has a bit of anemia that is new.   We had been holding her Rituxan last infusion 07/20/2023 500mg x 1.  I want to restart Rituxan 500mg x 2 doses this time 15 days apart and discussed with her possibility of Tavneos for persistent sinus symptoms. Will have infusion call her.   Discussed COVID vaccine she would like to wait until fall.        Continue omeprazole (prilosec)  40mg by mouth daily.       Patient hx of Osteopenia only, renal function not stable enough for oral bisphos.                she is having some spinous process tenderness today but pain not classic of vertebral fracture will check imaging anyway. She had rib fractures last  year as well.               Completed. Prolia 2022. Off with most recent DXA Osteopenia. Continues with Calcium and Vitamin D.      40min consultation with greater than 50% of that time included Preparing to see the patient (review records, tests), Obtaining and/or reviewing separately obtained historical data, Performing a medically appropriate examination and/or evaluation , Ordering medications, tests, and/or procedures, Referring and communicating with other healthcare professionals , Documenting clinical information in the electronic or other health record and Independently interpreting results  (as warranted) & communicating results to the patient/family/caregiver. All questions answered.

## 2024-06-17 NOTE — TELEPHONE ENCOUNTER
----- Message from Rain Mcguire LPN sent at 6/17/2024  3:12 PM CDT -----  Regarding: rituxan  Good afternoon, Dr Snyder said patient can resume Rituxan infusion. Can someone reach out to patient to schedule?  Thanks,  Rain AREVALO LPN

## 2024-06-26 DIAGNOSIS — M31.31 WEGENER'S GRANULOMATOSIS WITH RENAL INVOLVEMENT: ICD-10-CM

## 2024-06-26 DIAGNOSIS — M31.30 GRANULOMATOSIS WITH POLYANGIITIS WITH PULMONARY INVOLVEMENT: ICD-10-CM

## 2024-06-27 RX ORDER — PREDNISONE 2.5 MG/1
2.5 TABLET ORAL
Qty: 90 TABLET | Refills: 3 | Status: SHIPPED | OUTPATIENT
Start: 2024-06-27

## 2024-06-28 ENCOUNTER — LAB VISIT (OUTPATIENT)
Dept: LAB | Facility: HOSPITAL | Age: 84
End: 2024-06-28
Attending: INTERNAL MEDICINE
Payer: MEDICARE

## 2024-06-28 DIAGNOSIS — D84.9 IMMUNOCOMPROMISED: ICD-10-CM

## 2024-06-28 DIAGNOSIS — M31.31 GRANULOMATOSIS WITH POLYANGIITIS WITH RENAL INVOLVEMENT: ICD-10-CM

## 2024-06-28 DIAGNOSIS — M81.0 POSTMENOPAUSAL OSTEOPOROSIS: ICD-10-CM

## 2024-06-28 DIAGNOSIS — F19.20 STEROID DEPENDENCE: ICD-10-CM

## 2024-06-28 LAB
ALBUMIN SERPL BCP-MCNC: 3.6 G/DL (ref 3.5–5.2)
ALP SERPL-CCNC: 77 U/L (ref 55–135)
ALT SERPL W/O P-5'-P-CCNC: 9 U/L (ref 10–44)
ANION GAP SERPL CALC-SCNC: 9 MMOL/L (ref 8–16)
AST SERPL-CCNC: 18 U/L (ref 10–40)
BASOPHILS # BLD AUTO: 0.05 K/UL (ref 0–0.2)
BASOPHILS NFR BLD: 0.9 % (ref 0–1.9)
BILIRUB SERPL-MCNC: 0.3 MG/DL (ref 0.1–1)
BUN SERPL-MCNC: 18 MG/DL (ref 8–23)
CALCIUM SERPL-MCNC: 9.8 MG/DL (ref 8.7–10.5)
CHLORIDE SERPL-SCNC: 100 MMOL/L (ref 95–110)
CO2 SERPL-SCNC: 25 MMOL/L (ref 23–29)
CREAT SERPL-MCNC: 1 MG/DL (ref 0.5–1.4)
CRP SERPL-MCNC: 0.8 MG/L (ref 0–8.2)
DIFFERENTIAL METHOD BLD: ABNORMAL
EOSINOPHIL # BLD AUTO: 0.2 K/UL (ref 0–0.5)
EOSINOPHIL NFR BLD: 3.6 % (ref 0–8)
ERYTHROCYTE [DISTWIDTH] IN BLOOD BY AUTOMATED COUNT: 14.7 % (ref 11.5–14.5)
ERYTHROCYTE [SEDIMENTATION RATE] IN BLOOD BY PHOTOMETRIC METHOD: 29 MM/HR (ref 0–36)
EST. GFR  (NO RACE VARIABLE): 56.6 ML/MIN/1.73 M^2
GLUCOSE SERPL-MCNC: 108 MG/DL (ref 70–110)
HCT VFR BLD AUTO: 32 % (ref 37–48.5)
HGB BLD-MCNC: 11 G/DL (ref 12–16)
IMM GRANULOCYTES # BLD AUTO: 0.02 K/UL (ref 0–0.04)
IMM GRANULOCYTES NFR BLD AUTO: 0.4 % (ref 0–0.5)
LYMPHOCYTES # BLD AUTO: 1.2 K/UL (ref 1–4.8)
LYMPHOCYTES NFR BLD: 22.4 % (ref 18–48)
MCH RBC QN AUTO: 31.4 PG (ref 27–31)
MCHC RBC AUTO-ENTMCNC: 34.4 G/DL (ref 32–36)
MCV RBC AUTO: 91 FL (ref 82–98)
MONOCYTES # BLD AUTO: 0.8 K/UL (ref 0.3–1)
MONOCYTES NFR BLD: 15.2 % (ref 4–15)
NEUTROPHILS # BLD AUTO: 3.2 K/UL (ref 1.8–7.7)
NEUTROPHILS NFR BLD: 57.5 % (ref 38–73)
NRBC BLD-RTO: 0 /100 WBC
PLATELET # BLD AUTO: 300 K/UL (ref 150–450)
PMV BLD AUTO: 9.7 FL (ref 9.2–12.9)
POTASSIUM SERPL-SCNC: 4.3 MMOL/L (ref 3.5–5.1)
PROT SERPL-MCNC: 6.3 G/DL (ref 6–8.4)
RBC # BLD AUTO: 3.5 M/UL (ref 4–5.4)
SODIUM SERPL-SCNC: 134 MMOL/L (ref 136–145)
WBC # BLD AUTO: 5.53 K/UL (ref 3.9–12.7)

## 2024-06-28 PROCEDURE — 85025 COMPLETE CBC W/AUTO DIFF WBC: CPT | Performed by: INTERNAL MEDICINE

## 2024-06-28 PROCEDURE — 36415 COLL VENOUS BLD VENIPUNCTURE: CPT | Mod: PO | Performed by: INTERNAL MEDICINE

## 2024-06-28 PROCEDURE — 80053 COMPREHEN METABOLIC PANEL: CPT | Performed by: INTERNAL MEDICINE

## 2024-06-28 PROCEDURE — 86140 C-REACTIVE PROTEIN: CPT | Performed by: INTERNAL MEDICINE

## 2024-06-28 PROCEDURE — 85652 RBC SED RATE AUTOMATED: CPT | Performed by: INTERNAL MEDICINE

## 2024-07-08 ENCOUNTER — INFUSION (OUTPATIENT)
Dept: INFUSION THERAPY | Facility: HOSPITAL | Age: 84
End: 2024-07-08
Attending: INTERNAL MEDICINE
Payer: MEDICARE

## 2024-07-08 VITALS
HEIGHT: 62 IN | TEMPERATURE: 98 F | RESPIRATION RATE: 18 BRPM | HEART RATE: 63 BPM | DIASTOLIC BLOOD PRESSURE: 64 MMHG | BODY MASS INDEX: 28.07 KG/M2 | SYSTOLIC BLOOD PRESSURE: 145 MMHG | OXYGEN SATURATION: 98 % | WEIGHT: 152.56 LBS

## 2024-07-08 DIAGNOSIS — M31.31 WEGENER'S GRANULOMATOSIS WITH RENAL INVOLVEMENT: ICD-10-CM

## 2024-07-08 DIAGNOSIS — I77.6 VASCULITIS: Primary | ICD-10-CM

## 2024-07-08 PROCEDURE — 96367 TX/PROPH/DG ADDL SEQ IV INF: CPT | Mod: PN

## 2024-07-08 PROCEDURE — 96365 THER/PROPH/DIAG IV INF INIT: CPT | Mod: PN

## 2024-07-08 PROCEDURE — 63600175 PHARM REV CODE 636 W HCPCS: Mod: JZ,JG,PN | Performed by: INTERNAL MEDICINE

## 2024-07-08 PROCEDURE — 96375 TX/PRO/DX INJ NEW DRUG ADDON: CPT | Mod: PN

## 2024-07-08 PROCEDURE — 96366 THER/PROPH/DIAG IV INF ADDON: CPT | Mod: PN

## 2024-07-08 PROCEDURE — 25000003 PHARM REV CODE 250: Mod: PN | Performed by: INTERNAL MEDICINE

## 2024-07-08 RX ORDER — DIPHENHYDRAMINE HYDROCHLORIDE 50 MG/ML
50 INJECTION INTRAMUSCULAR; INTRAVENOUS ONCE AS NEEDED
OUTPATIENT
Start: 2024-07-22

## 2024-07-08 RX ORDER — EPINEPHRINE 0.3 MG/.3ML
0.3 INJECTION SUBCUTANEOUS ONCE AS NEEDED
OUTPATIENT
Start: 2024-07-22

## 2024-07-08 RX ORDER — FAMOTIDINE 10 MG/ML
20 INJECTION INTRAVENOUS
Status: COMPLETED | OUTPATIENT
Start: 2024-07-08 | End: 2024-07-08

## 2024-07-08 RX ORDER — METHYLPREDNISOLONE SOD SUCC 125 MG
100 VIAL (EA) INJECTION
OUTPATIENT
Start: 2024-07-22

## 2024-07-08 RX ORDER — METHYLPREDNISOLONE SOD SUCC 125 MG
100 VIAL (EA) INJECTION
Status: COMPLETED | OUTPATIENT
Start: 2024-07-08 | End: 2024-07-08

## 2024-07-08 RX ORDER — ACETAMINOPHEN 325 MG/1
650 TABLET ORAL
OUTPATIENT
Start: 2024-07-22

## 2024-07-08 RX ORDER — FAMOTIDINE 10 MG/ML
20 INJECTION INTRAVENOUS
OUTPATIENT
Start: 2024-07-22

## 2024-07-08 RX ORDER — ACETAMINOPHEN 325 MG/1
650 TABLET ORAL
Status: COMPLETED | OUTPATIENT
Start: 2024-07-08 | End: 2024-07-08

## 2024-07-08 RX ORDER — SODIUM CHLORIDE 0.9 % (FLUSH) 0.9 %
10 SYRINGE (ML) INJECTION
OUTPATIENT
Start: 2024-07-22

## 2024-07-08 RX ORDER — HEPARIN 100 UNIT/ML
500 SYRINGE INTRAVENOUS
OUTPATIENT
Start: 2024-07-22

## 2024-07-08 RX ORDER — SODIUM CHLORIDE 0.9 % (FLUSH) 0.9 %
10 SYRINGE (ML) INJECTION
Status: DISCONTINUED | OUTPATIENT
Start: 2024-07-08 | End: 2024-07-08 | Stop reason: HOSPADM

## 2024-07-08 RX ADMIN — DIPHENHYDRAMINE HYDROCHLORIDE 50 MG: 50 INJECTION, SOLUTION INTRAMUSCULAR; INTRAVENOUS at 11:07

## 2024-07-08 RX ADMIN — SODIUM CHLORIDE 500 MG: 9 INJECTION, SOLUTION INTRAVENOUS at 11:07

## 2024-07-08 RX ADMIN — ACETAMINOPHEN 650 MG: 325 TABLET ORAL at 11:07

## 2024-07-08 RX ADMIN — SODIUM CHLORIDE: 9 INJECTION, SOLUTION INTRAVENOUS at 10:07

## 2024-07-08 RX ADMIN — METHYLPREDNISOLONE SODIUM SUCCINATE 100 MG: 125 INJECTION, POWDER, FOR SOLUTION INTRAMUSCULAR; INTRAVENOUS at 11:07

## 2024-07-08 RX ADMIN — FAMOTIDINE 20 MG: 10 INJECTION INTRAVENOUS at 11:07

## 2024-07-08 NOTE — PLAN OF CARE
Problem: Adult Inpatient Plan of Care  Goal: Patient-Specific Goal (Individualized)  Outcome: Progressing  Flowsheets (Taken 7/8/2024 1049)  Individualized Care Needs: recliner, warm blanket,  chairside  Anxieties, Fears or Concerns: none     Problem: Fatigue  Goal: Improved Activity Tolerance  Outcome: Progressing  Intervention: Promote Improved Energy  Flowsheets (Taken 7/8/2024 1049)  Fatigue Management: frequent rest breaks encouraged  Sleep/Rest Enhancement: regular sleep/rest pattern promoted  Activity Management: Ambulated -L4  Environmental Support: rest periods encouraged

## 2024-07-08 NOTE — PLAN OF CARE
Problem: Adult Inpatient Plan of Care  Goal: Plan of Care Review  Outcome: Progressing  Flowsheets (Taken 7/8/2024 1515)  Plan of Care Reviewed With:   patient   spouse   Pt tolerated restart ruxience infusion well.  No adverse reaction noted.   IV flushed with NS and D/C per protocol.  Patient left clinic in no acute distress.

## 2024-07-09 ENCOUNTER — TELEPHONE (OUTPATIENT)
Dept: RHEUMATOLOGY | Facility: CLINIC | Age: 84
End: 2024-07-09
Payer: MEDICARE

## 2024-07-09 NOTE — TELEPHONE ENCOUNTER
----- Message from Brandon Sloan, Patient Care Assistant sent at 7/9/2024  1:36 PM CDT -----  Contact: Pt  Type: Needs Medical Advice  Who Called: Pt  Best Call Back Number: [phone] (home) [phone] (work)  Inquiry/Question: Pt is calling to advise she is having a reaction to the infusion on yesterday. Pt complains of fever, cough, and headache. Please advise Thank you~

## 2024-07-09 NOTE — TELEPHONE ENCOUNTER
Ms Weber c/o slight headache, stomach upset, 99.4 temp after infusion on 7/8/24. Denies any shortness of breath. Patient advised message will be discussed with Dr Snyder and that if any shortness of breath or any worsening in condition to call 911 or go to ER.   Per Dr Snyder pt to take Tylenol as directed, monitor symptoms and that symptoms should subside within 24 hours.  Ms Weber voiced understanding.

## 2024-07-16 ENCOUNTER — TELEPHONE (OUTPATIENT)
Dept: RHEUMATOLOGY | Facility: CLINIC | Age: 84
End: 2024-07-16
Payer: MEDICARE

## 2024-07-16 NOTE — TELEPHONE ENCOUNTER
Per Dr Snyder patient needs to reach out to PCP to be assessed and treated.  She voiced understanding.

## 2024-07-16 NOTE — TELEPHONE ENCOUNTER
----- Message from Hayden Limane sent at 7/15/2024  3:55 PM CDT -----  Type: Needs Medical Advice  Who Called:  pt  Best Call Back Number: [phone]  Additional Information: pt is calling the office to speak with the nurse in regards to her  being diagnosed with COVID. The pt is concerned for her own health and what she can do to not get ill. Please call back to advise. Thanks!

## 2024-07-16 NOTE — TELEPHONE ENCOUNTER
Ms Weber states her  test positive for Covid Saturday 7/13/24. She started having cough, temp 100. Chills, and fatigue yesterday 7/15/24.   Patient denies shortness of breath.   She was advised that Dr Snyder is not in office and she needs to reach out to PCP (Dr Reddy), or urgent care, ER if needed and to be assessed. She voiced understanding.   Will route to Dr Snyder to review.

## 2024-07-17 NOTE — TELEPHONE ENCOUNTER
I spoke with my staff today. I am out of the office this week. Should [place] test positive she will require oral antiviral therapy given her immunosuppressed status and specifically Rituxan.   There is no prophylaxis until we get a + test.   If she is hypoxic she will require remdesivir per protocol on admit.     Do not hesitate to call [phone] (my cell)  Dr. Snyder.

## 2024-07-19 DIAGNOSIS — M31.31 WEGENER'S GRANULOMATOSIS WITH RENAL INVOLVEMENT: Primary | ICD-10-CM

## 2024-07-19 DIAGNOSIS — D84.9 IMMUNOSUPPRESSION: ICD-10-CM

## 2024-07-19 DIAGNOSIS — Z79.899 HIGH RISK MEDICATIONS (NOT ANTICOAGULANTS) LONG-TERM USE: ICD-10-CM

## 2024-07-19 DIAGNOSIS — I77.82 ANCA-ASSOCIATED VASCULITIS: ICD-10-CM

## 2024-08-29 ENCOUNTER — TELEPHONE (OUTPATIENT)
Dept: RHEUMATOLOGY | Facility: CLINIC | Age: 84
End: 2024-08-29
Payer: MEDICARE

## 2024-08-29 NOTE — TELEPHONE ENCOUNTER
Patient received 1 dose of 500 mg of RTX 7/8/24 but not the 2nd dose. 7/22 appointment cancelled. Patient due for 2nd dose of RTX.    Routing [facility] infusion  to reach out to patient to schedule needed second dose     Routing provider as FYI and if any additional instructions

## 2024-08-30 ENCOUNTER — TELEPHONE (OUTPATIENT)
Dept: INFUSION THERAPY | Facility: HOSPITAL | Age: 84
End: 2024-08-30
Payer: MEDICARE

## 2024-09-03 ENCOUNTER — TELEPHONE (OUTPATIENT)
Dept: INFUSION THERAPY | Facility: HOSPITAL | Age: 84
End: 2024-09-03
Payer: MEDICARE

## 2024-09-09 ENCOUNTER — TELEPHONE (OUTPATIENT)
Dept: RHEUMATOLOGY | Facility: CLINIC | Age: 84
End: 2024-09-09
Payer: MEDICARE

## 2024-09-09 DIAGNOSIS — M31.31 WEGENER'S GRANULOMATOSIS WITH RENAL INVOLVEMENT: Primary | ICD-10-CM

## 2024-09-09 DIAGNOSIS — D84.9 IMMUNOCOMPROMISED: ICD-10-CM

## 2024-09-09 NOTE — TELEPHONE ENCOUNTER
Patient missed 2nd Rituxan due to COVID exposure. 7/13/2024. By 7/16/24 patient with same symptoms as her . Could not come for COVID test as he is only .     Presumed COVID patient on 7/16/24. She declined Paxlovid, was given Zpak for 2ndary infection.     Have her hold off on repeat Rituxan right now.   I want labs prior to our next visit on 9/20/24. I missed seeing her today and given her infection risk I am going to rec. Not repeating 2nd dose of Rituxan until I can see her and assess the community COVID risk      Dr. Snyder

## 2024-09-09 NOTE — TELEPHONE ENCOUNTER
Rescheduled follow-up for 9/20/24. Patient also asking if last labs looked ok and can she resume Rituxan. She had held infusion due to Covid in July.

## 2024-09-10 NOTE — TELEPHONE ENCOUNTER
Left voicemail to let patient know Dr Snyder wants her to hold the Rituxan until next follow-up on 9/20/2024.

## 2024-09-13 DIAGNOSIS — G62.9 NEUROPATHY: ICD-10-CM

## 2024-09-13 RX ORDER — GABAPENTIN 400 MG/1
400 CAPSULE ORAL 3 TIMES DAILY
Qty: 270 CAPSULE | Refills: 3 | Status: SHIPPED | OUTPATIENT
Start: 2024-09-13

## 2024-09-13 RX ORDER — GABAPENTIN 400 MG/1
400 CAPSULE ORAL 3 TIMES DAILY
Qty: 30 CAPSULE | Refills: 0 | Status: SHIPPED | OUTPATIENT
Start: 2024-09-13 | End: 2024-09-23

## 2024-09-13 NOTE — TELEPHONE ENCOUNTER
----- Message from Danya Mathew sent at 9/13/2024  2:01 PM CDT -----  Type:  RX Refill Request    Who Called:  Pt    Refill or New Rx:  refill    RX Name and Strength:  gabapentin (NEURONTIN) 400 MG capsule    How is the patient currently taking it? (ex. 1XDay):  as directed    Is this a 30 day or 90 day RX:  90    Preferred Pharmacy with phone number:    [facility] - [facility] [address] [facility]  [address] [facility] [address]  Phone: [phone] Fax: [phone]    Local or Mail Order:  Local    Ordering Provider:  Dr Snyder    Would the patient rather a call back or a response via [facility]?  Call back    Best Call Back Number:  [phone]     Additional Information:  Pt is out of medication and needing a refill today expedited. [facility] is faxed over a request as well.     Needing a Short order for 10 day sent today to cover to till the mail order comes in.   [id number] - [facility] [id number] [place] [address] AT [facility] [address] [facility]  [address] [address] [address]  Phone: [phone] Fax: [phone]       Please call back to advise. Thanks!

## 2024-09-16 RX ORDER — GABAPENTIN 400 MG/1
400 CAPSULE ORAL 3 TIMES DAILY
Qty: 270 CAPSULE | Refills: 2 | Status: SHIPPED | OUTPATIENT
Start: 2024-09-16

## 2024-09-20 ENCOUNTER — OFFICE VISIT (OUTPATIENT)
Dept: RHEUMATOLOGY | Facility: CLINIC | Age: 84
End: 2024-09-20
Payer: MEDICARE

## 2024-09-20 ENCOUNTER — LAB VISIT (OUTPATIENT)
Dept: LAB | Facility: HOSPITAL | Age: 84
End: 2024-09-20
Attending: INTERNAL MEDICINE
Payer: MEDICARE

## 2024-09-20 VITALS
HEIGHT: 62 IN | BODY MASS INDEX: 26.41 KG/M2 | WEIGHT: 143.5 LBS | SYSTOLIC BLOOD PRESSURE: 120 MMHG | HEART RATE: 61 BPM | DIASTOLIC BLOOD PRESSURE: 60 MMHG | OXYGEN SATURATION: 99 %

## 2024-09-20 DIAGNOSIS — M31.31 WEGENER'S GRANULOMATOSIS WITH RENAL INVOLVEMENT: Primary | ICD-10-CM

## 2024-09-20 DIAGNOSIS — D84.9 IMMUNOCOMPROMISED: ICD-10-CM

## 2024-09-20 DIAGNOSIS — M31.31 WEGENER'S GRANULOMATOSIS WITH RENAL INVOLVEMENT: ICD-10-CM

## 2024-09-20 LAB
ALT SERPL W/O P-5'-P-CCNC: 8 U/L (ref 10–44)
AST SERPL-CCNC: 22 U/L (ref 10–40)
BASOPHILS # BLD AUTO: 0.04 K/UL (ref 0–0.2)
BASOPHILS NFR BLD: 0.5 % (ref 0–1.9)
CRP SERPL-MCNC: 12.1 MG/L (ref 0–8.2)
DIFFERENTIAL METHOD BLD: ABNORMAL
EOSINOPHIL # BLD AUTO: 0.2 K/UL (ref 0–0.5)
EOSINOPHIL NFR BLD: 2.6 % (ref 0–8)
ERYTHROCYTE [DISTWIDTH] IN BLOOD BY AUTOMATED COUNT: 14.2 % (ref 11.5–14.5)
ERYTHROCYTE [SEDIMENTATION RATE] IN BLOOD BY PHOTOMETRIC METHOD: 46 MM/HR (ref 0–36)
HCT VFR BLD AUTO: 33.3 % (ref 37–48.5)
HGB BLD-MCNC: 10.5 G/DL (ref 12–16)
IMM GRANULOCYTES # BLD AUTO: 0.08 K/UL (ref 0–0.04)
IMM GRANULOCYTES NFR BLD AUTO: 1 % (ref 0–0.5)
LYMPHOCYTES # BLD AUTO: 1.1 K/UL (ref 1–4.8)
LYMPHOCYTES NFR BLD: 13.7 % (ref 18–48)
MCH RBC QN AUTO: 28.2 PG (ref 27–31)
MCHC RBC AUTO-ENTMCNC: 31.5 G/DL (ref 32–36)
MCV RBC AUTO: 90 FL (ref 82–98)
MONOCYTES # BLD AUTO: 0.8 K/UL (ref 0.3–1)
MONOCYTES NFR BLD: 10.4 % (ref 4–15)
NEUTROPHILS # BLD AUTO: 5.5 K/UL (ref 1.8–7.7)
NEUTROPHILS NFR BLD: 71.8 % (ref 38–73)
NRBC BLD-RTO: 0 /100 WBC
PLATELET # BLD AUTO: 349 K/UL (ref 150–450)
PMV BLD AUTO: 9.8 FL (ref 9.2–12.9)
RBC # BLD AUTO: 3.72 M/UL (ref 4–5.4)
WBC # BLD AUTO: 7.67 K/UL (ref 3.9–12.7)

## 2024-09-20 PROCEDURE — 3074F SYST BP LT 130 MM HG: CPT | Mod: CPTII,S$GLB,, | Performed by: INTERNAL MEDICINE

## 2024-09-20 PROCEDURE — 3078F DIAST BP <80 MM HG: CPT | Mod: CPTII,S$GLB,, | Performed by: INTERNAL MEDICINE

## 2024-09-20 PROCEDURE — 83516 IMMUNOASSAY NONANTIBODY: CPT | Performed by: INTERNAL MEDICINE

## 2024-09-20 PROCEDURE — 85652 RBC SED RATE AUTOMATED: CPT | Performed by: INTERNAL MEDICINE

## 2024-09-20 PROCEDURE — 1125F AMNT PAIN NOTED PAIN PRSNT: CPT | Mod: CPTII,S$GLB,, | Performed by: INTERNAL MEDICINE

## 2024-09-20 PROCEDURE — 99999 PR PBB SHADOW E&M-EST. PATIENT-LVL V: CPT | Mod: PBBFAC,,, | Performed by: INTERNAL MEDICINE

## 2024-09-20 PROCEDURE — 86140 C-REACTIVE PROTEIN: CPT | Performed by: INTERNAL MEDICINE

## 2024-09-20 PROCEDURE — 1159F MED LIST DOCD IN RCRD: CPT | Mod: CPTII,S$GLB,, | Performed by: INTERNAL MEDICINE

## 2024-09-20 PROCEDURE — 84450 TRANSFERASE (AST) (SGOT): CPT | Performed by: INTERNAL MEDICINE

## 2024-09-20 PROCEDURE — 85025 COMPLETE CBC W/AUTO DIFF WBC: CPT | Performed by: INTERNAL MEDICINE

## 2024-09-20 PROCEDURE — 84460 ALANINE AMINO (ALT) (SGPT): CPT | Performed by: INTERNAL MEDICINE

## 2024-09-20 PROCEDURE — 99215 OFFICE O/P EST HI 40 MIN: CPT | Mod: S$GLB,,, | Performed by: INTERNAL MEDICINE

## 2024-09-20 PROCEDURE — 86036 ANCA SCREEN EACH ANTIBODY: CPT | Mod: 59 | Performed by: INTERNAL MEDICINE

## 2024-09-20 PROCEDURE — 1160F RVW MEDS BY RX/DR IN RCRD: CPT | Mod: CPTII,S$GLB,, | Performed by: INTERNAL MEDICINE

## 2024-09-20 PROCEDURE — 36415 COLL VENOUS BLD VENIPUNCTURE: CPT | Mod: PO | Performed by: INTERNAL MEDICINE

## 2024-09-20 PROCEDURE — 83516 IMMUNOASSAY NONANTIBODY: CPT | Mod: 59 | Performed by: INTERNAL MEDICINE

## 2024-09-20 PROCEDURE — 3288F FALL RISK ASSESSMENT DOCD: CPT | Mod: CPTII,S$GLB,, | Performed by: INTERNAL MEDICINE

## 2024-09-20 PROCEDURE — 1101F PT FALLS ASSESS-DOCD LE1/YR: CPT | Mod: CPTII,S$GLB,, | Performed by: INTERNAL MEDICINE

## 2024-09-20 ASSESSMENT — ROUTINE ASSESSMENT OF PATIENT INDEX DATA (RAPID3)
FATIGUE SCORE: 1.1
MDHAQ FUNCTION SCORE: 0.9
TOTAL RAPID3 SCORE: 3.83
PATIENT GLOBAL ASSESSMENT SCORE: 4
PSYCHOLOGICAL DISTRESS SCORE: 1.1
PAIN SCORE: 4.5

## 2024-09-20 NOTE — PROGRESS NOTES
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 She has remarkably recovered and doing "ok". She is noting significant fatigue slight after RTX, much more after COVID. She is not driving alone so she is home few days which is lonely.   Recent seen with [facility] endoscopy with less crusting since RTX.     She is having a hard time with loss of hearing, loss of smell since the disease. Hard to not "smell the holidays"   Rapid onset peripheral neuropathy which I have had to attribute to her neuropathy.     6/2024: I signed Rituxan orders 4/15/24 she has not received. She was called to schedule and medication is approved but she is having a myriad of complaints with bleeding in the nose over a few months with sinus congestion. She had a debridement with Dr. Quinn  and this most recent one resolved nose bleeds, she states her sinuses are better than they have been in nearly 3 years. Mucous has stopped being so productive, she is avoiding navage and no worse.     Hearing stable one hearing aid is not working.     No new illnesses she had URI 4/2024 none since  Renal function stable.   No SOB or cough no fevers.   She will have left lower tooth extracted tomorrow: Dr. Huang treated with ABX.     3/2024:  3/2024: Patient HSV did finally clear some residula angular chelitis/  Patient with dry cough typic this time of year no SOB  Sinuses have been pretty good.  No fever, chills, weight loss or increase in fatigue.    Wet AMD-O'Varghese injections monthly now  Hearing a bit worse as of recent.     1/2024:  With a recent URI her labs drawn 01/05/2024 this with the elevated sed rate and C-reactive protein she was negative for flu, RSV, COVID but given that she is having symptoms of some nosebleeds and while her baseline sinusitis is chronic seems to be an uptake in the amount of nasal " discharge we are going to repeat her sed rate next month.  Her renal function is stable and she has a bit of anemia that is new.   We had been holding her Rituxan last infusion 07/20/2023 500mg x 1 , but I want to make sure we are not missing any underlying Wegener's activity.    She did have significant cough that took some time to improve, her sinuses feel continued inflammation, just started Z-pack.  Sharply marginated lucent lesion in the T10 vertebral body, possibly a hemangioma.             MRI completed with Ms Alonzo will just monitor for increase cervical stenosis or radicular [id number] at this time.   Did have vertebral fx.      9/2023: Patient was seen 6/2023 with anticipation of repeat FESS Sinus surgery and pending compound nasal irrigation. She did have surgery with Dr. Quinn end of June 2023, by July visit noted improvement with compound irrigation from prior. Still with chronic sinusitis.   Last Rituxan 500mg single dose 7/2023.    She is doing much better with the congestion but nothing like it was before. She denies fevers, chills, some cough, no hemoptysis.   She is down to Prednisone 2.5 mg daily but overall her edema is much improved.   Renal function stable  ESR and CRP    She does not want to take MTX she lost significant amounts of hair.   Imuran was lost with flare and transition to Imuran.     She has completed Rituxan 18 months with sinuses under control, lungs clear , renal function stable. Hearing is stable she lost nearly 90% of hearing and has stabilized.   She has mac degeneration.   Neuropathy continues to be a problem but we never have petechial rashes.   Edema is much improved.     3/2023:   Patient current on RTX Q 6 month maintanance following induction for Wegeners.    12/2/22 (Delayed COVID infection) RTX was 500mg with solumedrol 80mg IM   Completed Rituxan 1000mg for 1st  maintenance at 4 months 4/20/2022. due to rise in symptoms, and ESR   Induction 10/2021.      Continued  nasal congestion and drainage.   Recent significant nosebleeds. Endoscopy with recurrent crusting despite office debridement by ENT and continued irrigation.      Will repeat serologies and inflammatory markers and check CD 19 levels first week March and see her later that month.   Patient doing very well. Still rhinitis, using navage. No antibiotics from ENT since last visit. She is still clearing significant mucous faint with dried blood.   Dry cough, no fevers, no SOB\  Prolia: 10/17/2022         11/2022  Patient doing very well. Still rhinitis, using navage. No antibiotics from ENT since last visit. She is still clearing significant mucous faint with dried blood.   Dry cough, no fevers, no SOB  Mild HA only with elevated BP.   Completed Rituxan maintanence at 4 months 4/20/2022.   Prolia 3/25/22  Evusheld with catch up dose 2/10/22 and 3/25/2022     5/5/2022:   Patient doing very well. Still rhinitis, using navage. No antibiotics from ENT since last visit. She is still clearing significant mucous faint with dried blood.   Dry cough, no fevers, no SOB  Mild HA only with elevated BP.   Completed Rituxan maintanence at 4 months 4/20/2022.   Prolia 3/25/22  Evusheld with catch up dose 2/10/22 and 3/25/2022.      Major complaints: pins, needles, stinging, burning in feet day and night is constant. Dr. Campbell: EB-N5 supplement is helping some, stopped for months and noted significant worsening. Dr. Campbell did increase from EB-N3.   Gabapentin 400mg TID  Hydrocodone only PRN  Prednisone 7.5mg         2/2022  Doing well no cough, no fevers. Recent COVID exposure but negative.   Seen with Nephro.   Given her successful response with Ritux. Agree to continue maintanance with Ritux Q 4-6 months     Needs to get #3 COVID vaccine timing with RTX. - will time this with me 2 weeks prior to   Discussed Prolia  Working on Evusheld for PreP with COVID> she is scheduled 2/10/22        12/9/2021  Patient's recent CXR 12/21/21 with  marked improvement when compared to 10/21/21  Prednisone 10mg daily  S/p RTX x 4 infusions completion date. 11/5/21  Patient with rise in creatinine to 2.0 on 11/26 had been requiring lasix for marked edema since 11/1/21. Stopped 2 weeks ago. Edema is present but managed. Drinking about 40 oz water and 3 cups of coffee daily     Patient completed [facility] PT felt she was doing well home exercises about 2 weeks ago with acute pain in lumbar spine after exercising. Imaging lumbar few days ago without evidence of a fracture. She point to pain in the lumbosacral and radiating to buttock region. Ok to rise from seated. Pain more on left low back and buttock. No numbness no tingling but pain in the hamstring region. Comfortable sitting but not lying down, but she never sleeps in bed. Long hx of rather impressive scoliosis.         11/11/2021:   Inflammatory markers are markedly improved.   Cough dry still at night. Patient noting she has lost sense of smell. Can still taste.      Patient recently hospitatlized for Bronch with negative cultures to date.  There was a concern for possible atypical infection she has had enlarging right upper lobe cavitary lesion measures still has a right middle lobe lesion with some cavitation now as well.  I have discussed this case extensively with both Dr. Schwartz as well as Dr. Joseph upon discharge we felt comfortable that this is likely Wegener's granulomatosis causing cavitary lesions.  Supporting sinus biopsy does note granuloma.    Patient was started on Imuran in March of 2021 but with the growth of her lesion we had rule out for any possible infection given her status of immunosuppression.  She is here today not in her usual state she appears fatigued pale she is not complaining of shortness of breath but does have a cough dry persistent.  She is noticing increased pedal edema.           9/13/21: patient with 2 falls 8 days apart. Spoke w/ pulm shortly following initial fall and we were  scheduled for bronch when she sustained another fall.   Scheduled now for 9/17/21 bronch. RUL lesion.   Remains off Imuran  Prednisone 15mg daily.   Sinus congestion. Still productive      She is noting some productive cough at night no hemoptysis.   Patient denies SOB  Having more HA. -frontal and sinus, she is noting some drainage in right ear some mucous/blood.   No edema.   She notes fatigue, no fevers, no night sweats. No weight loss.   Skin easily bruising.      No personal hx of any malignancy.         7/20/2021:    Spoke with Pulm plan for bronch is able working on possible bx site vs BAL.   Need to r/o infectious/neoplastic and confirm for ANCA (GPA vasculitis)   Inflammatory markers markedly down .8  Now 14.   H/H improve from 7.4 to 8.2  Platelets normalized.   Renal stable over time     Patient did attend wedding with approx 300 people unmasked last weekend. Reviewed by recs for COVID precautions given     Current regimen:   pred 15mg (Na stable)  Holding Imuran     Interval events: PET with hypermetabolic lesions:   a. RUL cavitary/cystic lesion 1.9cm x 1.3cm  b. RUL spiculated 1.2cm x 1.4cm  c. RML 1.7cm x 2.0 cm  All with differential: infectious, inflammatory, neoplastic, grannulomatous (a) with favor of inflammation given the rapid change     Fungal immunodiffusion neg  Quant gold and T spot: neg  H/H improving      Sinus congestion, pressure, headache, x 3 weeks very productive with blowing nose, back on navage. :  started Cindamycin with DR Quinn x 10 days.   Patient denies any shortness breath.   Very fatigues.   No more fevers. Slight dry cough, no blood/ no mucous.   No SOB.         5/12/21 completed nasal irrigation with biopsy not definitive for GPA, but + inflammation and granulomas. Temporal artery bx negative. Patient with PCP same day as labs 6/2/21  dx with UTI on keflex.      Was seen apparently in ED in [place] for 104F she had altered mental status, dx with UTI, dehydration,  "treated with. Completed all 7 days of keflex and within 48 hours with another fever 104 F. She continues with congestion but no worse. Patient denies cough, hemoptysis, bloody nasal discharge. She has had no fever x 10 days. Checks twice daily.   while travelling told "congestion in chest on xray". Inflammatory markers very high again.   Patient with recent concern for wegeners needs CT chest. CXR with new airspace opacities right chest- need to r/o GGO vs infection. CT has been ordered pending scheduling.   Broad ABX coverage recommended for now will cc Dr. Natarajan.      I am limited here with her  severe edema from higher dose steroids. Max tolerated is 20mg daily pred  Started Imuran 5/5/2021. Very low dose 50mg BID (0.65mg/kg) tolerating VERY well.   H/H still low.            2/2021  Sinususitis, cx were negative.  Using nasal irrigation with mucoid disharge.   Patient continues with significant nasal discharge and blood with scabs. We discussed Wegeners but pathology sinus no vasculitis, granuloma noted.   No HA, no blurred vision. Recent sinus infection with HA for 10 days. cx +, but also repeat labs demonstarting +PR3 and +ANCA         Patient with c/o right > left 3,4 finger numbness that was intermittent until approximately 2 weeks about having near constant numbness in hands, pins and needles and pain.   Patient not noting much improvement with change in position.      Off MTX 6  tabs weekly.   Doing well with Hydroxychloroquine.   Declined COVID vaccine     Gabapentin up to 300mg TID.   Now with Dr. Campbell doing PT for Plantar fasciitis. She is taking supplement and compound cream for joint and neuropathy. No response to tarsal tunnel injection per Dr. Campbell.   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 WBC ok.         11/2020  Patient with PMR   MTX at 4 tabs weekly new anemia. Thrombocytosis  Pred at " 10mg   Complicated with peripheral edema rather severe , started MTX seeing trending CRP/ESR but persistent leg pain.   Seen with PCP for neuropathy s/sx not seeing much benefit with gabpentin  Seen with Cardiology- no concern for cardiac ECHO ok. dc'd lasix for hyponatremia.      8/2020  Pred 20mg with mild edema, pred 30 mg with severe edema.   Current Pred 15mg daily  Rising ESR again.   Lasix 20 mg daily with improved edema but having some pins and needles in feet/legs.   Having tight, burning stinging at the toes and feet. Heaviness. Prickly.   Noting sodium is falling, K 5.2-5.4 still using               7/2020:  Hands not painful, knees not painful. Shoulder/cervical and down the arms, markedly improved. She still has some pain him hips and groin with walking and some weakness to the legs with edema. Some pain with fixing own hair.   She takes in AM regarding hips ache, some shoulder/arms, and again at night because hips/legs.   The clue is that the LDN new dose she had some ASE, previously tolerated 3mg daily fine.      Historical review of present Illness.   Patient with a recent chronic sinusitis that ultimately warranted a septoplasty, endoscopic sinus surgery and turbinate reduction 5/2020   Four weeks postop t increasingly worse she was noting pain in her throat and ears lasting several hours complaint of pdizziness she had symptoms of ear pain and decreased hearing in the left ear.  She underwent a debridement at this visit she continue with compound nasal irrigations.  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     Patient did have repeat procedure on with biopsies taken 07/03/2020 showing right maxillary sinus chronically inflamed fibrotic polypoid portions benign nasal mucosa left maxillary sinus similar polypoid fragments ulcerated markedly inflamed respiratory mucosa focal foreign body giant cell response noted suggested that this might have been from her Gelfoam packing as a possible cause of this reaction     I have spoken with Dr. Quinn prior to patient's visit today and was a concern that she could have triggered some inflammatory response with the Gelfoam packing     Patient states she no longer has a headache is not noticing much ear drainage continues with profound loss of hearing on the left and rather significant on the right both sensorineural and some conductive changes.     Restarted on prednisone as of 7/20/20  10 mg patient has not noticed any change with her hearing in this time she did notice slight improvement with her joint aches and pains but is still relying on hydrocodone for the bulk of this.     She denies a persistent headache she does have some tenderness about the preauricular region particularly on the right more than the left.  She has denied jaw claudication changes in her voice or any amaurosis fugax.  She has no pre-existing history of joint aches and pains hip or otherwise.  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  She had a workup that involved a negative rheumatoid factor, age appropriate sedimentation rate,C reactive protein JANET was positive 1:160 in speckled pattern           Previous: Hx:   She is having pain in her hand with stiffness and right 2nd PIP joint now unable to flex. 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   Hx of bilateral TKA 3 and  5 years ago and those are doing well.   She notes intermittent edema. Some hammer toe deformity bilaterally making shoes problematic but not painful otherwise.   Long hx of GERD-severe.   Cannot tolerate NSAIDs at all w/o gastritis.   Can use Hydrocodone PRN   Added Naltrexone at 3mg and doing very well  Lost 30# with WW.   Patient denies weight loss, rashes, dry eye, dry mouth, nasal or palatal ulcerations,  lymphadenopathy, Raynaud's, hx of DVT/miscarriages, psoriasis or family hx of psoriasis, rashes, serositis, anemia or other constitutional symptoms.  Asking about naltrexone  Component      Latest Ref Rng & Units 8/8/2020 7/29/2020 7/20/2020 7/13/2020   Sodium      136 - 145 mmol/L   126 (L) 128 (L)     Potassium      3.5 - 5.1 mmol/L   5.4 (H) 5.2 (H)     Chloride      95 - 110 mmol/L   92 (L) 92 (L)     CO2      23 - 29 mmol/L   25 29     Glucose      70 - 110 mg/dL   109 109     BUN, Bld      8 - 23 mg/dL   13 11     Creatinine      0.5 - 1.4 mg/dL   0.8 0.8     Calcium      8.7 - 10.5 mg/dL   9.5 9.7     Anion Gap      8 - 16 mmol/L   9 7 (L)     eGFR if African American      >60 mL/min/1.73 m:2   >60.0 >60.0     eGFR if non African American      >60 mL/min/1.73 m:2   >60.0 >60.0     Anti Sm Antibody      0.00 - 0.99 Ratio       0.04   Anti-Sm Interpretation      Negative       Negative   Anti Sm/RNP Antibody      0.00 - 0.99 Ratio       0.09   Anti-Sm/RNP Interpretation      Negative       Negative   JANET Screen      None Detected           Rheumatoid Factor      0.0 - 15.0 IU/mL           Sed Rate      0 - 36 mm/Hr 57 (H)   54 (H) 75 (H)   CRP      0.0 - 8.2 mg/L 75.4 (H) 56.1 (H) 57.5 (H) 99.4 (H)   Anti-Histone Antibody      0.0 - 0.9 Units       0.4   ds DNA Ab      Negative 1:10       Negative 1:10      Component      Latest Ref Rng & Units 6/25/2020 11/10/2018   Sodium      136 - 145 mmol/L       Potassium      3.5 - 5.1 mmol/L       Chloride      95 - 110 mmol/L       CO2      23 - 29 mmol/L      "  Glucose      70 - 110 mg/dL       BUN, Bld      8 - 23 mg/dL       Creatinine      0.5 - 1.4 mg/dL       Calcium      8.7 - 10.5 mg/dL       Anion Gap      8 - 16 mmol/L       eGFR if African American      >60 mL/min/1.73 m:2       eGFR if non African American      >60 mL/min/1.73 m:2       Anti Sm Antibody      0.00 - 0.99 Ratio       Anti-Sm Interpretation      Negative       Anti Sm/RNP Antibody      0.00 - 0.99 Ratio       Anti-Sm/RNP Interpretation      Negative       JANET Screen      None Detected Detected (A) Detected (A)   Rheumatoid Factor      0.0 - 15.0 IU/mL 11.6 <8.6   Sed Rate      0 - 36 mm/Hr       CRP      0.0 - 8.2 mg/L       Anti-Histone Antibody      0.0 - 0.9 Units       ds DNA Ab      Negative 1:10             REVIEW OF SYSTEMS:     Review of Systems   Constitutional: Negative for fever, malaise/fatigue and weight loss.   HENT: Negative for sore throat.    Eyes: Negative for double vision, photophobia and redness.   Respiratory: Negative for cough, shortness of breath and wheezing.    Cardiovascular: Negative for chest pain, palpitations and orthopnea.   Gastrointestinal: Negative for abdominal pain, constipation and diarrhea.   Genitourinary: Negative for dysuria, hematuria and urgency.   Musculoskeletal: Positive for joint pain. Negative for back pain and myalgias.   Skin: Negative for rash.   Neurological: Negative for dizziness, tingling, focal weakness and headaches.   Endo/Heme/Allergies: Does not bruise/bleed easily.   Psychiatric/Behavioral: Negative for depression, hallucinations and suicidal ideas.                     Objective:      Objective        Past Medical History:   Diagnosis Date    Anesthesia       'Mother stop breathing after anesthesia"    Chronic sinusitis      Depression      GERD (gastroesophageal reflux disease)      PVC (premature ventricular contraction)              Family History   Problem Relation Age of Onset    Heart disease Mother      Arthritis Mother      " Cancer Sister      Arthritis Sister      Diabetes Sister      Hypertension Sister        Social History            Tobacco Use    Smoking status: Former       Current packs/day: 0.00       Types: Cigarettes       Start date: 2/3/1955       Quit date: 1/3/1960       Years since quittin.7    Smokeless tobacco: Never   Substance Use Topics    Alcohol use: Never       Alcohol/week: 0.0 standard drinks of alcohol    Drug use: Never                   Current Outpatient Medications on File Prior to Visit   Medication Sig Dispense Refill    acetaminophen (TYLENOL) 500 MG tablet Take 500 mg by mouth every 6 (six) hours as needed for Pain.        albuterol (PROVENTIL/VENTOLIN HFA) 90 mcg/actuation inhaler INHALE 2 PUFFS BY MOUTH EVERY 4 TO 6 HOURS AS NEEDED FOR SHORTNESS OF BREATH OR COUGH        ascorbate calcium (MAHSA-C ORAL) Take by mouth.        B2-B6 phos-levomef simran-mecobal (EB-N3 DR) 1.3-70-6-4 mg CpDR Take 1 capsule by mouth once daily.        cetirizine (ZYRTEC) 10 MG tablet TAKE 1 TABLET ONE TIME DAILY 90 tablet 0    denosumab (PROLIA SUBQ) Inject into the skin. Two times a year        diclofenac sodium (VOLTAREN) 1 % Gel APPLY TO THE AFFECTED AREA(S) 2 GRAMS THREE TIMES DAILY 600 g 0    furosemide (LASIX) 20 MG tablet TAKE 1 TABLET EVERY DAY 90 tablet 3    gabapentin (NEURONTIN) 400 MG capsule TAKE 1 CAPSULE THREE TIMES DAILY 270 capsule 3    HYDROcodone-acetaminophen (NORCO) 7.5-325 mg per tablet Take 1 tablet by mouth every 6 (six) hours as needed.        ITRACONAZOLE, BULK, MISC EMPTY CONTENTS OF 1 CAPSULE INTO NASAL IRRIGATION SYSTEM, ADD DISTILLED WATER, SALT PACK, MIX & IRRIGATE. PERFORM 2 TIMES DAILY        LACTOBACILLUS ACIDOPHILUS ORAL Take by mouth.        metoprolol succinate (TOPROL-XL) 50 MG 24 hr tablet TAKE 1 TABLET EVERY DAY 90 tablet 3    mirabegron (MYRBETRIQ) 50 mg Tb24 Take 1 tablet (50 mg total) by mouth once daily. 30 tablet 11    neomycin-polymyxin-dexamethasone (MAXITROL)  3.5mg/mL-10,000 unit/mL-0.1 % DrpS          omeprazole (PRILOSEC) 40 MG capsule TAKE 1 CAPSULE EVERY MORNING 90 capsule 0    ondansetron (ZOFRAN-ODT) 8 MG TbDL Take 8 mg by mouth 2 (two) times daily.        peg 400-propylene glycol, PF, (SYSTANE, PF,) 0.4-0.3 % Dpet Apply to eye 2 (two) times a day.        predniSONE (DELTASONE) 2.5 MG tablet Take 1 tablet (2.5 mg total) by mouth once daily. 90 tablet 3    predniSONE (DELTASONE) 5 MG tablet Take 1 tablet (5 mg total) by mouth once daily. 30 tablet 3    sodium chloride 0.9% SolP 500 mL with riTUXimab 10 mg/mL Conc 375 mg/m2 Inject 375 mg/m2 into the vein. 4 to 6 months        tobramycin sulfate 0.3% (TOBREX) 0.3 % ophthalmic solution Place into both eyes.        vancomycin (VANCOCIN) 1,000 mg injection SMARTSI Vial(s) Both Nares Twice Daily        vit C/E/Zn/coppr/lutein/zeaxan (PRESERVISION AREDS-2 ORAL) Take 1 capsule by mouth 2 (two) times a day.         [DISCONTINUED] naltrexone capsule Take 3 mg by mouth once daily.          No current facility-administered medications on file prior to visit.             Vitals:     23 1306   BP: 134/65   Pulse: 69         Physical Exam:     Physical Exam   Constitutional: She is oriented to person, place, and time. She appears well-developed and well-nourished.   HENT:   Head: Normocephalic and atraumatic.   Mouth/Throat: Oropharynx is clear and moist.   Eyes: Pupils are equal, round, and reactive to light. EOM are normal.   Neck: Normal range of motion.   Cardiovascular: Normal rate, regular rhythm and normal heart sounds.   Pulmonary/Chest: Effort normal and breath sounds normal.   Musculoskeletal:        Right shoulder: She exhibits normal range of motion, no tenderness and no swelling.        Left shoulder: She exhibits normal range of motion, no tenderness and no swelling.        Right elbow: She exhibits normal range of motion and no swelling. No tenderness found.        Left elbow: She exhibits normal range of  motion and no swelling. No tenderness found.        Right wrist: She exhibits normal range of motion, no tenderness and no swelling.        Left wrist: She exhibits normal range of motion, no tenderness and no swelling.        Right knee: She exhibits normal range of motion and no swelling. No tenderness found.        Left knee: She exhibits normal range of motion and no swelling. No tenderness found.        Right hand: She exhibits decreased range of motion and tenderness. She exhibits no swelling.        Left hand: She exhibits decreased range of motion and tenderness. She exhibits no swelling.        Right foot: There is normal range of motion, no tenderness and no swelling.        Left foot: There is normal range of motion, no tenderness and no swelling.   Bony hypertrophy at the PIP and DIP joints. +squaring at the CMC joint. No active synovitis on 28 joint exam.    Neurological: She is alert and oriented to person, place, and time.   Skin: Skin is warm and dry.   Psychiatric: She has a normal mood and affect. Her behavior is normal.      Component      Latest Ref Rng & Units 9/17/2021 9/17/2021          11:03 AM 11:03 AM   Respiratory Culture         No growth   Gram Stain (Respiratory)       No organisms seen No WBC's   AFB Culture & Smear             AFB CULTURE STAIN             GENET Prep             Fungus (Mycology) Culture             Aerobic Bacterial Culture                Component      Latest Ref Rng & Units 9/17/2021          11:03 AM   Respiratory Culture       No Staph aureus, MRSA or Pseudomonas isolated.   Gram Stain (Respiratory)       No organisms seen   AFB Culture & Smear           AFB CULTURE STAIN           GENET Prep           Fungus (Mycology) Culture           Aerobic Bacterial Culture              Component      Latest Ref Rng & Units 9/17/2021 9/17/2021          11:03 AM 11:03 AM   Respiratory Culture       Normal respiratory nimesh     Gram Stain (Respiratory)       Moderate WBC's      AFB Culture & Smear         Culture in progress   AFB CULTURE STAIN         No acid fast bacilli seen.   KOH Prep         No yeast or fungal elements seen   Fungus (Mycology) Culture         Culture in progress   Aerobic Bacterial Culture                      Narrative  Performed by: SHAWN  Patient - ONIEL ROSARIO                   - 1940 Sex- F   Med Rec # - [id number]                        Bimal Mckeon M.D.   The following is an electronic copy of report # [id number] from:   [facility]   [address] [place] [address]                         Phone [phone]   DIAGNOSIS:   2021  JL/sdc     1, 2.  RIGHT UPPER LOBE MASS BRUSHING AND FINE NEEDLE ASPIRATE BIOPSIES:   - NEGATIVE FOR MALIGNANT CELLS.     - PURULENT EXUDATE.     - A FEW FRAGMENTS OF BRONCHIAL MUCOSA WITH FLORID CHRONIC BRONCHITIS    AND REACTIVE SQUAMOUS    ATYPICAL METAPLASIA.       Comment:   Special stains (AFB and GMS) are negative for organisms. While no    granulomas are seen here, the inflammatory reaction appears to be    similar to that seen in the sinus material over the past few years    (Delta [id number], [id number], [id number]). 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 Bronchoscopic material, particularly of the lower    respiratory tract, is of low yield for a definitive diagnosis of that    entity. The previous sinus material will be reviewed with    consideration for that diagnosis.   _______________________________________________________________________   SPECIMEN AND SOURCE:   1. RUL mass brushing   2. RUL mass needle     CLINICAL INFORMATION:   Clinical Information:-gt Right Upper Lobe Mass   Specific Site:-gt RUL mass brushing T Brush; RUL Mass; RUL mass-    microbiology; RUL BAL; RUL washing;   Other Requests:-gt N/A     GROSS  DESCRIPTION:   1. Received 40 mL of fluid in formalin, 1 dry slides and 1 slides in    95% alcohol. Prepared one cell block.     2. Received 40 mL of fluid in formalin, 1 dry slides and 1 slides in    95% alcohol. Prepared one cell block.     CODE: 0     Cytotechnologist: ABDIFATAH Marvin (ASC)   Pathologist: Scott Johnson MD (Electronic Signature) 09/22/2021 2:34 PM     [facility], [facility] * [address] * [place]    [address]   Technical services performed at: [facility], [facility] * [address] * [place] [address]   Screening Site: [facility], [facility] * [facility] *    [place] [address]                            Post Rituxan image                                     Pre Rituxan image  Assessment and Plan      There are no diagnoses linked to this encounter.    Patient is a 83-year-old female she has had a recent robust inflammatory reaction within the sinus cavity as well as hearing deficit following a sinus surgery.  She has been repeated cultured postoperatively completed antibiotics and irrigations negative for any fungal infections or bacterial infections.   Patient was doing well on MTX with regard to ESR and CRP, but hair loss.      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               Completed RTX 11/5/21 Induction               Review of data and feel : The best data supporting the use of  as maintenance therapy come from the Maintenance of Remission using Rituximab in Systemic ANCA-associated Vasculitis (MAINRITSAN) trial with less relapse for PR3+ paitients when treated with Rituxan at 500mg IV at 6, 12, and 18months when compared to Imuran. Patient has tolerated RTX last 4/20/2022, she had to cancel 10/21/2022 for COVID. Did receive 500mg on 12/2022. Received again 7/2023. Patient has completed 18 months of Rituxan therapy.       We had been holding her Rituxan previous  infusion 07/20/2023 500mg x 1.  I wanted to restart Rituxan 500mg x 2 doses this time 15 days apart and discussed with her possibility of Tavneos for persistent sinus symptoms. She completed one dose of Rituxan 500mg 7/2024 cancelled 2nd due to +COVID and I did not want to repeat following COVID infection.   Will check inflammatory markers today. She will consider Tavneos.       Discussed COVID vaccine ideally we need 6month from last Rituxan but there was a bit of a delay getting this started: I will have her receive COVID vaccine early November regardless.        Continue omeprazole (prilosec)  40mg by mouth daily.       Patient hx of Osteopenia only, renal function not stable enough for oral bisphos.                she is having some spinous process tenderness today but pain not classic of vertebral fracture will check imaging anyway. She had rib fractures last year as well.               Completed. Prolia 2022. Off with most recent DXA Osteopenia. Continues with Calcium and Vitamin D.      40min consultation with greater than 50% of that time included Preparing to see the patient (review records, tests), Obtaining and/or reviewing separately obtained historical data, Performing a medically appropriate examination and/or evaluation Ordering medications, tests, and/or procedures, Referring and communicating with other healthcare professionals , Documenting clinical information in the electronic or other health record and Independently interpreting results  (as warranted) & communicating results to the patient/family/caregiver. All questions answered.

## 2024-09-23 LAB
ANCA AB TITR SER IF: NORMAL TITER
P-ANCA TITR SER IF: NORMAL TITER
PROTEINASE3 IGG SER-ACNC: <0.2 U

## 2024-09-24 LAB — MYELOPEROXIDASE AB SER-ACNC: <0.2 U/ML

## 2024-09-26 ENCOUNTER — TELEPHONE (OUTPATIENT)
Dept: RHEUMATOLOGY | Facility: CLINIC | Age: 84
End: 2024-09-26
Payer: MEDICARE

## 2024-09-26 DIAGNOSIS — D63.8 ANEMIA IN OTHER CHRONIC DISEASES CLASSIFIED ELSEWHERE: Primary | ICD-10-CM

## 2024-09-26 NOTE — TELEPHONE ENCOUNTER
----- Message from Gutierrez Gonsalves sent at 9/26/2024 11:26 AM CDT -----  Contact: self  Type: Needs Medical Advice  Who Called:  Patient    Best Call Back Number: [phone]    Additional Information: Pt states she would like to speak with office has some questions.Please call back

## 2024-09-26 NOTE — TELEPHONE ENCOUNTER
Patient calling with concerns for her recent (9/20/24) lab results. She c/o rash around mouth that she is treating and she has a lot of fatigue.Will route to provider to review and advise.

## 2024-09-27 NOTE — TELEPHONE ENCOUNTER
Her inflammatory markers are a bit elevated. I do not think the rash around mouth is driving any of this this is just a nuisance rash but not going to affect fatigue or blood work.        We will need to decide if we start Tavneos soon has she given this any more thought?    She is more anemic and I suspect this is iron.  Next is to get some iron studies she is a bit anemia and may need to set up appt with Dr. Reddy in the meantime if she is able I recommend increasing iron as much as possible and consider taking SLOW iron (Fe) over the counter.     Placed iron study orders she can come have this done anytime

## 2024-09-27 NOTE — TELEPHONE ENCOUNTER
"Went over the below information from Dr askew with the patient,    "Her inflammatory markers are a bit elevated. I do not think the rash around mouth is driving any of this this is just a nuisance rash but not going to affect fatigue or blood work.         We will need to decide if we start Tavneos soon has she given this any more thought?     She is more anemic and I suspect this is iron.  Next is to get some iron studies she is a bit anemia and may need to set up appt with Dr. Reddy in the meantime if she is able I recommend increasing iron as much as possible and consider taking SLOW iron (Fe) over the counter.      Placed iron study orders she can come have this done anytime"       Made lab appointment. Pt voiced understanding. And will let Dr askew know about starting the new medication soon.  "

## 2024-09-30 ENCOUNTER — OFFICE VISIT (OUTPATIENT)
Dept: UROLOGY | Facility: CLINIC | Age: 84
End: 2024-09-30
Payer: MEDICARE

## 2024-09-30 ENCOUNTER — LAB VISIT (OUTPATIENT)
Dept: LAB | Facility: HOSPITAL | Age: 84
End: 2024-09-30
Attending: INTERNAL MEDICINE
Payer: MEDICARE

## 2024-09-30 ENCOUNTER — TELEPHONE (OUTPATIENT)
Dept: UROLOGY | Facility: CLINIC | Age: 84
End: 2024-09-30

## 2024-09-30 VITALS — HEIGHT: 62 IN | BODY MASS INDEX: 26.94 KG/M2 | WEIGHT: 146.38 LBS

## 2024-09-30 DIAGNOSIS — N30.90 CYSTITIS WITHOUT HEMATURIA: ICD-10-CM

## 2024-09-30 DIAGNOSIS — N32.81 OAB (OVERACTIVE BLADDER): Primary | ICD-10-CM

## 2024-09-30 DIAGNOSIS — D63.8 ANEMIA IN OTHER CHRONIC DISEASES CLASSIFIED ELSEWHERE: ICD-10-CM

## 2024-09-30 LAB
BILIRUBIN, UA POC OHS: NEGATIVE
BLOOD, UA POC OHS: ABNORMAL
CLARITY, UA POC OHS: ABNORMAL
COLOR, UA POC OHS: YELLOW
FERRITIN SERPL-MCNC: 79 NG/ML (ref 20–300)
GLUCOSE, UA POC OHS: NEGATIVE
IRON SERPL-MCNC: 49 UG/DL (ref 30–160)
KETONES, UA POC OHS: NEGATIVE
LEUKOCYTES, UA POC OHS: ABNORMAL
NITRITE, UA POC OHS: POSITIVE
PH, UA POC OHS: 6.5
PROTEIN, UA POC OHS: ABNORMAL
SATURATED IRON: 18 % (ref 20–50)
SPECIFIC GRAVITY, UA POC OHS: 1.02
TOTAL IRON BINDING CAPACITY: 271 UG/DL (ref 250–450)
TRANSFERRIN SERPL-MCNC: 183 MG/DL (ref 200–375)
UROBILINOGEN, UA POC OHS: 0.2

## 2024-09-30 PROCEDURE — 99214 OFFICE O/P EST MOD 30 MIN: CPT | Mod: S$GLB,,, | Performed by: UROLOGY

## 2024-09-30 PROCEDURE — 87086 URINE CULTURE/COLONY COUNT: CPT | Performed by: UROLOGY

## 2024-09-30 PROCEDURE — 99999 PR PBB SHADOW E&M-EST. PATIENT-LVL III: CPT | Mod: PBBFAC,,, | Performed by: UROLOGY

## 2024-09-30 PROCEDURE — 36415 COLL VENOUS BLD VENIPUNCTURE: CPT | Mod: PO | Performed by: INTERNAL MEDICINE

## 2024-09-30 PROCEDURE — 82728 ASSAY OF FERRITIN: CPT | Performed by: INTERNAL MEDICINE

## 2024-09-30 PROCEDURE — 81003 URINALYSIS AUTO W/O SCOPE: CPT | Mod: QW,S$GLB,, | Performed by: UROLOGY

## 2024-09-30 PROCEDURE — 87186 SC STD MICRODIL/AGAR DIL: CPT | Performed by: UROLOGY

## 2024-09-30 PROCEDURE — 87088 URINE BACTERIA CULTURE: CPT | Performed by: UROLOGY

## 2024-09-30 PROCEDURE — 3288F FALL RISK ASSESSMENT DOCD: CPT | Mod: CPTII,S$GLB,, | Performed by: UROLOGY

## 2024-09-30 PROCEDURE — 1101F PT FALLS ASSESS-DOCD LE1/YR: CPT | Mod: CPTII,S$GLB,, | Performed by: UROLOGY

## 2024-09-30 PROCEDURE — 83540 ASSAY OF IRON: CPT | Performed by: INTERNAL MEDICINE

## 2024-09-30 PROCEDURE — 87184 SC STD DISK METHOD PER PLATE: CPT | Performed by: UROLOGY

## 2024-09-30 PROCEDURE — 1159F MED LIST DOCD IN RCRD: CPT | Mod: CPTII,S$GLB,, | Performed by: UROLOGY

## 2024-09-30 RX ORDER — OXYBUTYNIN CHLORIDE 10 MG/1
10 TABLET, EXTENDED RELEASE ORAL DAILY
Qty: 30 TABLET | Refills: 11 | Status: SHIPPED | OUTPATIENT
Start: 2024-09-30 | End: 2025-09-30

## 2024-09-30 RX ORDER — GENTAMICIN 40 MG/ML
INJECTION, SOLUTION INTRAMUSCULAR; INTRAVENOUS
COMMUNITY
Start: 2024-04-04

## 2024-09-30 RX ORDER — SULFAMETHOXAZOLE AND TRIMETHOPRIM 800; 160 MG/1; MG/1
1 TABLET ORAL 2 TIMES DAILY
Qty: 14 TABLET | Refills: 0 | Status: SHIPPED | OUTPATIENT
Start: 2024-09-30 | End: 2024-10-01

## 2024-09-30 RX ORDER — ERYTHROMYCIN 5 MG/G
OINTMENT OPHTHALMIC 2 TIMES DAILY
COMMUNITY
Start: 2024-08-09

## 2024-09-30 NOTE — PROGRESS NOTES
"Subjective:       Patient ID: Aracelis Weber is a 83 y.o. female.    Chief Complaint: Nocturia and Urinary Frequency    HPI    83-year-old with urinary frequency urgency and urgency incontinence.  She is wearing pads and sometimes soaks through her pads.  She also has dysuria.  She has not tried any previous overactive bladder medications.  She denies gross hematuria.  She denies constipation.  Cath UA:  Urine dipstick shows negative for glucose, positive for nitrites, 3+leukocytes, 1+blood, trace protein.    Past Medical History:   Diagnosis Date    Anesthesia     'Mother stop breathing after anesthesia"    Chronic sinusitis     Depression     GERD (gastroesophageal reflux disease)     PVC (premature ventricular contraction)      Past Surgical History:   Procedure Laterality Date    ADENOIDECTOMY      APPENDECTOMY      BREAST BIOPSY      HYSTERECTOMY      JOINT REPLACEMENT Bilateral     KNEE    NAVIGATIONAL BRONCHOSCOPY Right 9/17/2021    Procedure: BRONCHOSCOPY, NAVIGATIONAL WITH BODY VISION;  Surgeon: Bimal Schwartz MD;  Location: [facility];  Service: Pulmonary;  Laterality: Right;    THUMB SURGERY      TONSILLECTOMY         Current Outpatient Medications:     acetaminophen (TYLENOL) 500 MG tablet, Take 500 mg by mouth every 6 (six) hours as needed for Pain., Disp: , Rfl:     azelastine (ASTELIN) 137 mcg (0.1 %) nasal spray, 1 spray 2 (two) times daily., Disp: , Rfl:     B2-B6 phos-levomef simran-mecobal (EB-N3 DR) 1.3-70-6-4 mg CpDR, Take 1 capsule by mouth 2 (two) times a day., Disp: , Rfl:     benzonatate (TESSALON PERLES) 100 MG capsule, Take 1 capsule (100 mg total) by mouth 3 (three) times daily as needed for Cough., Disp: 30 capsule, Rfl: 1    budesonide 1 mg/2 mL NbSp, 2 vials by Each Nostril route 2 (two) times a day. EMPTY CONTENTS INTO NASAL IRRIGATION SYSTEM, ADD DISTILLED WATER, SALT PACK, MIX & IRRIGATE. PERFORM 2 TIMES DAILY, Disp: , Rfl:     diclofenac sodium (VOLTAREN) 1 % Gel, APPLY TO THE " AFFECTED AREA(S) 2 GRAMS THREE TIMES DAILY, Disp: 600 g, Rfl: 0    erythromycin (ROMYCIN) ophthalmic ointment, Place into the left eye 2 (two) times daily., Disp: , Rfl:     furosemide (LASIX) 20 MG tablet, TAKE 1 TABLET EVERY DAY (Patient taking differently: daily as needed.), Disp: 90 tablet, Rfl: 3    gabapentin (NEURONTIN) 400 MG capsule, Take 1 capsule (400 mg total) by mouth 3 (three) times daily., Disp: 270 capsule, Rfl: 2    gabapentin (NEURONTIN) 400 MG capsule, TAKE 1 CAPSULE THREE TIMES DAILY, Disp: 270 capsule, Rfl: 3    gentamicin (GARAMYCIN) 40 mg/mL injection, EMPTY CONTENTS OF 1 VIAL INTO NASAL IRRIGATION SYSTEM, ADD DISTILLED WATER, SALT PACK, MIX & IRRIGATE PERFORM 2 TIMES DAILY, Disp: , Rfl:     hydrocortisone 2.5 % cream, Apply topically 2 (two) times daily. Apply twice daily to affected area until improved, then can stop and restart as needed, Disp: 28 g, Rfl: 2    ipratropium (ATROVENT) 42 mcg (0.06 %) nasal spray, USE 1 SPRAY IN EACH NOSTRIL THREE TIMES DAILY, Disp: , Rfl:     ITRACONAZOLE, BULK, MISC, EMPTY CONTENTS OF 1 CAPSULE INTO NASAL IRRIGATION SYSTEM, ADD DISTILLED WATER, SALT PACK, MIX & IRRIGATE. PERFORM 2 TIMES DAILY, Disp: , Rfl:     itraconazole, bulk, Powd, 2 (two) times a day. EMPTY CONTENTS OF 1 CAPSULE INTO NASAL IRRIGATION SYSTEM, ADD DISTILLED WATER, SALT PACK, MIX & IRRIGATE. PERFORM 2 TIMES DAILY, Disp: , Rfl:     LACTOBACILLUS ACIDOPHILUS ORAL, Take by mouth., Disp: , Rfl:     metoprolol succinate (TOPROL-XL) 50 MG 24 hr tablet, TAKE 1 TABLET EVERY DAY, Disp: 90 tablet, Rfl: 3    omeprazole (PRILOSEC) 40 MG capsule, TAKE 1 CAPSULE EVERY MORNING, Disp: 90 capsule, Rfl: 0    ondansetron (ZOFRAN-ODT) 8 MG TbDL, Take 8 mg by mouth every 12 (twelve) hours as needed., Disp: , Rfl:     peg 400-propylene glycol, PF, (SYSTANE, PF,) 0.4-0.3 % Dpet, Apply to eye 2 (two) times a day., Disp: , Rfl:     predniSONE (DELTASONE) 2.5 MG tablet, TAKE 1 TABLET ONE TIME DAILY, Disp: 90  tablet, Rfl: 3    tiZANidine (ZANAFLEX) 4 MG tablet, Take 1 tablet (4 mg total) by mouth nightly as needed (muscle spasms/ pain)., Disp: 40 tablet, Rfl: 0    vancomycin HCl (VANCOMYCIN, BULK, MISC), by Misc.(Non-Drug; Combo Route) route. 1gram/Vial EMPTY CONTENTS OF 1 VIAL INTO NASAL IRRIGATION SYSTEM, ADD DISTILLED WATER, SALT PACK, MIX & IRRIGATE. PERFORM 2 TIMES DAILY, Disp: , Rfl:     vit C/E/Zn/coppr/lutein/zeaxan (PRESERVISION AREDS-2 ORAL), Take 1 capsule by mouth 2 (two) times a day. , Disp: , Rfl:     oxybutynin (DITROPAN-XL) 10 MG 24 hr tablet, Take 1 tablet (10 mg total) by mouth once daily., Disp: 30 tablet, Rfl: 11    sulfamethoxazole-trimethoprim 800-160mg (BACTRIM DS) 800-160 mg Tab, Take 1 tablet by mouth 2 (two) times daily., Disp: 14 tablet, Rfl: 0      Review of Systems   Constitutional:  Negative for fever.   Genitourinary:  Positive for dysuria, frequency and urgency. Negative for hematuria.       Objective:      Physical Exam  Vitals reviewed.   Constitutional:       Appearance: She is well-developed.   Pulmonary:      Effort: Pulmonary effort is normal. No respiratory distress.   Skin:     General: Skin is warm.   Neurological:      Mental Status: She is alert and oriented to person, place, and time.   Psychiatric:         Behavior: Behavior normal.         Assessment:       1. OAB (overactive bladder)    2. Cystitis without hematuria        Plan:       OAB (overactive bladder)  -     POCT Urinalysis(Instrument)    Cystitis without hematuria  -     Urine culture    Other orders  -     sulfamethoxazole-trimethoprim 800-160mg (BACTRIM DS) 800-160 mg Tab; Take 1 tablet by mouth 2 (two) times daily.  Dispense: 14 tablet; Refill: 0  -     oxybutynin (DITROPAN-XL) 10 MG 24 hr tablet; Take 1 tablet (10 mg total) by mouth once daily.  Dispense: 30 tablet; Refill: 11      Follow-up urine culture.  Empiric Bactrim.  Begin oxybutynin daily.  Follow-up 3 months

## 2024-09-30 NOTE — TELEPHONE ENCOUNTER
----- Message from Yadira sent at 9/30/2024 12:37 PM CDT -----  Regarding: advise  Contact: patient  Type: Needs Medical Advice  Who Called:  patient   Symptoms (please be specific):    How long has patient had these symptoms:    Pharmacy name and phone #:    Best Call Back Number: [phone]    Additional Information: pt has a uti she is wondering if drinking cranberry juice is helpful and should she start drinking it right away call to advise

## 2024-10-01 ENCOUNTER — TELEPHONE (OUTPATIENT)
Dept: UROLOGY | Facility: CLINIC | Age: 84
End: 2024-10-01
Payer: MEDICARE

## 2024-10-01 NOTE — TELEPHONE ENCOUNTER
Called the pt and verified with her that she was allergic to Sulfa.  We had received a message from Vaughn, notifying the office she was allergic to Sulfa and I notified the pt that because she was allergic to various antibiotics, Dr De Guzman would probably, wait till the cath specimen urine culture was final, she verbally understood.

## 2024-10-03 RX ORDER — NITROFURANTOIN 25; 75 MG/1; MG/1
100 CAPSULE ORAL 2 TIMES DAILY
Qty: 20 CAPSULE | Refills: 0 | Status: SHIPPED | OUTPATIENT
Start: 2024-10-03

## 2024-10-04 LAB — BACTERIA UR CULT: ABNORMAL

## 2024-10-07 ENCOUNTER — TELEPHONE (OUTPATIENT)
Dept: RHEUMATOLOGY | Facility: CLINIC | Age: 84
End: 2024-10-07
Payer: MEDICARE

## 2024-10-07 NOTE — TELEPHONE ENCOUNTER
----- Message from Jaida sent at 10/7/2024  2:36 PM CDT -----  Regarding: Call back  Type:  Needs Medical Advice    Who Called: Pt    Would the patient rather a call back or a response via [employer]? Call back    Best Call Back Number: [phone]    Additional Information: Pt is requesting a  call back from nurse Rodrigez. Thank you

## 2024-10-07 NOTE — TELEPHONE ENCOUNTER
----- Message from Jaida sent at 10/7/2024  2:36 PM CDT -----  Regarding: Call back  Type:  Needs Medical Advice    Who Called: Pt    Would the patient rather a call back or a response via [employer]? Call back    Best Call Back Number: [phone]    Additional Information: Pt is requesting a  call back from nurse Rodrigez. Thank you

## 2024-10-07 NOTE — TELEPHONE ENCOUNTER
Ms Weber has a UTI and is on antibiotics. She saw Dr Smith for this.  Pt asking for lab results from 9/30/24 be reviewed  Pt states she wants to continue with the Rituxan and possibly start the Tavneos next year  Pt wanted to update Dr Snyder on the above

## 2024-10-10 ENCOUNTER — TELEPHONE (OUTPATIENT)
Dept: RHEUMATOLOGY | Facility: CLINIC | Age: 84
End: 2024-10-10
Payer: MEDICARE

## 2024-10-10 NOTE — TELEPHONE ENCOUNTER
Left this message for patient   Iron studies show low iron stores but her iron is normal. Keep making sure you get enough iron in your diet.

## 2024-10-10 NOTE — TELEPHONE ENCOUNTER
----- Message from Irais sent at 10/10/2024 11:03 AM CDT -----  Pt is needing to speak to Rain   Please call back to advise   Pt wants to ty for letting her know and she wants to know if dr askew wants her to take the retuxin since she s not taking the new drug   Please call to advise [phone]

## 2024-10-10 NOTE — TELEPHONE ENCOUNTER
----- Message from Eden Snyder DO sent at 10/10/2024  8:05 AM CDT -----  Iron studies show low iron stores but her iron is normal. Keep making sure you get enough iron in your diet. Dr. Snyder

## 2024-10-11 NOTE — TELEPHONE ENCOUNTER
----- Message from Alycia sent at 10/11/2024  3:54 PM CDT -----  Type: Needs Medical Advice  Who Called:  pt     Best Call Back Number: [phone] (home) [phone] (work)    Additional Information: pt requesting call back from rodríguez please advise

## 2024-10-11 NOTE — TELEPHONE ENCOUNTER
----- Message from Charline sent at 10/11/2024  2:53 PM CDT -----  Contact: pt [phone]  Type: Needs Medical Advice  Who Called:  Pt     Best Call Back Number: [phone]    Additional Information: Pt requesting a call back. She is f/u on message from 10/10.

## 2024-10-18 ENCOUNTER — TELEPHONE (OUTPATIENT)
Dept: UROLOGY | Facility: CLINIC | Age: 84
End: 2024-10-18
Payer: MEDICARE

## 2024-10-18 NOTE — TELEPHONE ENCOUNTER
----- Message from Maximiliano Simth sent at 10/16/2024  3:52 PM CDT -----    ----- Message -----  From: Debby Daly  Sent: 10/16/2024   3:39 PM CDT  To: OCTAVIA De Guzman Staff [facility]    Type: Needs Medical Advice  Who Called:  pt  Best Call Back Number: [phone]  Additional Information: pt is calling in regards to needing to speak to Noelle about the Rx for oxybutynin (DITROPAN-XL) 10 MG 24 hr tablet. Pt says she had to stop taking the Rx yesterday morning. Needs to discuss the Rx. Please call back and advise. Thanks!

## 2024-10-18 NOTE — TELEPHONE ENCOUNTER
Called the pt back regarding her taking the Oxybutynin 10 mg, it was drying her mouth out severely.  She is feeling better that she had finished taking the antibiotic Macrobid and she was getting some relief with the Oxybutynin.  The pt was advised to cut the Oxybutynin in half to make a 5 mg and she will take this over the weekend and call back on Monday if she needs a lower dose of this medication because it works, she verbally understood.

## 2024-10-21 ENCOUNTER — OFFICE VISIT (OUTPATIENT)
Dept: RHEUMATOLOGY | Facility: CLINIC | Age: 84
End: 2024-10-21
Payer: MEDICARE

## 2024-10-21 DIAGNOSIS — I77.82 ANCA-ASSOCIATED VASCULITIS: ICD-10-CM

## 2024-10-21 DIAGNOSIS — G63 POLYNEUROPATHY ASSOCIATED WITH UNDERLYING DISEASE: ICD-10-CM

## 2024-10-21 DIAGNOSIS — D84.9 IMMUNOCOMPROMISED: Primary | ICD-10-CM

## 2024-10-21 PROCEDURE — 1160F RVW MEDS BY RX/DR IN RCRD: CPT | Mod: CPTII,95,, | Performed by: INTERNAL MEDICINE

## 2024-10-21 PROCEDURE — 99214 OFFICE O/P EST MOD 30 MIN: CPT | Mod: 95,,, | Performed by: INTERNAL MEDICINE

## 2024-10-21 PROCEDURE — 1159F MED LIST DOCD IN RCRD: CPT | Mod: CPTII,95,, | Performed by: INTERNAL MEDICINE

## 2024-10-21 RX ORDER — OMEPRAZOLE 40 MG/1
40 CAPSULE, DELAYED RELEASE ORAL EVERY MORNING
Qty: 90 CAPSULE | Refills: 0 | Status: SHIPPED | OUTPATIENT
Start: 2024-10-21

## 2024-10-21 RX ORDER — OMEPRAZOLE 20 MG/1
20 CAPSULE, DELAYED RELEASE ORAL DAILY
Qty: 90 CAPSULE | Refills: 3 | Status: SHIPPED | OUTPATIENT
Start: 2024-10-21 | End: 2025-10-21

## 2024-10-21 NOTE — PROGRESS NOTES
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 We did check CBC which was a bit low with elevated inflammatory markers, was having increased urination was found to have UTI and is feeling better since being treated.   Sinuses have calmed down. She went to boat show.   Iron was not as low as she has been in past encouraged increased dietary iron.     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   John Paul would like to avoid at this time. She did review all the information  We discussed my concerns,     9/2024: RTX completed 500mg 7/8/2024, +COVID approx 7/16/24 held off on 2nd infusion. She has remarkably recovered and doing "ok". She is noting significant fatigue slight after RTX, much more after COVID. She is not driving alone so she is home few days which is lonely.   Recent seen with [facility] endoscopy with less crusting since RTX.     She is having a hard time with loss of hearing, loss of smell since the disease. Hard to not "smell the holidays"   Rapid onset peripheral neuropathy which I have had to attribute to her neuropathy.     6/2024: I signed Rituxan orders 4/15/24 she has not received. She was called to schedule and medication is approved but she is having a myriad of complaints with bleeding in the nose over a few months with sinus congestion. She had a debridement with Dr. Quinn  and this most recent one resolved nose bleeds, she states her sinuses are better than they have been in nearly 3 years. Mucous has stopped being so productive, she is avoiding navage and no worse.     Hearing stable one hearing aid is not working.     No new illnesses she had URI 4/2024 none since  Renal function stable.   No SOB or cough no fevers.   She will have left lower tooth extracted tomorrow: Dr. Huang treated with ABX.     3/2024:  3/2024: Patient HSV did finally clear some residula angular chelitis/  Patient with dry " cough typic this time of year no SOB  Sinuses have been pretty good.  No fever, chills, weight loss or increase in fatigue.    Wet AMD-O'Varghese injections monthly now  Hearing a bit worse as of recent.     1/2024:  With a recent URI her labs drawn 01/05/2024 this with the elevated sed rate and C-reactive protein she was negative for flu, RSV, COVID but given that she is having symptoms of some nosebleeds and while her baseline sinusitis is chronic seems to be an uptake in the amount of nasal discharge we are going to repeat her sed rate next month.  Her renal function is stable and she has a bit of anemia that is new.   We had been holding her Rituxan last infusion 07/20/2023 500mg x 1 , but I want to make sure we are not missing any underlying Wegener's activity.    She did have significant cough that took some time to improve, her sinuses feel continued inflammation, just started Z-pack.  Sharply marginated lucent lesion in the T10 vertebral body, possibly a hemangioma.             MRI completed with Ms Alonzo will just monitor for increase cervical stenosis or radicular [id number] at this time.   Did have vertebral fx.      9/2023: Patient was seen 6/2023 with anticipation of repeat FESS Sinus surgery and pending compound nasal irrigation. She did have surgery with Dr. Quinn end of June 2023, by July visit noted improvement with compound irrigation from prior. Still with chronic sinusitis.   Last Rituxan 500mg single dose 7/2023.    She is doing much better with the congestion but nothing like it was before. She denies fevers, chills, some cough, no hemoptysis.   She is down to Prednisone 2.5 mg daily but overall her edema is much improved.   Renal function stable  ESR and CRP    She does not want to take MTX she lost significant amounts of hair.   Imuran was lost with flare and transition to Imuran.     She has completed Rituxan 18 months with sinuses under control, lungs clear , renal function stable.  Hearing is stable she lost nearly 90% of hearing and has stabilized.   She has mac degeneration.   Neuropathy continues to be a problem but we never have petechial rashes.   Edema is much improved.     3/2023:   Patient current on RTX Q 6 month maintanance following induction for Wegeners.    12/2/22 (Delayed COVID infection) RTX was 500mg with solumedrol 80mg IM   Completed Rituxan 1000mg for 1st  maintenance at 4 months 4/20/2022. due to rise in symptoms, and ESR   Induction 10/2021.      Continued nasal congestion and drainage.   Recent significant nosebleeds. Endoscopy with recurrent crusting despite office debridement by ENT and continued irrigation.      Will repeat serologies and inflammatory markers and check CD 19 levels first week March and see her later that month.   Patient doing very well. Still rhinitis, using navage. No antibiotics from ENT since last visit. She is still clearing significant mucous faint with dried blood.   Dry cough, no fevers, no SOB\  Prolia: 10/17/2022         11/2022  Patient doing very well. Still rhinitis, using navage. No antibiotics from ENT since last visit. She is still clearing significant mucous faint with dried blood.   Dry cough, no fevers, no SOB  Mild HA only with elevated BP.   Completed Rituxan maintanence at 4 months 4/20/2022.   Prolia 3/25/22  Evusheld with catch up dose 2/10/22 and 3/25/2022     5/5/2022:   Patient doing very well. Still rhinitis, using navage. No antibiotics from ENT since last visit. She is still clearing significant mucous faint with dried blood.   Dry cough, no fevers, no SOB  Mild HA only with elevated BP.   Completed Rituxan maintanence at 4 months 4/20/2022.   Prolia 3/25/22  Evusheld with catch up dose 2/10/22 and 3/25/2022.      Major complaints: pins, needles, stinging, burning in feet day and night is constant. Dr. Campbell: EB-N5 supplement is helping some, stopped for months and noted significant worsening. Dr. Campbell did increase  from EB-N3.   Gabapentin 400mg TID  Hydrocodone only PRN  Prednisone 7.5mg         2/2022  Doing well no cough, no fevers. Recent COVID exposure but negative.   Seen with Nephro.   Given her successful response with Ritux. Agree to continue maintanance with Ritux Q 4-6 months     Needs to get #3 COVID vaccine timing with RTX. - will time this with me 2 weeks prior to   Discussed Prolia  Working on [facility] for PreP with COVID> she is scheduled 2/10/22        12/9/2021  Patient's recent CXR 12/21/21 with marked improvement when compared to 10/21/21  Prednisone 10mg daily  S/p RTX x 4 infusions completion date. 11/5/21  Patient with rise in creatinine to 2.0 on 11/26 had been requiring lasix for marked edema since 11/1/21. Stopped 2 weeks ago. Edema is present but managed. Drinking about 40 oz water and 3 cups of coffee daily     Patient completed C PT felt she was doing well home exercises about 2 weeks ago with acute pain in lumbar spine after exercising. Imaging lumbar few days ago without evidence of a fracture. She point to pain in the lumbosacral and radiating to buttock region. Ok to rise from seated. Pain more on left low back and buttock. No numbness no tingling but pain in the hamstring region. Comfortable sitting but not lying down, but she never sleeps in bed. Long hx of rather impressive scoliosis.         11/11/2021:   Inflammatory markers are markedly improved.   Cough dry still at night. Patient noting she has lost sense of smell. Can still taste.      Patient recently hospitatlized for Bronch with negative cultures to date.  There was a concern for possible atypical infection she has had enlarging right upper lobe cavitary lesion measures still has a right middle lobe lesion with some cavitation now as well.  I have discussed this case extensively with both Dr. Schwartz as well as Dr. Jospeh upon discharge we felt comfortable that this is likely Wegener's granulomatosis causing cavitary lesions.   Supporting sinus biopsy does note granuloma.    Patient was started on Imuran in March of 2021 but with the growth of her lesion we had rule out for any possible infection given her status of immunosuppression.  She is here today not in her usual state she appears fatigued pale she is not complaining of shortness of breath but does have a cough dry persistent.  She is noticing increased pedal edema.           9/13/21: patient with 2 falls 8 days apart. Spoke w/ pulm shortly following initial fall and we were scheduled for bronch when she sustained another fall.   Scheduled now for 9/17/21 bronch. RUL lesion.   Remains off Imuran  Prednisone 15mg daily.   Sinus congestion. Still productive      She is noting some productive cough at night no hemoptysis.   Patient denies SOB  Having more HA. -frontal and sinus, she is noting some drainage in right ear some mucous/blood.   No edema.   She notes fatigue, no fevers, no night sweats. No weight loss.   Skin easily bruising.      No personal hx of any malignancy.         7/20/2021:    Spoke with Pulm plan for bronch is able working on possible bx site vs BAL.   Need to r/o infectious/neoplastic and confirm for ANCA (GPA vasculitis)   Inflammatory markers markedly down .8  Now 14.   H/H improve from 7.4 to 8.2  Platelets normalized.   Renal stable over time     Patient did attend wedding with approx 300 people unmasked last weekend. Reviewed by recs for COVID precautions given     Current regimen:   pred 15mg (Na stable)  Holding Imuran     Interval events: PET with hypermetabolic lesions:   a. RUL cavitary/cystic lesion 1.9cm x 1.3cm  b. RUL spiculated 1.2cm x 1.4cm  c. RML 1.7cm x 2.0 cm  All with differential: infectious, inflammatory, neoplastic, grannulomatous (a) with favor of inflammation given the rapid change     Fungal immunodiffusion neg  Quant gold and T spot: neg  H/H improving      Sinus congestion, pressure, headache, x 3 weeks very productive with  "blowing nose, back on navage. :  started Cindamycin with DR Quinn x 10 days.   Patient denies any shortness breath.   Very fatigues.   No more fevers. Slight dry cough, no blood/ no mucous.   No SOB.         5/12/21 completed nasal irrigation with biopsy not definitive for GPA, but + inflammation and granulomas. Temporal artery bx negative. Patient with PCP same day as labs 6/2/21  dx with UTI on keflex.      Was seen apparently in ED in [place] for 104F she had altered mental status, dx with UTI, dehydration, treated with. Completed all 7 days of keflex and within 48 hours with another fever 104 F. She continues with congestion but no worse. Patient denies cough, hemoptysis, bloody nasal discharge. She has had no fever x 10 days. Checks twice daily.   while travelling told "congestion in chest on xray". Inflammatory markers very high again.   Patient with recent concern for wegeners needs CT chest. CXR with new airspace opacities right chest- need to r/o GGO vs infection. CT has been ordered pending scheduling.   Broad ABX coverage recommended for now will cc Dr. Natarajan.      I am limited here with her  severe edema from higher dose steroids. Max tolerated is 20mg daily pred  Started Imuran 5/5/2021. Very low dose 50mg BID (0.65mg/kg) tolerating VERY well.   H/H still low.            2/2021  Sinususitis, cx were negative.  Using nasal irrigation with mucoid disharge.   Patient continues with significant nasal discharge and blood with scabs. We discussed Wegeners but pathology sinus no vasculitis, granuloma noted.   No HA, no blurred vision. Recent sinus infection with HA for 10 days. cx +, but also repeat labs demonstarting +PR3 and +ANCA         Patient with c/o right > left 3,4 finger numbness that was intermittent until approximately 2 weeks about having near constant numbness in hands, pins and needles and pain.   Patient not noting much improvement with change in position.      Off MTX 6  tabs weekly.   Doing " well with Hydroxychloroquine.   Declined COVID vaccine     Gabapentin up to 300mg TID.   Now with Dr. Campbell doing PT for Plantar fasciitis. She is taking supplement and compound cream for joint and neuropathy. No response to tarsal tunnel injection per Dr. Campbell.   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 WBC ok.         11/2020  Patient with PMR   MTX at 4 tabs weekly new anemia. Thrombocytosis  Pred at 10mg   Complicated with peripheral edema rather severe , started MTX seeing trending CRP/ESR but persistent leg pain.   Seen with PCP for neuropathy s/sx not seeing much benefit with gabpentin  Seen with Cardiology- no concern for cardiac ECHO ok. dc'd lasix for hyponatremia.      8/2020  Pred 20mg with mild edema, pred 30 mg with severe edema.   Current Pred 15mg daily  Rising ESR again.   Lasix 20 mg daily with improved edema but having some pins and needles in feet/legs.   Having tight, burning stinging at the toes and feet. Heaviness. Prickly.   Noting sodium is falling, K 5.2-5.4 still using               7/2020:  Hands not painful, knees not painful. Shoulder/cervical and down the arms, markedly improved. She still has some pain him hips and groin with walking and some weakness to the legs with edema. Some pain with fixing own hair.   She takes in AM regarding hips ache, some shoulder/arms, and again at night because hips/legs.   The clue is that the LDN new dose she had some ASE, previously tolerated 3mg daily fine.      Historical review of present Illness.   Patient with a recent chronic sinusitis that ultimately warranted a septoplasty, endoscopic sinus surgery and turbinate reduction 5/2020   Four weeks postop t increasingly worse she was noting pain in her throat and ears lasting several hours complaint of pdizziness she had symptoms of ear pain and decreased hearing in the left ear.  She underwent a  debridement at this visit she continue with compound nasal irrigations.  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     Patient did have repeat procedure on with biopsies taken 07/03/2020 showing right maxillary sinus chronically inflamed fibrotic polypoid portions benign nasal mucosa left maxillary sinus similar polypoid fragments ulcerated markedly inflamed respiratory mucosa focal foreign body giant cell response noted suggested that this might have been from her Gelfoam packing as a possible cause of this reaction     I have spoken with Dr. Quinn prior to patient's visit today and was a concern that she could have triggered some inflammatory response with the Gelfoam packing     Patient states she no longer has a headache is not noticing much ear drainage continues with profound loss of hearing on the left and rather significant on the right both sensorineural and some conductive changes.     Restarted on prednisone as of 7/20/20  10 mg patient has not noticed any change with her hearing in this time she did notice slight improvement with her joint aches and pains but is still relying on hydrocodone for the bulk of this.     She denies a persistent headache she does have some tenderness about the preauricular region particularly on the right more than the left.  She has denied jaw claudication changes in her voice or any amaurosis fugax.  She has no pre-existing history of  joint aches and pains hip or otherwise.  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  She had a workup that involved a negative rheumatoid factor, age appropriate sedimentation rate,C reactive protein JANET was positive 1:160 in speckled pattern           Previous: Hx:   She is having pain in her hand with stiffness and right 2nd PIP joint now unable to flex. 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   Hx of bilateral TKA 3 and 5 years ago and those are doing well.   She notes intermittent edema. Some hammer toe deformity bilaterally making shoes problematic but not painful otherwise.   Long hx of GERD-severe.   Cannot tolerate NSAIDs at all w/o gastritis.   Can use Hydrocodone PRN   Added Naltrexone at 3mg and doing very well  Lost 30# with WW.   Patient denies weight loss, rashes, dry eye, dry mouth, nasal or palatal ulcerations,  lymphadenopathy, Raynaud's, hx of DVT/miscarriages, psoriasis or family hx of psoriasis, rashes, serositis, anemia or other constitutional symptoms.  Asking about naltrexone  Component      Latest Ref Rng & Units 8/8/2020 7/29/2020 7/20/2020 7/13/2020   Sodium      136 - 145 mmol/L   126 (L) 128 (L)     Potassium      3.5 - 5.1 mmol/L   5.4 (H) 5.2 (H)     Chloride      95 - 110 mmol/L   92 (L) 92 (L)     CO2      23 - 29 mmol/L   25 29     Glucose      70 - 110 mg/dL   109 109     BUN, Bld      8 - 23 mg/dL   13 11     Creatinine      0.5 - 1.4 mg/dL   0.8 0.8     Calcium      8.7 - 10.5 mg/dL   9.5 9.7     Anion Gap      8 - 16 mmol/L   9 7 (L)     eGFR if African American      >60 mL/min/1.73 m:2   >60.0 >60.0     eGFR if non African American      >60 mL/min/1.73 m:2   >60.0 >60.0     Anti Sm Antibody      0.00 - 0.99 Ratio       0.04   Anti-Sm Interpretation      Negative       Negative   Anti Sm/RNP Antibody      0.00 - 0.99 Ratio       0.09   Anti-Sm/RNP Interpretation      Negative       Negative    JANET Screen      None Detected           Rheumatoid Factor      0.0 - 15.0 IU/mL           Sed Rate      0 - 36 mm/Hr 57 (H)   54 (H) 75 (H)   CRP      0.0 - 8.2 mg/L 75.4 (H) 56.1 (H) 57.5 (H) 99.4 (H)   Anti-Histone Antibody      0.0 - 0.9 Units       0.4   ds DNA Ab      Negative 1:10       Negative 1:10      Component      Latest Ref Rng & Units 6/25/2020 11/10/2018   Sodium      136 - 145 mmol/L       Potassium      3.5 - 5.1 mmol/L       Chloride      95 - 110 mmol/L       CO2      23 - 29 mmol/L       Glucose      70 - 110 mg/dL       BUN, Bld      8 - 23 mg/dL       Creatinine      0.5 - 1.4 mg/dL       Calcium      8.7 - 10.5 mg/dL       Anion Gap      8 - 16 mmol/L       eGFR if African American      >60 mL/min/1.73 m:2       eGFR if non African American      >60 mL/min/1.73 m:2       Anti Sm Antibody      0.00 - 0.99 Ratio       Anti-Sm Interpretation      Negative       Anti Sm/RNP Antibody      0.00 - 0.99 Ratio       Anti-Sm/RNP Interpretation      Negative       JANET Screen      None Detected Detected (A) Detected (A)   Rheumatoid Factor      0.0 - 15.0 IU/mL 11.6 <8.6   Sed Rate      0 - 36 mm/Hr       CRP      0.0 - 8.2 mg/L       Anti-Histone Antibody      0.0 - 0.9 Units       ds DNA Ab      Negative 1:10             REVIEW OF SYSTEMS:     Review of Systems   Constitutional: Negative for fever, malaise/fatigue and weight loss.   HENT: Negative for sore throat.    Eyes: Negative for double vision, photophobia and redness.   Respiratory: Negative for cough, shortness of breath and wheezing.    Cardiovascular: Negative for chest pain, palpitations and orthopnea.   Gastrointestinal: Negative for abdominal pain, constipation and diarrhea.   Genitourinary: Negative for dysuria, hematuria and urgency.   Musculoskeletal: Positive for joint pain. Negative for back pain and myalgias.   Skin: Negative for rash.   Neurological: Negative for dizziness, tingling, focal weakness and headaches.  "  Endo/Heme/Allergies: Does not bruise/bleed easily.   Psychiatric/Behavioral: Negative for depression, hallucinations and suicidal ideas.                     Objective:      Objective        Past Medical History:   Diagnosis Date    Anesthesia       'Mother stop breathing after anesthesia"    Chronic sinusitis      Depression      GERD (gastroesophageal reflux disease)      PVC (premature ventricular contraction)              Family History   Problem Relation Age of Onset    Heart disease Mother      Arthritis Mother      Cancer Sister      Arthritis Sister      Diabetes Sister      Hypertension Sister        Social History            Tobacco Use    Smoking status: Former       Current packs/day: 0.00       Types: Cigarettes       Start date: 2/3/1955       Quit date: 1/3/1960       Years since quittin.7    Smokeless tobacco: Never   Substance Use Topics    Alcohol use: Never       Alcohol/week: 0.0 standard drinks of alcohol    Drug use: Never                   Current Outpatient Medications on File Prior to Visit   Medication Sig Dispense Refill    acetaminophen (TYLENOL) 500 MG tablet Take 500 mg by mouth every 6 (six) hours as needed for Pain.        albuterol (PROVENTIL/VENTOLIN HFA) 90 mcg/actuation inhaler INHALE 2 PUFFS BY MOUTH EVERY 4 TO 6 HOURS AS NEEDED FOR SHORTNESS OF BREATH OR COUGH        ascorbate calcium (MAHSA-C ORAL) Take by mouth.        B2-B6 phos-levomef simran-mecobal (EB-N3 DR) 1.3-70-6-4 mg CpDR Take 1 capsule by mouth once daily.        cetirizine (ZYRTEC) 10 MG tablet TAKE 1 TABLET ONE TIME DAILY 90 tablet 0    denosumab (PROLIA SUBQ) Inject into the skin. Two times a year        diclofenac sodium (VOLTAREN) 1 % Gel APPLY TO THE AFFECTED AREA(S) 2 GRAMS THREE TIMES DAILY 600 g 0    furosemide (LASIX) 20 MG tablet TAKE 1 TABLET EVERY DAY 90 tablet 3    gabapentin (NEURONTIN) 400 MG capsule TAKE 1 CAPSULE THREE TIMES DAILY 270 capsule 3    HYDROcodone-acetaminophen (NORCO) 7.5-325 mg per " tablet Take 1 tablet by mouth every 6 (six) hours as needed.        ITRACONAZOLE, BULK, MISC EMPTY CONTENTS OF 1 CAPSULE INTO NASAL IRRIGATION SYSTEM, ADD DISTILLED WATER, SALT PACK, MIX & IRRIGATE. PERFORM 2 TIMES DAILY        LACTOBACILLUS ACIDOPHILUS ORAL Take by mouth.        metoprolol succinate (TOPROL-XL) 50 MG 24 hr tablet TAKE 1 TABLET EVERY DAY 90 tablet 3    mirabegron (MYRBETRIQ) 50 mg Tb24 Take 1 tablet (50 mg total) by mouth once daily. 30 tablet 11    neomycin-polymyxin-dexamethasone (MAXITROL) 3.5mg/mL-10,000 unit/mL-0.1 % DrpS          omeprazole (PRILOSEC) 40 MG capsule TAKE 1 CAPSULE EVERY MORNING 90 capsule 0    ondansetron (ZOFRAN-ODT) 8 MG TbDL Take 8 mg by mouth 2 (two) times daily.        peg 400-propylene glycol, PF, (SYSTANE, PF,) 0.4-0.3 % Dpet Apply to eye 2 (two) times a day.        predniSONE (DELTASONE) 2.5 MG tablet Take 1 tablet (2.5 mg total) by mouth once daily. 90 tablet 3    predniSONE (DELTASONE) 5 MG tablet Take 1 tablet (5 mg total) by mouth once daily. 30 tablet 3    sodium chloride 0.9% SolP 500 mL with riTUXimab 10 mg/mL Conc 375 mg/m2 Inject 375 mg/m2 into the vein. 4 to 6 months        tobramycin sulfate 0.3% (TOBREX) 0.3 % ophthalmic solution Place into both eyes.        vancomycin (VANCOCIN) 1,000 mg injection SMARTSI Vial(s) Both Nares Twice Daily        vit C/E/Zn/coppr/lutein/zeaxan (PRESERVISION AREDS-2 ORAL) Take 1 capsule by mouth 2 (two) times a day.         [DISCONTINUED] naltrexone capsule Take 3 mg by mouth once daily.          No current facility-administered medications on file prior to visit.             Vitals:     23 1306   BP: 134/65   Pulse: 69         Physical Exam:     Physical Exam   Constitutional: She is oriented to person, place, and time. She appears well-developed and well-nourished.   HENT:   Head: Normocephalic and atraumatic.   Mouth/Throat: Oropharynx is clear and moist.   Eyes: Pupils are equal, round, and reactive to light. EOM  are normal.   Neck: Normal range of motion.   Cardiovascular: Normal rate, regular rhythm and normal heart sounds.   Pulmonary/Chest: Effort normal and breath sounds normal.   Musculoskeletal:        Right shoulder: She exhibits normal range of motion, no tenderness and no swelling.        Left shoulder: She exhibits normal range of motion, no tenderness and no swelling.        Right elbow: She exhibits normal range of motion and no swelling. No tenderness found.        Left elbow: She exhibits normal range of motion and no swelling. No tenderness found.        Right wrist: She exhibits normal range of motion, no tenderness and no swelling.        Left wrist: She exhibits normal range of motion, no tenderness and no swelling.        Right knee: She exhibits normal range of motion and no swelling. No tenderness found.        Left knee: She exhibits normal range of motion and no swelling. No tenderness found.        Right hand: She exhibits decreased range of motion and tenderness. She exhibits no swelling.        Left hand: She exhibits decreased range of motion and tenderness. She exhibits no swelling.        Right foot: There is normal range of motion, no tenderness and no swelling.        Left foot: There is normal range of motion, no tenderness and no swelling.   Bony hypertrophy at the PIP and DIP joints. +squaring at the CMC joint. No active synovitis on 28 joint exam.    Neurological: She is alert and oriented to person, place, and time.   Skin: Skin is warm and dry.   Psychiatric: She has a normal mood and affect. Her behavior is normal.      Component      Latest Ref Rng & Units 9/17/2021 9/17/2021          11:03 AM 11:03 AM   Respiratory Culture         No growth   Gram Stain (Respiratory)       No organisms seen No WBC's   AFB Culture & Smear             AFB CULTURE STAIN             GENET Prep             Fungus (Mycology) Culture             Aerobic Bacterial Culture                Component      Latest  Ref Rng & Units 2021          11:03 AM   Respiratory Culture       No Staph aureus, MRSA or Pseudomonas isolated.   Gram Stain (Respiratory)       No organisms seen   AFB Culture & Smear           AFB CULTURE STAIN           EGNET Prep           Fungus (Mycology) Culture           Aerobic Bacterial Culture              Component      Latest Ref Rng & Units 2021          11:03 AM 11:03 AM   Respiratory Culture       Normal respiratory nimesh     Gram Stain (Respiratory)       Moderate WBC's     AFB Culture & Smear         Culture in progress   AFB CULTURE STAIN         No acid fast bacilli seen.   KOH Prep         No yeast or fungal elements seen   Fungus (Mycology) Culture         Culture in progress   Aerobic Bacterial Culture                      Narrative  Performed by: DPAOBIE  Patient - ONIEL ROSARIO                   - 1940 Sex- F   Med Rec # - [id number]                        Bimal Mckeon M.D.   The following is an electronic copy of report # [id number] from:   [facility]   [address] [place] [id number]                         Phone [phone]   DIAGNOSIS:   2021  JL/sdc     1, 2.  RIGHT UPPER LOBE MASS BRUSHING AND FINE NEEDLE ASPIRATE BIOPSIES:   - NEGATIVE FOR MALIGNANT CELLS.     - PURULENT EXUDATE.     - A FEW FRAGMENTS OF BRONCHIAL MUCOSA WITH FLORID CHRONIC BRONCHITIS    AND REACTIVE SQUAMOUS    ATYPICAL METAPLASIA.       Comment:   Special stains (AFB and GMS) are negative for organisms. While no    granulomas are seen here, the inflammatory reaction appears to be    similar to that seen in the sinus material over the past few years    (Delta [id number], [id number], [id number]). 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 Bronchoscopic material, particularly of the lower    respiratory  tract, is of low yield for a definitive diagnosis of that    entity. The previous sinus material will be reviewed with    consideration for that diagnosis.   _______________________________________________________________________   SPECIMEN AND SOURCE:   1. RUL mass brushing   2. RUL mass needle     CLINICAL INFORMATION:   Clinical Information:-gt Right Upper Lobe Mass   Specific Site:-gt RUL mass brushing T Brush; RUL Mass; RUL mass-    microbiology; RUL BAL; RUL washing;   Other Requests:-gt N/A     GROSS DESCRIPTION:   1. Received 40 mL of fluid in formalin, 1 dry slides and 1 slides in    95% alcohol. Prepared one cell block.     2. Received 40 mL of fluid in formalin, 1 dry slides and 1 slides in    95% alcohol. Prepared one cell block.     CODE: 0     Cytotechnologist: ABDIFATAH Marvin (ASCP)   Pathologist: Scott Johnson MD (Electronic Signature) 09/22/2021 2:34 PM     [facility], [facility] * [address] [place] [address] * [place] [address]   Technical services performed at: [facility] * [address] [place] [address] * [place] [address]   Screening Site: [facility], [facility] * [address] [place] [address] *    [place] [address]                            Post Rituxan image                                     Pre Rituxan image  Assessment and Plan      Immunocompromised  -     Sedimentation rate; Future; Expected date: 10/21/2024  -     C-Reactive Protein; Future; Expected date: 10/21/2024  -     Myeloperoxidase Antibody (MPO); Future; Expected date: 10/21/2024  -     Proteinase 3 Autoantibodies; Future; Expected date: 10/21/2024  -     Quantiferon Gold TB; Future; Expected date: 10/21/2024  -     IMMUNOGLOBULINS (IGG, IGA, IGM) QUANTITATIVE; Future; Expected date: 10/21/2024    ANCA-associated vasculitis  -     Sedimentation rate; Future; Expected date: 10/21/2024  -     C-Reactive Protein; Future; Expected date: 10/21/2024  -     Myeloperoxidase Antibody (MPO); Future; Expected date:  10/21/2024  -     Proteinase 3 Autoantibodies; Future; Expected date: 10/21/2024  -     Quantiferon Gold TB; Future; Expected date: 10/21/2024  -     IMMUNOGLOBULINS (IGG, IGA, IGM) QUANTITATIVE; Future; Expected date: 10/21/2024    Polyneuropathy associated with underlying disease    Other orders  -     omeprazole (PRILOSEC) 20 MG capsule; Take 1 capsule (20 mg total) by mouth once daily.  Dispense: 90 capsule; Refill: 3  -     omeprazole (PRILOSEC) 40 MG capsule; Take 1 capsule (40 mg total) by mouth every morning.  Dispense: 90 capsule; Refill: 0        Patient is a 83-year-old female she has had a recent robust inflammatory reaction within the sinus cavity as well as hearing deficit following a sinus surgery.  She has been repeated cultured postoperatively completed antibiotics and irrigations negative for any fungal infections or bacterial infections.   Patient was doing well on MTX with regard to ESR and CRP, but hair loss.      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               Completed RTX 11/5/21 Induction               Review of data and feel : The best data supporting the use of  as maintenance therapy come from the Maintenance of Remission using Rituximab in Systemic ANCA-associated Vasculitis (MAINRITSAN) trial with less relapse for PR3+ paitients when treated with Rituxan at 500mg IV at 6, 12, and 18months when compared to Imuran. Patient has tolerated RTX last 4/20/2022, she had to cancel 10/21/2022 for COVID. Did receive 500mg on 12/2022. Received again 7/2023. Patient has completed 18 months of Rituxan therapy.         I wanted to restart Rituxan 500mg x 2 doses this time 15 days apart and discussed with her possibility of Tavneos for persistent sinus symptoms. She completed one dose of Rituxan 500mg 7/2024 cancelled 2nd due to +COVID and I did not want to repeat following COVID infection.   Gave her information last visit on  Tavneos she declines at this time.   Cotninue to have Rituxan approximately every 6 months or as warranted by disease activity.   I want her to have a COVID vaccine, Flu, and PCV 20. November 2024.        Continue omeprazole (prilosec)  40mg by mouth daily.       Patient hx of Osteopenia only, renal function not stable enough for oral bisphos.                she is having some spinous process tenderness today but pain not classic of vertebral fracture will check imaging anyway. She had rib fractures last year as well.               Completed. Prolia 2022. Off with most recent DXA Osteopenia. Continues with Calcium and Vitamin D.

## 2024-12-02 ENCOUNTER — LAB VISIT (OUTPATIENT)
Dept: LAB | Facility: HOSPITAL | Age: 84
End: 2024-12-02
Attending: INTERNAL MEDICINE
Payer: MEDICARE

## 2024-12-02 DIAGNOSIS — I77.82 ANCA-ASSOCIATED VASCULITIS: ICD-10-CM

## 2024-12-02 DIAGNOSIS — N18.31 STAGE 3A CHRONIC KIDNEY DISEASE: ICD-10-CM

## 2024-12-02 DIAGNOSIS — D84.9 IMMUNOCOMPROMISED: ICD-10-CM

## 2024-12-02 LAB
ALBUMIN SERPL BCP-MCNC: 3.4 G/DL (ref 3.5–5.2)
ANION GAP SERPL CALC-SCNC: 9 MMOL/L (ref 8–16)
BUN SERPL-MCNC: 19 MG/DL (ref 8–23)
CALCIUM SERPL-MCNC: 9.8 MG/DL (ref 8.7–10.5)
CHLORIDE SERPL-SCNC: 102 MMOL/L (ref 95–110)
CO2 SERPL-SCNC: 25 MMOL/L (ref 23–29)
CREAT SERPL-MCNC: 1.2 MG/DL (ref 0.5–1.4)
CREAT UR-MCNC: 69 MG/DL (ref 15–325)
CRP SERPL-MCNC: 12.7 MG/L (ref 0–8.2)
ERYTHROCYTE [SEDIMENTATION RATE] IN BLOOD BY PHOTOMETRIC METHOD: 49 MM/HR (ref 0–36)
EST. GFR  (NO RACE VARIABLE): 45.2 ML/MIN/1.73 M^2
GLUCOSE SERPL-MCNC: 71 MG/DL (ref 70–110)
IGA SERPL-MCNC: 141 MG/DL (ref 40–350)
IGG SERPL-MCNC: 843 MG/DL (ref 650–1600)
IGM SERPL-MCNC: 20 MG/DL (ref 50–300)
PHOSPHATE SERPL-MCNC: 3.9 MG/DL (ref 2.7–4.5)
POTASSIUM SERPL-SCNC: 4.1 MMOL/L (ref 3.5–5.1)
PROT UR-MCNC: 9 MG/DL (ref 0–15)
PROT/CREAT UR: 0.13 MG/G{CREAT} (ref 0–0.2)
PTH-INTACT SERPL-MCNC: 22.4 PG/ML (ref 9–77)
SODIUM SERPL-SCNC: 136 MMOL/L (ref 136–145)

## 2024-12-02 PROCEDURE — 83970 ASSAY OF PARATHORMONE: CPT | Performed by: INTERNAL MEDICINE

## 2024-12-02 PROCEDURE — 83516 IMMUNOASSAY NONANTIBODY: CPT | Mod: 59 | Performed by: INTERNAL MEDICINE

## 2024-12-02 PROCEDURE — 82784 ASSAY IGA/IGD/IGG/IGM EACH: CPT | Mod: 59 | Performed by: INTERNAL MEDICINE

## 2024-12-02 PROCEDURE — 36415 COLL VENOUS BLD VENIPUNCTURE: CPT | Mod: PO | Performed by: INTERNAL MEDICINE

## 2024-12-02 PROCEDURE — 82570 ASSAY OF URINE CREATININE: CPT | Performed by: INTERNAL MEDICINE

## 2024-12-02 PROCEDURE — 85652 RBC SED RATE AUTOMATED: CPT | Performed by: INTERNAL MEDICINE

## 2024-12-02 PROCEDURE — 86140 C-REACTIVE PROTEIN: CPT | Performed by: INTERNAL MEDICINE

## 2024-12-02 PROCEDURE — 83516 IMMUNOASSAY NONANTIBODY: CPT | Performed by: INTERNAL MEDICINE

## 2024-12-02 PROCEDURE — 86480 TB TEST CELL IMMUN MEASURE: CPT | Performed by: INTERNAL MEDICINE

## 2024-12-02 PROCEDURE — 80069 RENAL FUNCTION PANEL: CPT | Performed by: INTERNAL MEDICINE

## 2024-12-03 LAB
GAMMA INTERFERON BACKGROUND BLD IA-ACNC: 0.16 IU/ML
M TB IFN-G CD4+ BCKGRND COR BLD-ACNC: -0 IU/ML
M TB IFN-G CD4+ BCKGRND COR BLD-ACNC: 0 IU/ML
MITOGEN IGNF BCKGRD COR BLD-ACNC: 9.84 IU/ML
TB GOLD PLUS: NEGATIVE

## 2024-12-04 LAB — PROTEINASE3 IGG SER-ACNC: 0.2 U

## 2024-12-05 LAB — MYELOPEROXIDASE AB SER-ACNC: <0.2 U/ML

## 2024-12-06 ENCOUNTER — OFFICE VISIT (OUTPATIENT)
Dept: NEPHROLOGY | Facility: CLINIC | Age: 84
End: 2024-12-06
Payer: MEDICARE

## 2024-12-06 VITALS — DIASTOLIC BLOOD PRESSURE: 56 MMHG | SYSTOLIC BLOOD PRESSURE: 148 MMHG

## 2024-12-06 DIAGNOSIS — N18.31 STAGE 3A CHRONIC KIDNEY DISEASE: Primary | ICD-10-CM

## 2024-12-06 DIAGNOSIS — E87.1 HYPONATREMIA: ICD-10-CM

## 2024-12-06 DIAGNOSIS — M31.30 GRANULOMATOSIS WITH POLYANGIITIS WITH PULMONARY INVOLVEMENT: ICD-10-CM

## 2024-12-06 DIAGNOSIS — E83.52 HYPERCALCEMIA: ICD-10-CM

## 2024-12-06 DIAGNOSIS — N06.9 ISOLATED PROTEINURIA WITH MORPHOLOGIC LESION: ICD-10-CM

## 2024-12-06 PROCEDURE — 99999 PR PBB SHADOW E&M-EST. PATIENT-LVL III: CPT | Mod: PBBFAC,,, | Performed by: INTERNAL MEDICINE

## 2024-12-06 NOTE — PROGRESS NOTES
Subjective:       Patient ID: Aracelis Weebr is a 84 y.o. White female who presents for return patient evaluation for chronic renal failure.      She has been treated with rituxan and steroids for Wegener's q 4-6 months.  She had COVID in November 2022.  She has chronic neuropathy in her feet.  She had edema with myrbetriq.  She is having nocturia and has frequency but she sees Urology.      Review of Systems   Constitutional:  Negative for appetite change, chills and fever.   HENT:  Positive for hearing loss. Negative for congestion.    Eyes:  Positive for visual disturbance (L eye).   Respiratory:  Negative for cough and shortness of breath.    Cardiovascular:  Negative for chest pain and leg swelling.   Gastrointestinal:  Negative for abdominal pain, diarrhea, nausea and vomiting.   Genitourinary:  Positive for difficulty urinating (urge incontinence) and frequency. Negative for dysuria and hematuria.   Musculoskeletal:  Positive for arthralgias (hands). Negative for back pain and myalgias.   Skin:  Negative for rash.   Neurological:  Positive for numbness (feet B). Negative for headaches.   Psychiatric/Behavioral:  Negative for sleep disturbance.        The past medical, family and social histories were reviewed for this encounter.     BP (!) 148/56 (BP Location: Right arm, Patient Position: Sitting)     Objective:      Physical Exam  Vitals reviewed.   Constitutional:       General: She is not in acute distress.     Appearance: She is well-developed.   HENT:      Head: Normocephalic and atraumatic.   Eyes:      General: No scleral icterus.     Conjunctiva/sclera: Conjunctivae normal.   Neck:      Vascular: No JVD.   Cardiovascular:      Rate and Rhythm: Normal rate and regular rhythm.      Heart sounds: Normal heart sounds. No murmur heard.     No friction rub. No gallop.   Pulmonary:      Effort: Pulmonary effort is normal. No respiratory distress.      Breath sounds: Normal breath sounds. No wheezing.    Abdominal:      General: Bowel sounds are normal. There is no distension.      Palpations: Abdomen is soft.      Tenderness: There is no abdominal tenderness.   Musculoskeletal:      Right lower leg: No edema.      Left lower leg: No edema.   Skin:     General: Skin is warm and dry.      Findings: No rash.   Neurological:      Mental Status: She is alert and oriented to person, place, and time.   Psychiatric:         Mood and Affect: Mood normal.         Behavior: Behavior normal.         Assessment:       1. Stage 3a chronic kidney disease    2. Wegener's disease, pulmonary    3. Isolated proteinuria with morphologic lesion    4. Hyponatremia    5. Hypercalcemia        Lab Results   Component Value Date    CREATININE 1.2 12/02/2024    BUN 19 12/02/2024     12/02/2024    K 4.1 12/02/2024     12/02/2024    CO2 25 12/02/2024     Lab Results   Component Value Date    PTH 22.4 12/02/2024    CALCIUM 9.8 12/02/2024    PHOS 3.9 12/02/2024     Lab Results   Component Value Date    HCT 33.3 (L) 09/20/2024     Prot/Creat Ratio, Urine   Date Value Ref Range Status   12/02/2024 0.13 0.00 - 0.20 Final   04/05/2024 Unable to calculate 0.00 - 0.20 Final   01/05/2024 0.26 (H) 0.00 - 0.20 Final     Plan:   Return to clinic in 6 months.  Labs for next visit include rp upc pth per standing orders.  Baseline creatinine is 0.8-1.2 since 2016.  UPC is negative.  PTH is 22 with calcium of 9.8.    Renal US shows R 9.6 cm L 12.4 cm.  She was treated for her disease and has responded well.   Blood pressure is controlled on the current regimen.  Continue current medications as prescribed and reviewed.

## 2024-12-10 PROBLEM — D64.9 ANEMIA: Status: ACTIVE | Noted: 2021-09-17

## 2024-12-12 DIAGNOSIS — G62.9 NEUROPATHY: ICD-10-CM

## 2024-12-14 RX ORDER — GABAPENTIN 400 MG/1
400 CAPSULE ORAL 3 TIMES DAILY
Qty: 270 CAPSULE | Refills: 2 | Status: SHIPPED | OUTPATIENT
Start: 2024-12-14

## 2024-12-20 ENCOUNTER — OFFICE VISIT (OUTPATIENT)
Dept: RHEUMATOLOGY | Facility: CLINIC | Age: 84
End: 2024-12-20
Payer: MEDICARE

## 2024-12-20 ENCOUNTER — HOSPITAL ENCOUNTER (OUTPATIENT)
Dept: RADIOLOGY | Facility: HOSPITAL | Age: 84
Discharge: HOME | End: 2024-12-20
Attending: INTERNAL MEDICINE
Payer: MEDICARE

## 2024-12-20 VITALS
DIASTOLIC BLOOD PRESSURE: 66 MMHG | HEART RATE: 85 BPM | HEIGHT: 62 IN | SYSTOLIC BLOOD PRESSURE: 124 MMHG | BODY MASS INDEX: 27.3 KG/M2 | WEIGHT: 148.38 LBS

## 2024-12-20 DIAGNOSIS — D84.9 IMMUNOCOMPROMISED: ICD-10-CM

## 2024-12-20 DIAGNOSIS — R06.00 DYSPNEA, UNSPECIFIED TYPE: ICD-10-CM

## 2024-12-20 DIAGNOSIS — M31.31 WEGENER'S GRANULOMATOSIS WITH RENAL INVOLVEMENT: ICD-10-CM

## 2024-12-20 DIAGNOSIS — M31.31 WEGENER'S GRANULOMATOSIS WITH RENAL INVOLVEMENT: Primary | ICD-10-CM

## 2024-12-20 PROCEDURE — 99215 OFFICE O/P EST HI 40 MIN: CPT | Mod: S$GLB,,, | Performed by: INTERNAL MEDICINE

## 2024-12-20 PROCEDURE — 1125F AMNT PAIN NOTED PAIN PRSNT: CPT | Mod: CPTII,S$GLB,, | Performed by: INTERNAL MEDICINE

## 2024-12-20 PROCEDURE — 1101F PT FALLS ASSESS-DOCD LE1/YR: CPT | Mod: CPTII,S$GLB,, | Performed by: INTERNAL MEDICINE

## 2024-12-20 PROCEDURE — 3078F DIAST BP <80 MM HG: CPT | Mod: CPTII,S$GLB,, | Performed by: INTERNAL MEDICINE

## 2024-12-20 PROCEDURE — 71046 X-RAY EXAM CHEST 2 VIEWS: CPT | Mod: TC,FY,PO

## 2024-12-20 PROCEDURE — 3288F FALL RISK ASSESSMENT DOCD: CPT | Mod: CPTII,S$GLB,, | Performed by: INTERNAL MEDICINE

## 2024-12-20 PROCEDURE — 1159F MED LIST DOCD IN RCRD: CPT | Mod: CPTII,S$GLB,, | Performed by: INTERNAL MEDICINE

## 2024-12-20 PROCEDURE — 71046 X-RAY EXAM CHEST 2 VIEWS: CPT | Mod: 26,,, | Performed by: RADIOLOGY

## 2024-12-20 PROCEDURE — 3074F SYST BP LT 130 MM HG: CPT | Mod: CPTII,S$GLB,, | Performed by: INTERNAL MEDICINE

## 2024-12-20 PROCEDURE — 99999 PR PBB SHADOW E&M-EST. PATIENT-LVL IV: CPT | Mod: PBBFAC,,, | Performed by: INTERNAL MEDICINE

## 2024-12-20 ASSESSMENT — ROUTINE ASSESSMENT OF PATIENT INDEX DATA (RAPID3)
TOTAL RAPID3 SCORE: 4.05
FATIGUE SCORE: 1.1
PAIN SCORE: 5
PSYCHOLOGICAL DISTRESS SCORE: 3.3
MDHAQ FUNCTION SCORE: 1.1
PATIENT GLOBAL ASSESSMENT SCORE: 3.5

## 2024-12-20 NOTE — PROGRESS NOTES
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 + fatigue. Hands are aching more with trying to wrap gift which she loves to do.   Sinuses are better than they used to be doing much better last 2 visits. This will be the first time she has gone 4 months w/o issues.     10/2024: Patient has some questions. We did check CBC which was a bit low with elevated inflammatory markers, was having increased urination was found to have UTI and is feeling better since being treated.   Sinuses have calmed down. She went to boat show.   Iron was not as low as she has been in past encouraged increased dietary iron.     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   John Paul would like to avoid at this time. She did review all the information  We discussed my concerns,     9/2024: RTX completed 500mg 7/8/2024, +COVID approx 7/16/24 held off on 2nd infusion. She has remarkably recovered and doing "ok". She is noting significant fatigue slight after RTX, much more after COVID. She is not driving alone so she is home few days which is lonely.   Recent seen with [facility] endoscopy with less crusting since RTX.     She is having a hard time with loss of hearing, loss of smell since the disease. Hard to not "smell the holidays"   Rapid onset peripheral neuropathy which I have had to attribute to her neuropathy.     6/2024: I signed Rituxan orders 4/15/24 she has not received. She was called to schedule and medication is approved but she is having a myriad of complaints with bleeding in the nose over a few months with sinus congestion. She had a debridement with Dr. Quinn  and this most recent one resolved nose bleeds, she states her sinuses are better than they have been in nearly 3 years. Mucous has stopped being so productive, she is avoiding navage and no worse.     Hearing " stable one hearing aid is not working.     No new illnesses she had URI 4/2024 none since  Renal function stable.   No SOB or cough no fevers.   She will have left lower tooth extracted tomorrow: Dr. Huang treated with ABX.     3/2024:  3/2024: Patient HSV did finally clear some residula angular chelitis/  Patient with dry cough typic this time of year no SOB  Sinuses have been pretty good.  No fever, chills, weight loss or increase in fatigue.    Wet AMD-O'Varghese injections monthly now  Hearing a bit worse as of recent.     1/2024:  With a recent URI her labs drawn 01/05/2024 this with the elevated sed rate and C-reactive protein she was negative for flu, RSV, COVID but given that she is having symptoms of some nosebleeds and while her baseline sinusitis is chronic seems to be an uptake in the amount of nasal discharge we are going to repeat her sed rate next month.  Her renal function is stable and she has a bit of anemia that is new.   We had been holding her Rituxan last infusion 07/20/2023 500mg x 1 , but I want to make sure we are not missing any underlying Wegener's activity.    She did have significant cough that took some time to improve, her sinuses feel continued inflammation, just started Z-pack.  Sharply marginated lucent lesion in the T10 vertebral body, possibly a hemangioma.             MRI completed with Ms Alonzo will just monitor for increase cervical stenosis or radicular [id number] at this time.   Did have vertebral fx.      9/2023: Patient was seen 6/2023 with anticipation of repeat FESS Sinus surgery and pending compound nasal irrigation. She did have surgery with Dr. Quinn end of June 2023, by July visit noted improvement with compound irrigation from prior. Still with chronic sinusitis.   Last Rituxan 500mg single dose 7/2023.    She is doing much better with the congestion but nothing like it was before. She denies fevers, chills, some cough, no hemoptysis.   She is down to Prednisone  2.5 mg daily but overall her edema is much improved.   Renal function stable  ESR and CRP    She does not want to take MTX she lost significant amounts of hair.   Imuran was lost with flare and transition to Imuran.     She has completed Rituxan 18 months with sinuses under control, lungs clear , renal function stable. Hearing is stable she lost nearly 90% of hearing and has stabilized.   She has mac degeneration.   Neuropathy continues to be a problem but we never have petechial rashes.   Edema is much improved.     3/2023:   Patient current on RTX Q 6 month maintanance following induction for Wegeners.    12/2/22 (Delayed COVID infection) RTX was 500mg with solumedrol 80mg IM   Completed Rituxan 1000mg for 1st  maintenance at 4 months 4/20/2022. due to rise in symptoms, and ESR   Induction 10/2021.      Continued nasal congestion and drainage.   Recent significant nosebleeds. Endoscopy with recurrent crusting despite office debridement by ENT and continued irrigation.      Will repeat serologies and inflammatory markers and check CD 19 levels first week March and see her later that month.   Patient doing very well. Still rhinitis, using navage. No antibiotics from ENT since last visit. She is still clearing significant mucous faint with dried blood.   Dry cough, no fevers, no SOB\  Prolia: 10/17/2022         11/2022  Patient doing very well. Still rhinitis, using navage. No antibiotics from ENT since last visit. She is still clearing significant mucous faint with dried blood.   Dry cough, no fevers, no SOB  Mild HA only with elevated BP.   Completed Rituxan maintanence at 4 months 4/20/2022.   Prolia 3/25/22  Sandi with catch up dose 2/10/22 and 3/25/2022     5/5/2022:   Patient doing very well. Still rhinitis, using navage. No antibiotics from ENT since last visit. She is still clearing significant mucous faint with dried blood.   Dry cough, no fevers, no SOB  Mild HA only with elevated BP.   Completed Rituxan  maintanence at 4 months 4/20/2022.   Prolia 3/25/22  Evusheld with catch up dose 2/10/22 and 3/25/2022.      Major complaints: pins, needles, stinging, burning in feet day and night is constant. Dr. Campbell: EB-N5 supplement is helping some, stopped for months and noted significant worsening. Dr. Campbell did increase from EB-N3.   Gabapentin 400mg TID  Hydrocodone only PRN  Prednisone 7.5mg         2/2022  Doing well no cough, no fevers. Recent COVID exposure but negative.   Seen with Nephro.   Given her successful response with Ritux. Agree to continue maintanance with Ritux Q 4-6 months     Needs to get #3 COVID vaccine timing with RTX. - will time this with me 2 weeks prior to   Discussed Prolia  Working on Evusheld for PreP with COVID> she is scheduled 2/10/22        12/9/2021  Patient's recent CXR 12/21/21 with marked improvement when compared to 10/21/21  Prednisone 10mg daily  S/p RTX x 4 infusions completion date. 11/5/21  Patient with rise in creatinine to 2.0 on 11/26 had been requiring lasix for marked edema since 11/1/21. Stopped 2 weeks ago. Edema is present but managed. Drinking about 40 oz water and 3 cups of coffee daily     Patient completed [facility] PT felt she was doing well home exercises about 2 weeks ago with acute pain in lumbar spine after exercising. Imaging lumbar few days ago without evidence of a fracture. She point to pain in the lumbosacral and radiating to buttock region. Ok to rise from seated. Pain more on left low back and buttock. No numbness no tingling but pain in the hamstring region. Comfortable sitting but not lying down, but she never sleeps in bed. Long hx of rather impressive scoliosis.         11/11/2021:   Inflammatory markers are markedly improved.   Cough dry still at night. Patient noting she has lost sense of smell. Can still taste.      Patient recently hospitatlized for Bronch with negative cultures to date.  There was a concern for possible atypical infection she has had  enlarging right upper lobe cavitary lesion measures still has a right middle lobe lesion with some cavitation now as well.  I have discussed this case extensively with both Dr. Schwartz as well as Dr. Joseph upon discharge we felt comfortable that this is likely Wegener's granulomatosis causing cavitary lesions.  Supporting sinus biopsy does note granuloma.    Patient was started on Imuran in March of 2021 but with the growth of her lesion we had rule out for any possible infection given her status of immunosuppression.  She is here today not in her usual state she appears fatigued pale she is not complaining of shortness of breath but does have a cough dry persistent.  She is noticing increased pedal edema.           9/13/21: patient with 2 falls 8 days apart. Spoke w/ pulm shortly following initial fall and we were scheduled for bronch when she sustained another fall.   Scheduled now for 9/17/21 bronch. RUL lesion.   Remains off Imuran  Prednisone 15mg daily.   Sinus congestion. Still productive      She is noting some productive cough at night no hemoptysis.   Patient denies SOB  Having more HA. -frontal and sinus, she is noting some drainage in right ear some mucous/blood.   No edema.   She notes fatigue, no fevers, no night sweats. No weight loss.   Skin easily bruising.      No personal hx of any malignancy.         7/20/2021:    Spoke with Pulm plan for bronch is able working on possible bx site vs BAL.   Need to r/o infectious/neoplastic and confirm for ANCA (GPA vasculitis)   Inflammatory markers markedly down .8  Now 14.   H/H improve from 7.4 to 8.2  Platelets normalized.   Renal stable over time     Patient did attend wedding with approx 300 people unmasked last weekend. Reviewed by recs for COVID precautions given     Current regimen:   pred 15mg (Na stable)  Holding Imuran     Interval events: PET with hypermetabolic lesions:   a. RUL cavitary/cystic lesion 1.9cm x 1.3cm  b. RUL spiculated 1.2cm x  "1.4cm  c. RML 1.7cm x 2.0 cm  All with differential: infectious, inflammatory, neoplastic, grannulomatous (a) with favor of inflammation given the rapid change     Fungal immunodiffusion neg  Quant gold and T spot: neg  H/H improving      Sinus congestion, pressure, headache, x 3 weeks very productive with blowing nose, back on navage. :  started Cindamycin with DR Quinn x 10 days.   Patient denies any shortness breath.   Very fatigues.   No more fevers. Slight dry cough, no blood/ no mucous.   No SOB.         5/12/21 completed nasal irrigation with biopsy not definitive for GPA, but + inflammation and granulomas. Temporal artery bx negative. Patient with PCP same day as labs 6/2/21  dx with UTI on keflex.      Was seen apparently in ED in [place] for 104F she had altered mental status, dx with UTI, dehydration, treated with. Completed all 7 days of keflex and within 48 hours with another fever 104 F. She continues with congestion but no worse. Patient denies cough, hemoptysis, bloody nasal discharge. She has had no fever x 10 days. Checks twice daily.   while travelling told "congestion in chest on xray". Inflammatory markers very high again.   Patient with recent concern for wegeners needs CT chest. CXR with new airspace opacities right chest- need to r/o GGO vs infection. CT has been ordered pending scheduling.   Broad ABX coverage recommended for now will cc Dr. Natarajan.      I am limited here with her  severe edema from higher dose steroids. Max tolerated is 20mg daily pred  Started Imuran 5/5/2021. Very low dose 50mg BID (0.65mg/kg) tolerating VERY well.   H/H still low.            2/2021  Sinususitis, cx were negative.  Using nasal irrigation with mucoid disharge.   Patient continues with significant nasal discharge and blood with scabs. We discussed Wegeners but pathology sinus no vasculitis, granuloma noted.   No HA, no blurred vision. Recent sinus infection with HA for 10 days. cx +, but also repeat labs " demonstarting +PR3 and +ANCA         Patient with c/o right > left 3,4 finger numbness that was intermittent until approximately 2 weeks about having near constant numbness in hands, pins and needles and pain.   Patient not noting much improvement with change in position.      Off MTX 6  tabs weekly.   Doing well with Hydroxychloroquine.   Declined COVID vaccine     Gabapentin up to 300mg TID.   Now with Dr. Campbell doing PT for Plantar fasciitis. She is taking supplement and compound cream for joint and neuropathy. No response to tarsal tunnel injection per Dr. Campbell.   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 WBC ok.         11/2020  Patient with PMR   MTX at 4 tabs weekly new anemia. Thrombocytosis  Pred at 10mg   Complicated with peripheral edema rather severe , started MTX seeing trending CRP/ESR but persistent leg pain.   Seen with PCP for neuropathy s/sx not seeing much benefit with gabpentin  Seen with Cardiology- no concern for cardiac ECHO ok. dc'd lasix for hyponatremia.      8/2020  Pred 20mg with mild edema, pred 30 mg with severe edema.   Current Pred 15mg daily  Rising ESR again.   Lasix 20 mg daily with improved edema but having some pins and needles in feet/legs.   Having tight, burning stinging at the toes and feet. Heaviness. Prickly.   Noting sodium is falling, K 5.2-5.4 still using               7/2020:  Hands not painful, knees not painful. Shoulder/cervical and down the arms, markedly improved. She still has some pain him hips and groin with walking and some weakness to the legs with edema. Some pain with fixing own hair.   She takes in AM regarding hips ache, some shoulder/arms, and again at night because hips/legs.   The clue is that the LDN new dose she had some ASE, previously tolerated 3mg daily fine.      Historical review of present Illness.   Patient with a recent chronic sinusitis  that ultimately warranted a septoplasty, endoscopic sinus surgery and turbinate reduction 5/2020   Four weeks postop t increasingly worse she was noting pain in her throat and ears lasting several hours complaint of pdizziness she had symptoms of ear pain and decreased hearing in the left ear.  She underwent a debridement at this visit she continue with compound nasal irrigations.  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     Patient did have repeat procedure on with biopsies taken 07/03/2020 showing right maxillary sinus chronically inflamed fibrotic polypoid portions benign nasal mucosa left maxillary sinus similar polypoid fragments ulcerated markedly inflamed respiratory mucosa focal foreign body giant cell response noted suggested that this might have been from her Gelfoam packing as a possible cause of this reaction     I have spoken with Dr. Quinn prior to patient's visit today and was a concern that she could have triggered some inflammatory response with the Gelfoam packing     Patient states she no longer has a headache is not noticing much ear drainage continues with profound loss of hearing on the left and rather significant on the right both sensorineural and some conductive changes.     Restarted on prednisone as of 7/20/20  10 mg patient has not noticed any change with her hearing in this time she did notice slight improvement with her joint aches and pains but is  still relying on hydrocodone for the bulk of this.     She denies a persistent headache she does have some tenderness about the preauricular region particularly on the right more than the left.  She has denied jaw claudication changes in her voice or any amaurosis fugax.  She has no pre-existing history of joint aches and pains hip or otherwise.  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  She had a workup that involved a negative rheumatoid factor, age appropriate sedimentation rate,C reactive protein JANET was positive 1:160 in speckled pattern           Previous: Hx:   She is having pain in her hand with stiffness and right 2nd PIP joint now unable to flex. 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   Hx of bilateral TKA 3 and 5 years ago and those are doing well.   She notes intermittent edema. Some hammer toe deformity bilaterally making shoes problematic but not painful otherwise.   Long hx of GERD-severe.   Cannot tolerate NSAIDs at all w/o gastritis.   Can use Hydrocodone PRN   Added Naltrexone at 3mg and doing very well  Lost 30# with WW.   Patient denies weight loss, rashes, dry eye, dry mouth, nasal or palatal ulcerations,  lymphadenopathy, Raynaud's, hx of DVT/miscarriages, psoriasis or family hx of psoriasis, rashes, serositis, anemia or other constitutional symptoms.  Asking about naltrexone  Component      Latest Ref Rng & Units 8/8/2020 7/29/2020 7/20/2020 7/13/2020   Sodium      136 - 145 mmol/L   126 (L) 128 (L)     Potassium      3.5 - 5.1 mmol/L   5.4 (H) 5.2 (H)     Chloride      95 - 110 mmol/L   92 (L) 92 (L)     CO2      23 - 29 mmol/L   25 29     Glucose      70 - 110 mg/dL   109 109     BUN, Bld      8 - 23 mg/dL   13 11     Creatinine      0.5 - 1.4 mg/dL   0.8 0.8     Calcium      8.7 - 10.5 mg/dL   9.5 9.7     Anion Gap      8 - 16 mmol/L   9 7 (L)     eGFR if African American      >60 mL/min/1.73 m:2    >60.0 >60.0     eGFR if non African American      >60 mL/min/1.73 m:2   >60.0 >60.0     Anti Sm Antibody      0.00 - 0.99 Ratio       0.04   Anti-Sm Interpretation      Negative       Negative   Anti Sm/RNP Antibody      0.00 - 0.99 Ratio       0.09   Anti-Sm/RNP Interpretation      Negative       Negative   JANET Screen      None Detected           Rheumatoid Factor      0.0 - 15.0 IU/mL           Sed Rate      0 - 36 mm/Hr 57 (H)   54 (H) 75 (H)   CRP      0.0 - 8.2 mg/L 75.4 (H) 56.1 (H) 57.5 (H) 99.4 (H)   Anti-Histone Antibody      0.0 - 0.9 Units       0.4   ds DNA Ab      Negative 1:10       Negative 1:10      Component      Latest Ref Rng & Units 6/25/2020 11/10/2018   Sodium      136 - 145 mmol/L       Potassium      3.5 - 5.1 mmol/L       Chloride      95 - 110 mmol/L       CO2      23 - 29 mmol/L       Glucose      70 - 110 mg/dL       BUN, Bld      8 - 23 mg/dL       Creatinine      0.5 - 1.4 mg/dL       Calcium      8.7 - 10.5 mg/dL       Anion Gap      8 - 16 mmol/L       eGFR if African American      >60 mL/min/1.73 m:2       eGFR if non African American      >60 mL/min/1.73 m:2       Anti Sm Antibody      0.00 - 0.99 Ratio       Anti-Sm Interpretation      Negative       Anti Sm/RNP Antibody      0.00 - 0.99 Ratio       Anti-Sm/RNP Interpretation      Negative       JANET Screen      None Detected Detected (A) Detected (A)   Rheumatoid Factor      0.0 - 15.0 IU/mL 11.6 <8.6   Sed Rate      0 - 36 mm/Hr       CRP      0.0 - 8.2 mg/L       Anti-Histone Antibody      0.0 - 0.9 Units       ds DNA Ab      Negative 1:10             REVIEW OF SYSTEMS:     Review of Systems   Constitutional: Negative for fever, malaise/fatigue and weight loss.   HENT: Negative for sore throat.    Eyes: Negative for double vision, photophobia and redness.   Respiratory: Negative for cough, shortness of breath and wheezing.    Cardiovascular: Negative for chest pain, palpitations and orthopnea.   Gastrointestinal: Negative for  "abdominal pain, constipation and diarrhea.   Genitourinary: Negative for dysuria, hematuria and urgency.   Musculoskeletal: Positive for joint pain. Negative for back pain and myalgias.   Skin: Negative for rash.   Neurological: Negative for dizziness, tingling, focal weakness and headaches.   Endo/Heme/Allergies: Does not bruise/bleed easily.   Psychiatric/Behavioral: Negative for depression, hallucinations and suicidal ideas.                     Objective:      Objective        Past Medical History:   Diagnosis Date    Anesthesia       'Mother stop breathing after anesthesia"    Chronic sinusitis      Depression      GERD (gastroesophageal reflux disease)      PVC (premature ventricular contraction)              Family History   Problem Relation Age of Onset    Heart disease Mother      Arthritis Mother      Cancer Sister      Arthritis Sister      Diabetes Sister      Hypertension Sister        Social History            Tobacco Use    Smoking status: Former       Current packs/day: 0.00       Types: Cigarettes       Start date: 2/3/1955       Quit date: 1/3/1960       Years since quittin.7    Smokeless tobacco: Never   Substance Use Topics    Alcohol use: Never       Alcohol/week: 0.0 standard drinks of alcohol    Drug use: Never                   Current Outpatient Medications on File Prior to Visit   Medication Sig Dispense Refill    acetaminophen (TYLENOL) 500 MG tablet Take 500 mg by mouth every 6 (six) hours as needed for Pain.        albuterol (PROVENTIL/VENTOLIN HFA) 90 mcg/actuation inhaler INHALE 2 PUFFS BY MOUTH EVERY 4 TO 6 HOURS AS NEEDED FOR SHORTNESS OF BREATH OR COUGH        ascorbate calcium (MAHSA-C ORAL) Take by mouth.        B2-B6 phos-levomef simran-mecobal (EB-N3 DR) 1.3-70-6-4 mg CpDR Take 1 capsule by mouth once daily.        cetirizine (ZYRTEC) 10 MG tablet TAKE 1 TABLET ONE TIME DAILY 90 tablet 0    denosumab (PROLIA SUBQ) Inject into the skin. Two times a year        diclofenac sodium " (VOLTAREN) 1 % Gel APPLY TO THE AFFECTED AREA(S) 2 GRAMS THREE TIMES DAILY 600 g 0    furosemide (LASIX) 20 MG tablet TAKE 1 TABLET EVERY DAY 90 tablet 3    gabapentin (NEURONTIN) 400 MG capsule TAKE 1 CAPSULE THREE TIMES DAILY 270 capsule 3    HYDROcodone-acetaminophen (NORCO) 7.5-325 mg per tablet Take 1 tablet by mouth every 6 (six) hours as needed.        ITRACONAZOLE, BULK, MISC EMPTY CONTENTS OF 1 CAPSULE INTO NASAL IRRIGATION SYSTEM, ADD DISTILLED WATER, SALT PACK, MIX & IRRIGATE. PERFORM 2 TIMES DAILY        LACTOBACILLUS ACIDOPHILUS ORAL Take by mouth.        metoprolol succinate (TOPROL-XL) 50 MG 24 hr tablet TAKE 1 TABLET EVERY DAY 90 tablet 3    mirabegron (MYRBETRIQ) 50 mg Tb24 Take 1 tablet (50 mg total) by mouth once daily. 30 tablet 11    neomycin-polymyxin-dexamethasone (MAXITROL) 3.5mg/mL-10,000 unit/mL-0.1 % DrpS          omeprazole (PRILOSEC) 40 MG capsule TAKE 1 CAPSULE EVERY MORNING 90 capsule 0    ondansetron (ZOFRAN-ODT) 8 MG TbDL Take 8 mg by mouth 2 (two) times daily.        peg 400-propylene glycol, PF, (SYSTANE, PF,) 0.4-0.3 % Dpet Apply to eye 2 (two) times a day.        predniSONE (DELTASONE) 2.5 MG tablet Take 1 tablet (2.5 mg total) by mouth once daily. 90 tablet 3    predniSONE (DELTASONE) 5 MG tablet Take 1 tablet (5 mg total) by mouth once daily. 30 tablet 3    sodium chloride 0.9% SolP 500 mL with riTUXimab 10 mg/mL Conc 375 mg/m2 Inject 375 mg/m2 into the vein. 4 to 6 months        tobramycin sulfate 0.3% (TOBREX) 0.3 % ophthalmic solution Place into both eyes.        vancomycin (VANCOCIN) 1,000 mg injection SMARTSI Vial(s) Both Nares Twice Daily        vit C/E/Zn/coppr/lutein/zeaxan (PRESERVISION AREDS-2 ORAL) Take 1 capsule by mouth 2 (two) times a day.         [DISCONTINUED] naltrexone capsule Take 3 mg by mouth once daily.          No current facility-administered medications on file prior to visit.             Vitals:     23 1306   BP: 134/65   Pulse: 69          Physical Exam:     Physical Exam   Constitutional: She is oriented to person, place, and time. She appears well-developed and well-nourished.   HENT:   Head: Normocephalic and atraumatic.   Mouth/Throat: Oropharynx is clear and moist.   Eyes: Pupils are equal, round, and reactive to light. EOM are normal.   Neck: Normal range of motion.   Cardiovascular: Normal rate, regular rhythm and normal heart sounds.   Pulmonary/Chest: Effort normal and breath sounds normal.   Musculoskeletal:        Right shoulder: She exhibits normal range of motion, no tenderness and no swelling.        Left shoulder: She exhibits normal range of motion, no tenderness and no swelling.        Right elbow: She exhibits normal range of motion and no swelling. No tenderness found.        Left elbow: She exhibits normal range of motion and no swelling. No tenderness found.        Right wrist: She exhibits normal range of motion, no tenderness and no swelling.        Left wrist: She exhibits normal range of motion, no tenderness and no swelling.        Right knee: She exhibits normal range of motion and no swelling. No tenderness found.        Left knee: She exhibits normal range of motion and no swelling. No tenderness found.        Right hand: She exhibits decreased range of motion and tenderness. She exhibits no swelling.        Left hand: She exhibits decreased range of motion and tenderness. She exhibits no swelling.        Right foot: There is normal range of motion, no tenderness and no swelling.        Left foot: There is normal range of motion, no tenderness and no swelling.   Bony hypertrophy at the PIP and DIP joints. +squaring at the CMC joint. No active synovitis on 28 joint exam.    Neurological: She is alert and oriented to person, place, and time.   Skin: Skin is warm and dry.   Psychiatric: She has a normal mood and affect. Her behavior is normal.      Component      Latest Ref Rng & Units 9/17/2021 9/17/2021          11:03 AM  11:03 AM   Respiratory Culture         No growth   Gram Stain (Respiratory)       No organisms seen No WBC's   AFB Culture & Smear             AFB CULTURE STAIN             GENET Prep             Fungus (Mycology) Culture             Aerobic Bacterial Culture                Component      Latest Ref Rng & Units 2021          11:03 AM   Respiratory Culture       No Staph aureus, MRSA or Pseudomonas isolated.   Gram Stain (Respiratory)       No organisms seen   AFB Culture & Smear           AFB CULTURE STAIN           GENET Prep           Fungus (Mycology) Culture           Aerobic Bacterial Culture              Component      Latest Ref Rng & Units 2021          11:03 AM 11:03 AM   Respiratory Culture       Normal respiratory nimesh     Gram Stain (Respiratory)       Moderate WBC's     AFB Culture & Smear         Culture in progress   AFB CULTURE STAIN         No acid fast bacilli seen.   KOH Prep         No yeast or fungal elements seen   Fungus (Mycology) Culture         Culture in progress   Aerobic Bacterial Culture                      Narrative  Performed by: [facility]  Patient - ONIEL ROSARIO                   - 1940 Sex- F   Med Rec # - [id number]                        Bimal Mckeon M.D.   The following is an electronic copy of report # [id number] from:   [facility]   [address] [place] [address]                         Phone [phone]   DIAGNOSIS:   2021  JL/sdc     1, 2.  RIGHT UPPER LOBE MASS BRUSHING AND FINE NEEDLE ASPIRATE BIOPSIES:   - NEGATIVE FOR MALIGNANT CELLS.     - PURULENT EXUDATE.     - A FEW FRAGMENTS OF BRONCHIAL MUCOSA WITH FLORID CHRONIC BRONCHITIS    AND REACTIVE SQUAMOUS    ATYPICAL METAPLASIA.       Comment:   Special stains (AFB and GMS) are negative for organisms. While no    granulomas are seen here, the inflammatory reaction appears to be    similar to that seen in the sinus material over the past  few years    (Delta [id number], [id number], [id number]). 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 Bronchoscopic material, particularly of the lower    respiratory tract, is of low yield for a definitive diagnosis of that    entity. The previous sinus material will be reviewed with    consideration for that diagnosis.   _______________________________________________________________________   SPECIMEN AND SOURCE:   1. RUL mass brushing   2. RUL mass needle     CLINICAL INFORMATION:   Clinical Information:-gt Right Upper Lobe Mass   Specific Site:-gt RUL mass brushing T Brush; RUL Mass; RUL mass-    microbiology; RUL BAL; RUL washing;   Other Requests:-gt N/A     GROSS DESCRIPTION:   1. Received 40 mL of fluid in formalin, 1 dry slides and 1 slides in    95% alcohol. Prepared one cell block.     2. Received 40 mL of fluid in formalin, 1 dry slides and 1 slides in    95% alcohol. Prepared one cell block.     CODE: 0     Cytotechnologist: ABDIFATAH Marvin (ASCP)   Pathologist: Scott Johnson MD (Electronic Signature) 09/22/2021 2:34 PM     [facility], [facility] * [address] [place] [address] * [place] [address]   Technical services performed at: [facility], [facility] * [address] [place] [address] * [place] [address]   Screening Site: [facility], [facility] * [address] [place] [address] *    [place] [address]                            Post Rituxan image                                     Pre Rituxan image  Assessment and Plan      Wegener's granulomatosis with renal involvement  -     X-Ray Chest PA And Lateral; Future; Expected date: 12/20/2024  -     Hepatitis B Core Antibody, Total; Future; Expected date: 12/20/2024  -     Hepatitis B Surface Antigen; Future; Expected date: 12/20/2024  -     HEPATITIS B SURFACE ANTIBODY; Future; Expected date: 12/20/2024  -     ALT (SGPT);  Future; Expected date: 12/20/2024  -     AST (SGOT); Future; Expected date: 12/20/2024  -     CBC Auto Differential; Future; Expected date: 12/20/2024  -     C-Reactive Protein; Future; Expected date: 12/20/2024  -     Creatinine, Serum; Future; Expected date: 12/20/2024  -     Sedimentation rate; Future; Expected date: 12/20/2024    Immunocompromised  -     X-Ray Chest PA And Lateral; Future; Expected date: 12/20/2024  -     Hepatitis B Core Antibody, Total; Future; Expected date: 12/20/2024  -     Hepatitis B Surface Antigen; Future; Expected date: 12/20/2024  -     HEPATITIS B SURFACE ANTIBODY; Future; Expected date: 12/20/2024  -     ALT (SGPT); Future; Expected date: 12/20/2024  -     AST (SGOT); Future; Expected date: 12/20/2024  -     CBC Auto Differential; Future; Expected date: 12/20/2024  -     C-Reactive Protein; Future; Expected date: 12/20/2024  -     Creatinine, Serum; Future; Expected date: 12/20/2024  -     Sedimentation rate; Future; Expected date: 12/20/2024    Dyspnea, unspecified type  -     X-Ray Chest PA And Lateral; Future; Expected date: 12/20/2024          Patient is a 83-year-old female she has had a recent robust inflammatory reaction within the sinus cavity as well as hearing deficit following a sinus surgery.  She has been repeated cultured postoperatively completed antibiotics and irrigations negative for any fungal infections or bacterial infections.   Patient was doing well on MTX with regard to ESR and CRP, but hair loss.      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               Completed RTX 11/5/21 Induction               Review of data and feel : The best data supporting the use of  as maintenance therapy come from the Maintenance of Remission using Rituximab in Systemic ANCA-associated Vasculitis (MAINRITSAN) trial with less relapse for PR3+ paitients when treated with Rituxan at 500mg IV at 6, 12, and 18months when  compared to Imuran. Patient has tolerated RTX last 4/20/2022, she had to cancel 10/21/2022 for COVID. Did receive 500mg on 12/2022. Received again 7/2023. Patient has completed 18 months of Rituxan therapy.         She completed one dose of Rituxan 500mg 7/2024 cancelled 2nd infusion due to +COVID and I did not want to repeat following COVID infection.   Gave her information on Tavneos she declined at this time.   Milkaue to have Rituxan approximately every 6 months or as warranted by disease activity. We will get her next infusion for Jan 2025 set up now.       Continue omeprazole (prilosec)  40mg by mouth daily.       Patient hx of Osteopenia only, renal function not stable enough for oral bisphos.                        Continues with Calcium and Vitamin D.

## 2025-01-29 ENCOUNTER — TELEPHONE (OUTPATIENT)
Dept: RHEUMATOLOGY | Facility: CLINIC | Age: 85
End: 2025-01-29
Payer: MEDICARE

## 2025-01-29 NOTE — TELEPHONE ENCOUNTER
----- Message from Dianna sent at 1/29/2025 10:58 AM CST -----  Contact: self  Type:  Needs Medical Advice    Who Called: self  Symptoms (please be specific): pt needs to speak to nurse regarding a new condition that the dr needs to know about, pt sts her dermatologist found she has a outbreak on mouth and confirmed it is staph. Pt is due to get an infusion treatment and needs to know if she needs to have it or not. Please call pt to discuss.     Would the patient rather a call back or a response via [facility]? call  Best Call Back Number: [phone] (home) [phone] (work)    Additional Information: please advise and thank you.

## 2025-01-29 NOTE — TELEPHONE ENCOUNTER
----- Message from Dianna sent at 1/29/2025 10:58 AM CST -----  Contact: self  Type:  Needs Medical Advice    Who Called: self  Symptoms (please be specific): pt needs to speak to nurse regarding a new condition that the dr needs to know about, pt sts her dermatologist found she has a outbreak on mouth and confirmed it is staph. Pt is due to get an infusion treatment and needs to know if she needs to have it or not. Please call pt to discuss.     Would the patient rather a call back or a response via [facility]? call  Best Call Back Number: [phone] (home) [phone] (work)    Additional Information: please advise and thank you.

## 2025-01-29 NOTE — TELEPHONE ENCOUNTER
Patient wants to update the provider that she had appointment with Dr Gooden (Dermatology) and is on antibiotics for mouth ulcers or outbreak that has tested positive for staph. Will route to provider to look at progress notes from Dr Gooden from 1/24/2025. Patient asking if she needs to hold off on infusion. Nurse told patient that is usually the case until antibiotics are completed and no sign of infection. Will route to Dr Snyder to review and advise.

## 2025-01-31 NOTE — TELEPHONE ENCOUNTER
Not in this scenario. This is a superficial skin infection she may continue with Rituxan as planned AND be sure to complete the entire course of Doxycycline from Dr MICAELA Gooden.     Dr. Snyder

## 2025-01-31 NOTE — TELEPHONE ENCOUNTER
Per Dr Snyder:  Not in this scenario. This is a superficial skin infection she may continue with Rituxan as planned AND be sure to complete the entire course of Doxycycline from Dr MICAELA Gooden.      Dr. Snyder

## 2025-02-03 ENCOUNTER — TELEPHONE (OUTPATIENT)
Dept: INFUSION THERAPY | Facility: HOSPITAL | Age: 85
End: 2025-02-03
Payer: MEDICARE

## 2025-02-03 NOTE — TELEPHONE ENCOUNTER
----- Message from Yamile sent at 2/3/2025 10:58 AM CST -----  Type: Needs Medical Advice  Who Called:  PT    Best Call Back Number: [phone]   Additional Information: requesting a call back to schedule infusion from referral

## 2025-02-17 ENCOUNTER — INFUSION (OUTPATIENT)
Dept: INFUSION THERAPY | Facility: HOSPITAL | Age: 85
End: 2025-02-17
Attending: INTERNAL MEDICINE
Payer: MEDICARE

## 2025-02-17 VITALS
TEMPERATURE: 98 F | HEIGHT: 62 IN | RESPIRATION RATE: 16 BRPM | HEART RATE: 60 BPM | OXYGEN SATURATION: 94 % | SYSTOLIC BLOOD PRESSURE: 145 MMHG | DIASTOLIC BLOOD PRESSURE: 62 MMHG | BODY MASS INDEX: 27.02 KG/M2 | WEIGHT: 146.81 LBS

## 2025-02-17 DIAGNOSIS — I77.6 VASCULITIS: Primary | ICD-10-CM

## 2025-02-17 DIAGNOSIS — H91.90 HEARING LOSS, UNSPECIFIED HEARING LOSS TYPE, UNSPECIFIED LATERALITY: ICD-10-CM

## 2025-02-17 DIAGNOSIS — M31.31 WEGENER'S GRANULOMATOSIS WITH RENAL INVOLVEMENT: ICD-10-CM

## 2025-02-17 PROCEDURE — 96415 CHEMO IV INFUSION ADDL HR: CPT | Mod: PN

## 2025-02-17 PROCEDURE — 25000003 PHARM REV CODE 250: Mod: PN | Performed by: INTERNAL MEDICINE

## 2025-02-17 PROCEDURE — 96375 TX/PRO/DX INJ NEW DRUG ADDON: CPT | Mod: PN

## 2025-02-17 PROCEDURE — 96367 TX/PROPH/DG ADDL SEQ IV INF: CPT | Mod: PN

## 2025-02-17 PROCEDURE — 63600175 PHARM REV CODE 636 W HCPCS: Mod: JZ,TB,PN | Performed by: INTERNAL MEDICINE

## 2025-02-17 PROCEDURE — 96413 CHEMO IV INFUSION 1 HR: CPT | Mod: PN

## 2025-02-17 RX ORDER — METHYLPREDNISOLONE SOD SUCC 125 MG
100 VIAL (EA) INJECTION
Status: CANCELLED | OUTPATIENT
Start: 2025-02-17

## 2025-02-17 RX ORDER — SODIUM CHLORIDE 0.9 % (FLUSH) 0.9 %
10 SYRINGE (ML) INJECTION
Status: DISCONTINUED | OUTPATIENT
Start: 2025-02-17 | End: 2025-02-17 | Stop reason: HOSPADM

## 2025-02-17 RX ORDER — HEPARIN 100 UNIT/ML
500 SYRINGE INTRAVENOUS
Status: DISCONTINUED | OUTPATIENT
Start: 2025-02-17 | End: 2025-02-17 | Stop reason: HOSPADM

## 2025-02-17 RX ORDER — HEPARIN 100 UNIT/ML
500 SYRINGE INTRAVENOUS
OUTPATIENT
Start: 2025-02-17

## 2025-02-17 RX ORDER — EPINEPHRINE 0.3 MG/.3ML
0.3 INJECTION SUBCUTANEOUS ONCE AS NEEDED
OUTPATIENT
Start: 2025-02-17

## 2025-02-17 RX ORDER — FAMOTIDINE 10 MG/ML
20 INJECTION INTRAVENOUS
Status: COMPLETED | OUTPATIENT
Start: 2025-02-17 | End: 2025-02-17

## 2025-02-17 RX ORDER — FAMOTIDINE 10 MG/ML
20 INJECTION INTRAVENOUS
Status: CANCELLED | OUTPATIENT
Start: 2025-02-17

## 2025-02-17 RX ORDER — ACETAMINOPHEN 325 MG/1
650 TABLET ORAL
Status: CANCELLED | OUTPATIENT
Start: 2025-02-17

## 2025-02-17 RX ORDER — SODIUM CHLORIDE 0.9 % (FLUSH) 0.9 %
10 SYRINGE (ML) INJECTION
OUTPATIENT
Start: 2025-02-17

## 2025-02-17 RX ORDER — EPINEPHRINE 0.3 MG/.3ML
0.3 INJECTION SUBCUTANEOUS ONCE AS NEEDED
Status: DISCONTINUED | OUTPATIENT
Start: 2025-02-17 | End: 2025-02-17 | Stop reason: HOSPADM

## 2025-02-17 RX ORDER — METHYLPREDNISOLONE SOD SUCC 125 MG
100 VIAL (EA) INJECTION
Status: COMPLETED | OUTPATIENT
Start: 2025-02-17 | End: 2025-02-17

## 2025-02-17 RX ORDER — ACETAMINOPHEN 325 MG/1
650 TABLET ORAL
Status: COMPLETED | OUTPATIENT
Start: 2025-02-17 | End: 2025-02-17

## 2025-02-17 RX ORDER — DIPHENHYDRAMINE HYDROCHLORIDE 50 MG/ML
50 INJECTION INTRAMUSCULAR; INTRAVENOUS ONCE AS NEEDED
Status: DISCONTINUED | OUTPATIENT
Start: 2025-02-17 | End: 2025-02-17 | Stop reason: HOSPADM

## 2025-02-17 RX ORDER — DIPHENHYDRAMINE HYDROCHLORIDE 50 MG/ML
50 INJECTION INTRAMUSCULAR; INTRAVENOUS ONCE AS NEEDED
OUTPATIENT
Start: 2025-02-17

## 2025-02-17 RX ADMIN — ACETAMINOPHEN 650 MG: 325 TABLET ORAL at 10:02

## 2025-02-17 RX ADMIN — METHYLPREDNISOLONE SODIUM SUCCINATE 100 MG: 125 INJECTION, POWDER, FOR SOLUTION INTRAMUSCULAR; INTRAVENOUS at 10:02

## 2025-02-17 RX ADMIN — SODIUM CHLORIDE: 9 INJECTION, SOLUTION INTRAVENOUS at 10:02

## 2025-02-17 RX ADMIN — DIPHENHYDRAMINE HYDROCHLORIDE 50 MG: 50 INJECTION INTRAMUSCULAR; INTRAVENOUS at 11:02

## 2025-02-17 RX ADMIN — FAMOTIDINE 20 MG: 10 INJECTION INTRAVENOUS at 10:02

## 2025-02-17 RX ADMIN — SODIUM CHLORIDE 500 MG: 9 INJECTION, SOLUTION INTRAVENOUS at 11:02

## 2025-02-17 NOTE — PLAN OF CARE
Problem: Adult Inpatient Plan of Care  Goal: Plan of Care Review  Outcome: Progressing  Flowsheets (Taken 2/17/2025 1111)  Plan of Care Reviewed With:   patient   spouse  Goal: Patient-Specific Goal (Individualized)  Outcome: Progressing  Flowsheets (Taken 2/17/2025 1111)  Individualized Care Needs: Recliner, warm blanket,  at chairside, education, conversation  Anxieties, Fears or Concerns: I requested a window seat  Patient/Family-Specific Goals (Include Timeframe): Free of S/S of reaction with treatment.     Problem: Fatigue  Goal: Improved Activity Tolerance  Outcome: Progressing    Patient to Infusion for Ruxience, accompanied by her . Treatment plan reviewed with patient. VSS. Tolerated infusion. Provided with copy of upcoming appointment schedule. Escorted to the front lobby in no distress for discharge to home.

## 2025-02-26 ENCOUNTER — TELEPHONE (OUTPATIENT)
Dept: INFUSION THERAPY | Facility: HOSPITAL | Age: 85
End: 2025-02-26
Payer: MEDICARE

## 2025-02-26 NOTE — TELEPHONE ENCOUNTER
----- Message from Alberta sent at 2/25/2025  1:47 PM CST -----  Type: Needs Medical AdviceWho Called:  Patient Symptoms (please be specific):  How long has patient had these symptoms:  Pharmacy name and phone #:  Best Call Back Number: [phone] Information: Patient called to inform that she would like to have a chair with window seating not chair 32.

## 2025-02-28 ENCOUNTER — TELEPHONE (OUTPATIENT)
Dept: RHEUMATOLOGY | Facility: CLINIC | Age: 85
End: 2025-02-28
Payer: MEDICARE

## 2025-02-28 RX ORDER — METHYLPREDNISOLONE SOD SUCC 125 MG
100 VIAL (EA) INJECTION
OUTPATIENT
Start: 2025-02-28

## 2025-02-28 RX ORDER — FAMOTIDINE 10 MG/ML
20 INJECTION INTRAVENOUS
OUTPATIENT
Start: 2025-03-03

## 2025-02-28 RX ORDER — SODIUM CHLORIDE 0.9 % (FLUSH) 0.9 %
10 SYRINGE (ML) INJECTION
OUTPATIENT
Start: 2025-02-28

## 2025-02-28 RX ORDER — ACETAMINOPHEN 325 MG/1
650 TABLET ORAL
OUTPATIENT
Start: 2025-03-03

## 2025-02-28 RX ORDER — METHYLPREDNISOLONE SOD SUCC 125 MG
100 VIAL (EA) INJECTION
OUTPATIENT
Start: 2025-03-03

## 2025-02-28 RX ORDER — ACETAMINOPHEN 325 MG/1
650 TABLET ORAL
OUTPATIENT
Start: 2025-02-28

## 2025-02-28 RX ORDER — EPINEPHRINE 0.3 MG/.3ML
0.3 INJECTION SUBCUTANEOUS ONCE AS NEEDED
OUTPATIENT
Start: 2025-02-28

## 2025-02-28 RX ORDER — HEPARIN 100 UNIT/ML
500 SYRINGE INTRAVENOUS
OUTPATIENT
Start: 2025-02-28

## 2025-02-28 RX ORDER — FAMOTIDINE 10 MG/ML
20 INJECTION INTRAVENOUS
OUTPATIENT
Start: 2025-02-28

## 2025-02-28 RX ORDER — DIPHENHYDRAMINE HYDROCHLORIDE 50 MG/ML
50 INJECTION INTRAMUSCULAR; INTRAVENOUS ONCE AS NEEDED
OUTPATIENT
Start: 2025-02-28

## 2025-02-28 NOTE — TELEPHONE ENCOUNTER
" Dose Frequency Start End   acetaminophen tablet 650 mg (Completed) 650 mg Clinic/HOD 1 time 2/17/2025 2/17/2025   Admin Instructions: Premedication for rituximab - administer 30 minutes prior to starting rituximab infusion.   Route: Oral   diphenhydrAMINE (BENADRYL) 50 mg in NS 50 mL IVPB (Completed) 50 mg Clinic/HOD 1 time 2/17/2025 2/17/2025   Admin Instructions: Premedication for rituximab - administer 30 minutes prior to starting rituximab infusion.   Route: Intravenous   famotidine (PF) injection 20 mg (Completed) 20 mg Clinic/HOD 1 time 2/17/2025 2/17/2025   Admin Instructions: Premedication for rituximab - administer 30 minutes prior to starting rituximab infusion.   Route: Intravenous   methylPREDNISolone sodium succinate injection 100 mg (Completed) 100 mg [facility]/HOD 1 time 2/17/2025 2/17/2025   Admin Instructions: For IV push administration: total IV push dose up to 250 mg of methylPREDNISolone may be administered over 5 minutes. For IM administration: AVOID the deltoid muscle.   Route: Intravenous   riTUXimab-pvvr (RUXIENCE) 500 mg in 0.9% NaCl 500 mL infusion (conc: 1 mg/mL) (Completed) 500 mg [facility]/HOD 1 time 2/17/2025 2/17/2025   Admin Instructions: "HIGH ALERT MEDICATION"     "

## 2025-03-03 ENCOUNTER — INFUSION (OUTPATIENT)
Dept: INFUSION THERAPY | Facility: HOSPITAL | Age: 85
End: 2025-03-03
Attending: INTERNAL MEDICINE
Payer: MEDICARE

## 2025-03-03 VITALS
OXYGEN SATURATION: 99 % | WEIGHT: 146.81 LBS | HEART RATE: 73 BPM | TEMPERATURE: 98 F | SYSTOLIC BLOOD PRESSURE: 119 MMHG | RESPIRATION RATE: 18 BRPM | DIASTOLIC BLOOD PRESSURE: 65 MMHG | HEIGHT: 62 IN | BODY MASS INDEX: 27.02 KG/M2

## 2025-03-03 DIAGNOSIS — H91.90 HEARING LOSS, UNSPECIFIED HEARING LOSS TYPE, UNSPECIFIED LATERALITY: ICD-10-CM

## 2025-03-03 DIAGNOSIS — I77.6 VASCULITIS: Primary | ICD-10-CM

## 2025-03-03 DIAGNOSIS — M31.31 WEGENER'S GRANULOMATOSIS WITH RENAL INVOLVEMENT: ICD-10-CM

## 2025-03-03 PROCEDURE — 96375 TX/PRO/DX INJ NEW DRUG ADDON: CPT | Mod: PN

## 2025-03-03 PROCEDURE — 96367 TX/PROPH/DG ADDL SEQ IV INF: CPT | Mod: PN

## 2025-03-03 PROCEDURE — 96415 CHEMO IV INFUSION ADDL HR: CPT | Mod: PN

## 2025-03-03 PROCEDURE — 96413 CHEMO IV INFUSION 1 HR: CPT | Mod: PN

## 2025-03-03 PROCEDURE — 63600175 PHARM REV CODE 636 W HCPCS: Mod: JZ,TB,PN | Performed by: INTERNAL MEDICINE

## 2025-03-03 PROCEDURE — 25000003 PHARM REV CODE 250: Mod: PN | Performed by: INTERNAL MEDICINE

## 2025-03-03 RX ORDER — ACETAMINOPHEN 325 MG/1
650 TABLET ORAL
OUTPATIENT
Start: 2025-03-17

## 2025-03-03 RX ORDER — EPINEPHRINE 0.3 MG/.3ML
0.3 INJECTION SUBCUTANEOUS ONCE AS NEEDED
OUTPATIENT
Start: 2025-03-17

## 2025-03-03 RX ORDER — ACETAMINOPHEN 325 MG/1
650 TABLET ORAL
Status: COMPLETED | OUTPATIENT
Start: 2025-03-03 | End: 2025-03-03

## 2025-03-03 RX ORDER — SODIUM CHLORIDE 0.9 % (FLUSH) 0.9 %
10 SYRINGE (ML) INJECTION
OUTPATIENT
Start: 2025-03-17

## 2025-03-03 RX ORDER — FAMOTIDINE 10 MG/ML
20 INJECTION INTRAVENOUS
OUTPATIENT
Start: 2025-03-17

## 2025-03-03 RX ORDER — METHYLPREDNISOLONE SOD SUCC 125 MG
100 VIAL (EA) INJECTION
OUTPATIENT
Start: 2025-03-17

## 2025-03-03 RX ORDER — HEPARIN 100 UNIT/ML
500 SYRINGE INTRAVENOUS
OUTPATIENT
Start: 2025-03-17

## 2025-03-03 RX ORDER — FAMOTIDINE 10 MG/ML
20 INJECTION INTRAVENOUS
Status: COMPLETED | OUTPATIENT
Start: 2025-03-03 | End: 2025-03-03

## 2025-03-03 RX ORDER — METHYLPREDNISOLONE SOD SUCC 125 MG
100 VIAL (EA) INJECTION
Status: COMPLETED | OUTPATIENT
Start: 2025-03-03 | End: 2025-03-03

## 2025-03-03 RX ORDER — DIPHENHYDRAMINE HYDROCHLORIDE 50 MG/ML
50 INJECTION INTRAMUSCULAR; INTRAVENOUS ONCE AS NEEDED
OUTPATIENT
Start: 2025-03-17

## 2025-03-03 RX ADMIN — ACETAMINOPHEN 650 MG: 325 TABLET ORAL at 10:03

## 2025-03-03 RX ADMIN — METHYLPREDNISOLONE SODIUM SUCCINATE 100 MG: 125 INJECTION, POWDER, FOR SOLUTION INTRAMUSCULAR; INTRAVENOUS at 11:03

## 2025-03-03 RX ADMIN — SODIUM CHLORIDE 50 MG: 9 INJECTION, SOLUTION INTRAVENOUS at 10:03

## 2025-03-03 RX ADMIN — FAMOTIDINE 20 MG: 10 INJECTION INTRAVENOUS at 10:03

## 2025-03-03 RX ADMIN — SODIUM CHLORIDE 500 MG: 9 INJECTION, SOLUTION INTRAVENOUS at 11:03

## 2025-03-03 NOTE — PLAN OF CARE
Tolerated ruxience well.  No reactions noted.  No questions or concerns at this time.  Ambulated off unit in NAD.

## 2025-03-05 ENCOUNTER — TELEPHONE (OUTPATIENT)
Dept: RHEUMATOLOGY | Facility: CLINIC | Age: 85
End: 2025-03-05
Payer: MEDICARE

## 2025-03-05 DIAGNOSIS — R68.89 RIGORS: Primary | ICD-10-CM

## 2025-03-05 RX ORDER — HYDROCODONE BITARTRATE AND ACETAMINOPHEN 5; 325 MG/1; MG/1
1 TABLET ORAL EVERY 12 HOURS PRN
Qty: 2 TABLET | Refills: 0 | Status: SHIPPED | OUTPATIENT
Start: 2025-03-05 | End: 2025-04-04

## 2025-03-05 NOTE — TELEPHONE ENCOUNTER
We can offer over the counter Benedryl 25mg today and I am sending Hydrocodone low dose for her to try. I sent to tablets she can even try taking just one HALF tablet first if not improved in 1 hour then can take the other HALF.      if not improving in next few hours to 24 hours to let me know or go to ER. Sometimes morhpine in the ER can help stop rigors.     Dr. CORBIN

## 2025-03-05 NOTE — TELEPHONE ENCOUNTER
Patient had Rituxan infusion on 3/3/25. She woke up this afternoon (she slept in) weak and shaking.   Patient states this has happened a couple times after infusion and usually resolves within a day.   Patient was advised to eat something as she has not eaten today.   Denies any other symptoms but some weakness and shaking. No shortness of breath at this time noted.   No distress is noted in Ms White voice and she states she wants to let Dr Snyder know.  This message is being routed to the provider to review and advise.  Next office visit is 3/21/25

## 2025-03-05 NOTE — TELEPHONE ENCOUNTER
Discussed Dr Gomez message (recommendations) with Ms Weber. She will have  pickup medications now and will update as needed. Patient was advised to go to ER if symptoms do not resolve within 24 hours. She voiced understanding.

## 2025-03-05 NOTE — TELEPHONE ENCOUNTER
----- Message from Jaida sent at 3/5/2025  2:04 PM CST -----  Regarding: Call  Type:  Needs Medical AdviceWho Called: PtWould the patient rather a call back or a response via [facility]? CallBest Call Back Number: [phone] Information: Pt had infusion treatment on Monday, she was doing Okay until today. Today she is weak and shaking, Pt is requesting a call back. Thanks

## 2025-03-18 ENCOUNTER — TELEPHONE (OUTPATIENT)
Dept: RHEUMATOLOGY | Facility: CLINIC | Age: 85
End: 2025-03-18
Payer: MEDICARE

## 2025-03-18 NOTE — TELEPHONE ENCOUNTER
----- Message from Jarrett sent at 3/18/2025 11:16 AM CDT -----  Contact: Pt [phone]  Type:  Sooner Apoointment RequestCaller is requesting a sooner appointment.  Caller declined first available appointment listed below.  Caller will not accept being placed on the waitlist and is requesting a message be sent to doctor.Name of Caller:PtWhen is the first available appointment?4/16Symptoms:F/uWould the patient rather a call back or a response via [facility]? CallBe Call Back Number:[phone] Additional Information: Pt adv she was told 2pm for her appt time on 3/21 then today learned it was 10:30am. She adv she cannot make the appt for 10:30am and is req a sooner appt than next avail. Pt declined appt with any other provider. Pls call back and adv. Thank you.

## 2025-03-21 ENCOUNTER — LAB VISIT (OUTPATIENT)
Dept: LAB | Facility: HOSPITAL | Age: 85
End: 2025-03-21
Attending: INTERNAL MEDICINE
Payer: MEDICARE

## 2025-03-21 ENCOUNTER — OFFICE VISIT (OUTPATIENT)
Dept: RHEUMATOLOGY | Facility: CLINIC | Age: 85
End: 2025-03-21
Payer: MEDICARE

## 2025-03-21 VITALS
HEART RATE: 78 BPM | WEIGHT: 146.38 LBS | DIASTOLIC BLOOD PRESSURE: 56 MMHG | HEIGHT: 62 IN | BODY MASS INDEX: 26.94 KG/M2 | SYSTOLIC BLOOD PRESSURE: 107 MMHG

## 2025-03-21 DIAGNOSIS — M31.30 GRANULOMATOSIS WITH POLYANGIITIS WITH MULTISYSTEM INVOLVEMENT: ICD-10-CM

## 2025-03-21 DIAGNOSIS — D64.9 ANEMIA, UNSPECIFIED TYPE: ICD-10-CM

## 2025-03-21 DIAGNOSIS — D84.821 IMMUNOSUPPRESSION DUE TO DRUG THERAPY: Primary | ICD-10-CM

## 2025-03-21 DIAGNOSIS — G62.9 PERIPHERAL POLYNEUROPATHY: ICD-10-CM

## 2025-03-21 DIAGNOSIS — Z79.899 HIGH RISK MEDICATIONS (NOT ANTICOAGULANTS) LONG-TERM USE: ICD-10-CM

## 2025-03-21 DIAGNOSIS — Z79.899 IMMUNOSUPPRESSION DUE TO DRUG THERAPY: ICD-10-CM

## 2025-03-21 DIAGNOSIS — Z79.899 IMMUNOSUPPRESSION DUE TO DRUG THERAPY: Primary | ICD-10-CM

## 2025-03-21 DIAGNOSIS — D84.821 IMMUNOSUPPRESSION DUE TO DRUG THERAPY: ICD-10-CM

## 2025-03-21 LAB
ANION GAP SERPL CALC-SCNC: 9 MMOL/L (ref 8–16)
BASOPHILS # BLD AUTO: 0.04 K/UL (ref 0–0.2)
BASOPHILS NFR BLD: 0.5 % (ref 0–1.9)
BUN SERPL-MCNC: 23 MG/DL (ref 8–23)
CALCIUM SERPL-MCNC: 9.2 MG/DL (ref 8.7–10.5)
CHLORIDE SERPL-SCNC: 102 MMOL/L (ref 95–110)
CO2 SERPL-SCNC: 23 MMOL/L (ref 23–29)
CREAT SERPL-MCNC: 1.3 MG/DL (ref 0.5–1.4)
CRP SERPL-MCNC: 7.6 MG/L (ref 0–8.2)
DIFFERENTIAL METHOD BLD: ABNORMAL
EOSINOPHIL # BLD AUTO: 0.1 K/UL (ref 0–0.5)
EOSINOPHIL NFR BLD: 1.1 % (ref 0–8)
ERYTHROCYTE [DISTWIDTH] IN BLOOD BY AUTOMATED COUNT: 15.8 % (ref 11.5–14.5)
ERYTHROCYTE [SEDIMENTATION RATE] IN BLOOD BY PHOTOMETRIC METHOD: 52 MM/HR (ref 0–36)
EST. GFR  (NO RACE VARIABLE): 41.1 ML/MIN/1.73 M^2
FERRITIN SERPL-MCNC: 84 NG/ML (ref 20–300)
GLUCOSE SERPL-MCNC: 82 MG/DL (ref 70–110)
HCT VFR BLD AUTO: 31.7 % (ref 37–48.5)
HGB BLD-MCNC: 10 G/DL (ref 12–16)
IMM GRANULOCYTES # BLD AUTO: 0.09 K/UL (ref 0–0.04)
IMM GRANULOCYTES NFR BLD AUTO: 1.1 % (ref 0–0.5)
IRON SERPL-MCNC: 66 UG/DL (ref 30–160)
LYMPHOCYTES # BLD AUTO: 1 K/UL (ref 1–4.8)
LYMPHOCYTES NFR BLD: 12.4 % (ref 18–48)
MCH RBC QN AUTO: 28.6 PG (ref 27–31)
MCHC RBC AUTO-ENTMCNC: 31.5 G/DL (ref 32–36)
MCV RBC AUTO: 91 FL (ref 82–98)
MONOCYTES # BLD AUTO: 0.9 K/UL (ref 0.3–1)
MONOCYTES NFR BLD: 10.2 % (ref 4–15)
NEUTROPHILS # BLD AUTO: 6.2 K/UL (ref 1.8–7.7)
NEUTROPHILS NFR BLD: 74.7 % (ref 38–73)
NRBC BLD-RTO: 0 /100 WBC
PLATELET # BLD AUTO: 352 K/UL (ref 150–450)
PMV BLD AUTO: 9.3 FL (ref 9.2–12.9)
POTASSIUM SERPL-SCNC: 5.1 MMOL/L (ref 3.5–5.1)
RBC # BLD AUTO: 3.5 M/UL (ref 4–5.4)
SATURATED IRON: 23 % (ref 20–50)
SODIUM SERPL-SCNC: 134 MMOL/L (ref 136–145)
TOTAL IRON BINDING CAPACITY: 292 UG/DL (ref 250–450)
TRANSFERRIN SERPL-MCNC: 197 MG/DL (ref 200–375)
WBC # BLD AUTO: 8.3 K/UL (ref 3.9–12.7)

## 2025-03-21 PROCEDURE — 3074F SYST BP LT 130 MM HG: CPT | Mod: CPTII,S$GLB,, | Performed by: INTERNAL MEDICINE

## 2025-03-21 PROCEDURE — 86140 C-REACTIVE PROTEIN: CPT | Performed by: INTERNAL MEDICINE

## 2025-03-21 PROCEDURE — 82728 ASSAY OF FERRITIN: CPT | Performed by: INTERNAL MEDICINE

## 2025-03-21 PROCEDURE — 1125F AMNT PAIN NOTED PAIN PRSNT: CPT | Mod: CPTII,S$GLB,, | Performed by: INTERNAL MEDICINE

## 2025-03-21 PROCEDURE — 80048 BASIC METABOLIC PNL TOTAL CA: CPT | Performed by: INTERNAL MEDICINE

## 2025-03-21 PROCEDURE — 1159F MED LIST DOCD IN RCRD: CPT | Mod: CPTII,S$GLB,, | Performed by: INTERNAL MEDICINE

## 2025-03-21 PROCEDURE — 85652 RBC SED RATE AUTOMATED: CPT | Performed by: INTERNAL MEDICINE

## 2025-03-21 PROCEDURE — 3078F DIAST BP <80 MM HG: CPT | Mod: CPTII,S$GLB,, | Performed by: INTERNAL MEDICINE

## 2025-03-21 PROCEDURE — 85025 COMPLETE CBC W/AUTO DIFF WBC: CPT | Performed by: INTERNAL MEDICINE

## 2025-03-21 PROCEDURE — 1101F PT FALLS ASSESS-DOCD LE1/YR: CPT | Mod: CPTII,S$GLB,, | Performed by: INTERNAL MEDICINE

## 2025-03-21 PROCEDURE — 99215 OFFICE O/P EST HI 40 MIN: CPT | Mod: S$GLB,,, | Performed by: INTERNAL MEDICINE

## 2025-03-21 PROCEDURE — 36415 COLL VENOUS BLD VENIPUNCTURE: CPT | Mod: PO | Performed by: INTERNAL MEDICINE

## 2025-03-21 PROCEDURE — 99999 PR PBB SHADOW E&M-EST. PATIENT-LVL IV: CPT | Mod: PBBFAC,,, | Performed by: INTERNAL MEDICINE

## 2025-03-21 PROCEDURE — 3288F FALL RISK ASSESSMENT DOCD: CPT | Mod: CPTII,S$GLB,, | Performed by: INTERNAL MEDICINE

## 2025-03-21 PROCEDURE — 84466 ASSAY OF TRANSFERRIN: CPT | Performed by: INTERNAL MEDICINE

## 2025-03-21 ASSESSMENT — ROUTINE ASSESSMENT OF PATIENT INDEX DATA (RAPID3)
FATIGUE SCORE: 1.1
PAIN SCORE: 5
MDHAQ FUNCTION SCORE: 0.9
PATIENT GLOBAL ASSESSMENT SCORE: 3
PSYCHOLOGICAL DISTRESS SCORE: 1.1
TOTAL RAPID3 SCORE: 3.67

## 2025-03-21 NOTE — PROGRESS NOTES
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 She is noting more SOB  just with activity. She notes fever and low grade fever with rigors after each Rituxan.   We can add demerol. To her next     She continues with crusting of sinuses using navage consistently but NO recent sinus infections. Notes sinus ins not as congested as she used to be. She notes some build up and crusting with navage with mild soap and ABX, but not nearly as much.   No SOB at rest. No new edema. No cough.     Her hands have been quite painful last few weeks to months ache all day never stop. She does not use tylenol. I will have her start this.     She is noting more fatigue as of recent more so than previous. She does make lap in her living room to keep active.     She is more tearful this visit missing her  children, she is managing. Prefers not to take meds, but did take lexapro briefly and did well in the past.     I do not feel her Wegener's is active but we will check labs today  I did offer Lexapro she is going to hold off. Will call me if worsens     Perioral derm is worsening.       2024: patient not SOB but she is feeling like she is taking deep breaths more often than baseline. + fatigue. Hands are aching more with trying to wrap gift which she loves to do.   Sinuses are better than they used to be doing much better last 2 visits. This will be the first time she has gone 4 months w/o issues.     10/2024: Patient has some questions. We did check CBC which was a bit low with elevated inflammatory markers, was having increased urination was found to have UTI and is feeling better since being treated.   Sinuses have calmed down. She went to boat show.   Iron was not as low as she has been in past encouraged increased dietary iron.     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   John Paul would like to  "avoid at this time. She did review all the information  We discussed my concerns,     9/2024: RTX completed 500mg 7/8/2024, +COVID approx 7/16/24 held off on 2nd infusion. She has remarkably recovered and doing "ok". She is noting significant fatigue slight after RTX, much more after COVID. She is not driving alone so she is home few days which is lonely.   Recent seen with [facility] endoscopy with less crusting since RTX.     She is having a hard time with loss of hearing, loss of smell since the disease. Hard to not "smell the holidays"   Rapid onset peripheral neuropathy which I have had to attribute to her neuropathy.     6/2024: I signed Rituxan orders 4/15/24 she has not received. She was called to schedule and medication is approved but she is having a myriad of complaints with bleeding in the nose over a few months with sinus congestion. She had a debridement with Dr. Quinn  and this most recent one resolved nose bleeds, she states her sinuses are better than they have been in nearly 3 years. Mucous has stopped being so productive, she is avoiding navage and no worse.     Hearing stable one hearing aid is not working.     No new illnesses she had URI 4/2024 none since  Renal function stable.   No SOB or cough no fevers.   She will have left lower tooth extracted tomorrow: Dr. Huang treated with ABX.     3/2024:  3/2024: Patient HSV did finally clear some residula angular chelitis/  Patient with dry cough typic this time of year no SOB  Sinuses have been pretty good.  No fever, chills, weight loss or increase in fatigue.    Wet AMD-O'Varghese injections monthly now  Hearing a bit worse as of recent.     1/2024:  With a recent URI her labs drawn 01/05/2024 this with the elevated sed rate and C-reactive protein she was negative for flu, RSV, COVID but given that she is having symptoms of some nosebleeds and while her baseline sinusitis is chronic seems to be an uptake in the amount of nasal discharge we are " going to repeat her sed rate next month.  Her renal function is stable and she has a bit of anemia that is new.   We had been holding her Rituxan last infusion 07/20/2023 500mg x 1 , but I want to make sure we are not missing any underlying Wegener's activity.    She did have significant cough that took some time to improve, her sinuses feel continued inflammation, just started Z-pack.  Sharply marginated lucent lesion in the T10 vertebral body, possibly a hemangioma.             MRI completed with Ms Alonzo will just monitor for increase cervical stenosis or radicular [id number] at this time.   Did have vertebral fx.      9/2023: Patient was seen 6/2023 with anticipation of repeat FESS Sinus surgery and pending compound nasal irrigation. She did have surgery with Dr. Quinn end of June 2023, by July visit noted improvement with compound irrigation from prior. Still with chronic sinusitis.   Last Rituxan 500mg single dose 7/2023.    She is doing much better with the congestion but nothing like it was before. She denies fevers, chills, some cough, no hemoptysis.   She is down to Prednisone 2.5 mg daily but overall her edema is much improved.   Renal function stable  ESR and CRP    She does not want to take MTX she lost significant amounts of hair.   Imuran was lost with flare and transition to Imuran.     She has completed Rituxan 18 months with sinuses under control, lungs clear , renal function stable. Hearing is stable she lost nearly 90% of hearing and has stabilized.   She has mac degeneration.   Neuropathy continues to be a problem but we never have petechial rashes.   Edema is much improved.     3/2023:   Patient current on RTX Q 6 month maintanance following induction for Wegeners.    12/2/22 (Delayed COVID infection) RTX was 500mg with solumedrol 80mg IM   Completed Rituxan 1000mg for 1st  maintenance at 4 months 4/20/2022. due to rise in symptoms, and ESR   Induction 10/2021.      Continued nasal congestion  and drainage.   Recent significant nosebleeds. Endoscopy with recurrent crusting despite office debridement by ENT and continued irrigation.      Will repeat serologies and inflammatory markers and check CD 19 levels first week March and see her later that month.   Patient doing very well. Still rhinitis, using navage. No antibiotics from ENT since last visit. She is still clearing significant mucous faint with dried blood.   Dry cough, no fevers, no SOB\  Prolia: 10/17/2022         11/2022  Patient doing very well. Still rhinitis, using navage. No antibiotics from ENT since last visit. She is still clearing significant mucous faint with dried blood.   Dry cough, no fevers, no SOB  Mild HA only with elevated BP.   Completed Rituxan maintanence at 4 months 4/20/2022.   Prolia 3/25/22  Evusheld with catch up dose 2/10/22 and 3/25/2022     5/5/2022:   Patient doing very well. Still rhinitis, using navage. No antibiotics from ENT since last visit. She is still clearing significant mucous faint with dried blood.   Dry cough, no fevers, no SOB  Mild HA only with elevated BP.   Completed Rituxan maintanence at 4 months 4/20/2022.   Prolia 3/25/22  Evusheld with catch up dose 2/10/22 and 3/25/2022.      Major complaints: pins, needles, stinging, burning in feet day and night is constant. Dr. Campbell: EB-N5 supplement is helping some, stopped for months and noted significant worsening. Dr. Campbell did increase from EB-N3.   Gabapentin 400mg TID  Hydrocodone only PRN  Prednisone 7.5mg         2/2022  Doing well no cough, no fevers. Recent COVID exposure but negative.   Seen with Nephro.   Given her successful response with Ritux. Agree to continue maintanance with Ritux Q 4-6 months     Needs to get #3 COVID vaccine timing with RTX. - will time this with me 2 weeks prior to   Discussed Prolia  Working on Evusheld for PreP with COVID> she is scheduled 2/10/22        12/9/2021  Patient's recent CXR 12/21/21 with marked  improvement when compared to 10/21/21  Prednisone 10mg daily  S/p RTX x 4 infusions completion date. 11/5/21  Patient with rise in creatinine to 2.0 on 11/26 had been requiring lasix for marked edema since 11/1/21. Stopped 2 weeks ago. Edema is present but managed. Drinking about 40 oz water and 3 cups of coffee daily     Patient completed [facility] felt she was doing well home exercises about 2 weeks ago with acute pain in lumbar spine after exercising. Imaging lumbar few days ago without evidence of a fracture. She point to pain in the lumbosacral and radiating to buttock region. Ok to rise from seated. Pain more on left low back and buttock. No numbness no tingling but pain in the hamstring region. Comfortable sitting but not lying down, but she never sleeps in bed. Long hx of rather impressive scoliosis.         11/11/2021:   Inflammatory markers are markedly improved.   Cough dry still at night. Patient noting she has lost sense of smell. Can still taste.      Patient recently hospitatlized for Bronch with negative cultures to date.  There was a concern for possible atypical infection she has had enlarging right upper lobe cavitary lesion measures still has a right middle lobe lesion with some cavitation now as well.  I have discussed this case extensively with both Dr. Schwartz as well as Dr. Joseph upon discharge we felt comfortable that this is likely Wegener's granulomatosis causing cavitary lesions.  Supporting sinus biopsy does note granuloma.    Patient was started on Imuran in March of 2021 but with the growth of her lesion we had rule out for any possible infection given her status of immunosuppression.  She is here today not in her usual state she appears fatigued pale she is not complaining of shortness of breath but does have a cough dry persistent.  She is noticing increased pedal edema.           9/13/21: patient with 2 falls 8 days apart. Spoke w/ pulm shortly following initial fall and we were  scheduled for bronch when she sustained another fall.   Scheduled now for 9/17/21 bronch. RUL lesion.   Remains off Imuran  Prednisone 15mg daily.   Sinus congestion. Still productive      She is noting some productive cough at night no hemoptysis.   Patient denies SOB  Having more HA. -frontal and sinus, she is noting some drainage in right ear some mucous/blood.   No edema.   She notes fatigue, no fevers, no night sweats. No weight loss.   Skin easily bruising.      No personal hx of any malignancy.         7/20/2021:    Spoke with Pulm plan for bronch is able working on possible bx site vs BAL.   Need to r/o infectious/neoplastic and confirm for ANCA (GPA vasculitis)   Inflammatory markers markedly down .8  Now 14.   H/H improve from 7.4 to 8.2  Platelets normalized.   Renal stable over time     Patient did attend wedding with approx 300 people unmasked last weekend. Reviewed by recs for COVID precautions given     Current regimen:   pred 15mg (Na stable)  Holding Imuran     Interval events: PET with hypermetabolic lesions:   a. RUL cavitary/cystic lesion 1.9cm x 1.3cm  b. RUL spiculated 1.2cm x 1.4cm  c. RML 1.7cm x 2.0 cm  All with differential: infectious, inflammatory, neoplastic, grannulomatous (a) with favor of inflammation given the rapid change     Fungal immunodiffusion neg  Quant gold and T spot: neg  H/H improving      Sinus congestion, pressure, headache, x 3 weeks very productive with blowing nose, back on navage. :  started Cindamycin with DR Quinn x 10 days.   Patient denies any shortness breath.   Very fatigues.   No more fevers. Slight dry cough, no blood/ no mucous.   No SOB.         5/12/21 completed nasal irrigation with biopsy not definitive for GPA, but + inflammation and granulomas. Temporal artery bx negative. Patient with PCP same day as labs 6/2/21  dx with UTI on keflex.      Was seen apparently in ED in [place] for 104F she had altered mental status, dx with UTI, dehydration,  "treated with. Completed all 7 days of keflex and within 48 hours with another fever 104 F. She continues with congestion but no worse. Patient denies cough, hemoptysis, bloody nasal discharge. She has had no fever x 10 days. Checks twice daily.   while travelling told "congestion in chest on xray". Inflammatory markers very high again.   Patient with recent concern for wegeners needs CT chest. CXR with new airspace opacities right chest- need to r/o GGO vs infection. CT has been ordered pending scheduling.   Broad ABX coverage recommended for now will cc Dr. Natarajan.      I am limited here with her  severe edema from higher dose steroids. Max tolerated is 20mg daily pred  Started Imuran 5/5/2021. Very low dose 50mg BID (0.65mg/kg) tolerating VERY well.   H/H still low.            2/2021  Sinususitis, cx were negative.  Using nasal irrigation with mucoid disharge.   Patient continues with significant nasal discharge and blood with scabs. We discussed Wegeners but pathology sinus no vasculitis, granuloma noted.   No HA, no blurred vision. Recent sinus infection with HA for 10 days. cx +, but also repeat labs demonstarting +PR3 and +ANCA         Patient with c/o right > left 3,4 finger numbness that was intermittent until approximately 2 weeks about having near constant numbness in hands, pins and needles and pain.   Patient not noting much improvement with change in position.      Off MTX 6  tabs weekly.   Doing well with Hydroxychloroquine.   Declined COVID vaccine     Gabapentin up to 300mg TID.   Now with Dr. Campbell doing PT for Plantar fasciitis. She is taking supplement and compound cream for joint and neuropathy. No response to tarsal tunnel injection per Dr. Campbell.   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 WBC ok.         11/2020  Patient with PMR   MTX at 4 tabs weekly new anemia. Thrombocytosis  Pred at " 10mg   Complicated with peripheral edema rather severe , started MTX seeing trending CRP/ESR but persistent leg pain.   Seen with PCP for neuropathy s/sx not seeing much benefit with gabpentin  Seen with Cardiology- no concern for cardiac ECHO ok. dc'd lasix for hyponatremia.      8/2020  Pred 20mg with mild edema, pred 30 mg with severe edema.   Current Pred 15mg daily  Rising ESR again.   Lasix 20 mg daily with improved edema but having some pins and needles in feet/legs.   Having tight, burning stinging at the toes and feet. Heaviness. Prickly.   Noting sodium is falling, K 5.2-5.4 still using               7/2020:  Hands not painful, knees not painful. Shoulder/cervical and down the arms, markedly improved. She still has some pain him hips and groin with walking and some weakness to the legs with edema. Some pain with fixing own hair.   She takes in AM regarding hips ache, some shoulder/arms, and again at night because hips/legs.   The clue is that the LDN new dose she had some ASE, previously tolerated 3mg daily fine.      Historical review of present Illness.   Patient with a recent chronic sinusitis that ultimately warranted a septoplasty, endoscopic sinus surgery and turbinate reduction 5/2020   Four weeks postop t increasingly worse she was noting pain in her throat and ears lasting several hours complaint of pdizziness she had symptoms of ear pain and decreased hearing in the left ear.  She underwent a debridement at this visit she continue with compound nasal irrigations.  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     Patient did have repeat procedure on with biopsies taken 07/03/2020 showing right maxillary sinus chronically inflamed fibrotic polypoid portions benign nasal mucosa left maxillary sinus similar polypoid fragments ulcerated markedly inflamed respiratory mucosa focal foreign body giant cell response noted suggested that this might have been from her Gelfoam packing as a possible cause of this reaction     I have spoken with Dr. Quinn prior to patient's visit today and was a concern that she could have triggered some inflammatory response with the Gelfoam packing     Patient states she no longer has a headache is not noticing much ear drainage continues with profound loss of hearing on the left and rather significant on the right both sensorineural and some conductive changes.     Restarted on prednisone as of 7/20/20  10 mg patient has not noticed any change with her hearing in this time she did notice slight improvement with her joint aches and pains but is still relying on hydrocodone for the bulk of this.     She denies a persistent headache she does have some tenderness about the preauricular region particularly on the right more than the left.  She has denied jaw claudication changes in her voice or any amaurosis fugax.  She has no pre-existing history of joint aches and pains hip or otherwise.  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  She had a workup that involved a negative rheumatoid factor, age appropriate sedimentation rate,C reactive protein JANET was positive 1:160 in speckled pattern           Previous: Hx:   She is having pain in her hand with stiffness and right 2nd PIP joint now unable to flex. 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   Hx of bilateral TKA 3 and  5 years ago and those are doing well.   She notes intermittent edema. Some hammer toe deformity bilaterally making shoes problematic but not painful otherwise.   Long hx of GERD-severe.   Cannot tolerate NSAIDs at all w/o gastritis.   Can use Hydrocodone PRN   Added Naltrexone at 3mg and doing very well  Lost 30# with WW.   Patient denies weight loss, rashes, dry eye, dry mouth, nasal or palatal ulcerations,  lymphadenopathy, Raynaud's, hx of DVT/miscarriages, psoriasis or family hx of psoriasis, rashes, serositis, anemia or other constitutional symptoms.  Asking about naltrexone  Component      Latest Ref Rng & Units 8/8/2020 7/29/2020 7/20/2020 7/13/2020   Sodium      136 - 145 mmol/L   126 (L) 128 (L)     Potassium      3.5 - 5.1 mmol/L   5.4 (H) 5.2 (H)     Chloride      95 - 110 mmol/L   92 (L) 92 (L)     CO2      23 - 29 mmol/L   25 29     Glucose      70 - 110 mg/dL   109 109     BUN, Bld      8 - 23 mg/dL   13 11     Creatinine      0.5 - 1.4 mg/dL   0.8 0.8     Calcium      8.7 - 10.5 mg/dL   9.5 9.7     Anion Gap      8 - 16 mmol/L   9 7 (L)     eGFR if African American      >60 mL/min/1.73 m:2   >60.0 >60.0     eGFR if non African American      >60 mL/min/1.73 m:2   >60.0 >60.0     Anti Sm Antibody      0.00 - 0.99 Ratio       0.04   Anti-Sm Interpretation      Negative       Negative   Anti Sm/RNP Antibody      0.00 - 0.99 Ratio       0.09   Anti-Sm/RNP Interpretation      Negative       Negative   JANET Screen      None Detected           Rheumatoid Factor      0.0 - 15.0 IU/mL           Sed Rate      0 - 36 mm/Hr 57 (H)   54 (H) 75 (H)   CRP      0.0 - 8.2 mg/L 75.4 (H) 56.1 (H) 57.5 (H) 99.4 (H)   Anti-Histone Antibody      0.0 - 0.9 Units       0.4   ds DNA Ab      Negative 1:10       Negative 1:10      Component      Latest Ref Rng & Units 6/25/2020 11/10/2018   Sodium      136 - 145 mmol/L       Potassium      3.5 - 5.1 mmol/L       Chloride      95 - 110 mmol/L       CO2      23 - 29 mmol/L      "  Glucose      70 - 110 mg/dL       BUN, Bld      8 - 23 mg/dL       Creatinine      0.5 - 1.4 mg/dL       Calcium      8.7 - 10.5 mg/dL       Anion Gap      8 - 16 mmol/L       eGFR if African American      >60 mL/min/1.73 m:2       eGFR if non African American      >60 mL/min/1.73 m:2       Anti Sm Antibody      0.00 - 0.99 Ratio       Anti-Sm Interpretation      Negative       Anti Sm/RNP Antibody      0.00 - 0.99 Ratio       Anti-Sm/RNP Interpretation      Negative       JANET Screen      None Detected Detected (A) Detected (A)   Rheumatoid Factor      0.0 - 15.0 IU/mL 11.6 <8.6   Sed Rate      0 - 36 mm/Hr       CRP      0.0 - 8.2 mg/L       Anti-Histone Antibody      0.0 - 0.9 Units       ds DNA Ab      Negative 1:10             REVIEW OF SYSTEMS:     Review of Systems   Constitutional: Negative for fever, malaise/fatigue and weight loss.   HENT: Negative for sore throat.    Eyes: Negative for double vision, photophobia and redness.   Respiratory: Negative for cough, shortness of breath and wheezing.    Cardiovascular: Negative for chest pain, palpitations and orthopnea.   Gastrointestinal: Negative for abdominal pain, constipation and diarrhea.   Genitourinary: Negative for dysuria, hematuria and urgency.   Musculoskeletal: Positive for joint pain. Negative for back pain and myalgias.   Skin: Negative for rash.   Neurological: Negative for dizziness, tingling, focal weakness and headaches.   Endo/Heme/Allergies: Does not bruise/bleed easily.   Psychiatric/Behavioral: Negative for depression, hallucinations and suicidal ideas.                     Objective:      Objective        Past Medical History:   Diagnosis Date    Anesthesia       'Mother stop breathing after anesthesia"    Chronic sinusitis      Depression      GERD (gastroesophageal reflux disease)      PVC (premature ventricular contraction)              Family History   Problem Relation Age of Onset    Heart disease Mother      Arthritis Mother      " Cancer Sister      Arthritis Sister      Diabetes Sister      Hypertension Sister        Social History            Tobacco Use    Smoking status: Former       Current packs/day: 0.00       Types: Cigarettes       Start date: 2/3/1955       Quit date: 1/3/1960       Years since quittin.7    Smokeless tobacco: Never   Substance Use Topics    Alcohol use: Never       Alcohol/week: 0.0 standard drinks of alcohol    Drug use: Never                   Current Outpatient Medications on File Prior to Visit   Medication Sig Dispense Refill    acetaminophen (TYLENOL) 500 MG tablet Take 500 mg by mouth every 6 (six) hours as needed for Pain.        albuterol (PROVENTIL/VENTOLIN HFA) 90 mcg/actuation inhaler INHALE 2 PUFFS BY MOUTH EVERY 4 TO 6 HOURS AS NEEDED FOR SHORTNESS OF BREATH OR COUGH        ascorbate calcium (MAHSA-C ORAL) Take by mouth.        B2-B6 phos-levomef simran-mecobal (EB-N3 DR) 1.3-70-6-4 mg CpDR Take 1 capsule by mouth once daily.        cetirizine (ZYRTEC) 10 MG tablet TAKE 1 TABLET ONE TIME DAILY 90 tablet 0    denosumab (PROLIA SUBQ) Inject into the skin. Two times a year        diclofenac sodium (VOLTAREN) 1 % Gel APPLY TO THE AFFECTED AREA(S) 2 GRAMS THREE TIMES DAILY 600 g 0    furosemide (LASIX) 20 MG tablet TAKE 1 TABLET EVERY DAY 90 tablet 3    gabapentin (NEURONTIN) 400 MG capsule TAKE 1 CAPSULE THREE TIMES DAILY 270 capsule 3    HYDROcodone-acetaminophen (NORCO) 7.5-325 mg per tablet Take 1 tablet by mouth every 6 (six) hours as needed.        ITRACONAZOLE, BULK, MISC EMPTY CONTENTS OF 1 CAPSULE INTO NASAL IRRIGATION SYSTEM, ADD DISTILLED WATER, SALT PACK, MIX & IRRIGATE. PERFORM 2 TIMES DAILY        LACTOBACILLUS ACIDOPHILUS ORAL Take by mouth.        metoprolol succinate (TOPROL-XL) 50 MG 24 hr tablet TAKE 1 TABLET EVERY DAY 90 tablet 3    mirabegron (MYRBETRIQ) 50 mg Tb24 Take 1 tablet (50 mg total) by mouth once daily. 30 tablet 11    neomycin-polymyxin-dexamethasone (MAXITROL)  3.5mg/mL-10,000 unit/mL-0.1 % DrpS          omeprazole (PRILOSEC) 40 MG capsule TAKE 1 CAPSULE EVERY MORNING 90 capsule 0    ondansetron (ZOFRAN-ODT) 8 MG TbDL Take 8 mg by mouth 2 (two) times daily.        peg 400-propylene glycol, PF, (SYSTANE, PF,) 0.4-0.3 % Dpet Apply to eye 2 (two) times a day.        predniSONE (DELTASONE) 2.5 MG tablet Take 1 tablet (2.5 mg total) by mouth once daily. 90 tablet 3    predniSONE (DELTASONE) 5 MG tablet Take 1 tablet (5 mg total) by mouth once daily. 30 tablet 3    sodium chloride 0.9% SolP 500 mL with riTUXimab 10 mg/mL Conc 375 mg/m2 Inject 375 mg/m2 into the vein. 4 to 6 months        tobramycin sulfate 0.3% (TOBREX) 0.3 % ophthalmic solution Place into both eyes.        vancomycin (VANCOCIN) 1,000 mg injection SMARTSI Vial(s) Both Nares Twice Daily        vit C/E/Zn/coppr/lutein/zeaxan (PRESERVISION AREDS-2 ORAL) Take 1 capsule by mouth 2 (two) times a day.         [DISCONTINUED] naltrexone capsule Take 3 mg by mouth once daily.          No current facility-administered medications on file prior to visit.             Vitals:     23 1306   BP: 134/65   Pulse: 69         Physical Exam:     Physical Exam   Constitutional: She is oriented to person, place, and time. She appears well-developed and well-nourished.   HENT:   Head: Normocephalic and atraumatic.   Mouth/Throat: Oropharynx is clear and moist.   Eyes: Pupils are equal, round, and reactive to light. EOM are normal.   Neck: Normal range of motion.   Cardiovascular: Normal rate, regular rhythm and normal heart sounds.   Pulmonary/Chest: Effort normal and breath sounds normal.   Musculoskeletal:        Right shoulder: She exhibits normal range of motion, no tenderness and no swelling.        Left shoulder: She exhibits normal range of motion, no tenderness and no swelling.        Right elbow: She exhibits normal range of motion and no swelling. No tenderness found.        Left elbow: She exhibits normal range of  motion and no swelling. No tenderness found.        Right wrist: She exhibits normal range of motion, no tenderness and no swelling.        Left wrist: She exhibits normal range of motion, no tenderness and no swelling.        Right knee: She exhibits normal range of motion and no swelling. No tenderness found.        Left knee: She exhibits normal range of motion and no swelling. No tenderness found.        Right hand: She exhibits decreased range of motion and tenderness. She exhibits no swelling.        Left hand: She exhibits decreased range of motion and tenderness. She exhibits no swelling.        Right foot: There is normal range of motion, no tenderness and no swelling.        Left foot: There is normal range of motion, no tenderness and no swelling.   Bony hypertrophy at the PIP and DIP joints. +squaring at the CMC joint. No active synovitis on 28 joint exam.    Neurological: She is alert and oriented to person, place, and time.   Skin: Skin is warm and dry.   Psychiatric: She has a normal mood and affect. Her behavior is normal.      Component      Latest Ref Rng & Units 9/17/2021 9/17/2021          11:03 AM 11:03 AM   Respiratory Culture         No growth   Gram Stain (Respiratory)       No organisms seen No WBC's   AFB Culture & Smear             AFB CULTURE STAIN             GENET Prep             Fungus (Mycology) Culture             Aerobic Bacterial Culture                Component      Latest Ref Rng & Units 9/17/2021          11:03 AM   Respiratory Culture       No Staph aureus, MRSA or Pseudomonas isolated.   Gram Stain (Respiratory)       No organisms seen   AFB Culture & Smear           AFB CULTURE STAIN           GENET Prep           Fungus (Mycology) Culture           Aerobic Bacterial Culture              Component      Latest Ref Rng & Units 9/17/2021 9/17/2021          11:03 AM 11:03 AM   Respiratory Culture       Normal respiratory nimesh     Gram Stain (Respiratory)       Moderate WBC's      AFB Culture & Smear         Culture in progress   AFB CULTURE STAIN         No acid fast bacilli seen.   KOH Prep         No yeast or fungal elements seen   Fungus (Mycology) Culture         Culture in progress   Aerobic Bacterial Culture                      Narrative  Performed by: [facility]  Patient - ONIEL ROSARIO                   - 1940 Sex- F   Med Rec # - [id number]                        Bimal Mckeon M.D.   The following is an electronic copy of report # [id number] from:   [facility]   [address] [place] [address]                         Phone [phone]   DIAGNOSIS:   2021  JL/sdc     1, 2.  RIGHT UPPER LOBE MASS BRUSHING AND FINE NEEDLE ASPIRATE BIOPSIES:   - NEGATIVE FOR MALIGNANT CELLS.     - PURULENT EXUDATE.     - A FEW FRAGMENTS OF BRONCHIAL MUCOSA WITH FLORID CHRONIC BRONCHITIS    AND REACTIVE SQUAMOUS    ATYPICAL METAPLASIA.       Comment:   Special stains (AFB and GMS) are negative for organisms. While no    granulomas are seen here, the inflammatory reaction appears to be    similar to that seen in the sinus material over the past few years    (Delta [id number], [id number], [id number]). 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 Bronchoscopic material, particularly of the lower    respiratory tract, is of low yield for a definitive diagnosis of that    entity. The previous sinus material will be reviewed with    consideration for that diagnosis.   _______________________________________________________________________   SPECIMEN AND SOURCE:   1. RUL mass brushing   2. RUL mass needle     CLINICAL INFORMATION:   Clinical Information:-gt Right Upper Lobe Mass   Specific Site:-gt RUL mass brushing T Brush; RUL Mass; RUL mass-    microbiology; RUL BAL; RUL washing;   Other Requests:-gt N/A     GROSS  DESCRIPTION:   1. Received 40 mL of fluid in formalin, 1 dry slides and 1 slides in    95% alcohol. Prepared one cell block.     2. Received 40 mL of fluid in formalin, 1 dry slides and 1 slides in    95% alcohol. Prepared one cell block.     CODE: 0     Cytotechnologist: ABDIFATAH Marvin (ASCP)   Pathologist: Scott Johnson MD (Electronic Signature) 09/22/2021 2:34 PM     [facility], [facility] * [address] [facility] * [place]    [address]   Technical services performed at: [facility], [facility] * [address] [place] [address] * [place] [address]   Screening Site: [facility], [facility] * 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  -     Sedimentation rate; Future; Expected date: 03/21/2025  -     C-Reactive Protein; Future; Expected date: 03/21/2025  -     CBC Auto Differential; Future; Expected date: 03/21/2025  -     Iron and TIBC; Future; Expected date: 03/21/2025  -     Ferritin; Future; Expected date: 03/21/2025  -     Basic Metabolic Panel; Future; Expected date: 03/21/2025    High risk medications (not anticoagulants) long-term use  -     Sedimentation rate; Future; Expected date: 03/21/2025  -     C-Reactive Protein; Future; Expected date: 03/21/2025  -     CBC Auto Differential; Future; Expected date: 03/21/2025  -     Basic Metabolic Panel; Future; Expected date: 03/21/2025    Peripheral polyneuropathy    Anemia, unspecified type  -     Iron and TIBC; Future; Expected date: 03/21/2025  -     Ferritin; Future;  Expected date: 03/21/2025          Patient is a 83-year-old female she has had a recent robust inflammatory reaction within the sinus cavity as well as hearing deficit following a sinus surgery.  She has been repeated cultured postoperatively completed antibiotics and irrigations negative for any fungal infections or bacterial infections.   Patient was doing well on MTX with regard to ESR and CRP, but hair loss.      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               Completed RTX 11/5/21 Induction               Review of data and feel : The best data supporting the use of  as maintenance therapy come from the Maintenance of Remission using Rituximab in Systemic ANCA-associated Vasculitis (MAINRITSAN) trial with less relapse for PR3+ paitients when treated with Rituxan at 500mg IV at 6, 12, and 18months when compared to Imuran. Patient has tolerated RTX last 4/20/2022, she had to cancel 10/21/2022 for COVID. Did receive 500mg on 12/2022. Received again 7/2023. Patient has completed 18 months of Rituxan therapy.         She completed one dose of Rituxan 500mg 7/2024 cancelled 2nd infusion due to +COVID and I did not want to repeat following COVID infection.   Gave her information on Tavneos she declined at this time.   Hai to have Rituxan approximately every 6 months or as warranted by disease activity. We will get her next infusion for Jan 2025 set up now.       Continue omeprazole (prilosec)  40mg by mouth daily.                              Continues with Calcium and Vitamin D.

## 2025-03-27 ENCOUNTER — TELEPHONE (OUTPATIENT)
Dept: RHEUMATOLOGY | Facility: CLINIC | Age: 85
End: 2025-03-27
Payer: MEDICARE

## 2025-03-27 DIAGNOSIS — I77.82 ANCA-ASSOCIATED VASCULITIS: ICD-10-CM

## 2025-03-27 DIAGNOSIS — D84.821 IMMUNOSUPPRESSION DUE TO DRUG THERAPY: ICD-10-CM

## 2025-03-27 DIAGNOSIS — D64.9 ANEMIA, UNSPECIFIED TYPE: ICD-10-CM

## 2025-03-27 DIAGNOSIS — Z79.899 IMMUNOSUPPRESSION DUE TO DRUG THERAPY: ICD-10-CM

## 2025-03-27 DIAGNOSIS — M31.30 GRANULOMATOSIS WITH POLYANGIITIS WITH MULTISYSTEM INVOLVEMENT: Primary | ICD-10-CM

## 2025-03-27 DIAGNOSIS — M31.31 WEGENER'S GRANULOMATOSIS WITH RENAL INVOLVEMENT: ICD-10-CM

## 2025-03-27 NOTE — TELEPHONE ENCOUNTER
----- Message from Ernestine sent at 3/27/2025  3:59 PM CDT -----  Regarding: Test Results  Type:  Test ResultsWho Called: pt Name of Test (Lab/Mammo/Etc): Labs Date of Test: 03/21Ordering Provider: Claudio Where the test was performed: [facility] Would the patient rather a call back or a response via [facility]? Call back Best Call Back Number: [phone] Additional Information:   please call to advise, Thank You.

## 2025-03-31 NOTE — TELEPHONE ENCOUNTER
The labs showed improved iron levels overall. Her inflammatory markers are moving back and forth.   This time C-reactive protein completely normal, but the sed rate was up a bit.     Anemia is slightly down but not enough at this time from last month that concerns me.     I recommend no change in therapy and we just repeat again in 6 weeks.     If she notes any decline in her overall function call me, but for now I think Rituxan is still working.     Dr. Snyder.

## 2025-04-03 ENCOUNTER — TELEPHONE (OUTPATIENT)
Dept: RHEUMATOLOGY | Facility: CLINIC | Age: 85
End: 2025-04-03
Payer: MEDICARE

## 2025-04-03 NOTE — TELEPHONE ENCOUNTER
Discussed recent lab results with patient.  She would like to ask Dr Snyder if there is possibly a vitamin deficiency that could be causing the recurrent mouth sores(scaly lips) that she can take.  Will route to provider to review and advise.

## 2025-04-03 NOTE — TELEPHONE ENCOUNTER
----- Message from Sparkle sent at 4/3/2025 11:37 AM CDT -----  Contact: self  Type:  Patient Returning CallWho Called: PtWho Left Message for Patient: Rain AREVALO Does the patient know what this is regarding?:   Lab Results Would the patient rather a call back or a response via [facility]? CallBest Call Back Number: [phone] return call to pt... Thank you...   Called and spoke to patient's parent, Mom notified of recent urine cx results. Mom confirmed understanding of results. No further action needed.

## 2025-04-04 NOTE — TELEPHONE ENCOUNTER
Ms Weber will try Biotin and Zinc she will also reach out to Dr Gooden to discuss before doing so.

## 2025-04-16 DIAGNOSIS — M31.31 WEGENER'S GRANULOMATOSIS WITH RENAL INVOLVEMENT: ICD-10-CM

## 2025-04-16 DIAGNOSIS — M31.30 GRANULOMATOSIS WITH POLYANGIITIS WITH PULMONARY INVOLVEMENT: ICD-10-CM

## 2025-04-21 RX ORDER — PREDNISONE 2.5 MG/1
2.5 TABLET ORAL
Qty: 90 TABLET | Refills: 3 | Status: SHIPPED | OUTPATIENT
Start: 2025-04-21

## 2025-05-13 ENCOUNTER — LAB VISIT (OUTPATIENT)
Dept: LAB | Facility: HOSPITAL | Age: 85
End: 2025-05-13
Attending: INTERNAL MEDICINE
Payer: MEDICARE

## 2025-05-13 DIAGNOSIS — D64.9 ANEMIA, UNSPECIFIED TYPE: ICD-10-CM

## 2025-05-13 DIAGNOSIS — I77.82 ANCA-ASSOCIATED VASCULITIS: ICD-10-CM

## 2025-05-13 DIAGNOSIS — M31.31 WEGENER'S GRANULOMATOSIS WITH RENAL INVOLVEMENT: ICD-10-CM

## 2025-05-13 DIAGNOSIS — Z79.899 IMMUNOSUPPRESSION DUE TO DRUG THERAPY: ICD-10-CM

## 2025-05-13 DIAGNOSIS — D84.821 IMMUNOSUPPRESSION DUE TO DRUG THERAPY: ICD-10-CM

## 2025-05-13 DIAGNOSIS — M31.30 GRANULOMATOSIS WITH POLYANGIITIS WITH MULTISYSTEM INVOLVEMENT: ICD-10-CM

## 2025-05-13 LAB
ABSOLUTE EOSINOPHIL (OHS): 0.07 K/UL
ABSOLUTE MONOCYTE (OHS): 1.25 K/UL (ref 0.3–1)
ABSOLUTE NEUTROPHIL COUNT (OHS): 5.83 K/UL (ref 1.8–7.7)
ANION GAP (OHS): 11 MMOL/L (ref 8–16)
BASOPHILS # BLD AUTO: 0.05 K/UL
BASOPHILS NFR BLD AUTO: 0.6 %
BUN SERPL-MCNC: 20 MG/DL (ref 8–23)
CALCIUM SERPL-MCNC: 9 MG/DL (ref 8.7–10.5)
CHLORIDE SERPL-SCNC: 101 MMOL/L (ref 95–110)
CO2 SERPL-SCNC: 24 MMOL/L (ref 23–29)
CREAT SERPL-MCNC: 1.2 MG/DL (ref 0.5–1.4)
CRP SERPL-MCNC: 15.7 MG/L
ERYTHROCYTE [DISTWIDTH] IN BLOOD BY AUTOMATED COUNT: 14.4 % (ref 11.5–14.5)
ERYTHROCYTE [SEDIMENTATION RATE] IN BLOOD BY PHOTOMETRIC METHOD: 70 MM/HR
FERRITIN SERPL-MCNC: 78 NG/ML (ref 20–300)
GFR SERPLBLD CREATININE-BSD FMLA CKD-EPI: 45 ML/MIN/1.73/M2
GLUCOSE SERPL-MCNC: 107 MG/DL (ref 70–110)
HCT VFR BLD AUTO: 34.1 % (ref 37–48.5)
HGB BLD-MCNC: 10.5 GM/DL (ref 12–16)
IMM GRANULOCYTES # BLD AUTO: 0.08 K/UL (ref 0–0.04)
IMM GRANULOCYTES NFR BLD AUTO: 1 % (ref 0–0.5)
IRON SATN MFR SERPL: 17 % (ref 20–50)
IRON SERPL-MCNC: 47 UG/DL (ref 30–160)
LYMPHOCYTES # BLD AUTO: 0.94 K/UL (ref 1–4.8)
MCH RBC QN AUTO: 28.2 PG (ref 27–31)
MCHC RBC AUTO-ENTMCNC: 30.8 G/DL (ref 32–36)
MCV RBC AUTO: 92 FL (ref 82–98)
NUCLEATED RBC (/100WBC) (OHS): 0 /100 WBC
PLATELET # BLD AUTO: 397 K/UL (ref 150–450)
PMV BLD AUTO: 9.5 FL (ref 9.2–12.9)
POTASSIUM SERPL-SCNC: 4.3 MMOL/L (ref 3.5–5.1)
RBC # BLD AUTO: 3.72 M/UL (ref 4–5.4)
RELATIVE EOSINOPHIL (OHS): 0.9 %
RELATIVE LYMPHOCYTE (OHS): 11.4 % (ref 18–48)
RELATIVE MONOCYTE (OHS): 15.2 % (ref 4–15)
RELATIVE NEUTROPHIL (OHS): 70.9 % (ref 38–73)
SODIUM SERPL-SCNC: 136 MMOL/L (ref 136–145)
TIBC SERPL-MCNC: 283 UG/DL (ref 250–450)
TRANSFERRIN SERPL-MCNC: 191 MG/DL (ref 200–375)
WBC # BLD AUTO: 8.22 K/UL (ref 3.9–12.7)

## 2025-05-13 PROCEDURE — 85025 COMPLETE CBC W/AUTO DIFF WBC: CPT

## 2025-05-13 PROCEDURE — 36415 COLL VENOUS BLD VENIPUNCTURE: CPT | Mod: PO

## 2025-05-13 PROCEDURE — 82728 ASSAY OF FERRITIN: CPT

## 2025-05-13 PROCEDURE — 80048 BASIC METABOLIC PNL TOTAL CA: CPT

## 2025-05-13 PROCEDURE — 86140 C-REACTIVE PROTEIN: CPT

## 2025-05-13 PROCEDURE — 84466 ASSAY OF TRANSFERRIN: CPT

## 2025-05-13 PROCEDURE — 85652 RBC SED RATE AUTOMATED: CPT

## 2025-05-16 ENCOUNTER — TELEPHONE (OUTPATIENT)
Dept: RHEUMATOLOGY | Facility: CLINIC | Age: 85
End: 2025-05-16
Payer: MEDICARE

## 2025-05-16 NOTE — TELEPHONE ENCOUNTER
----- Message from Kim sent at 5/16/2025  9:47 AM CDT -----  Type:  Test ResultsWho Called: ptName of Test (Lab/Mammo/Etc): labsDate of Test: 5.14Ordering Provider: martinWhere the test was performed: [facility]Would the patient rather a call back or a response via [facility]? callBest Call Back Number:  [phone] Information:

## 2025-05-16 NOTE — TELEPHONE ENCOUNTER
----- Message from Kim sent at 5/16/2025  9:47 AM CDT -----  Type:  Test ResultsWho Called: ptName of Test (Lab/Mammo/Etc): labsDate of Test: 5.14Ordering Provider: martinWhere the test was performed: [facility] lWould the patient rather a call back or a response via [facility]? callBest Call Back Number:  [phone] Information:

## 2025-05-19 ENCOUNTER — TELEPHONE (OUTPATIENT)
Dept: RHEUMATOLOGY | Facility: CLINIC | Age: 85
End: 2025-05-19
Payer: MEDICARE

## 2025-05-19 DIAGNOSIS — D63.8 ANEMIA OF CHRONIC DISEASE: ICD-10-CM

## 2025-05-19 DIAGNOSIS — D64.9 ANEMIA, UNSPECIFIED TYPE: Primary | ICD-10-CM

## 2025-05-19 NOTE — TELEPHONE ENCOUNTER
----- Message from Nurse Rivas sent at 5/19/2025  4:20 PM CDT -----    ----- Message -----  From: Alycia Song  Sent: 5/19/2025   4:16 PM CDT  To: Laney Florez Staff    Type: Needs Medical AdviceWho Called:  pt Best Call Back Number: [phone] Information: pt requesting call back from rodríguez please advise

## 2025-05-19 NOTE — TELEPHONE ENCOUNTER
----- Message from Nurse Rivas sent at 5/19/2025  4:20 PM CDT -----    ----- Message -----  From: Alycia Song  Sent: 5/19/2025   4:16 PM CDT  To: Laney Florez Staff    Type: Needs Medical AdviceWho Called:  pt Best Call Back Number: [phone] Information: pt requesting call back from rodríguez please advise

## 2025-05-19 NOTE — TELEPHONE ENCOUNTER
----- Message from Criss sent at 5/19/2025  8:25 AM CDT -----  Type:  Test ResultsWho Called: ptName of Test (Lab/Mammo/Etc): labsDate of Test: 5/14Ordering Provider: MartinWhere the test was performed[facility] patient rather a call back or a response via [facility]? callBest Call Back Number: [phone] Information:   Please call back to advise, thank you!

## 2025-05-20 ENCOUNTER — RESULTS FOLLOW-UP (OUTPATIENT)
Dept: RHEUMATOLOGY | Facility: CLINIC | Age: 85
End: 2025-05-20

## 2025-05-20 RX ORDER — METHYLPREDNISOLONE SOD SUCC 125 MG
100 VIAL (EA) INJECTION
OUTPATIENT
Start: 2025-05-20

## 2025-05-20 NOTE — TELEPHONE ENCOUNTER
I will plan for her to receive another dosage of Rituxan in June (4 months) rather than waiting until August (6 months.)   Changed infusion order to Rituxan 1gm x 2   2 weeks apart. Please schedule for end of June instead of August. She is flaring and last dosage perhaps too low.            Dr. Snyder

## 2025-05-20 NOTE — TELEPHONE ENCOUNTER
Discussed Dr Gomez advice with Ms combs.    Per Dr Snyder:    Call patient the inflammatory markers are elevated her anemia is stable over time. Iron is fluctuating but levels in blood are ok. If she can tolerate oral iron keep taking daily. Kidney function is stable. I will plan for her to receive another dosage of Rituxan in June (4 months) rather than waiting until August (6 months.) I am going to refer her to hematology to discuss getting iron infusion which may help. Dr. Snyder   Sedimentation rate; CBC with Differential; Ferritin; Iron and TIBC; C-Reactive Protein (1 more orders)    Also per Dr Snyder patient can buy Slow-Fe iron supplement OTC

## 2025-05-20 NOTE — TELEPHONE ENCOUNTER
----- Message from Eden Snyder DO sent at 5/20/2025  1:14 PM CDT -----  Call patient the inflammatory markers are elevated her anemia is stable over time. Iron is fluctuating but levels in blood are ok. If she can tolerate oral iron keep taking daily. Kidney function is   stable. I will plan for her to receive another dosage of Rituxan in June (4 months) rather than waiting until August (6 months.) I am going to refer her to hematology to discuss getting iron infusion   which may help. Dr. Snyder   ----- Message -----  From: Lab, Background User  Sent: 5/13/2025   6:58 PM CDT  To: Eden Snyder DO

## 2025-05-26 ENCOUNTER — TELEPHONE (OUTPATIENT)
Dept: RHEUMATOLOGY | Facility: CLINIC | Age: 85
End: 2025-05-26
Payer: MEDICARE

## 2025-06-02 ENCOUNTER — APPOINTMENT (OUTPATIENT)
Dept: LAB | Facility: HOSPITAL | Age: 85
End: 2025-06-02
Attending: INTERNAL MEDICINE
Payer: MEDICARE

## 2025-06-04 ENCOUNTER — RESULTS FOLLOW-UP (OUTPATIENT)
Dept: NEPHROLOGY | Facility: CLINIC | Age: 85
End: 2025-06-04

## 2025-06-09 ENCOUNTER — OFFICE VISIT (OUTPATIENT)
Dept: NEPHROLOGY | Facility: CLINIC | Age: 85
End: 2025-06-09
Payer: MEDICARE

## 2025-06-09 VITALS
SYSTOLIC BLOOD PRESSURE: 116 MMHG | HEART RATE: 70 BPM | WEIGHT: 146.38 LBS | OXYGEN SATURATION: 98 % | HEIGHT: 62 IN | DIASTOLIC BLOOD PRESSURE: 44 MMHG | BODY MASS INDEX: 26.94 KG/M2

## 2025-06-09 DIAGNOSIS — N18.31 STAGE 3A CHRONIC KIDNEY DISEASE: Primary | ICD-10-CM

## 2025-06-09 DIAGNOSIS — E83.52 HYPERCALCEMIA: ICD-10-CM

## 2025-06-09 DIAGNOSIS — E87.1 HYPONATREMIA: ICD-10-CM

## 2025-06-09 DIAGNOSIS — N06.9 ISOLATED PROTEINURIA WITH MORPHOLOGIC LESION: ICD-10-CM

## 2025-06-09 DIAGNOSIS — M31.30 GRANULOMATOSIS WITH POLYANGIITIS WITH PULMONARY INVOLVEMENT: ICD-10-CM

## 2025-06-09 PROBLEM — N18.4 CHRONIC KIDNEY DISEASE (CKD), STAGE IV (SEVERE): Status: RESOLVED | Noted: 2021-12-06 | Resolved: 2025-06-09

## 2025-06-09 PROBLEM — N18.30 CRF (CHRONIC RENAL FAILURE), STAGE 3 (MODERATE): Status: ACTIVE | Noted: 2025-06-09

## 2025-06-09 PROCEDURE — 99999 PR PBB SHADOW E&M-EST. PATIENT-LVL IV: CPT | Mod: PBBFAC,,, | Performed by: INTERNAL MEDICINE

## 2025-06-09 PROCEDURE — 3074F SYST BP LT 130 MM HG: CPT | Mod: CPTII,S$GLB,, | Performed by: INTERNAL MEDICINE

## 2025-06-09 PROCEDURE — 3078F DIAST BP <80 MM HG: CPT | Mod: CPTII,S$GLB,, | Performed by: INTERNAL MEDICINE

## 2025-06-09 PROCEDURE — 1159F MED LIST DOCD IN RCRD: CPT | Mod: CPTII,S$GLB,, | Performed by: INTERNAL MEDICINE

## 2025-06-09 PROCEDURE — 99214 OFFICE O/P EST MOD 30 MIN: CPT | Mod: S$GLB,,, | Performed by: INTERNAL MEDICINE

## 2025-06-09 NOTE — PROGRESS NOTES
"  Subjective:       Patient ID: Aracelis Weber is a 84 y.o. White female who presents for return patient evaluation for chronic renal failure.      She has been treated with rituxan and steroids for Wegener's q 4-6 months.  She had COVID in November 2022.  She has chronic neuropathy in her feet. She has been having more fatigue lately.      Review of Systems   Constitutional:  Positive for fatigue. Negative for appetite change, chills and fever.   HENT:  Positive for hearing loss. Negative for congestion.    Eyes:  Positive for visual disturbance (L eye).   Respiratory:  Negative for cough and shortness of breath.    Cardiovascular:  Negative for chest pain and leg swelling.   Gastrointestinal:  Negative for abdominal pain, diarrhea, nausea and vomiting.   Genitourinary:  Positive for difficulty urinating (urge incontinence) and frequency. Negative for dysuria and hematuria.   Musculoskeletal:  Positive for arthralgias (hands). Negative for back pain and myalgias.   Skin:  Negative for rash.   Neurological:  Positive for numbness (feet B). Negative for headaches.   Psychiatric/Behavioral:  Negative for sleep disturbance.        The past medical, family and social histories were reviewed for this encounter.     BP (!) 116/44 (BP Location: Left arm, Patient Position: Sitting)   Pulse 70   Ht 5' 2" (1.575 m)   Wt 66.4 kg (146 lb 6.2 oz)   SpO2 98%   BMI 26.77 kg/m²     Objective:      Physical Exam  Vitals reviewed.   Constitutional:       General: She is not in acute distress.     Appearance: She is well-developed.   HENT:      Head: Normocephalic and atraumatic.   Eyes:      General: No scleral icterus.     Conjunctiva/sclera: Conjunctivae normal.   Neck:      Vascular: No JVD.   Cardiovascular:      Rate and Rhythm: Normal rate and regular rhythm.      Heart sounds: Normal heart sounds. No murmur heard.     No friction rub. No gallop.   Pulmonary:      Effort: Pulmonary effort is normal. No respiratory " distress.      Breath sounds: Normal breath sounds. No wheezing.   Abdominal:      General: Bowel sounds are normal. There is no distension.      Palpations: Abdomen is soft.      Tenderness: There is no abdominal tenderness.   Musculoskeletal:      Right lower leg: No edema.      Left lower leg: No edema.   Skin:     General: Skin is warm and dry.      Findings: No rash.   Neurological:      Mental Status: She is alert and oriented to person, place, and time.   Psychiatric:         Mood and Affect: Mood normal.         Behavior: Behavior normal.         Assessment:       1. Stage 3a chronic kidney disease    2. Wegener's disease, pulmonary    3. Isolated proteinuria with morphologic lesion    4. Hyponatremia    5. Hypercalcemia        Lab Results   Component Value Date    CREATININE 1.4 06/02/2025    BUN 23 06/02/2025     (L) 06/02/2025    K 4.9 06/02/2025     06/02/2025    CO2 25 06/02/2025     Lab Results   Component Value Date    PTH 63.9 06/02/2025    CALCIUM 8.9 06/02/2025    PHOS 3.7 06/02/2025     Lab Results   Component Value Date    HCT 34.1 (L) 05/13/2025     Urine Protein/Creatinine Ratio   Date Value Ref Range Status   06/06/2025   Final     Comment:     UNABLE TO CALCULATE     Prot/Creat Ratio, Urine   Date Value Ref Range Status   12/02/2024 0.13 0.00 - 0.20 Final   04/05/2024 Unable to calculate 0.00 - 0.20 Final   01/05/2024 0.26 (H) 0.00 - 0.20 Final     Plan:   Return to clinic in 6 months.  Labs for next visit include rp upc pth per standing orders.  Baseline creatinine is 0.8-1.2 since 2016.  Since 2022 she has been 1.2-1.4.  UPC is negative.  PTH is 64 with calcium of 8.9.    Renal US shows R 9.6 cm L 12.4 cm.  She was treated for her disease and has responded well.   I think she needs a lesser dose of metoprolol but will ask Dr. Villavicencio.

## 2025-06-20 ENCOUNTER — TELEPHONE (OUTPATIENT)
Dept: INFUSION THERAPY | Facility: HOSPITAL | Age: 85
End: 2025-06-20
Payer: MEDICARE

## 2025-06-20 NOTE — TELEPHONE ENCOUNTER
Copied from CRM #[id number]. Topic: Appointments - Information Request  >> Jun 20, 2025 12:48 PM Jackie wrote:  Type: Needs Medical Advice  Who Called:  Patient  Best Call Back Number: [phone]  Additional Information: Pt is scheduled for her infusion on Monday 06/23 at 9:30 and is assigned to chair 37 and she wants to be seated somewhere in the teens or by the window, she does not want a dark corner chair and wants to make sure she can change it now before her appt on Monday since they won't switch her then, She is also wanting the infusion in October and November to be put into one of the teen chairs as well since this is 4 hours long. Thank You.

## 2025-06-23 ENCOUNTER — INFUSION (OUTPATIENT)
Dept: INFUSION THERAPY | Facility: HOSPITAL | Age: 85
End: 2025-06-23
Attending: INTERNAL MEDICINE
Payer: MEDICARE

## 2025-06-23 VITALS
TEMPERATURE: 98 F | SYSTOLIC BLOOD PRESSURE: 152 MMHG | HEART RATE: 68 BPM | BODY MASS INDEX: 27.7 KG/M2 | DIASTOLIC BLOOD PRESSURE: 75 MMHG | HEIGHT: 62 IN | RESPIRATION RATE: 16 BRPM | OXYGEN SATURATION: 97 % | WEIGHT: 150.56 LBS

## 2025-06-23 DIAGNOSIS — M31.31 WEGENER'S GRANULOMATOSIS WITH RENAL INVOLVEMENT: ICD-10-CM

## 2025-06-23 DIAGNOSIS — I77.6 VASCULITIS: Primary | ICD-10-CM

## 2025-06-23 DIAGNOSIS — H91.90 HEARING LOSS, UNSPECIFIED HEARING LOSS TYPE, UNSPECIFIED LATERALITY: ICD-10-CM

## 2025-06-23 PROCEDURE — 96367 TX/PROPH/DG ADDL SEQ IV INF: CPT | Mod: PN

## 2025-06-23 PROCEDURE — 63600175 PHARM REV CODE 636 W HCPCS: Mod: JZ,TB,PN | Performed by: INTERNAL MEDICINE

## 2025-06-23 PROCEDURE — 96415 CHEMO IV INFUSION ADDL HR: CPT | Mod: PN

## 2025-06-23 PROCEDURE — 96413 CHEMO IV INFUSION 1 HR: CPT | Mod: PN

## 2025-06-23 PROCEDURE — 96375 TX/PRO/DX INJ NEW DRUG ADDON: CPT | Mod: PN

## 2025-06-23 PROCEDURE — 25000003 PHARM REV CODE 250: Mod: PN | Performed by: INTERNAL MEDICINE

## 2025-06-23 RX ORDER — DIPHENHYDRAMINE HYDROCHLORIDE 50 MG/ML
50 INJECTION, SOLUTION INTRAMUSCULAR; INTRAVENOUS ONCE AS NEEDED
OUTPATIENT
Start: 2025-06-23

## 2025-06-23 RX ORDER — METHYLPREDNISOLONE SOD SUCC 125 MG
100 VIAL (EA) INJECTION
Status: COMPLETED | OUTPATIENT
Start: 2025-06-23 | End: 2025-06-23

## 2025-06-23 RX ORDER — METHYLPREDNISOLONE SOD SUCC 125 MG
100 VIAL (EA) INJECTION
Status: CANCELLED | OUTPATIENT
Start: 2025-06-23

## 2025-06-23 RX ORDER — ACETAMINOPHEN 325 MG/1
650 TABLET ORAL
Status: COMPLETED | OUTPATIENT
Start: 2025-06-23 | End: 2025-06-23

## 2025-06-23 RX ORDER — HEPARIN 100 UNIT/ML
500 SYRINGE INTRAVENOUS
OUTPATIENT
Start: 2025-06-23

## 2025-06-23 RX ORDER — FAMOTIDINE 10 MG/ML
20 INJECTION, SOLUTION INTRAVENOUS
Status: COMPLETED | OUTPATIENT
Start: 2025-06-23 | End: 2025-06-23

## 2025-06-23 RX ORDER — ACETAMINOPHEN 325 MG/1
650 TABLET ORAL
Status: CANCELLED | OUTPATIENT
Start: 2025-06-23

## 2025-06-23 RX ORDER — SODIUM CHLORIDE 0.9 % (FLUSH) 0.9 %
10 SYRINGE (ML) INJECTION
OUTPATIENT
Start: 2025-06-23

## 2025-06-23 RX ORDER — FAMOTIDINE 10 MG/ML
20 INJECTION, SOLUTION INTRAVENOUS
Status: CANCELLED | OUTPATIENT
Start: 2025-06-23

## 2025-06-23 RX ORDER — EPINEPHRINE 0.3 MG/.3ML
0.3 INJECTION SUBCUTANEOUS ONCE AS NEEDED
OUTPATIENT
Start: 2025-06-23

## 2025-06-23 RX ADMIN — DIPHENHYDRAMINE HYDROCHLORIDE 50 MG: 50 INJECTION INTRAMUSCULAR; INTRAVENOUS at 10:06

## 2025-06-23 RX ADMIN — ACETAMINOPHEN 650 MG: 325 TABLET ORAL at 09:06

## 2025-06-23 RX ADMIN — FAMOTIDINE 20 MG: 10 INJECTION INTRAVENOUS at 09:06

## 2025-06-23 RX ADMIN — SODIUM CHLORIDE 1000 MG: 9 INJECTION, SOLUTION INTRAVENOUS at 10:06

## 2025-06-23 RX ADMIN — METHYLPREDNISOLONE SODIUM SUCCINATE 100 MG: 125 INJECTION, POWDER, FOR SOLUTION INTRAMUSCULAR; INTRAVENOUS at 09:06

## 2025-06-23 NOTE — PROGRESS NOTES
Received a message from [facility] infusion about dosing for RTX. Confirmed dosing was changed to RTX 1 g x 2 doses on 5/26/25 by provider

## 2025-06-23 NOTE — PLAN OF CARE
Problem: Fall Injury Risk  Goal: Absence of Fall and Fall-Related Injury  Outcome: Progressing  Intervention: Promote Injury-Free Environment  Flowsheets (Taken 6/23/2025 0943)  Safety Promotion/Fall Prevention: (pt states she left walker at home and cane)   Fall Risk reviewed with patient/family   family expresses understanding of fall risk and prevention   family to remain at bedside   instructed to call staff for mobility   patient verbalized intentions of noncompliance     Problem: Fall Injury Risk  Goal: Absence of Fall and Fall-Related Injury  Intervention: Promote Injury-Free Environment  Flowsheets (Taken 6/23/2025 0943)  Safety Promotion/Fall Prevention: (pt states she left walker at home and cane)   Fall Risk reviewed with patient/family   family expresses understanding of fall risk and prevention   family to remain at bedside   instructed to call staff for mobility   patient verbalized intentions of noncompliance     Problem: Adult Inpatient Plan of Care  Goal: Plan of Care Review  Outcome: Met   Tolerated infusion well today Able to be d/c to home  Discharge instructions given with copy of avs and pt ambulated to private vehicle without difficulty NAD

## 2025-06-27 ENCOUNTER — OFFICE VISIT (OUTPATIENT)
Dept: RHEUMATOLOGY | Facility: CLINIC | Age: 85
End: 2025-06-27
Payer: MEDICARE

## 2025-06-27 VITALS
HEART RATE: 81 BPM | BODY MASS INDEX: 28.03 KG/M2 | WEIGHT: 152.31 LBS | DIASTOLIC BLOOD PRESSURE: 58 MMHG | SYSTOLIC BLOOD PRESSURE: 109 MMHG | HEIGHT: 62 IN

## 2025-06-27 DIAGNOSIS — I77.82 ANCA-ASSOCIATED VASCULITIS: ICD-10-CM

## 2025-06-27 DIAGNOSIS — F32.A DEPRESSION, UNSPECIFIED DEPRESSION TYPE: Primary | ICD-10-CM

## 2025-06-27 DIAGNOSIS — D84.821 IMMUNOSUPPRESSION DUE TO DRUG THERAPY: ICD-10-CM

## 2025-06-27 DIAGNOSIS — Z79.899 IMMUNOSUPPRESSION DUE TO DRUG THERAPY: ICD-10-CM

## 2025-06-27 DIAGNOSIS — Z79.899 HIGH RISK MEDICATIONS (NOT ANTICOAGULANTS) LONG-TERM USE: ICD-10-CM

## 2025-06-27 DIAGNOSIS — M31.30 GRANULOMATOSIS WITH POLYANGIITIS WITH PULMONARY INVOLVEMENT: ICD-10-CM

## 2025-06-27 DIAGNOSIS — G62.9 NEUROPATHY: ICD-10-CM

## 2025-06-27 PROCEDURE — 99999 PR PBB SHADOW E&M-EST. PATIENT-LVL III: CPT | Mod: PBBFAC,,, | Performed by: INTERNAL MEDICINE

## 2025-06-27 RX ORDER — ESCITALOPRAM OXALATE 5 MG/1
5 TABLET ORAL DAILY
Qty: 90 TABLET | Refills: 3 | Status: SHIPPED | OUTPATIENT
Start: 2025-06-27 | End: 2025-06-27 | Stop reason: SDUPTHER

## 2025-06-27 RX ORDER — ESCITALOPRAM OXALATE 5 MG/1
5 TABLET ORAL DAILY
Qty: 90 TABLET | Refills: 3 | Status: SHIPPED | OUTPATIENT
Start: 2025-06-27 | End: 2026-06-27

## 2025-06-27 RX ORDER — GABAPENTIN 400 MG/1
400 CAPSULE ORAL 3 TIMES DAILY
Qty: 270 CAPSULE | Refills: 2 | Status: SHIPPED | OUTPATIENT
Start: 2025-06-27

## 2025-06-27 ASSESSMENT — ROUTINE ASSESSMENT OF PATIENT INDEX DATA (RAPID3)
PSYCHOLOGICAL DISTRESS SCORE: 2.2
TOTAL RAPID3 SCORE: 4.22
MDHAQ FUNCTION SCORE: 0.8
PAIN SCORE: 5
FATIGUE SCORE: 0
PATIENT GLOBAL ASSESSMENT SCORE: 5

## 2025-06-27 NOTE — PROGRESS NOTES
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 Iron is fluctuating but levels in blood are ok. If she can tolerate oral iron keep taking daily. Kidney function is stable. I will plan for her to receive another dosage of Rituxan in  (4 months) rather than waiting until August (6 months.) I am going to refer her to hematology to discuss getting iron infusion which may help.    After 1st Rituxan at the 4 month interval 2025 at 1 g without incident  Next 2 scheduled for 2025    I will check her labs today    She states she has a 5/10 pain hands and feet a lot of this is also neuropathic pain.  She is still taking prednisone 2.5 mg daily gabapentin 400 mg t.i.d. and using diclofenac gel.  3/2025: Patient notes deep breathing when cleaning the house. She is noting more SOB  just with activity. She notes fever and low grade fever with rigors after each Rituxan.   We can add demerol. To her next     She continues with crusting of sinuses using navage consistently but NO recent sinus infections. Notes sinus ins not as congested as she used to be. She notes some build up and crusting with navage with mild soap and ABX, but not nearly as much.   No SOB at rest. No new edema. No cough.     Her hands have been quite painful last few weeks to months ache all day never stop. She does not use tylenol. I will have her start this.     She is noting more fatigue as of recent more so than previous. She does make lap in her living room to keep active.     She is more tearful this visit missing her  children, she is managing. Prefers not to take meds, but did take lexapro briefly and did well in the past.     I do not feel her Wegener's is active but we will check labs today  I did offer Lexapro she is going to hold off. Will call me if worsens     Perioral derm is worsening.  "      12/2024: patient not SOB but she is feeling like she is taking deep breaths more often than baseline. + fatigue. Hands are aching more with trying to wrap gift which she loves to do.   Sinuses are better than they used to be doing much better last 2 visits. This will be the first time she has gone 4 months w/o issues.     10/2024: Patient has some questions. We did check CBC which was a bit low with elevated inflammatory markers, was having increased urination was found to have UTI and is feeling better since being treated.   Sinuses have calmed down. She went to boat show.   Iron was not as low as she has been in past encouraged increased dietary iron.     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   Mannienetyra would like to avoid at this time. She did review all the information  We discussed my concerns,     9/2024: RTX completed 500mg 7/8/2024, +COVID approx 7/16/24 held off on 2nd infusion. She has remarkably recovered and doing "ok". She is noting significant fatigue slight after RTX, much more after COVID. She is not driving alone so she is home few days which is lonely.   Recent seen with [facility] endoscopy with less crusting since RTX.     She is having a hard time with loss of hearing, loss of smell since the disease. Hard to not "smell the holidays"   Rapid onset peripheral neuropathy which I have had to attribute to her neuropathy.     6/2024: I signed Rituxan orders 4/15/24 she has not received. She was called to schedule and medication is approved but she is having a myriad of complaints with bleeding in the nose over a few months with sinus congestion. She had a debridement with Dr. Quinn  and this most recent one resolved nose bleeds, she states her sinuses are better than they have been in nearly 3 years. Mucous has stopped being so productive, she is avoiding navage and no worse.     Hearing stable one hearing aid is not working.     No new illnesses she had URI 4/2024 none " since  Renal function stable.   No SOB or cough no fevers.   She will have left lower tooth extracted tomorrow: Dr. Huang treated with ABX.     3/2024:  3/2024: Patient HSV did finally clear some residula angular chelitis/  Patient with dry cough typic this time of year no SOB  Sinuses have been pretty good.  No fever, chills, weight loss or increase in fatigue.    Wet AMD-O'Varghese injections monthly now  Hearing a bit worse as of recent.     1/2024:  With a recent URI her labs drawn 01/05/2024 this with the elevated sed rate and C-reactive protein she was negative for flu, RSV, COVID but given that she is having symptoms of some nosebleeds and while her baseline sinusitis is chronic seems to be an uptake in the amount of nasal discharge we are going to repeat her sed rate next month.  Her renal function is stable and she has a bit of anemia that is new.   We had been holding her Rituxan last infusion 07/20/2023 500mg x 1 , but I want to make sure we are not missing any underlying Wegener's activity.    She did have significant cough that took some time to improve, her sinuses feel continued inflammation, just started Z-pack.  Sharply marginated lucent lesion in the T10 vertebral body, possibly a hemangioma.             MRI completed with Ms Alonzo will just monitor for increase cervical stenosis or radicular [id number] at this time.   Did have vertebral fx.      9/2023: Patient was seen 6/2023 with anticipation of repeat FESS Sinus surgery and pending compound nasal irrigation. She did have surgery with Dr. Quinn end of June 2023, by July visit noted improvement with compound irrigation from prior. Still with chronic sinusitis.   Last Rituxan 500mg single dose 7/2023.    She is doing much better with the congestion but nothing like it was before. She denies fevers, chills, some cough, no hemoptysis.   She is down to Prednisone 2.5 mg daily but overall her edema is much improved.   Renal function stable  ESR and  CRP    She does not want to take MTX she lost significant amounts of hair.   Imuran was lost with flare and transition to Imuran.     She has completed Rituxan 18 months with sinuses under control, lungs clear , renal function stable. Hearing is stable she lost nearly 90% of hearing and has stabilized.   She has mac degeneration.   Neuropathy continues to be a problem but we never have petechial rashes.   Edema is much improved.     3/2023:   Patient current on RTX Q 6 month maintanance following induction for Wegeners.    12/2/22 (Delayed COVID infection) RTX was 500mg with solumedrol 80mg IM   Completed Rituxan 1000mg for 1st  maintenance at 4 months 4/20/2022. due to rise in symptoms, and ESR   Induction 10/2021.      Continued nasal congestion and drainage.   Recent significant nosebleeds. Endoscopy with recurrent crusting despite office debridement by ENT and continued irrigation.      Will repeat serologies and inflammatory markers and check CD 19 levels first week March and see her later that month.   Patient doing very well. Still rhinitis, using navage. No antibiotics from ENT since last visit. She is still clearing significant mucous faint with dried blood.   Dry cough, no fevers, no SOB\  Prolia: 10/17/2022         11/2022  Patient doing very well. Still rhinitis, using navage. No antibiotics from ENT since last visit. She is still clearing significant mucous faint with dried blood.   Dry cough, no fevers, no SOB  Mild HA only with elevated BP.   Completed Rituxan maintanence at 4 months 4/20/2022.   Prolia 3/25/22  Evusheld with catch up dose 2/10/22 and 3/25/2022     5/5/2022:   Patient doing very well. Still rhinitis, using navage. No antibiotics from ENT since last visit. She is still clearing significant mucous faint with dried blood.   Dry cough, no fevers, no SOB  Mild HA only with elevated BP.   Completed Rituxan maintanence at 4 months 4/20/2022.   Prolia 3/25/22  Evusheld with catch up dose  2/10/22 and 3/25/2022.      Major complaints: pins, needles, stinging, burning in feet day and night is constant. Dr. Campbell: EB-N5 supplement is helping some, stopped for months and noted significant worsening. Dr. Campbell did increase from EB-N3.   Gabapentin 400mg TID  Hydrocodone only PRN  Prednisone 7.5mg         2/2022  Doing well no cough, no fevers. Recent COVID exposure but negative.   Seen with Nephro.   Given her successful response with Ritux. Agree to continue maintanance with Ritux Q 4-6 months     Needs to get #3 COVID vaccine timing with RTX. - will time this with me 2 weeks prior to   Discussed Prolia  Working on [employer]tamar for PreP with COVID> she is scheduled 2/10/22        12/9/2021  Patient's recent CXR 12/21/21 with marked improvement when compared to 10/21/21  Prednisone 10mg daily  S/p RTX x 4 infusions completion date. 11/5/21  Patient with rise in creatinine to 2.0 on 11/26 had been requiring lasix for marked edema since 11/1/21. Stopped 2 weeks ago. Edema is present but managed. Drinking about 40 oz water and 3 cups of coffee daily     Patient completed [facility] PT felt she was doing well home exercises about 2 weeks ago with acute pain in lumbar spine after exercising. Imaging lumbar few days ago without evidence of a fracture. She point to pain in the lumbosacral and radiating to buttock region. Ok to rise from seated. Pain more on left low back and buttock. No numbness no tingling but pain in the hamstring region. Comfortable sitting but not lying down, but she never sleeps in bed. Long hx of rather impressive scoliosis.         11/11/2021:   Inflammatory markers are markedly improved.   Cough dry still at night. Patient noting she has lost sense of smell. Can still taste.      Patient recently hospitatlized for Bronch with negative cultures to date.  There was a concern for possible atypical infection she has had enlarging right upper lobe cavitary lesion measures still has a right middle lobe  lesion with some cavitation now as well.  I have discussed this case extensively with both Dr. Schwartz as well as Dr. Joseph upon discharge we felt comfortable that this is likely Wegener's granulomatosis causing cavitary lesions.  Supporting sinus biopsy does note granuloma.    Patient was started on Imuran in March of 2021 but with the growth of her lesion we had rule out for any possible infection given her status of immunosuppression.  She is here today not in her usual state she appears fatigued pale she is not complaining of shortness of breath but does have a cough dry persistent.  She is noticing increased pedal edema.           9/13/21: patient with 2 falls 8 days apart. Spoke w/ pulm shortly following initial fall and we were scheduled for bronch when she sustained another fall.   Scheduled now for 9/17/21 bronch. RUL lesion.   Remains off Imuran  Prednisone 15mg daily.   Sinus congestion. Still productive      She is noting some productive cough at night no hemoptysis.   Patient denies SOB  Having more HA. -frontal and sinus, she is noting some drainage in right ear some mucous/blood.   No edema.   She notes fatigue, no fevers, no night sweats. No weight loss.   Skin easily bruising.      No personal hx of any malignancy.         7/20/2021:    Spoke with Pulm plan for bronch is able working on possible bx site vs BAL.   Need to r/o infectious/neoplastic and confirm for ANCA (GPA vasculitis)   Inflammatory markers markedly down .8  Now 14.   H/H improve from 7.4 to 8.2  Platelets normalized.   Renal stable over time     Patient did attend wedding with approx 300 people unmasked last weekend. Reviewed by recs for COVID precautions given     Current regimen:   pred 15mg (Na stable)  Holding Imuran     Interval events: PET with hypermetabolic lesions:   a. RUL cavitary/cystic lesion 1.9cm x 1.3cm  b. RUL spiculated 1.2cm x 1.4cm  c. RML 1.7cm x 2.0 cm  All with differential: infectious, inflammatory,  "neoplastic, grannulomatous (a) with favor of inflammation given the rapid change     Fungal immunodiffusion neg  Quant gold and T spot: neg  H/H improving      Sinus congestion, pressure, headache, x 3 weeks very productive with blowing nose, back on navage. :  started Cindamycin with DR Quinn x 10 days.   Patient denies any shortness breath.   Very fatigues.   No more fevers. Slight dry cough, no blood/ no mucous.   No SOB.         5/12/21 completed nasal irrigation with biopsy not definitive for GPA, but + inflammation and granulomas. Temporal artery bx negative. Patient with PCP same day as labs 6/2/21  dx with UTI on keflex.      Was seen apparently in ED in [place] for 104F she had altered mental status, dx with UTI, dehydration, treated with. Completed all 7 days of keflex and within 48 hours with another fever 104 F. She continues with congestion but no worse. Patient denies cough, hemoptysis, bloody nasal discharge. She has had no fever x 10 days. Checks twice daily.   while travelling told "congestion in chest on xray". Inflammatory markers very high again.   Patient with recent concern for wegeners needs CT chest. CXR with new airspace opacities right chest- need to r/o GGO vs infection. CT has been ordered pending scheduling.   Broad ABX coverage recommended for now will cc Dr. Natarajan.      I am limited here with her  severe edema from higher dose steroids. Max tolerated is 20mg daily pred  Started Imuran 5/5/2021. Very low dose 50mg BID (0.65mg/kg) tolerating VERY well.   H/H still low.            2/2021  Sinususitis, cx were negative.  Using nasal irrigation with mucoid disharge.   Patient continues with significant nasal discharge and blood with scabs. We discussed Wegeners but pathology sinus no vasculitis, granuloma noted.   No HA, no blurred vision. Recent sinus infection with HA for 10 days. cx +, but also repeat labs demonstarting +PR3 and +ANCA         Patient with c/o right > left 3,4 finger " numbness that was intermittent until approximately 2 weeks about having near constant numbness in hands, pins and needles and pain.   Patient not noting much improvement with change in position.      Off MTX 6  tabs weekly.   Doing well with Hydroxychloroquine.   Declined COVID vaccine     Gabapentin up to 300mg TID.   Now with Dr. Campbell doing PT for Plantar fasciitis. She is taking supplement and compound cream for joint and neuropathy. No response to tarsal tunnel injection per Dr. Campbell.   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 WBC ok.         11/2020  Patient with PMR   MTX at 4 tabs weekly new anemia. Thrombocytosis  Pred at 10mg   Complicated with peripheral edema rather severe , started MTX seeing trending CRP/ESR but persistent leg pain.   Seen with PCP for neuropathy s/sx not seeing much benefit with gabpentin  Seen with Cardiology- no concern for cardiac ECHO ok. dc'd lasix for hyponatremia.      8/2020  Pred 20mg with mild edema, pred 30 mg with severe edema.   Current Pred 15mg daily  Rising ESR again.   Lasix 20 mg daily with improved edema but having some pins and needles in feet/legs.   Having tight, burning stinging at the toes and feet. Heaviness. Prickly.   Noting sodium is falling, K 5.2-5.4 still using               7/2020:  Hands not painful, knees not painful. Shoulder/cervical and down the arms, markedly improved. She still has some pain him hips and groin with walking and some weakness to the legs with edema. Some pain with fixing own hair.   She takes in AM regarding hips ache, some shoulder/arms, and again at night because hips/legs.   The clue is that the LDN new dose she had some ASE, previously tolerated 3mg daily fine.      Historical review of present Illness.   Patient with a recent chronic sinusitis that ultimately warranted a septoplasty, endoscopic sinus surgery and turbinate  reduction 5/2020   Four weeks postop t increasingly worse she was noting pain in her throat and ears lasting several hours complaint of pdizziness she had symptoms of ear pain and decreased hearing in the left ear.  She underwent a debridement at this visit she continue with compound nasal irrigations.  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     Patient did have repeat procedure on with biopsies taken 07/03/2020 showing right maxillary sinus chronically inflamed fibrotic polypoid portions benign nasal mucosa left maxillary sinus similar polypoid fragments ulcerated markedly inflamed respiratory mucosa focal foreign body giant cell response noted suggested that this might have been from her Gelfoam packing as a possible cause of this reaction     I have spoken with Dr. Quinn prior to patient's visit today and was a concern that she could have triggered some inflammatory response with the Gelfoam packing     Patient states she no longer has a headache is not noticing much ear drainage continues with profound loss of hearing on the left and rather significant on the right both sensorineural and some conductive changes.     Restarted on prednisone as of 7/20/20  10 mg patient has not noticed any change with her hearing in this time she did notice slight improvement with her joint aches and pains but is still relying on hydrocodone for the bulk of this.     She denies a persistent  headache she does have some tenderness about the preauricular region particularly on the right more than the left.  She has denied jaw claudication changes in her voice or any amaurosis fugax.  She has no pre-existing history of joint aches and pains hip or otherwise.  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  She had a workup that involved a negative rheumatoid factor, age appropriate sedimentation rate,C reactive protein JANET was positive 1:160 in speckled pattern           Previous: Hx:   She is having pain in her hand with stiffness and right 2nd PIP joint now unable to flex. 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   Hx of bilateral TKA 3 and 5 years ago and those are doing well.   She notes intermittent edema. Some hammer toe deformity bilaterally making shoes problematic but not painful otherwise.   Long hx of GERD-severe.   Cannot tolerate NSAIDs at all w/o gastritis.   Can use Hydrocodone PRN   Added Naltrexone at 3mg and doing very well  Lost 30# with WW.   Patient denies weight loss, rashes, dry eye, dry mouth, nasal or palatal ulcerations,  lymphadenopathy, Raynaud's, hx of DVT/miscarriages, psoriasis or family hx of psoriasis, rashes, serositis, anemia or other constitutional symptoms.  Asking about naltrexone  Component      Latest Ref Rng & Units 8/8/2020 7/29/2020 7/20/2020 7/13/2020   Sodium      136 - 145 mmol/L   126 (L) 128 (L)     Potassium      3.5 - 5.1 mmol/L   5.4 (H) 5.2 (H)     Chloride      95 - 110 mmol/L   92 (L) 92 (L)     CO2      23 - 29 mmol/L   25 29     Glucose      70 - 110 mg/dL   109 109     BUN, Bld      8 - 23 mg/dL   13 11     Creatinine      0.5 - 1.4 mg/dL   0.8 0.8     Calcium      8.7 - 10.5 mg/dL   9.5 9.7     Anion Gap      8 - 16 mmol/L   9 7 (L)     eGFR if African American      >60 mL/min/1.73 m:2   >60.0 >60.0     eGFR if non African American      >60 mL/min/1.73 m:2   >60.0  >60.0     Anti Sm Antibody      0.00 - 0.99 Ratio       0.04   Anti-Sm Interpretation      Negative       Negative   Anti Sm/RNP Antibody      0.00 - 0.99 Ratio       0.09   Anti-Sm/RNP Interpretation      Negative       Negative   JANET Screen      None Detected           Rheumatoid Factor      0.0 - 15.0 IU/mL           Sed Rate      0 - 36 mm/Hr 57 (H)   54 (H) 75 (H)   CRP      0.0 - 8.2 mg/L 75.4 (H) 56.1 (H) 57.5 (H) 99.4 (H)   Anti-Histone Antibody      0.0 - 0.9 Units       0.4   ds DNA Ab      Negative 1:10       Negative 1:10      Component      Latest Ref Rng & Units 6/25/2020 11/10/2018   Sodium      136 - 145 mmol/L       Potassium      3.5 - 5.1 mmol/L       Chloride      95 - 110 mmol/L       CO2      23 - 29 mmol/L       Glucose      70 - 110 mg/dL       BUN, Bld      8 - 23 mg/dL       Creatinine      0.5 - 1.4 mg/dL       Calcium      8.7 - 10.5 mg/dL       Anion Gap      8 - 16 mmol/L       eGFR if African American      >60 mL/min/1.73 m:2       eGFR if non African American      >60 mL/min/1.73 m:2       Anti Sm Antibody      0.00 - 0.99 Ratio       Anti-Sm Interpretation      Negative       Anti Sm/RNP Antibody      0.00 - 0.99 Ratio       Anti-Sm/RNP Interpretation      Negative       JANET Screen      None Detected Detected (A) Detected (A)   Rheumatoid Factor      0.0 - 15.0 IU/mL 11.6 <8.6   Sed Rate      0 - 36 mm/Hr       CRP      0.0 - 8.2 mg/L       Anti-Histone Antibody      0.0 - 0.9 Units       ds DNA Ab      Negative 1:10             REVIEW OF SYSTEMS:     Review of Systems   Constitutional: Negative for fever, malaise/fatigue and weight loss.   HENT: Negative for sore throat.    Eyes: Negative for double vision, photophobia and redness.   Respiratory: Negative for cough, shortness of breath and wheezing.    Cardiovascular: Negative for chest pain, palpitations and orthopnea.   Gastrointestinal: Negative for abdominal pain, constipation and diarrhea.   Genitourinary: Negative for dysuria,  "hematuria and urgency.   Musculoskeletal: Positive for joint pain. Negative for back pain and myalgias.   Skin: Negative for rash.   Neurological: Negative for dizziness, tingling, focal weakness and headaches.   Endo/Heme/Allergies: Does not bruise/bleed easily.   Psychiatric/Behavioral: Negative for depression, hallucinations and suicidal ideas.                     Objective:      Objective        Past Medical History:   Diagnosis Date    Anesthesia       'Mother stop breathing after anesthesia"    Chronic sinusitis      Depression      GERD (gastroesophageal reflux disease)      PVC (premature ventricular contraction)              Family History   Problem Relation Age of Onset    Heart disease Mother      Arthritis Mother      Cancer Sister      Arthritis Sister      Diabetes Sister      Hypertension Sister        Social History            Tobacco Use    Smoking status: Former       Current packs/day: 0.00       Types: Cigarettes       Start date: 2/3/1955       Quit date: 1/3/1960       Years since quittin.7    Smokeless tobacco: Never   Substance Use Topics    Alcohol use: Never       Alcohol/week: 0.0 standard drinks of alcohol    Drug use: Never                   Current Outpatient Medications on File Prior to Visit   Medication Sig Dispense Refill    acetaminophen (TYLENOL) 500 MG tablet Take 500 mg by mouth every 6 (six) hours as needed for Pain.        albuterol (PROVENTIL/VENTOLIN HFA) 90 mcg/actuation inhaler INHALE 2 PUFFS BY MOUTH EVERY 4 TO 6 HOURS AS NEEDED FOR SHORTNESS OF BREATH OR COUGH        ascorbate calcium (MAHSA-C ORAL) Take by mouth.        B2-B6 phos-levomef simran-mecobal (EB-N3 DR) 1.3-70-6-4 mg CpDR Take 1 capsule by mouth once daily.        cetirizine (ZYRTEC) 10 MG tablet TAKE 1 TABLET ONE TIME DAILY 90 tablet 0    denosumab (PROLIA SUBQ) Inject into the skin. Two times a year        diclofenac sodium (VOLTAREN) 1 % Gel APPLY TO THE AFFECTED AREA(S) 2 GRAMS THREE TIMES DAILY 600 g 0 "    furosemide (LASIX) 20 MG tablet TAKE 1 TABLET EVERY DAY 90 tablet 3    gabapentin (NEURONTIN) 400 MG capsule TAKE 1 CAPSULE THREE TIMES DAILY 270 capsule 3    HYDROcodone-acetaminophen (NORCO) 7.5-325 mg per tablet Take 1 tablet by mouth every 6 (six) hours as needed.        ITRACONAZOLE, BULK, MISC EMPTY CONTENTS OF 1 CAPSULE INTO NASAL IRRIGATION SYSTEM, ADD DISTILLED WATER, SALT PACK, MIX & IRRIGATE. PERFORM 2 TIMES DAILY        LACTOBACILLUS ACIDOPHILUS ORAL Take by mouth.        metoprolol succinate (TOPROL-XL) 50 MG 24 hr tablet TAKE 1 TABLET EVERY DAY 90 tablet 3    mirabegron (MYRBETRIQ) 50 mg Tb24 Take 1 tablet (50 mg total) by mouth once daily. 30 tablet 11    neomycin-polymyxin-dexamethasone (MAXITROL) 3.5mg/mL-10,000 unit/mL-0.1 % DrpS          omeprazole (PRILOSEC) 40 MG capsule TAKE 1 CAPSULE EVERY MORNING 90 capsule 0    ondansetron (ZOFRAN-ODT) 8 MG TbDL Take 8 mg by mouth 2 (two) times daily.        peg 400-propylene glycol, PF, (SYSTANE, PF,) 0.4-0.3 % Dpet Apply to eye 2 (two) times a day.        predniSONE (DELTASONE) 2.5 MG tablet Take 1 tablet (2.5 mg total) by mouth once daily. 90 tablet 3    predniSONE (DELTASONE) 5 MG tablet Take 1 tablet (5 mg total) by mouth once daily. 30 tablet 3    sodium chloride 0.9% SolP 500 mL with riTUXimab 10 mg/mL Conc 375 mg/m2 Inject 375 mg/m2 into the vein. 4 to 6 months        tobramycin sulfate 0.3% (TOBREX) 0.3 % ophthalmic solution Place into both eyes.        vancomycin (VANCOCIN) 1,000 mg injection SMARTSI Vial(s) Both Nares Twice Daily        vit C/E/Zn/coppr/lutein/zeaxan (PRESERVISION AREDS-2 ORAL) Take 1 capsule by mouth 2 (two) times a day.         [DISCONTINUED] naltrexone capsule Take 3 mg by mouth once daily.          No current facility-administered medications on file prior to visit.             Vitals:     23 1306   BP: 134/65   Pulse: 69         Physical Exam:     Physical Exam   Constitutional: She is oriented to person, place,  and time. She appears well-developed and well-nourished.   HENT:   Head: Normocephalic and atraumatic.   Mouth/Throat: Oropharynx is clear and moist.   Eyes: Pupils are equal, round, and reactive to light. EOM are normal.   Neck: Normal range of motion.   Cardiovascular: Normal rate, regular rhythm and normal heart sounds.   Pulmonary/Chest: Effort normal and breath sounds normal.   Musculoskeletal:        Right shoulder: She exhibits normal range of motion, no tenderness and no swelling.        Left shoulder: She exhibits normal range of motion, no tenderness and no swelling.        Right elbow: She exhibits normal range of motion and no swelling. No tenderness found.        Left elbow: She exhibits normal range of motion and no swelling. No tenderness found.        Right wrist: She exhibits normal range of motion, no tenderness and no swelling.        Left wrist: She exhibits normal range of motion, no tenderness and no swelling.        Right knee: She exhibits normal range of motion and no swelling. No tenderness found.        Left knee: She exhibits normal range of motion and no swelling. No tenderness found.        Right hand: She exhibits decreased range of motion and tenderness. She exhibits no swelling.        Left hand: She exhibits decreased range of motion and tenderness. She exhibits no swelling.        Right foot: There is normal range of motion, no tenderness and no swelling.        Left foot: There is normal range of motion, no tenderness and no swelling.   Bony hypertrophy at the PIP and DIP joints. +squaring at the CMC joint. No active synovitis on 28 joint exam.    Neurological: She is alert and oriented to person, place, and time.   Skin: Skin is warm and dry.   Psychiatric: She has a normal mood and affect. Her behavior is normal.      Component      Latest Ref Rng & Units 9/17/2021 9/17/2021          11:03 AM 11:03 AM   Respiratory Culture         No growth   Gram Stain (Respiratory)       No  organisms seen No WBC's   AFB Culture & Smear             AFB CULTURE STAIN             GENET Prep             Fungus (Mycology) Culture             Aerobic Bacterial Culture                Component      Latest Ref Rng & Units 2021          11:03 AM   Respiratory Culture       No Staph aureus, MRSA or Pseudomonas isolated.   Gram Stain (Respiratory)       No organisms seen   AFB Culture & Smear           AFB CULTURE STAIN           GENET Prep           Fungus (Mycology) Culture           Aerobic Bacterial Culture              Component      Latest Ref Rng & Units 2021          11:03 AM 11:03 AM   Respiratory Culture       Normal respiratory nimesh     Gram Stain (Respiratory)       Moderate WBC's     AFB Culture & Smear         Culture in progress   AFB CULTURE STAIN         No acid fast bacilli seen.   KOH Prep         No yeast or fungal elements seen   Fungus (Mycology) Culture         Culture in progress   Aerobic Bacterial Culture                      Narrative  Performed by: [facility]  Patient - ONIEL ROSARIO                   - 1940 Sex- F   Med Rec # - [id number]                        Bimal Mckeon M.D.   The following is an electronic copy of report # [id number] from:   [facility]   [address] [place] [address]                         Phone [phone]   DIAGNOSIS:   2021  JL/sdc     1, 2.  RIGHT UPPER LOBE MASS BRUSHING AND FINE NEEDLE ASPIRATE BIOPSIES:   - NEGATIVE FOR MALIGNANT CELLS.     - PURULENT EXUDATE.     - A FEW FRAGMENTS OF BRONCHIAL MUCOSA WITH FLORID CHRONIC BRONCHITIS    AND REACTIVE SQUAMOUS    ATYPICAL METAPLASIA.       Comment:   Special stains (AFB and GMS) are negative for organisms. While no    granulomas are seen here, the inflammatory reaction appears to be    similar to that seen in the sinus material over the past few years    (Delta [id number], [id number], [id number]). This suggests a     continuation of an underlying systemic disease with the clinical    working diagnosis of Wegener's Granulomatosis currently being under    consideration. Bronchoscopic material, particularly of the lower    respiratory tract, is of low yield for a definitive diagnosis of that    entity. The previous sinus material will be reviewed with    consideration for that diagnosis.   _______________________________________________________________________   SPECIMEN AND SOURCE:   1. RUL mass brushing   2. RUL mass needle     CLINICAL INFORMATION:   Clinical Information:-gt Right Upper Lobe Mass   Specific Site:-gt RUL mass brushing T Brush; RUL Mass; RUL mass-    microbiology; RUL BAL; RUL washing;   Other Requests:-gt N/A     GROSS DESCRIPTION:   1. Received 40 mL of fluid in formalin, 1 dry slides and 1 slides in    95% alcohol. Prepared one cell block.     2. Received 40 mL of fluid in formalin, 1 dry slides and 1 slides in    95% alcohol. Prepared one cell block.     CODE: 0     Cytotechnologist: ABDIFATAH Marvin (ASCP)   Pathologist: Scott Johnson MD (Electronic Signature) 09/22/2021 2:34 PM     [facility], [facility] * [address] [place] [address] * [place] [address]   Technical services performed at: [facility], [facility] * [address] [place] [address] * [place] [address]   Screening Site: [facility], [facility] * [address] [place] [address] *    [place] [address]                            Post Rituxan image                                     Pre Rituxan image  Assessment and Plan      Depression, unspecified depression type  -     EScitalopram oxalate (LEXAPRO) 5 MG Tab; Take 1 tablet (5 mg total) by mouth once daily.  Dispense: 90 tablet; Refill: 3    Granulomatosis with polyangiitis with pulmonary involvement    Immunosuppression due to drug therapy  -     ALT (SGPT); Future; Expected date: 06/27/2025  -     AST (SGOT); Future; Expected date: 06/27/2025  -     CBC Auto Differential; Future; Expected date:  06/27/2025  -     C-Reactive Protein; Future; Expected date: 06/27/2025  -     Creatinine, Serum; Future; Expected date: 06/27/2025  -     Sedimentation rate; Future; Expected date: 06/27/2025    ANCA-associated vasculitis  -     ALT (SGPT); Future; Expected date: 06/27/2025  -     AST (SGOT); Future; Expected date: 06/27/2025  -     CBC Auto Differential; Future; Expected date: 06/27/2025  -     C-Reactive Protein; Future; Expected date: 06/27/2025  -     Creatinine, Serum; Future; Expected date: 06/27/2025  -     Sedimentation rate; Future; Expected date: 06/27/2025    High risk medications (not anticoagulants) long-term use  -     ALT (SGPT); Future; Expected date: 06/27/2025  -     AST (SGOT); Future; Expected date: 06/27/2025  -     CBC Auto Differential; Future; Expected date: 06/27/2025  -     C-Reactive Protein; Future; Expected date: 06/27/2025  -     Creatinine, Serum; Future; Expected date: 06/27/2025  -     Sedimentation rate; Future; Expected date: 06/27/2025      Patient is a 83-year-old female she has had a recent robust inflammatory reaction within the sinus cavity as well as hearing deficit following a sinus surgery.  She has been repeated cultured postoperatively completed antibiotics and irrigations negative for any fungal infections or bacterial infections.   Patient was doing well on MTX with regard to ESR and CRP, but hair loss.      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               Completed RTX 11/5/21 Induction               Review of data and feel : The best data supporting the use of  as maintenance therapy come from the Maintenance of Remission using Rituximab in Systemic ANCA-associated Vasculitis (MAINRITSAN) trial with less relapse for PR3+ paitients when treated with Rituxan at 500mg IV at 6, 12, and 18months when compared to Imuran. Patient has tolerated RTX last 4/20/2022, she had to cancel 10/21/2022 for COVID. Did  receive 500mg on 12/2022.  Patient completed 18 months of Rituxan therapy.     Patient on new dosage of Rituxan as of 6/2025 of 1gm 14 days apart every 6 months (we had to pull current cycle to 4months due to suspected GPA activity.  Hopefully we can go back to q 6 months.     Labs to be done with next infusion 7/7/25  Start lexapro.       Continue omeprazole (prilosec)  40mg by mouth daily.       Continues with Calcium and Vitamin D.      40min consultation with greater than 50% of that time included Preparing to see the patient (review records, tests), Obtaining and/or reviewing separately obtained historical data, Performing a medically appropriate examination and/or evaluation , Ordering medications, tests, and/or procedures, Referring and communicating with other healthcare professionals , Documenting clinical information in the electronic or other health record and Independently interpreting results  (as warranted) & communicating results to the patient/family/caregiver. All questions answered.

## 2025-07-03 ENCOUNTER — TELEPHONE (OUTPATIENT)
Dept: RHEUMATOLOGY | Facility: CLINIC | Age: 85
End: 2025-07-03
Payer: MEDICARE

## 2025-07-07 ENCOUNTER — RESULTS FOLLOW-UP (OUTPATIENT)
Dept: RHEUMATOLOGY | Facility: CLINIC | Age: 85
End: 2025-07-07

## 2025-07-07 ENCOUNTER — LAB VISIT (OUTPATIENT)
Dept: LAB | Facility: HOSPITAL | Age: 85
End: 2025-07-07
Attending: INTERNAL MEDICINE
Payer: MEDICARE

## 2025-07-07 ENCOUNTER — TELEPHONE (OUTPATIENT)
Dept: RHEUMATOLOGY | Facility: CLINIC | Age: 85
End: 2025-07-07
Payer: MEDICARE

## 2025-07-07 ENCOUNTER — INFUSION (OUTPATIENT)
Dept: INFUSION THERAPY | Facility: HOSPITAL | Age: 85
End: 2025-07-07
Attending: INTERNAL MEDICINE
Payer: MEDICARE

## 2025-07-07 VITALS
WEIGHT: 154.56 LBS | DIASTOLIC BLOOD PRESSURE: 66 MMHG | SYSTOLIC BLOOD PRESSURE: 131 MMHG | HEART RATE: 60 BPM | RESPIRATION RATE: 18 BRPM | HEIGHT: 62 IN | TEMPERATURE: 99 F | BODY MASS INDEX: 28.44 KG/M2 | OXYGEN SATURATION: 99 %

## 2025-07-07 DIAGNOSIS — Z79.899 IMMUNOSUPPRESSION DUE TO DRUG THERAPY: ICD-10-CM

## 2025-07-07 DIAGNOSIS — I77.82 ANCA-ASSOCIATED VASCULITIS: ICD-10-CM

## 2025-07-07 DIAGNOSIS — Z79.899 HIGH RISK MEDICATIONS (NOT ANTICOAGULANTS) LONG-TERM USE: ICD-10-CM

## 2025-07-07 DIAGNOSIS — D84.821 IMMUNOSUPPRESSION DUE TO DRUG THERAPY: ICD-10-CM

## 2025-07-07 DIAGNOSIS — I77.6 VASCULITIS: Primary | ICD-10-CM

## 2025-07-07 DIAGNOSIS — M31.31 WEGENER'S GRANULOMATOSIS WITH RENAL INVOLVEMENT: ICD-10-CM

## 2025-07-07 LAB
ABSOLUTE EOSINOPHIL (OHS): 0.3 K/UL
ABSOLUTE MONOCYTE (OHS): 1.12 K/UL (ref 0.3–1)
ABSOLUTE NEUTROPHIL COUNT (OHS): 4.49 K/UL (ref 1.8–7.7)
ALT SERPL W/O P-5'-P-CCNC: 18 UNIT/L (ref 10–44)
AST SERPL-CCNC: 24 UNIT/L (ref 11–45)
BASOPHILS # BLD AUTO: 0.03 K/UL
BASOPHILS NFR BLD AUTO: 0.4 %
CREAT SERPL-MCNC: 1.2 MG/DL (ref 0.5–1.4)
CRP SERPL-MCNC: 10.4 MG/L
ERYTHROCYTE [DISTWIDTH] IN BLOOD BY AUTOMATED COUNT: 16.3 % (ref 11.5–14.5)
ERYTHROCYTE [SEDIMENTATION RATE] IN BLOOD BY PHOTOMETRIC METHOD: 65 MM/HR
GFR SERPLBLD CREATININE-BSD FMLA CKD-EPI: 45 ML/MIN/1.73/M2
HCT VFR BLD AUTO: 32.8 % (ref 37–48.5)
HGB BLD-MCNC: 10.8 GM/DL (ref 12–16)
IMM GRANULOCYTES # BLD AUTO: 0.08 K/UL (ref 0–0.04)
IMM GRANULOCYTES NFR BLD AUTO: 1.2 % (ref 0–0.5)
LYMPHOCYTES # BLD AUTO: 0.78 K/UL (ref 1–4.8)
MCH RBC QN AUTO: 29.1 PG (ref 27–31)
MCHC RBC AUTO-ENTMCNC: 32.9 G/DL (ref 32–36)
MCV RBC AUTO: 88 FL (ref 82–98)
NUCLEATED RBC (/100WBC) (OHS): 0 /100 WBC
PLATELET # BLD AUTO: 308 K/UL (ref 150–450)
PMV BLD AUTO: 8.7 FL (ref 9.2–12.9)
RBC # BLD AUTO: 3.71 M/UL (ref 4–5.4)
RELATIVE EOSINOPHIL (OHS): 4.4 %
RELATIVE LYMPHOCYTE (OHS): 11.5 % (ref 18–48)
RELATIVE MONOCYTE (OHS): 16.5 % (ref 4–15)
RELATIVE NEUTROPHIL (OHS): 66 % (ref 38–73)
WBC # BLD AUTO: 6.8 K/UL (ref 3.9–12.7)

## 2025-07-07 PROCEDURE — 96367 TX/PROPH/DG ADDL SEQ IV INF: CPT | Mod: PN

## 2025-07-07 PROCEDURE — 25000003 PHARM REV CODE 250: Mod: PN

## 2025-07-07 PROCEDURE — 84450 TRANSFERASE (AST) (SGOT): CPT | Mod: PN

## 2025-07-07 PROCEDURE — 36415 COLL VENOUS BLD VENIPUNCTURE: CPT | Mod: PN

## 2025-07-07 PROCEDURE — 85025 COMPLETE CBC W/AUTO DIFF WBC: CPT | Mod: PN

## 2025-07-07 PROCEDURE — 84460 ALANINE AMINO (ALT) (SGPT): CPT | Mod: PN

## 2025-07-07 PROCEDURE — 82565 ASSAY OF CREATININE: CPT | Mod: PN

## 2025-07-07 PROCEDURE — 63600175 PHARM REV CODE 636 W HCPCS: Mod: PN

## 2025-07-07 PROCEDURE — 96413 CHEMO IV INFUSION 1 HR: CPT | Mod: PN

## 2025-07-07 PROCEDURE — 85652 RBC SED RATE AUTOMATED: CPT

## 2025-07-07 PROCEDURE — 63600175 PHARM REV CODE 636 W HCPCS: Mod: JZ,TB,PN | Performed by: INTERNAL MEDICINE

## 2025-07-07 PROCEDURE — 96415 CHEMO IV INFUSION ADDL HR: CPT | Mod: PN

## 2025-07-07 PROCEDURE — 25000003 PHARM REV CODE 250: Mod: PN | Performed by: INTERNAL MEDICINE

## 2025-07-07 PROCEDURE — 96375 TX/PRO/DX INJ NEW DRUG ADDON: CPT | Mod: PN

## 2025-07-07 PROCEDURE — 86140 C-REACTIVE PROTEIN: CPT

## 2025-07-07 RX ORDER — ACETAMINOPHEN 325 MG/1
TABLET ORAL
Status: COMPLETED
Start: 2025-07-07 | End: 2025-07-07

## 2025-07-07 RX ORDER — FAMOTIDINE 10 MG/ML
20 INJECTION, SOLUTION INTRAVENOUS
Status: CANCELLED | OUTPATIENT
Start: 2025-07-07

## 2025-07-07 RX ORDER — HEPARIN 100 UNIT/ML
500 SYRINGE INTRAVENOUS
OUTPATIENT
Start: 2025-07-21

## 2025-07-07 RX ORDER — EPINEPHRINE 0.3 MG/.3ML
0.3 INJECTION SUBCUTANEOUS ONCE AS NEEDED
OUTPATIENT
Start: 2025-07-21

## 2025-07-07 RX ORDER — SODIUM CHLORIDE 0.9 % (FLUSH) 0.9 %
10 SYRINGE (ML) INJECTION
OUTPATIENT
Start: 2025-07-21

## 2025-07-07 RX ORDER — FAMOTIDINE 10 MG/ML
INJECTION, SOLUTION INTRAVENOUS
Status: COMPLETED
Start: 2025-07-07 | End: 2025-07-07

## 2025-07-07 RX ORDER — FAMOTIDINE 10 MG/ML
20 INJECTION, SOLUTION INTRAVENOUS
OUTPATIENT
Start: 2025-07-21

## 2025-07-07 RX ORDER — ACETAMINOPHEN 325 MG/1
650 TABLET ORAL
Status: CANCELLED | OUTPATIENT
Start: 2025-07-07

## 2025-07-07 RX ORDER — FAMOTIDINE 10 MG/ML
20 INJECTION, SOLUTION INTRAVENOUS
Status: COMPLETED | OUTPATIENT
Start: 2025-07-07 | End: 2025-07-07

## 2025-07-07 RX ORDER — DIPHENHYDRAMINE HYDROCHLORIDE 50 MG/ML
50 INJECTION, SOLUTION INTRAMUSCULAR; INTRAVENOUS ONCE AS NEEDED
OUTPATIENT
Start: 2025-07-21

## 2025-07-07 RX ORDER — METHYLPREDNISOLONE SOD SUCC 125 MG
60 VIAL (EA) INJECTION
Status: COMPLETED | OUTPATIENT
Start: 2025-07-07 | End: 2025-07-07

## 2025-07-07 RX ORDER — ACETAMINOPHEN 325 MG/1
650 TABLET ORAL
Status: COMPLETED | OUTPATIENT
Start: 2025-07-07 | End: 2025-07-07

## 2025-07-07 RX ORDER — ACETAMINOPHEN 325 MG/1
650 TABLET ORAL
OUTPATIENT
Start: 2025-07-21

## 2025-07-07 RX ADMIN — ACETAMINOPHEN 650 MG: 325 TABLET ORAL at 11:07

## 2025-07-07 RX ADMIN — FAMOTIDINE 20 MG: 10 INJECTION, SOLUTION INTRAVENOUS at 10:07

## 2025-07-07 RX ADMIN — FAMOTIDINE 20 MG: 10 INJECTION INTRAVENOUS at 10:07

## 2025-07-07 RX ADMIN — SODIUM CHLORIDE: 9 INJECTION, SOLUTION INTRAVENOUS at 09:07

## 2025-07-07 RX ADMIN — DIPHENHYDRAMINE HYDROCHLORIDE 50 MG: 50 INJECTION INTRAMUSCULAR; INTRAVENOUS at 09:07

## 2025-07-07 RX ADMIN — SODIUM CHLORIDE 1000 MG: 9 INJECTION, SOLUTION INTRAVENOUS at 10:07

## 2025-07-07 RX ADMIN — METHYLPREDNISOLONE SODIUM SUCCINATE 60 MG: 125 INJECTION, POWDER, FOR SOLUTION INTRAMUSCULAR; INTRAVENOUS at 09:07

## 2025-07-07 NOTE — PLAN OF CARE
Problem: Adult Inpatient Plan of Care  Goal: Optimal Comfort and Wellbeing  Outcome: Progressing  Intervention: Monitor Pain and Promote Comfort  Flowsheets (Taken 7/7/2025 1000)  Pain Management Interventions: warm blanket provided     Problem: Fall Injury Risk  Goal: Absence of Fall and Fall-Related Injury  Outcome: Progressing  Intervention: Promote Injury-Free Environment  Flowsheets (Taken 7/7/2025 1000)  Safety Promotion/Fall Prevention: in recliner, wheels locked

## 2025-07-29 ENCOUNTER — TELEPHONE (OUTPATIENT)
Dept: RHEUMATOLOGY | Facility: CLINIC | Age: 85
End: 2025-07-29
Payer: MEDICARE

## 2025-07-29 NOTE — TELEPHONE ENCOUNTER
Copied from CRM #[id number]. Topic: General Inquiry - Return Call  >> Jul 29, 2025 11:15 AM Marquise wrote:  Type:  Patient Returning Call    Who Called:patient  Who Left Message for Patient:nurse  Does the patient know what this is regarding?:please call/ [facility]  Would the patient rather a call back or a response via [facility]?   Best Call Back Number:[phone]  Additional Information:

## 2025-08-04 ENCOUNTER — TELEPHONE (OUTPATIENT)
Dept: HEMATOLOGY/ONCOLOGY | Facility: CLINIC | Age: 85
End: 2025-08-04
Payer: MEDICARE

## 2025-08-04 NOTE — TELEPHONE ENCOUNTER
Copied from CRM #[id number]. Topic: General Inquiry - Patient Advice  >> Aug 4, 2025  1:36 PM Yamile wrote:  Type:  Needs Medical Advice    Who Called: pt    Would the patient rather a call back or a response via [facility]?   Best Call Back Number: [phone]   Additional Information: pt requesting a call back to r/s 8/6 appt

## 2025-08-04 NOTE — TELEPHONE ENCOUNTER
Called and spoke with patient to reschedule appt.  Patient rescheduled to 10/08 at 11AM with Dr. Miller.  Patient VU date/time

## 2025-08-15 ENCOUNTER — LAB VISIT (OUTPATIENT)
Dept: LAB | Facility: HOSPITAL | Age: 85
End: 2025-08-15
Attending: PHYSICIAN ASSISTANT
Payer: MEDICARE

## 2025-08-15 ENCOUNTER — TELEPHONE (OUTPATIENT)
Dept: HEMATOLOGY/ONCOLOGY | Facility: CLINIC | Age: 85
End: 2025-08-15
Payer: MEDICARE

## 2025-08-15 ENCOUNTER — OFFICE VISIT (OUTPATIENT)
Dept: HEMATOLOGY/ONCOLOGY | Facility: CLINIC | Age: 85
End: 2025-08-15
Payer: MEDICARE

## 2025-08-15 VITALS
RESPIRATION RATE: 16 BRPM | BODY MASS INDEX: 27.95 KG/M2 | SYSTOLIC BLOOD PRESSURE: 141 MMHG | WEIGHT: 151.88 LBS | OXYGEN SATURATION: 96 % | HEART RATE: 58 BPM | HEIGHT: 62 IN | TEMPERATURE: 97 F | DIASTOLIC BLOOD PRESSURE: 66 MMHG

## 2025-08-15 DIAGNOSIS — D63.8 ANEMIA OF CHRONIC DISEASE: ICD-10-CM

## 2025-08-15 DIAGNOSIS — M31.31 WEGENER'S GRANULOMATOSIS WITH RENAL INVOLVEMENT: ICD-10-CM

## 2025-08-15 DIAGNOSIS — D64.9 NORMOCYTIC ANEMIA: Primary | ICD-10-CM

## 2025-08-15 DIAGNOSIS — D64.9 NORMOCYTIC ANEMIA: ICD-10-CM

## 2025-08-15 DIAGNOSIS — N18.30 CRF (CHRONIC RENAL FAILURE), STAGE 3 (MODERATE): ICD-10-CM

## 2025-08-15 LAB
ABSOLUTE EOSINOPHIL (OHS): 0.16 K/UL
ABSOLUTE MONOCYTE (OHS): 0.73 K/UL (ref 0.3–1)
ABSOLUTE NEUTROPHIL COUNT (OHS): 5.16 K/UL (ref 1.8–7.7)
ALBUMIN SERPL BCP-MCNC: 3.7 G/DL (ref 3.5–5.2)
ALP SERPL-CCNC: 90 UNIT/L (ref 40–150)
ALT SERPL W/O P-5'-P-CCNC: 16 UNIT/L (ref 10–44)
ANION GAP (OHS): 8 MMOL/L (ref 8–16)
AST SERPL-CCNC: 24 UNIT/L (ref 11–45)
BASOPHILS # BLD AUTO: 0.03 K/UL
BASOPHILS NFR BLD AUTO: 0.4 %
BILIRUB SERPL-MCNC: 0.3 MG/DL (ref 0.1–1)
BUN SERPL-MCNC: 18 MG/DL (ref 8–23)
CALCIUM SERPL-MCNC: 9.6 MG/DL (ref 8.7–10.5)
CHLORIDE SERPL-SCNC: 104 MMOL/L (ref 95–110)
CO2 SERPL-SCNC: 25 MMOL/L (ref 23–29)
CREAT SERPL-MCNC: 1.2 MG/DL (ref 0.5–1.4)
ERYTHROCYTE [DISTWIDTH] IN BLOOD BY AUTOMATED COUNT: 16.2 % (ref 11.5–14.5)
FERRITIN SERPL-MCNC: 47 NG/ML (ref 20–300)
GFR SERPLBLD CREATININE-BSD FMLA CKD-EPI: 45 ML/MIN/1.73/M2
GLUCOSE SERPL-MCNC: 95 MG/DL (ref 70–110)
HCT VFR BLD AUTO: 33 % (ref 37–48.5)
HGB BLD-MCNC: 11 GM/DL (ref 12–16)
IMM GRANULOCYTES # BLD AUTO: 0.04 K/UL (ref 0–0.04)
IMM GRANULOCYTES NFR BLD AUTO: 0.6 % (ref 0–0.5)
IRON SATN MFR SERPL: 16 % (ref 20–50)
IRON SERPL-MCNC: 54 UG/DL (ref 30–160)
LYMPHOCYTES # BLD AUTO: 0.67 K/UL (ref 1–4.8)
MCH RBC QN AUTO: 30 PG (ref 27–31)
MCHC RBC AUTO-ENTMCNC: 33.3 G/DL (ref 32–36)
MCV RBC AUTO: 90 FL (ref 82–98)
NUCLEATED RBC (/100WBC) (OHS): 0 /100 WBC
PLATELET # BLD AUTO: 291 K/UL (ref 150–450)
PMV BLD AUTO: 8.7 FL (ref 9.2–12.9)
POTASSIUM SERPL-SCNC: 4.6 MMOL/L (ref 3.5–5.1)
PROT SERPL-MCNC: 7.1 GM/DL (ref 6–8.4)
RBC # BLD AUTO: 3.67 M/UL (ref 4–5.4)
RELATIVE EOSINOPHIL (OHS): 2.4 %
RELATIVE LYMPHOCYTE (OHS): 9.9 % (ref 18–48)
RELATIVE MONOCYTE (OHS): 10.8 % (ref 4–15)
RELATIVE NEUTROPHIL (OHS): 75.9 % (ref 38–73)
SODIUM SERPL-SCNC: 137 MMOL/L (ref 136–145)
TIBC SERPL-MCNC: 337 UG/DL (ref 250–450)
TRANSFERRIN SERPL-MCNC: 228 MG/DL (ref 200–375)
WBC # BLD AUTO: 6.79 K/UL (ref 3.9–12.7)

## 2025-08-15 PROCEDURE — 36415 COLL VENOUS BLD VENIPUNCTURE: CPT | Mod: PN

## 2025-08-15 PROCEDURE — 86334 IMMUNOFIX E-PHORESIS SERUM: CPT

## 2025-08-15 PROCEDURE — 99999 PR PBB SHADOW E&M-EST. PATIENT-LVL V: CPT | Mod: PBBFAC,,, | Performed by: NURSE PRACTITIONER

## 2025-08-15 PROCEDURE — 85025 COMPLETE CBC W/AUTO DIFF WBC: CPT | Mod: PN

## 2025-08-15 PROCEDURE — 84466 ASSAY OF TRANSFERRIN: CPT

## 2025-08-15 PROCEDURE — 84165 PROTEIN E-PHORESIS SERUM: CPT

## 2025-08-15 PROCEDURE — 82728 ASSAY OF FERRITIN: CPT

## 2025-08-15 PROCEDURE — 82040 ASSAY OF SERUM ALBUMIN: CPT | Mod: PN

## 2025-08-15 RX ORDER — ZINC GLUCONATE 50 MG
50 TABLET ORAL DAILY
COMMUNITY

## 2025-08-18 LAB
ALBUMIN, SPE (OHS): 3.88 G/DL (ref 3.35–5.55)
ALPHA 1 GLOB (OHS): 0.31 GM/DL (ref 0.17–0.41)
ALPHA 2 GLOB (OHS): 0.86 GM/DL (ref 0.43–0.99)
BETA GLOB (OHS): 0.67 GM/DL (ref 0.5–1.1)
GAMMA GLOBULIN (OHS): 0.69 GM/DL (ref 0.67–1.58)
PROT SERPL-MCNC: 6.4 GM/DL (ref 6–8.4)

## 2025-08-19 LAB
PATHOLOGIST INTERPRETATION - IFE SERUM (OHS): NORMAL
PATHOLOGIST REVIEW - SPE (OHS): NORMAL

## 2025-08-28 ENCOUNTER — TELEPHONE (OUTPATIENT)
Dept: RHEUMATOLOGY | Facility: CLINIC | Age: 85
End: 2025-08-28
Payer: MEDICARE